# Patient Record
Sex: FEMALE | Race: OTHER | HISPANIC OR LATINO | Employment: UNEMPLOYED | URBAN - METROPOLITAN AREA
[De-identification: names, ages, dates, MRNs, and addresses within clinical notes are randomized per-mention and may not be internally consistent; named-entity substitution may affect disease eponyms.]

---
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
## 2021-02-09 NOTE — PATIENT INSTRUCTIONS
Vulvovaginal Candidiasis   WHAT YOU NEED TO KNOW:   Vulvovaginal candidiasis, or yeast infection, is a common vaginal infection  Vulvovaginal candidiasis is caused by a fungus, or yeast-like germ  Fungi are normally found in your vagina  When there are too many fungi, it can cause an infection  DISCHARGE INSTRUCTIONS:   Return to the emergency department if:   · You have fever and chills  · You are bleeding from your vagina and it is not your monthly period  · You develop abdominal or pelvic pain  Contact your healthcare provider if:   · Your signs and symptoms get worse, even after treatment  · You have questions or concerns about your condition or care  Medicines:   · Medicines  help treat the fungal infection and decrease inflammation  The medicine may be a pill, topical cream, or vaginal suppository  · Take your medicine as directed  Contact your healthcare provider if you think your medicine is not helping or if you have side effects  Tell him of her if you are allergic to any medicine  Keep a list of the medicines, vitamins, and herbs you take  Include the amounts, and when and why you take them  Bring the list or the pill bottles to follow-up visits  Carry your medicine list with you in case of an emergency  Manage your symptoms:   · Wear cotton underwear  · Keep the vaginal area clean and dry  · Do not have sex until your symptoms are gone  · Do not douche  · Do not use feminine hygiene sprays, powders, or bubble bath  Prevent another infection:   · Take showers instead of baths  · Eat yogurt  · Limit the amount of alcohol you drink'    · Control your blood sugar if you are diabetic  · Limit your time in hot tubs  Follow up with your healthcare provider as directed:  Write down your questions so you remember to ask them during your visits     © 2017 Elvira Lopez Information is for End User's use only and may not be sold, redistributed or otherwise used well developed, well nourished , in no acute distress , ambulating without difficulty , normal communication ability for commercial purposes  All illustrations and images included in CareNotes® are the copyrighted property of A D A M , Inc  or Huan Simon  The above information is an  only  It is not intended as medical advice for individual conditions or treatments  Talk to your doctor, nurse or pharmacist before following any medical regimen to see if it is safe and effective for you

## 2021-07-25 NOTE — PROGRESS NOTES
L&D Triage Note - OB/GYN  Mindy Garza 24 y o  female MRN: [id number]  Unit/Bed#: L&D 329-01 Encounter: [id number]    Patient is seen by EMILY  _________________________________________________________  ASSESSMENT:  _________________________________________________________  Mindy Garza is a 24 y o   at 39w3d latent labor  _________________________________________________________  PLAN:  _________________________________________________________  1) Latent labor   - 3/70/-1, pt comfortable, declines 2 hour recheck as she lives 5 min away and would like to go home and come back when labor pains get worse  2) Continue routine prenatal care  ) Discharge from [facility] triage with term labor precautions    - Reviewed rupture of membranes, false vs true labor, decreased fetal movement, and vaginal bleeding   - Pt to call provider with any concerns and follow up at her next scheduled prenatal appointment, plan is for induction tomorrow night, this may be bumped up to the AM if she does not return in labor today, discussed with patient, I will call her today for an update on her status   - Case discussed with Dr Rodrick Blank  _________________________________________________________  SUBJECTIVE:  _________________________________________________________  Mindy Garza 24 y o   at 39w3d with an Estimated Date of Delivery: 20 who presents for rule out labor  Her contractions started overnight and have worsened but she does not appear to be in distress  She has no other obstetric complaints she is on for elective induction tomorrow night      Contractions: occasional  Leakage of fluid: none  Vaginal Bleeding: none  Fetal movement: present  _________________________________________________________  OBJECTIVE:  _________________________________________________________  Vitals:    20 0550   BP: 129/58   Pulse: 84   Resp: 20   Temp: 98 5 °F (36 9 °C)   SpO2:        ROS:  Constitutional: Negative  Respiratory: Negative  Cardiovascular: Negative    Gastrointestinal: Negative    General Physical Exam:  General: in no apparent distress, well developed and well nourished and non-toxic  Cardiovascular: deferred  Lungs: non-labored breathing  Abdomen: abdomen is soft without significant tenderness, masses, organomegaly or guarding  Lower extremeties: nontender    Cervical Exam  SVE: 3/70/-1    Fetal monitoring:  FHT:  130 bpm/ Moderate 6 - 25 bpm / 15 x 15 accelerations present, no decelerations  [place]: contractions q70-27tka, pt appears comfortable, not in distress     Juwan Alejandra, DO  PGY-4 OB/GYN Resident   8/20/2020 6:38 AM minimum assist (75% patients effort)

## 2021-12-14 ENCOUNTER — HOSPITAL ENCOUNTER (EMERGENCY)
Facility: HOSPITAL | Age: 21
Discharge: HOME | End: 2021-12-14
Attending: EMERGENCY MEDICINE
Payer: COMMERCIAL

## 2021-12-14 VITALS
RESPIRATION RATE: 18 BRPM | SYSTOLIC BLOOD PRESSURE: 108 MMHG | WEIGHT: 196.21 LBS | TEMPERATURE: 97.8 F | OXYGEN SATURATION: 100 % | DIASTOLIC BLOOD PRESSURE: 56 MMHG | BODY MASS INDEX: 34.76 KG/M2 | HEART RATE: 74 BPM

## 2021-12-14 DIAGNOSIS — R10.9 NONSPECIFIC ABDOMINAL PAIN: Primary | ICD-10-CM

## 2021-12-14 LAB
BACTERIA UR QL AUTO: ABNORMAL /HPF
BILIRUB UR QL STRIP: NEGATIVE
CLARITY UR: CLEAR
COLOR UR: YELLOW
EXT PREG TEST URINE: NEGATIVE
EXT. CONTROL ED NAV: NORMAL
GLUCOSE UR STRIP-MCNC: NEGATIVE MG/DL
HGB UR QL STRIP.AUTO: ABNORMAL
KETONES UR STRIP-MCNC: ABNORMAL MG/DL
LEUKOCYTE ESTERASE UR QL STRIP: NEGATIVE
NITRITE UR QL STRIP: NEGATIVE
NON-SQ EPI CELLS URNS QL MICRO: ABNORMAL /HPF
PH UR STRIP.AUTO: 5.5 [PH] (ref 4.5–8)
PROT UR STRIP-MCNC: ABNORMAL MG/DL
RBC #/AREA URNS AUTO: ABNORMAL /HPF
SP GR UR STRIP.AUTO: >=1.03 (ref 1–1.03)
UROBILINOGEN UR QL STRIP.AUTO: 0.2 E.U./DL
WBC #/AREA URNS AUTO: ABNORMAL /HPF

## 2021-12-14 PROCEDURE — 81025 URINE PREGNANCY TEST: CPT

## 2021-12-14 PROCEDURE — 81001 URINALYSIS AUTO W/SCOPE: CPT

## 2021-12-14 PROCEDURE — 99284 EMERGENCY DEPT VISIT MOD MDM: CPT

## 2021-12-14 PROCEDURE — 99284 EMERGENCY DEPT VISIT MOD MDM: CPT | Performed by: EMERGENCY MEDICINE

## 2022-01-05 DIAGNOSIS — Z11.59 SCREENING FOR VIRAL DISEASE: Primary | ICD-10-CM

## 2022-01-05 PROCEDURE — U0005 INFEC AGEN DETEC AMPLI PROBE: HCPCS | Performed by: INTERNAL MEDICINE

## 2022-01-05 PROCEDURE — U0003 INFECTIOUS AGENT DETECTION BY NUCLEIC ACID (DNA OR RNA); SEVERE ACUTE RESPIRATORY SYNDROME CORONAVIRUS 2 (SARS-COV-2) (CORONAVIRUS DISEASE [COVID-19]), AMPLIFIED PROBE TECHNIQUE, MAKING USE OF HIGH THROUGHPUT TECHNOLOGIES AS DESCRIBED BY CMS-2020-01-R: HCPCS | Performed by: INTERNAL MEDICINE

## 2022-01-06 ENCOUNTER — HOSPITAL ENCOUNTER (EMERGENCY)
Facility: HOSPITAL | Age: 22
Discharge: HOME | End: 2022-01-06
Attending: EMERGENCY MEDICINE
Payer: MEDICARE

## 2022-01-06 VITALS
HEART RATE: 88 BPM | DIASTOLIC BLOOD PRESSURE: 107 MMHG | SYSTOLIC BLOOD PRESSURE: 135 MMHG | WEIGHT: 197.1 LBS | RESPIRATION RATE: 18 BRPM | TEMPERATURE: 96.5 F | BODY MASS INDEX: 34.91 KG/M2 | OXYGEN SATURATION: 100 %

## 2022-01-06 DIAGNOSIS — K08.89 DENTALGIA: Primary | ICD-10-CM

## 2022-01-06 LAB
EXT PREG TEST URINE: NEGATIVE
EXT. CONTROL ED NAV: NORMAL

## 2022-01-06 PROCEDURE — 99284 EMERGENCY DEPT VISIT MOD MDM: CPT | Performed by: PHYSICIAN ASSISTANT

## 2022-01-06 PROCEDURE — 99283 EMERGENCY DEPT VISIT LOW MDM: CPT

## 2022-01-06 PROCEDURE — 81025 URINE PREGNANCY TEST: CPT | Performed by: PHYSICIAN ASSISTANT

## 2022-01-06 PROCEDURE — 96372 THER/PROPH/DIAG INJ SC/IM: CPT

## 2022-01-06 RX ORDER — PENICILLIN V POTASSIUM 500 MG/1
500 TABLET ORAL 4 TIMES DAILY
Qty: 28 TABLET | Refills: 0 | Status: SHIPPED | OUTPATIENT
Start: 2022-01-06 | End: 2022-01-13

## 2022-01-06 RX ORDER — KETOROLAC TROMETHAMINE 10 MG/1
10 TABLET, FILM COATED ORAL EVERY 6 HOURS PRN
Qty: 20 TABLET | Refills: 0 | Status: SHIPPED | OUTPATIENT
Start: 2022-01-06

## 2022-01-06 RX ORDER — PENICILLIN V POTASSIUM 250 MG/1
500 TABLET ORAL ONCE
Status: COMPLETED | OUTPATIENT
Start: 2022-01-06 | End: 2022-01-06

## 2022-01-06 RX ORDER — KETOROLAC TROMETHAMINE 30 MG/ML
15 INJECTION, SOLUTION INTRAMUSCULAR; INTRAVENOUS ONCE
Status: COMPLETED | OUTPATIENT
Start: 2022-01-06 | End: 2022-01-06

## 2022-01-06 RX ADMIN — PENICILLIN V POTASSIUM 500 MG: 250 TABLET, FILM COATED ORAL at 04:22

## 2022-01-06 RX ADMIN — KETOROLAC TROMETHAMINE 15 MG: 30 INJECTION, SOLUTION INTRAMUSCULAR; INTRAVENOUS at 04:23

## 2022-01-06 NOTE — ED PROVIDER NOTES
History  Chief Complaint   Patient presents with    Dental Pain     Pt reports right sided dental pain and headache  Tearful in triage   Headache     Patient presents for an evaluation of R upper dental pain ongoing for the past week  Patient has a dentist appointment later today at 0930  States she has tried using Tylenol and ibuprofen at home with little relief  States pain radiates up R side of her face  Denies any fevers, drooling, swelling, abdominal pain, vomiting, throat pain  No other complaints  Prior to Admission Medications   Prescriptions Last Dose Informant Patient Reported? Taking?   etonogestrel (NEXPLANON) subdermal implant   Yes No   Si mg by Subdermal route once      Facility-Administered Medications: None       Past Medical History:   Diagnosis Date    Depression        Past Surgical History:   Procedure Laterality Date    MT  DELIVERY ONLY N/A 2020    Procedure:  SECTION (); Surgeon: Delphine Miller MD;  [facility];  Service: Obstetrics       Family History   Problem Relation Age of Onset    No Known Problems Mother     No Known Problems Father     No Known Problems Sister     No Known Problems Brother     Diabetes Neg Hx     Cancer Neg Hx      I have reviewed and agree with the history as documented  E-Cigarette/Vaping    E-Cigarette Use Never User      E-Cigarette/Vaping Substances    Nicotine No     THC No     CBD No     Flavoring No     Other No     Unknown No      Social History     Tobacco Use    Smoking status: Current Every Day Smoker     Types: Pipe    Smokeless tobacco: Never Used   Vaping Use    Vaping Use: Never used   Substance Use Topics    Alcohol use: Not Currently     Comment: "sometimes"    Drug use: Never       Review of Systems   Constitutional: Negative for chills and fever  HENT: Positive for dental problem  Negative for congestion, drooling, ear pain, facial swelling and sore throat      Eyes: Negative for pain  Respiratory: Negative for cough and shortness of breath  Cardiovascular: Negative for chest pain  Gastrointestinal: Negative for abdominal pain, nausea and vomiting  Genitourinary: Negative for dysuria  Musculoskeletal: Negative for back pain  Skin: Negative for rash  Neurological: Negative for dizziness and numbness  Psychiatric/Behavioral: Negative for suicidal ideas  All other systems reviewed and are negative  Physical Exam  Physical Exam  Vitals reviewed  Constitutional:       Appearance: She is well-developed  HENT:      Head: Normocephalic and atraumatic  Right Ear: External ear normal       Left Ear: External ear normal       Nose: Nose normal       Mouth/Throat:      Mouth: Mucous membranes are moist       Dentition: Dental tenderness and gingival swelling present  No dental abscesses  Pharynx: Oropharynx is clear  No oropharyngeal exudate or posterior oropharyngeal erythema  Comments: No obvious dental caries  No abscess appreciated  Mild gingival swelling noted  Cardiovascular:      Rate and Rhythm: Normal rate and regular rhythm  Heart sounds: Normal heart sounds  Pulmonary:      Effort: Pulmonary effort is normal       Breath sounds: Normal breath sounds  Abdominal:      General: Bowel sounds are normal       Palpations: Abdomen is soft  Tenderness: There is no abdominal tenderness  Musculoskeletal:         General: Normal range of motion  Cervical back: Normal range of motion and neck supple  Skin:     General: Skin is warm and dry  Capillary Refill: Capillary refill takes less than 2 seconds  Neurological:      Mental Status: She is alert and oriented to person, place, and time     Psychiatric:         Behavior: Behavior normal          Vital Signs  ED Triage Vitals   Temperature Pulse Respirations Blood Pressure SpO2   01/06/22 0408 01/06/22 0408 01/06/22 0408 01/06/22 0408 01/06/22 0408   (!) 96 5 °F (35 8 °C) 88 18 (!) 135/107 100 %      Temp Source Heart Rate Source Patient Position - Orthostatic VS BP Location FiO2 (%)   01/06/22 0408 01/06/22 0408 01/06/22 0408 01/06/22 0408 --   Tympanic Monitor Lying Left arm       Pain Score       01/06/22 0423       10 - Worst Possible Pain           Vitals:    01/06/22 0408   BP: (!) 135/107   Pulse: 88   Patient Position - Orthostatic VS: Lying         Visual Acuity      ED Medications  Medications   ketorolac (TORADOL) injection 15 mg (15 mg Intramuscular Given 1/6/22 0423)   penicillin V potassium (VEETID) tablet 500 mg (500 mg Oral Given 1/6/22 0422)       Diagnostic Studies  Results Reviewed     Procedure Component Value Units Date/Time    POCT pregnancy, urine [[id number]]  (Normal) Resulted: 01/06/22 0423    Lab Status: Final result Updated: 01/06/22 0423     EXT PREG TEST UR (Ref: Negative) negative     Control valid                 No orders to display              Procedures  Procedures         ED Course                                             MDM  Number of Diagnoses or Management Options  Dentalgia  Diagnosis management comments: Patient has dental appointment later this morning  Emphasized importance of making appointment  Will DC with Toradol and penicillin  Patient agreeable      Disposition  Final diagnoses:   Ammon Rajan     Time reflects when diagnosis was documented in both MDM as applicable and the Disposition within this note     Time User Action Codes Description Comment    1/6/2022  4:23 AM Franco Ramirez Add [K08 89] Ammon Rajan       ED Disposition     ED Disposition Condition Date/Time Comment    Discharge Stable u Jan 6, 2022  4:23 AM Narcisa Medicmalik discharge to home/self care              Follow-up Information     Follow up With Specialties Details Why [address]    [address]  [place] [id number]  [phone]          Patient's Medications   Discharge Prescriptions    KETOROLAC (TORADOL) 10 MG TABLET    Take 1 tablet (10 mg total) by mouth every 6 (six) hours as needed for moderate pain       Start Date: 1/6/2022  End Date: --       Order Dose: 10 mg       Quantity: 20 tablet    Refills: 0    PENICILLIN V POTASSIUM (VEETID) 500 MG TABLET    Take 1 tablet (500 mg total) by mouth 4 (four) times a day for 7 days       Start Date: 1/6/2022  End Date: 1/13/2022       Order Dose: 500 mg       Quantity: 28 tablet    Refills: 0       No discharge procedures on file      PDMP Review     None          ED Provider  Electronically Signed by           Prince Virgilio PA-C  01/06/22 9838

## 2022-01-06 NOTE — DISCHARGE INSTRUCTIONS
Please follow up with your dentist appointment later this morning  Use medication as prescribed  Please return to the ER with any worsening symptoms

## 2023-05-16 ENCOUNTER — APPOINTMENT (EMERGENCY)
Dept: ULTRASOUND IMAGING | Facility: HOSPITAL | Age: 23
End: 2023-05-16

## 2023-05-16 ENCOUNTER — HOSPITAL ENCOUNTER (EMERGENCY)
Facility: HOSPITAL | Age: 23
Discharge: HOME | End: 2023-05-17
Attending: EMERGENCY MEDICINE

## 2023-05-16 VITALS
BODY MASS INDEX: 33.66 KG/M2 | WEIGHT: 190 LBS | TEMPERATURE: 98.4 F | SYSTOLIC BLOOD PRESSURE: 109 MMHG | HEART RATE: 87 BPM | RESPIRATION RATE: 16 BRPM | DIASTOLIC BLOOD PRESSURE: 62 MMHG | OXYGEN SATURATION: 100 %

## 2023-05-16 DIAGNOSIS — O23.41 URINARY TRACT INFECTION IN MOTHER DURING FIRST TRIMESTER OF PREGNANCY: ICD-10-CM

## 2023-05-16 DIAGNOSIS — M54.9 BACK PAIN: ICD-10-CM

## 2023-05-16 DIAGNOSIS — Z32.01 POSITIVE PREGNANCY TEST: Primary | ICD-10-CM

## 2023-05-16 DIAGNOSIS — R10.30 LOWER ABDOMINAL PAIN: ICD-10-CM

## 2023-05-16 LAB
B-HCG SERPL-ACNC: 2755 MIU/ML (ref 0–5)
BACTERIA UR QL AUTO: ABNORMAL /HPF
BILIRUB UR QL STRIP: NEGATIVE
BUDDING YEAST: PRESENT
CAOX CRY URNS QL MICRO: ABNORMAL /HPF
CLARITY UR: ABNORMAL
COLOR UR: YELLOW
EXT PREGNANCY TEST URINE: POSITIVE
EXT. CONTROL: ABNORMAL
GLUCOSE UR STRIP-MCNC: NEGATIVE MG/DL
HGB UR QL STRIP.AUTO: ABNORMAL
KETONES UR STRIP-MCNC: NEGATIVE MG/DL
LEUKOCYTE ESTERASE UR QL STRIP: ABNORMAL
MUCOUS THREADS UR QL AUTO: ABNORMAL
NITRITE UR QL STRIP: NEGATIVE
NON-SQ EPI CELLS URNS QL MICRO: ABNORMAL /HPF
PH UR STRIP.AUTO: 7 [PH] (ref 4.5–8)
PROT UR STRIP-MCNC: ABNORMAL MG/DL
RBC #/AREA URNS AUTO: ABNORMAL /HPF
SP GR UR STRIP.AUTO: 1.02 (ref 1–1.03)
UROBILINOGEN UR QL STRIP.AUTO: 0.2 E.U./DL
WBC #/AREA URNS AUTO: ABNORMAL /HPF

## 2023-05-16 RX ORDER — CEPHALEXIN 250 MG/1
500 CAPSULE ORAL ONCE
Status: COMPLETED | OUTPATIENT
Start: 2023-05-16 | End: 2023-05-16

## 2023-05-16 RX ORDER — KETOROLAC TROMETHAMINE 30 MG/ML
15 INJECTION, SOLUTION INTRAMUSCULAR; INTRAVENOUS ONCE
Status: DISCONTINUED | OUTPATIENT
Start: 2023-05-16 | End: 2023-05-17 | Stop reason: HOSPADM

## 2023-05-16 RX ORDER — CEPHALEXIN 500 MG/1
500 CAPSULE ORAL EVERY 12 HOURS SCHEDULED
Qty: 14 CAPSULE | Refills: 0 | Status: SHIPPED | OUTPATIENT
Start: 2023-05-16 | End: 2023-05-23

## 2023-05-16 RX ORDER — ACETAMINOPHEN 325 MG/1
650 TABLET ORAL ONCE
Status: COMPLETED | OUTPATIENT
Start: 2023-05-16 | End: 2023-05-16

## 2023-05-16 RX ADMIN — CEPHALEXIN 500 MG: 250 CAPSULE ORAL at 23:51

## 2023-05-16 RX ADMIN — ACETAMINOPHEN 325MG 325 MG: 325 TABLET ORAL at 21:40

## 2023-05-16 NOTE — Clinical Note
Daja Landis was seen and treated in our emergency department on 5/16/2023  No restrictions            Diagnosis: Abdominal Pain    Grismerlin  may return to work on return date  She may return on this date: 05/18/2023         If you have any questions or concerns, please don't hesitate to call        Josseline Garcia PA-C    ______________________________           _______________          _______________  Hospital Representative                              Date                                Time

## 2023-05-17 NOTE — ED PROVIDER NOTES
History  Chief Complaint   Patient presents with   • Abdominal Pain     Pt c/o bilateral lower abd pain and HA x2 days  Denies N/V/D     This is a 81-year-old female presenting for evaluation of bilateral lower abdominal pain, low back pain and headache x2 days  Patient denies any fevers or chills, no nausea or vomiting  She denies any diarrhea or constipation  She denies any trauma or injury to the areas  She has not taken anything for pain  She endorses dysuria and frequency, no urgency or hematuria  She denies any vaginal discharge or bleeding, no concerns for STDs  LMP was 2023  Patient states headache feels similar to headaches patient has had in the past   No URI symptoms  No visual changes, no N/T/W  History provided by:  Patient   used: No        Prior to Admission Medications   Prescriptions Last Dose Informant Patient Reported? Taking?   etonogestrel (NEXPLANON) subdermal implant   Yes No   Si mg by Subdermal route once   ketorolac (TORADOL) 10 mg tablet   No No   Sig: Take 1 tablet (10 mg total) by mouth every 6 (six) hours as needed for moderate pain      Facility-Administered Medications: None       Past Medical History:   Diagnosis Date   • Depression        Past Surgical History:   Procedure Laterality Date   • [place]  DELIVERY ONLY N/A 2020    Procedure:  SECTION (); Surgeon: Cammie Kirkpatrick MD;  Location: [facility];  Service: Obstetrics       Family History   Problem Relation Age of Onset   • No Known Problems Mother    • No Known Problems Father    • No Known Problems Sister    • No Known Problems Brother    • Diabetes Neg Hx    • Cancer Neg Hx      I have reviewed and agree with the history as documented      E-Cigarette/Vaping   • E-Cigarette Use Never User      E-Cigarette/Vaping Substances   • Nicotine No    • THC No    • CBD No    • Flavoring No    • Other No    • Unknown No      Social History     Tobacco Use   • Smoking status: "Some Days     Types: Pipe   • Smokeless tobacco: Never   Vaping Use   • Vaping Use: Never used   Substance Use Topics   • Alcohol use: Not Currently     Comment: \"sometimes\"   • Drug use: Never       Review of Systems   Constitutional: Negative for chills and fever  Gastrointestinal: Positive for abdominal pain  Negative for diarrhea, nausea and vomiting  Genitourinary: Positive for dysuria and frequency  Negative for vaginal bleeding and vaginal discharge  All other systems reviewed and are negative  Physical Exam  Physical Exam  Vitals reviewed  Constitutional:       General: She is not in acute distress  Appearance: Normal appearance  She is well-developed and well-groomed  She is not ill-appearing, toxic-appearing or diaphoretic  HENT:      Head: Normocephalic and atraumatic  Right Ear: External ear normal       Left Ear: External ear normal       Nose: Nose normal  No congestion or rhinorrhea  Mouth/Throat:      Mouth: Mucous membranes are moist       Pharynx: Oropharynx is clear  No oropharyngeal exudate or posterior oropharyngeal erythema  Eyes:      General: No scleral icterus  Right eye: No discharge  Left eye: No discharge  Extraocular Movements: Extraocular movements intact  Conjunctiva/sclera: Conjunctivae normal    Cardiovascular:      Rate and Rhythm: Normal rate and regular rhythm  Pulses: Normal pulses  Heart sounds: No murmur heard  No friction rub  No gallop  Pulmonary:      Effort: Pulmonary effort is normal  No respiratory distress  Breath sounds: Normal breath sounds  No wheezing, rhonchi or rales  Abdominal:      General: Abdomen is flat  There is no distension  Palpations: Abdomen is soft  Tenderness: There is abdominal tenderness in the suprapubic area  There is no right CVA tenderness, left CVA tenderness, guarding or rebound  Musculoskeletal:         General: No deformity  Normal range of motion        " Cervical back: Normal, normal range of motion and neck supple  Thoracic back: Normal       Lumbar back: Normal  No swelling, spasms, tenderness or bony tenderness  Normal range of motion  Negative right straight leg raise test and negative left straight leg raise test    Skin:     General: Skin is warm and dry  Coloration: Skin is not jaundiced or pale  Findings: No rash  Neurological:      General: No focal deficit present  Mental Status: She is alert  Psychiatric:         Mood and Affect: Mood normal          Behavior: Behavior normal  Behavior is cooperative  Vital Signs  ED Triage Vitals [05/16/23 2003]   Temperature Pulse Respirations Blood Pressure SpO2   98 4 °F (36 9 °C) 84 18 128/73 100 %      Temp Source Heart Rate Source Patient Position - Orthostatic VS BP Location FiO2 (%)   Oral Monitor Sitting Right arm --      Pain Score       --           Vitals:    05/16/23 2003 05/16/23 2207   BP: 128/73 109/62   Pulse: 84 87   Patient Position - Orthostatic VS: Sitting Lying         Visual Acuity      ED Medications  Medications   acetaminophen (TYLENOL) tablet 650 mg (325 mg Oral Given 5/16/23 2140)   cephalexin (KEFLEX) capsule 500 mg (500 mg Oral Given 5/16/23 2351)       Diagnostic Studies  Results Reviewed     Procedure Component Value Units Date/Time    Urine Microscopic [[id number]]  (Abnormal) Collected: 05/16/23 2047    Lab Status: Final result Specimen: Urine, Clean Catch Updated: 05/16/23 2331     RBC, UA 20-30 /hpf      WBC, UA Innumerable /hpf      Epithelial Cells Occasional /hpf      Bacteria, UA Occasional /hpf      MUCUS THREADS Occasional     Ca Oxalate Iesha, UA Occasional /hpf      Budding Yeast Present    Urine culture [[id number]] Collected: 05/16/23 2047    Lab Status:  In process Specimen: Urine, Clean Catch Updated: 05/16/23 2330    hCG, quantitative [[id number]]  (Abnormal) Collected: 05/16/23 2151    Lab Status: Final result Specimen: Blood from Arm, Right Updated: 23     HCG, Quant 2,755 mIU/mL     Narrative:       Expected Ranges:    HCG results between 5 and 25 mIU/mL may be indicative of early pregnancy but should be interpreted in light of the total clinical presentation  HCG can rise to detectable levels in bull and post menopausal women (0-11 6 mIU/mL  Approximate               Approximate HCG  Gestation age          Concentration ( mIU/mL)  _____________          ______________________   Carolina Blow                      HCG values  0 2-1                       5-50  1-2                           2-3                         100-5000  3-4                         500-34686  4-5                         1000-59601  5-6                         [id number]  6-8                         05509-670906  8-12                        [id number]      POCT pregnancy, urine [[id number]]  (Abnormal) Resulted: 23    Lab Status: Final result Updated: 23     EXT Preg Test, Ur Positive     Control Valid    Urine Macroscopic, POC [[id number]]  (Abnormal) Collected: 23    Lab Status: Final result Specimen: Urine Updated: 23     Color, UA Yellow     Clarity, UA Slightly Cloudy     pH, UA 7 0     Leukocytes, UA Small     Nitrite, UA Negative     Protein, UA Trace mg/dl      Glucose, UA Negative mg/dl      Ketones, UA Negative mg/dl      Urobilinogen, UA 0 2 E U /dl      Bilirubin, UA Negative     Occult Blood, UA Moderate     Specific [place], UA 1 020    Narrative:      CLINITEK RESULT                 US OB < 14 weeks with transvaginal   Final Result by Ashely Wallis MD ([id number])      No intrauterine gestation or adnexal mass identified  Differential remains early IUP, spontaneous  and ectopic pregnancy  Correlate with serial quantitative BHCG              Workstation performed: [id number]                    Procedures  Procedures         ED Course  ED Course as of 237   Tue May 16, 2023   2053 Leukocytes, UA(!): Small    Blood, UA(!): Moderate    Clarity, UA: Slightly Cloudy    LMP 2141 PREGNANCY TEST URINE(!): Positive   230 HCG QUANTITATIVE(!): 2,755   2309 US OB < 14 weeks with transvaginal  No intrauterine gestation or adnexal mass identified  Differential remains early IUP, spontaneous  and ectopic pregnancy  Correlate with serial quantitative BHCG  2340 WBC, UA(!): Innumerable   2340 Bacteria, UA: Occasional             SBIRT 20yo+    Flowsheet Row Most Recent Value   Initial Alcohol Screen: US AUDIT-C     1  How often do you have a drink containing alcohol? 0 Filed at: 2023   2  How many drinks containing alcohol do you have on a typical day you are drinking? 0 Filed at: 2023   3b  FEMALE Any Age, or MALE 65+: How often do you have 4 or more drinks on one occassion? 0 Filed at: 2023   Audit-C Score 0 Filed at: 2023   DARIUS: How many times in the past year have you    Used an illegal drug or used a prescription medication for non-medical reasons? Never Filed at: 2023                    Medical Decision Making      DDx including but not limited to: appendicitis, gastroenteritis, gastritis, PUD, GERD, colitis, enteritis, bowel obstruction,perforated viscus, tumor, diverticulitis, internal hernia, constipation, pelvic pathology, renal colic, pyelonephritis, UTI  Patient presenting for evaluation of lower abdominal and lower back pain x2 days  Patient has not taken anything for pain  She endorses dysuria and frequency as well, no nausea, vomiting or diarrhea  Patient has some mild tenderness to palpation of the suprapubic region, no pain on palpation of the back  Will order UA for evaluation of urinary tract infection, U preg for evaluation of pregnancy status  Will treat symptomatically with Tylenol and Toradol if upreg neg  UA with small leuks and moderate blood, urine micro pending at this time    U preg positive, given abdominal pain in the setting of positive pregnancy test, will order TVUS to rule out ectopic pregnancy  LMP 2023  Will order hCG quant as well  We will hold Toradol at this time  hCG quant 2754  US: No intrauterine gestation or adnexal mass identified  Differential remains early IUP, spontaneous  and ectopic pregnancy  Correlate with serial quantitative HCG  UA with innumerable WBC but occasional bacteria  Given symptomatic and positive preg with treat with Keflex  Patient given referral to OB/GYN as she states she has no followed up and repeat hCG quant in 2 days  Reviewed strict return precautions, patient verbalized understanding of signs and symptoms that warrant return to the ED  Prior to discharge, discharge instructions were discussed with patient at bedside  Patient was provided both verbal and written instructions  Patient is understanding of the discharge instructions and is agreeable to plan of care  Return precautions were discussed with patient bedside, patient verbalized understanding of signs and symptoms that would necessitate return to the ED  All questions were answered  Patient was comfortable with the plan of care and discharged to home  Dispo: discharge home with follow up to OBGYN  Patient stable, in no acute distress and non-toxic at discharge  Back pain: acute illness or injury  Lower abdominal pain: acute illness or injury  Positive pregnancy test: acute illness or injury  Urinary tract infection in mother during first trimester of pregnancy: acute illness or injury  Amount and/or Complexity of Data Reviewed  Labs: ordered  Decision-making details documented in ED Course  Radiology: ordered  Decision-making details documented in ED Course  Risk  OTC drugs  Prescription drug management            Disposition  Final diagnoses:   Positive pregnancy test   Lower abdominal pain   Back pain   Urinary tract infection in mother during first trimester of pregnancy     Time reflects when diagnosis was documented in both MDM as applicable and the Disposition within this note     Time User Action Codes Description Comment    5/16/2023 11:41 PM Ana Aida Add [Z32 01] Positive pregnancy test     5/16/2023 11:41 PM Ana Aida Add [R10 30] Lower abdominal pain     5/16/2023 11:41 PM Ana Aida Add [M54 9] Back pain     5/16/2023 11:42 PM Ana Aida Add [O23 41] Urinary tract infection in mother during first trimester of pregnancy       ED Disposition     ED Disposition   Discharge    Condition   Stable    Date/Time   Tue May 16, 2023 11:09 PM    Comment   Litone Funmilayo discharge to home/self care  Follow-up Information     Follow up With Specialties Details Why Contact Info Additional [address] [facility] Schedule an appointment as soon as possible for a visit   [address] [facility] [address] [facility] [address]  [address] [place] [address], [address] [place] [address], [address] [place] [address], [address] [phone]          Discharge Medication List as of 5/16/2023 11:46 PM      START taking these medications    Details   cephalexin (KEFLEX) 500 mg capsule Take 1 capsule (500 mg total) by mouth every 12 (twelve) hours for 7 days, Starting Tue 5/16/2023, Until Tue 5/23/2023, Normal         CONTINUE these medications which have NOT CHANGED    Details   etonogestrel (NEXPLANON) subdermal implant 68 mg by Subdermal route once, Starting Tue 8/25/2020, Historical Med      ketorolac (TORADOL) 10 mg tablet Take 1 tablet (10 mg total) by mouth every 6 (six) hours as needed for moderate pain, Starting Thu 1/6/2022, Normal             Outpatient Discharge Orders   hCG, quantitative   Standing Status: Future Standing Exp   Date: 05/16/24       PDMP Review     None          ED Provider  Electronically Signed by [facility] [address] [place], [place]  05/17/23 8201

## 2023-05-19 LAB — BACTERIA UR CULT: ABNORMAL

## 2023-06-01 ENCOUNTER — HOSPITAL ENCOUNTER (EMERGENCY)
Facility: HOSPITAL | Age: 23
Discharge: HOME | End: 2023-06-01
Attending: EMERGENCY MEDICINE

## 2023-06-01 VITALS
WEIGHT: 201.5 LBS | BODY MASS INDEX: 35.69 KG/M2 | OXYGEN SATURATION: 100 % | TEMPERATURE: 97.8 F | RESPIRATION RATE: 20 BRPM | HEART RATE: 97 BPM | SYSTOLIC BLOOD PRESSURE: 152 MMHG | DIASTOLIC BLOOD PRESSURE: 89 MMHG

## 2023-06-01 DIAGNOSIS — K04.7 DENTAL INFECTION: Primary | ICD-10-CM

## 2023-06-01 RX ORDER — ACETAMINOPHEN 325 MG/1
650 TABLET ORAL ONCE
Status: COMPLETED | OUTPATIENT
Start: 2023-06-01 | End: 2023-06-01

## 2023-06-01 RX ORDER — AMOXICILLIN 500 MG/1
500 CAPSULE ORAL EVERY 8 HOURS SCHEDULED
Qty: 21 CAPSULE | Refills: 0 | Status: SHIPPED | OUTPATIENT
Start: 2023-06-01 | End: 2023-06-08

## 2023-06-01 RX ORDER — LIDOCAINE HYDROCHLORIDE 20 MG/ML
15 SOLUTION OROPHARYNGEAL 4 TIMES DAILY PRN
Status: DISCONTINUED | OUTPATIENT
Start: 2023-06-01 | End: 2023-06-01 | Stop reason: HOSPADM

## 2023-06-01 RX ADMIN — ACETAMINOPHEN 650 MG: 325 TABLET ORAL at 12:45

## 2023-06-01 RX ADMIN — Medication 15 ML: at 12:56

## 2023-06-01 NOTE — DISCHARGE INSTRUCTIONS
Today I provided you with a prescription for amoxicillin  This is a antibiotic that can help with the dental infection that is causing your tooth pain  You can take Tylenol 1000 mg every 8 hours as needed for pain management  Do not take more than 3000 mg of Tylenol in a 24-hour time period as this can contribute to a liver injury  Avoid NSAIDs as you are currently pregnant  Taking NSAIDs while pregnant can lead to unwanted pregnancy complications  I provided you with a referral to the [facility] at [facility]  Please follow-up with them for further management of your dental infection  Please follow up with OBGYN for management of current pregnancy

## 2023-06-01 NOTE — ED PROVIDER NOTES
History  Chief Complaint   Patient presents with   • Dental Pain     Pt reports R sided dental pain, woke up with it  Took no OTC meds, pregnant  28-year-old female with no pertinent medical history presents with dental pain  She states that the dental pain started this morning  The pain is located on the upper right molar region  She denies history of prior dental infections  She denies recent dental traumas  She rates the pain a 10 out of 10  She is not taking Tylenol or ibuprofen for the pain  She denies fever, chills, nausea, vomiting, diarrhea, chest pain, shortness of breath, change in bowel or bladder habits, loss of motor function, loss of touch sensation, visual changes, face sensation  She denies seeing dental professional regularly  The pain is localized to 1 specific area of her mouth  Of note she was seen in the ED in the middle of May 2023 and had a positive pregnancy test   Patient is currently pregnant  Will follow up with OBGYN in early  for further pregnancy management  Prior to Admission Medications   Prescriptions Last Dose Informant Patient Reported? Taking?   etonogestrel (Rand Riggs) subdermal implant Not Taking  Yes No   Si mg by Subdermal route once   Patient not taking: Reported on 2023   ketorolac (TORADOL) 10 mg tablet Not Taking  No No   Sig: Take 1 tablet (10 mg total) by mouth every 6 (six) hours as needed for moderate pain   Patient not taking: Reported on 2023      Facility-Administered Medications: None       Past Medical History:   Diagnosis Date   • Depression        Past Surgical History:   Procedure Laterality Date   • [place]  DELIVERY ONLY N/A 2020    Procedure:  SECTION (); Surgeon:  Antonio Woodard MD;  Location: [facility];  Service: Obstetrics       Family History   Problem Relation Age of Onset   • No Known Problems Mother    • No Known Problems Father    • No Known Problems Sister    • No Known Problems "Brother    • Diabetes Neg Hx    • Cancer Neg Hx      I have reviewed and agree with the history as documented  E-Cigarette/Vaping   • E-Cigarette Use Never User      E-Cigarette/Vaping Substances   • Nicotine No    • THC No    • CBD No    • Flavoring No    • Other No    • Unknown No      Social History     Tobacco Use   • Smoking status: Some Days     Types: Pipe   • Smokeless tobacco: Never   Vaping Use   • Vaping Use: Never used   Substance Use Topics   • Alcohol use: Not Currently     Comment: \"sometimes\"   • Drug use: Never       Review of Systems   Constitutional: Negative for chills, diaphoresis and fever  HENT: Positive for dental problem  Negative for congestion, drooling, ear discharge, ear pain, facial swelling, hearing loss, mouth sores, sinus pain, sneezing and sore throat  Eyes: Negative for photophobia, pain, discharge, redness, itching and visual disturbance  Respiratory: Negative for cough, shortness of breath, wheezing and stridor  Cardiovascular: Negative for chest pain and palpitations  Gastrointestinal: Negative for abdominal pain and vomiting  Genitourinary: Negative for dysuria, frequency and hematuria  Musculoskeletal: Negative for arthralgias and back pain  Skin: Negative for color change and rash  Neurological: Negative for seizures, syncope and headaches  All other systems reviewed and are negative  Physical Exam  Physical Exam  Vitals and nursing note reviewed  Constitutional:       General: She is in acute distress  Appearance: Normal appearance  She is well-developed  She is not ill-appearing, toxic-appearing or diaphoretic  HENT:      Head: Normocephalic and atraumatic  Right Ear: Tympanic membrane, ear canal and external ear normal  There is no impacted cerumen  Left Ear: Tympanic membrane, ear canal and external ear normal  There is no impacted cerumen  Mouth/Throat:      Lips: Pink  No lesions        Mouth: Mucous membranes are " moist       Dentition: Dental tenderness present  No gingival swelling, dental caries, dental abscesses or gum lesions  Tongue: No lesions  Tongue does not deviate from midline  Pharynx: No pharyngeal swelling, oropharyngeal exudate, posterior oropharyngeal erythema or uvula swelling  Tonsils: No tonsillar exudate or tonsillar abscesses  Comments: Patient able to identify area of pain to selected tooth on diagram  No signs of inflammation near tooth  No pus or discharge  No lacerations of gums  No oral lesions seen in oral cavity  Tonsils non enlarged  No palate petechiae  Eyes:      General:         Right eye: No discharge  Left eye: No discharge  Extraocular Movements: Extraocular movements intact  Conjunctiva/sclera: Conjunctivae normal       Pupils: Pupils are equal, round, and reactive to light  Cardiovascular:      Rate and Rhythm: Normal rate and regular rhythm  Heart sounds: No murmur heard  Pulmonary:      Effort: Pulmonary effort is normal  No respiratory distress  Breath sounds: Normal breath sounds  Abdominal:      Palpations: Abdomen is soft  Tenderness: There is no abdominal tenderness  Musculoskeletal:         General: No swelling  Cervical back: Neck supple  Skin:     General: Skin is warm and dry  Capillary Refill: Capillary refill takes less than 2 seconds  Neurological:      Mental Status: She is alert     Psychiatric:         Mood and Affect: Mood normal          Vital Signs  ED Triage Vitals   Temperature Pulse Respirations Blood Pressure SpO2   06/01/23 1159 06/01/23 1159 06/01/23 1159 06/01/23 1159 06/01/23 1159   97 8 °F (36 6 °C) 97 20 152/89 100 %      Temp Source Heart Rate Source Patient Position - Orthostatic VS BP Location FiO2 (%)   06/01/23 1159 06/01/23 1159 06/01/23 1159 06/01/23 1159 --   Oral Monitor Sitting Right arm       Pain Score       06/01/23 1245       10 - Worst Possible Pain           Vitals: 06/01/23 1159   BP: 152/89   Pulse: 97   Patient Position - Orthostatic VS: Sitting         Visual Acuity      ED Medications  Medications   acetaminophen (TYLENOL) tablet 650 mg (650 mg Oral Given 6/1/23 1245)       Diagnostic Studies  Results Reviewed     None                 No orders to display              Procedures  Procedures         ED Course                                             Medical Decision Making  Patient presents with dental pain that started this morning  No prior history of dental infections  Patient is in extreme distress during physical examination  Patient given dose of tylenol for pain  Did not give NSAIDs due to patient currently being pregnant  Also tried viscous lidocaine rinse with mild relief of pain  Visual inspection of tender area demonstrates no obvious inflammation or open lesions  No expressible puss or discharge  Cause of pain likely due to dental infection  No signs of systemic infection  No other presenting symptoms besides dental pain  Will provide patient with prescription of amoxicillin and contact information for [facility] SPECIALTY [facility] dental team  Encouraged patient to follow up with OBGYN for management of current pregnancy  Prior to discharge, discharge instructions were discussed with patient at bedside  Patient was provided both verbal and written instructions  Patient is understanding of the discharge instructions and is agreeable to plan of care  Return precautions were discussed with patient bedside, patient verbalized understanding of signs and symptoms that would necessitate return to the ED  All questions were answered  Patient was comfortable with the plan of care and discharged to home  Dental infection: acute illness or injury  Risk  OTC drugs  Prescription drug management            Disposition  Final diagnoses:   Dental infection     Time reflects when diagnosis was documented in both MDM as applicable and the Disposition within this note     Time User Action Codes Description Comment    6/1/2023 12:54 PM Gearline Bumpers Add [K04 7] Dental infection       ED Disposition     ED Disposition   Discharge    Condition   Stable    Date/Time   Thu Jun 1, 2023  1:00 PM    Comment   Eliana Sonja discharge to home/self care  Follow-up Information     Follow up With Specialties Details Why [address]    [address]  [address] [facility]          Discharge Medication List as of 6/1/2023  1:01 PM      START taking these medications    Details   amoxicillin (AMOXIL) 500 mg capsule Take 1 capsule (500 mg total) by mouth every 8 (eight) hours for 7 days, Starting Thu 6/1/2023, Until Thu 6/8/2023, Normal         CONTINUE these medications which have NOT CHANGED    Details   etonogestrel (NEXPLANON) subdermal implant 68 mg by Subdermal route once, Starting Tue 8/25/2020, Historical Med      ketorolac (TORADOL) 10 mg tablet Take 1 tablet (10 mg total) by mouth every 6 (six) hours as needed for moderate pain, Starting Thu 1/6/2022, Normal             No discharge procedures on file      PDMP Review     None          ED Provider  Electronically Signed by           Amber Perez PA-C  06/01/23 7435

## 2023-06-08 ENCOUNTER — ULTRASOUND (OUTPATIENT)
Dept: OBGYN CLINIC | Facility: CLINIC | Age: 23
End: 2023-06-08

## 2023-06-08 VITALS
WEIGHT: 201 LBS | DIASTOLIC BLOOD PRESSURE: 78 MMHG | SYSTOLIC BLOOD PRESSURE: 122 MMHG | HEART RATE: 88 BPM | BODY MASS INDEX: 36.99 KG/M2 | HEIGHT: 62 IN

## 2023-06-08 DIAGNOSIS — Z32.01 POSITIVE PREGNANCY TEST: Primary | ICD-10-CM

## 2023-06-08 PROBLEM — D50.0 ANEMIA, BLOOD LOSS: Status: RESOLVED | Noted: 2020-08-26 | Resolved: 2023-06-08

## 2023-06-08 PROBLEM — Z98.891 S/P C-SECTION: Status: RESOLVED | Noted: 2020-08-26 | Resolved: 2023-06-08

## 2023-06-08 PROBLEM — Z00.8 MEDICAL CLEARANCE FOR PSYCHIATRIC ADMISSION: Status: RESOLVED | Noted: 2020-07-09 | Resolved: 2023-06-08

## 2023-06-08 PROBLEM — E66.9 OBESITY: Status: RESOLVED | Noted: 2020-11-24 | Resolved: 2023-06-08

## 2023-06-08 PROCEDURE — 76817 TRANSVAGINAL US OBSTETRIC: CPT | Performed by: NURSE PRACTITIONER

## 2023-06-08 PROCEDURE — 99213 OFFICE O/P EST LOW 20 MIN: CPT | Performed by: NURSE PRACTITIONER

## 2023-06-08 RX ORDER — VITAMIN A ACETATE, .BETA.-CAROTENE, ASCORBIC ACID, CHOLECALCIFEROL, .ALPHA.-TOCOPHEROL ACETATE, DL-, THIAMINE MONONITRATE, RIBOFLAVIN, NIACINAMIDE, PYRIDOXINE HYDROCHLORIDE, FOLIC ACID, CYANOCOBALAMIN, CALCIUM CARBONATE, FERROUS FUMARATE, ZINC OXIDE, CUPRIC OXIDE 9.9; 120; 920; 200; 400; 2; 12; 27; 1; 20; 10; 3; 1.84; 3080; 25 MG/1; MG/1; [IU]/1; MG/1; [IU]/1; MG/1; UG/1; MG/1; MG/1; MG/1; MG/1; MG/1; MG/1; [IU]/1; MG/1
1 TABLET, FILM COATED ORAL DAILY
Qty: 90 TABLET | Refills: 4 | Status: SHIPPED | OUTPATIENT
Start: 2023-06-08

## 2023-06-08 NOTE — PROGRESS NOTES
"LMP=23  ER 23 abd pain Auwu=7849  Pelvic u/s no IUP or adnexal mass      Juanjo Barnett is a  who presents today for viability/dating ultrasound  Patient's last menstrual period was 2023 (approximate)  She reports positive pregnancy test at ER on 2023  Serum Bhcg at that time was 2755  Pelvic ultrasound was also performed and noted no IUP or adnexal mass  She denies vaginal bleeding or abdominal/pelvic pain  /78   Pulse 88   Ht 5' 2\" (1 575 m)   Wt 91 2 kg (201 lb)   LMP 2023 (Approximate)   BMI 36 76 kg/m²   Abdomen soft and non-tender  Transvaginal Pelvic Ultrasound:  # of fetuses: 1  Intrauterine: yes  Average CRL: 1 57cm (8w0d)  EDC based on CRL: 2024  Fetal cardiac activity: present  FHR: 167  Additional Findings: + yolk sac    Please choose how you are assigning the NIGHAT: The gestational age by LMP is </= 8w 6d and demonstrates more than 5 days days difference from the gestational age by [facility], therefore the final NIGHAT will be based on the ultrasound at this encounter  Final NIGHAT: 2024 by ultrasound at this encounter  Return in 2 weeks for OB intake  Rx prenatal vitamins sent to pharmacy      Current Outpatient Medications   Medication Instructions   • amoxicillin (AMOXIL) 500 mg, Oral, Every 8 hours scheduled   • Prenatal Vit-Fe Fumarate-FA (Prenatal Plus Vitamin/Mineral) 27-1 MG TABS 1 tablet, Oral, Daily       Maximiliano Tillman, [address] [address]  Dept: [phone]  Dept Fax: Christina Ames 284: [phone]  [address] [place] [address] South: [phone]  [address] [place] [address] Fax: [phone]  "

## 2023-06-08 NOTE — PATIENT INSTRUCTIONS
Danielle por fernandez confianza en nuestro equipo  Erica Jenkins y agradecemos eliceo comentarios  Si recibe tiffany encuesta nuestra, tómese unos momentos para informarnos cómo estamos     Sinceramente,  [address] [facility] Nestor Levo

## 2023-06-19 ENCOUNTER — INITIAL PRENATAL (OUTPATIENT)
Dept: OBGYN CLINIC | Facility: CLINIC | Age: 23
End: 2023-06-19

## 2023-06-19 ENCOUNTER — PATIENT OUTREACH (OUTPATIENT)
Dept: OBGYN CLINIC | Facility: CLINIC | Age: 23
End: 2023-06-19

## 2023-06-19 VITALS
WEIGHT: 204.4 LBS | HEART RATE: 76 BPM | HEIGHT: 62 IN | SYSTOLIC BLOOD PRESSURE: 110 MMHG | DIASTOLIC BLOOD PRESSURE: 74 MMHG | BODY MASS INDEX: 37.61 KG/M2

## 2023-06-19 DIAGNOSIS — Z3A.09 9 WEEKS GESTATION OF PREGNANCY: ICD-10-CM

## 2023-06-19 DIAGNOSIS — Z34.91 PRENATAL CARE IN FIRST TRIMESTER: Primary | ICD-10-CM

## 2023-06-19 PROCEDURE — T1001 NURSING ASSESSMENT/EVALUATN: HCPCS

## 2023-06-19 NOTE — LETTER
6/19/2023      This letter is to confirm that Reddy Beard is pregnant and is under our care  Her Estimated Date of Delivery: 1/18/24  If you have any questions or concerns, please contact our office    Thank you,    PER Wright

## 2023-06-19 NOTE — PROGRESS NOTES
"OBSTETRIC INTAKE VISIT    Suly Robertson presents today for initial OB visit and intake at 9w4d  LMP - 23  History obtained from patient and she reports it as follows:    Past Medical History:   Diagnosis Date   • Depression      Past Surgical History:   Procedure Laterality Date   • ABDOMINOPLASTY     • [place]  DELIVERY ONLY N/A 2020    Procedure:  SECTION (); Surgeon: Monika Moulton MD;  [facility]: [facility];  Service: Obstetrics     OB History    Para Term  AB Living   2 1 1 0 0 1   SAB IAB Ectopic Multiple Live Births   0 0 0 0 1      # Outcome Date GA Lbr Silas/2nd Weight Sex Delivery Anes PTL Lv   2 Current            1 Term 20 39w5d  2470 g (5 lb 7 1 oz) M CS-LTranv EPI N ENRICO      Complications: Fetal Intolerance     Social History     Tobacco Use   • Smoking status: Former     Types: Pipe   • Smokeless tobacco: Never   Vaping Use   • Vaping Use: Never used   Substance Use Topics   • Alcohol use: Not Currently     Comment: \"sometimes\"   • Drug use: Never       Current Outpatient Medications   Medication Instructions   • Prenatal Vit-Fe Fumarate-FA (Prenatal Plus Vitamin/Mineral) 27-1 MG TABS 1 tablet, Oral, Daily       No Known Allergies    Vitals: /74 (BP Location: Left arm, Patient Position: Sitting, Cuff Size: Standard)   Pulse 76   Ht 5' 2\" (1 575 m)   Wt 92 7 kg (204 lb 6 4 oz)   LMP 2023 (Approximate)   BMI 37 39 kg/m²     Review of Systems:  Denies vaginal bleeding or leaking fluid  Denies abdominal/pelvic pain or contractions  Plan:  1  OB labwork ordered today to include Prenatal Panel, HCV antibody, Hgb fractionation cascade, Prenatal Carrier Screen Panel  Other labs include Glucose 1H PG  Advised patient to have drawn within the next few days  2  Will help pt schedule ultrasound with MFM  3  Return in 1 weeks for H&P prenatal visit  4  Referrals placed for [facility], , and [facility]    5  Given " vaccines: None due  6  Patient's depression screening was assessed with a PHQ-2 score of 2  Their PHQ-9 score was 6  Clinically patient does not have depression  No treatment is required   in to see patient    7  Reviewed the following educational topics with patient:   -routine prenatal visit/ultrasound/labwork schedule   -hospital for delivery and office phone/answering service contact information   -nutritional demands of pregnancy, healthy dietary habits   -listeria, toxoplasmosis, seafood precautions   -weight gain expectations (based on pre-pregnant BMI)   -exercise, rest, and sexual activity during pregnancy   -abstinence from alcohol, tobacco, and illegal drugs   -common discomforts of pregnancy and appropriate management   -OTC medications safe to use in pregnancy   -genetic screening options   -vaccines in pregnancy   -symptoms to report to OB provider    -signs of PTL and pre-eclampsia    -vaginal bleeding/leaking of fluid    -severe n/v-unable to tolerate ANY food/fluids for more than 24 hours

## 2023-06-19 NOTE — LETTER
2023    To Myrtice Severs  : 2000      To whom it may concern,    Myrtice Severs is under our care for her pregnancy  We recommend that all pregnant women:   1  Have a well-ventilated workspace  2  Wear low-heeled shoes  3  Work no more than 40 hours per week  4  Have a 15-minute break every 2 hours and at least 30 minutes for a meal    5  Lift no more than 20 pounds without assistance  6  Have ready access to bathrooms and water  7  Should not be climbing ladders  She may continue to work until her due date (Estimated Date of Delivery: 24) unless medical complications arise sooner  We anticipate she may return to work in 6-8 weeks after delivery  Please contact our office with any questions or concerns      Sincerely,  Bala Maria RN

## 2023-06-19 NOTE — LETTER
"    [facility] REFERRAL      Date: 6/19/2023    Patient name: Gabriella Hoskins    YOB: 2000    Estimated Date of Delivery: 1/18/24      /74 (BP Location: Left arm, Patient Position: Sitting, Cuff Size: Standard)   Pulse 76   Ht 5' 2\" (1 575 m)   Wt 92 7 kg (204 lb 6 4 oz)   LMP 04/19/2023 (Approximate)   BMI 37 39 kg/m²         Thank you,  Angelo Barrios, [address] [place] [address]  locations:   1  [facility] and [address]       Phone: [phone]       Fax: [phone]     2   [address]       [address]       Phone: [phone]       Fax: [phone]       "

## 2023-06-19 NOTE — PATIENT INSTRUCTIONS
SENALES RADHA EL EMBARAZO  Llame a nuestra oficina al [phone] para cualquiera de los siguientes:    1  sangrado vaginal  2  Dolor abdominal dari que no desaparece  3  Fiebre (más de 100 4 y no se bernardo con Tylenol)  4  Vómitos persistentes que tenorio más de 24 horas  5  dolor de pecho  6  Dolor o ardor al orinar  7  Dolor de brady severo que no se resuelve con Tylenol  8  Visión borrosa o isaac puntos en fernandez visión  9  Hinchazón repentina de fernandez chata o johanny  10  Enrojecimiento, hinchazón o dolor en tiffany pierna  11  Un aumento de peso repentino en pocos días  12  Contar los movimientos fetales del bebé  (después de 28 semanas o el sexto mes de embarazo)  15  Tiffany pérdida de líquido acuoso de la vagina: puede ser un chorro, un goteo o tiffany humedad continua  14   Después de 20 semanas de embarazo, calambres rítmicos (más de 4 por hora) o menstruales nancy dolor Glenys Popper / Gracy Clan
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
## 2023-06-21 ENCOUNTER — APPOINTMENT (OUTPATIENT)
Dept: LAB | Facility: HOSPITAL | Age: 23
End: 2023-06-21
Payer: COMMERCIAL

## 2023-06-21 DIAGNOSIS — Z34.91 PRENATAL CARE IN FIRST TRIMESTER: ICD-10-CM

## 2023-06-21 DIAGNOSIS — Z3A.09 9 WEEKS GESTATION OF PREGNANCY: ICD-10-CM

## 2023-06-21 DIAGNOSIS — Z32.01 POSITIVE PREGNANCY TEST: ICD-10-CM

## 2023-06-21 LAB
ABO GROUP BLD: NORMAL
B-HCG SERPL-ACNC: ABNORMAL MIU/ML (ref 0–5)
BACTERIA UR QL AUTO: ABNORMAL /HPF
BASOPHILS # BLD AUTO: 0.03 THOUSANDS/ÂΜL (ref 0–0.1)
BASOPHILS NFR BLD AUTO: 0 % (ref 0–1)
BILIRUB UR QL STRIP: NEGATIVE
BLD GP AB SCN SERPL QL: NEGATIVE
CLARITY UR: ABNORMAL
COLOR UR: ABNORMAL
EOSINOPHIL # BLD AUTO: 0.05 THOUSAND/ÂΜL (ref 0–0.61)
EOSINOPHIL NFR BLD AUTO: 0 % (ref 0–6)
ERYTHROCYTE [DISTWIDTH] IN BLOOD BY AUTOMATED COUNT: 12.3 % (ref 11.6–15.1)
GLUCOSE 1H P 50 G GLC PO SERPL-MCNC: 93 MG/DL (ref 40–134)
GLUCOSE UR STRIP-MCNC: NEGATIVE MG/DL
HBV SURFACE AB SER-ACNC: 35.2 MIU/ML
HBV SURFACE AG SER QL: NORMAL
HCT VFR BLD AUTO: 38.3 % (ref 34.8–46.1)
HCV AB SER QL: NORMAL
HGB BLD-MCNC: 12.4 G/DL (ref 11.5–15.4)
HGB UR QL STRIP.AUTO: 50
HIV 1+2 AB+HIV1 P24 AG SERPL QL IA: NORMAL
HIV 2 AB SERPL QL IA: NORMAL
HIV1 AB SERPL QL IA: NORMAL
HIV1 P24 AG SERPL QL IA: NORMAL
IMM GRANULOCYTES # BLD AUTO: 0.05 THOUSAND/UL (ref 0–0.2)
IMM GRANULOCYTES NFR BLD AUTO: 0 % (ref 0–2)
KETONES UR STRIP-MCNC: NEGATIVE MG/DL
LEUKOCYTE ESTERASE UR QL STRIP: 500
LYMPHOCYTES # BLD AUTO: 2.05 THOUSANDS/ÂΜL (ref 0.6–4.47)
LYMPHOCYTES NFR BLD AUTO: 18 % (ref 14–44)
MCH RBC QN AUTO: 28.5 PG (ref 26.8–34.3)
MCHC RBC AUTO-ENTMCNC: 32.4 G/DL (ref 31.4–37.4)
MCV RBC AUTO: 88 FL (ref 82–98)
MONOCYTES # BLD AUTO: 0.72 THOUSAND/ÂΜL (ref 0.17–1.22)
MONOCYTES NFR BLD AUTO: 6 % (ref 4–12)
NEUTROPHILS # BLD AUTO: 8.27 THOUSANDS/ÂΜL (ref 1.85–7.62)
NEUTS SEG NFR BLD AUTO: 76 % (ref 43–75)
NITRITE UR QL STRIP: NEGATIVE
NON-SQ EPI CELLS URNS QL MICRO: ABNORMAL /HPF
NRBC BLD AUTO-RTO: 0 /100 WBCS
PH UR STRIP.AUTO: 6 [PH]
PLATELET # BLD AUTO: 284 THOUSANDS/UL (ref 149–390)
PMV BLD AUTO: 10.1 FL (ref 8.9–12.7)
PROT UR STRIP-MCNC: NEGATIVE MG/DL
RBC # BLD AUTO: 4.35 MILLION/UL (ref 3.81–5.12)
RBC #/AREA URNS AUTO: ABNORMAL /HPF
RH BLD: POSITIVE
RUBV IGG SERPL IA-ACNC: 39.1 IU/ML
SP GR UR STRIP.AUTO: 1.02 (ref 1–1.04)
SPECIMEN EXPIRATION DATE: NORMAL
TREPONEMA PALLIDUM IGG+IGM AB [PRESENCE] IN SERUM OR PLASMA BY IMMUNOASSAY: NORMAL
UROBILINOGEN UA: NEGATIVE MG/DL
WBC # BLD AUTO: 11.17 THOUSAND/UL (ref 4.31–10.16)
WBC #/AREA URNS AUTO: ABNORMAL /HPF

## 2023-06-21 PROCEDURE — 87086 URINE CULTURE/COLONY COUNT: CPT

## 2023-06-21 PROCEDURE — 86901 BLOOD TYPING SEROLOGIC RH(D): CPT

## 2023-06-21 PROCEDURE — 86762 RUBELLA ANTIBODY: CPT

## 2023-06-21 PROCEDURE — 83020 HEMOGLOBIN ELECTROPHORESIS: CPT

## 2023-06-21 PROCEDURE — 86706 HEP B SURFACE ANTIBODY: CPT

## 2023-06-21 PROCEDURE — 85025 COMPLETE CBC W/AUTO DIFF WBC: CPT

## 2023-06-21 PROCEDURE — 82950 GLUCOSE TEST: CPT

## 2023-06-21 PROCEDURE — 81001 URINALYSIS AUTO W/SCOPE: CPT

## 2023-06-21 PROCEDURE — 86780 TREPONEMA PALLIDUM: CPT

## 2023-06-21 PROCEDURE — 87340 HEPATITIS B SURFACE AG IA: CPT

## 2023-06-21 PROCEDURE — 86803 HEPATITIS C AB TEST: CPT

## 2023-06-21 PROCEDURE — 84702 CHORIONIC GONADOTROPIN TEST: CPT

## 2023-06-21 PROCEDURE — 86900 BLOOD TYPING SEROLOGIC ABO: CPT

## 2023-06-21 PROCEDURE — 86850 RBC ANTIBODY SCREEN: CPT

## 2023-06-21 PROCEDURE — 36415 COLL VENOUS BLD VENIPUNCTURE: CPT

## 2023-06-21 PROCEDURE — 87389 HIV-1 AG W/HIV-1&-2 AB AG IA: CPT

## 2023-06-22 ENCOUNTER — TELEPHONE (OUTPATIENT)
Dept: PERINATAL CARE | Facility: OTHER | Age: 23
End: 2023-06-22

## 2023-06-22 ENCOUNTER — TELEPHONE (OUTPATIENT)
Dept: OBGYN CLINIC | Facility: CLINIC | Age: 23
End: 2023-06-22

## 2023-06-22 PROBLEM — O99.211 OBESITY AFFECTING PREGNANCY IN FIRST TRIMESTER: Status: ACTIVE | Noted: 2023-06-22

## 2023-06-22 PROBLEM — Z98.891 HX OF CESAREAN SECTION: Status: ACTIVE | Noted: 2023-06-22

## 2023-06-22 LAB — BACTERIA UR CULT: NORMAL

## 2023-06-22 NOTE — PROGRESS NOTES
"Prenatal H&P  [facility]    Subjective:  Patient is a  here for initial prenatal H&P  This is an intended pregnancy  Patient reports that her LMP was 23  Patient is currently doing wel  Her previous pregnancy resulted in a   She currently works at a [employer]  She has a good support system at home  She does not have a known family history of inheritable conditions such as physical or intellectual disabilities, birth defects, blood disorders, heart or neural tube defects  She denies recent travel  She denies use of nicotine, EtOH or recreational drug use  OB History    Para Term  AB Living   2 1 1 0 0 1   SAB IAB Ectopic Multiple Live Births   0 0 0 0 1      # Outcome Date GA Lbr Silas/2nd Weight Sex Delivery Anes PTL Lv   2 Current            1 Term 20 39w5d  2470 g (5 lb 7 1 oz) M CS-LTranv EPI N ENRICO      Complications: Fetal Intolerance         Objective:   /80   Pulse 80   Ht 5' 2\" (1 575 m)   Wt 91 kg (200 lb 9 6 oz)   LMP 2023 (Approximate)   BMI 36 69 kg/m²     General:   alert and oriented, in no acute distress, alert, appears stated age and cooperative   Heart: regular rate and rhythm, S1, S2 normal, no murmur, click, rub or gallop   Lungs: clear to auscultation bilaterally   Abdomen: soft, non-tender, without masses or organomegaly, prior abdominoplasty scars   Vulva: normal   Vagina: normal mucosa   Cervix: no lesions   Uterus: non-tender   Adnexa: normal adnexa         Assessment and Plan:  1  LMP 23 with an NIGHAT 23  2  TVUS showed EGA 8 with an NIGHAT 23  3  Pap smear collected  4  GC/CT collected  5  Prenatal labs normal   6  Plans on TOLAC  7  Delivery consent form to be signed at 28 weeks  8  Sequential screenin23  9  Labor expectations discussed with patient, including appointment schedule, nutrition, weight gain, sexual intercourse, and nausea and vomiting  10  RTC in 4 weeks   " Continue PNV QD

## 2023-06-24 LAB
HGB A MFR BLD: 2.5 % (ref 1.8–3.2)
HGB A MFR BLD: 97.5 % (ref 96.4–98.8)
HGB F MFR BLD: 0 % (ref 0–2)
HGB FRACT BLD-IMP: NORMAL
HGB S MFR BLD: 0 %

## 2023-06-28 ENCOUNTER — INITIAL PRENATAL (OUTPATIENT)
Dept: OBGYN CLINIC | Facility: CLINIC | Age: 23
End: 2023-06-28

## 2023-06-28 VITALS
DIASTOLIC BLOOD PRESSURE: 80 MMHG | HEART RATE: 80 BPM | SYSTOLIC BLOOD PRESSURE: 115 MMHG | BODY MASS INDEX: 36.91 KG/M2 | HEIGHT: 62 IN | WEIGHT: 200.6 LBS

## 2023-06-28 DIAGNOSIS — Z3A.10 10 WEEKS GESTATION OF PREGNANCY: ICD-10-CM

## 2023-06-28 DIAGNOSIS — Z34.91 PRENATAL CARE IN FIRST TRIMESTER: Primary | ICD-10-CM

## 2023-06-28 DIAGNOSIS — Z12.4 CERVICAL CANCER SCREENING: ICD-10-CM

## 2023-06-28 DIAGNOSIS — Z20.2 POSSIBLE EXPOSURE TO STD: ICD-10-CM

## 2023-06-28 PROCEDURE — 87591 N.GONORRHOEAE DNA AMP PROB: CPT | Performed by: OBSTETRICS & GYNECOLOGY

## 2023-06-28 PROCEDURE — G0143 SCR C/V CYTO,THINLAYER,RESCR: HCPCS | Performed by: OBSTETRICS & GYNECOLOGY

## 2023-06-28 PROCEDURE — 87491 CHLMYD TRACH DNA AMP PROBE: CPT | Performed by: OBSTETRICS & GYNECOLOGY

## 2023-06-28 PROCEDURE — 99213 OFFICE O/P EST LOW 20 MIN: CPT | Performed by: OBSTETRICS & GYNECOLOGY

## 2023-06-30 LAB
C TRACH DNA SPEC QL NAA+PROBE: NEGATIVE
N GONORRHOEA DNA SPEC QL NAA+PROBE: NEGATIVE

## 2023-07-04 ENCOUNTER — HOSPITAL ENCOUNTER (EMERGENCY)
Facility: HOSPITAL | Age: 23
Discharge: HOME | End: 2023-07-04
Attending: EMERGENCY MEDICINE
Payer: COMMERCIAL

## 2023-07-04 VITALS
TEMPERATURE: 98.2 F | WEIGHT: 203.48 LBS | RESPIRATION RATE: 18 BRPM | BODY MASS INDEX: 37.22 KG/M2 | SYSTOLIC BLOOD PRESSURE: 152 MMHG | DIASTOLIC BLOOD PRESSURE: 65 MMHG | HEART RATE: 93 BPM | OXYGEN SATURATION: 99 %

## 2023-07-04 DIAGNOSIS — R10.9 ABDOMINAL PAIN DURING PREGNANCY IN FIRST TRIMESTER: Primary | ICD-10-CM

## 2023-07-04 DIAGNOSIS — O26.891 ABDOMINAL PAIN DURING PREGNANCY IN FIRST TRIMESTER: Primary | ICD-10-CM

## 2023-07-04 PROCEDURE — 76815 OB US LIMITED FETUS(S): CPT | Performed by: PHYSICIAN ASSISTANT

## 2023-07-04 PROCEDURE — 99284 EMERGENCY DEPT VISIT MOD MDM: CPT | Performed by: PHYSICIAN ASSISTANT

## 2023-07-04 PROCEDURE — 99284 EMERGENCY DEPT VISIT MOD MDM: CPT

## 2023-07-04 NOTE — ED PROVIDER NOTES
History  Chief Complaint   Patient presents with   • Abdominal Pain     Patient arrives via ems c/o of abdominal pain for the past hour, patient denies vomiting or diarrhea, patient is 11 weeks pregnant     Is a 56-year-old female presenting via EMS for evaluation of lower abdominal pain. Patient is approximately 11 weeks pregnant. Patient denies any VB, LOF, vaginal discharge. She endorses dysuria and frequency. Symptoms started approximately 1 hour ago, she did not take anything for pain. She denies any nausea or vomiting, no diarrhea or constipation. She denies any trauma or injury to the area. History provided by:  Patient   used: No        Prior to Admission Medications   Prescriptions Last Dose Informant Patient Reported? Taking? Prenatal Vit-Fe Fumarate-FA (Prenatal Plus Vitamin/Mineral) 27-1 MG TABS   No No   Sig: Take 1 tablet by mouth in the morning      Facility-Administered Medications: None       Past Medical History:   Diagnosis Date   • Depression        Past Surgical History:   Procedure Laterality Date   • ABDOMINOPLASTY     • [place]  DELIVERY ONLY N/A 2020    Procedure:  SECTION (); Surgeon: Corey Delvalle MD;  Location: [facility];  Service: Obstetrics       Family History   Problem Relation Age of Onset   • No Known Problems Mother    • No Known Problems Father    • No Known Problems Sister    • No Known Problems Brother    • Cancer Neg Hx    • Diabetes Neg Hx      I have reviewed and agree with the history as documented.     E-Cigarette/Vaping   • E-Cigarette Use Never User      E-Cigarette/Vaping Substances   • Nicotine No    • THC No    • CBD No    • Flavoring No    • Other No    • Unknown No      Social History     Tobacco Use   • Smoking status: Former     Types: Pipe   • Smokeless tobacco: Never   Vaping Use   • Vaping Use: Never used   Substance Use Topics   • Alcohol use: Not Currently     Comment: "sometimes"   • Drug use: Never Review of Systems   Gastrointestinal: Positive for abdominal pain. Genitourinary: Positive for dysuria and frequency. All other systems reviewed and are negative. Physical Exam  Physical Exam  Vitals reviewed. Constitutional:       General: She is not in acute distress. Appearance: Normal appearance. She is well-developed and well-groomed. She is not ill-appearing, toxic-appearing or diaphoretic. HENT:      Head: Normocephalic and atraumatic. Right Ear: External ear normal.      Left Ear: External ear normal.      Nose: Nose normal. No congestion or rhinorrhea. Mouth/Throat:      Mouth: Mucous membranes are moist.      Pharynx: Oropharynx is clear. No oropharyngeal exudate or posterior oropharyngeal erythema. Eyes:      General: No scleral icterus. Right eye: No discharge. Left eye: No discharge. Extraocular Movements: Extraocular movements intact. Conjunctiva/sclera: Conjunctivae normal.   Cardiovascular:      Rate and Rhythm: Normal rate and regular rhythm. Pulses: Normal pulses. Heart sounds: No murmur heard. No friction rub. No gallop. Pulmonary:      Effort: Pulmonary effort is normal. No respiratory distress. Breath sounds: Normal breath sounds. No wheezing, rhonchi or rales. Abdominal:      General: Abdomen is flat. A surgical scar is present. There is no distension. Palpations: Abdomen is soft. Tenderness: There is abdominal tenderness in the suprapubic area. There is no right CVA tenderness, left CVA tenderness, guarding or rebound. Musculoskeletal:         General: No deformity. Normal range of motion. Cervical back: Normal range of motion and neck supple. Skin:     General: Skin is warm and dry. Coloration: Skin is not jaundiced or pale. Findings: No rash. Neurological:      General: No focal deficit present. Mental Status: She is alert.    Psychiatric:         Mood and Affect: Mood normal.         Behavior: Behavior normal. Behavior is cooperative. Vital Signs  ED Triage Vitals [07/04/23 0052]   Temperature Pulse Respirations Blood Pressure SpO2   98.2 °F (36.8 °C) 93 18 152/65 99 %      Temp Source Heart Rate Source Patient Position - Orthostatic VS BP Location FiO2 (%)   Oral Monitor Lying Right arm --      Pain Score       --           Vitals:    07/04/23 0052   BP: 152/65   Pulse: 93   Patient Position - Orthostatic VS: Lying         Visual Acuity      ED Medications  Medications - No data to display    Diagnostic Studies  Results Reviewed     None                 No orders to display              Procedures  POC Pelvic US    Date/Time: 7/4/2023 1:07 AM    Performed by: Jaqui Walker PA-C  Authorized by: Jaqui Walker PA-C    Patient location:  ED  Procedure details:     Indications: evaluate for IUP and pregnant with abdominal pain    Uterine findings:     Intrauterine pregnancy: identified      Fetal pole: identified      Fetal heart rate: identified      Fetal heart rate (bpm):  146  Interpretation:     Pregnancy findings: intrauterine pregnancy (IUP)                    ED Course                               SBIRT 22yo+    Flowsheet Row Most Recent Value   Initial Alcohol Screen: US AUDIT-C     1. How often do you have a drink containing alcohol? 0 Filed at: 07/04/2023 0118   2. How many drinks containing alcohol do you have on a typical day you are drinking? 0 Filed at: 07/04/2023 0118   3a. Male UNDER 65: How often do you have five or more drinks on one occasion? 0 Filed at: 07/04/2023 0118   3b. FEMALE Any Age, or MALE 65+: How often do you have 4 or more drinks on one occassion? 0 Filed at: 07/04/2023 0118   Audit-C Score 0 Filed at: 07/04/2023 0118   DARIUS: How many times in the past year have you. .. Used an illegal drug or used a prescription medication for non-medical reasons?  Never Filed at: 07/04/2023 1664                    Medical Decision Making      DDx including but not limited to: appendicitis, colitis, enteritis, diverticulitis, constipation, pelvic pathology, miscarriage, renal colic, pyelonephritis, UTI. Patient presenting to the ED for evaluation of suprapubic abdominal pain, dysuria and frequency. Point-of-care ultrasound done by myself, fetal heart tones noted. Was informed by RN that patient left after evaluation prior to labs being ordered. Unable to review return precautions with patient. Abdominal pain during pregnancy in first trimester: acute illness or injury      Disposition  Final diagnoses:   Abdominal pain during pregnancy in first trimester     Time reflects when diagnosis was documented in both MDM as applicable and the Disposition within this note     Time User Action Codes Description Comment    7/4/2023  1:44 AM Reyes Cheshire Add [O26.891,  R10.9] Abdominal pain during pregnancy in first trimester       ED Disposition     ED Disposition   Left from Room after Provider Exam    Condition   --    Date/Time   Tue Jul 4, 2023  1:44 AM    Comment   --         Follow-up Information    None         Discharge Medication List as of 7/4/2023  1:44 AM      CONTINUE these medications which have NOT CHANGED    Details   Prenatal Vit-Fe Fumarate-FA (Prenatal Plus Vitamin/Mineral) 27-1 MG TABS Take 1 tablet by mouth in the morning, Starting Thu 6/8/2023, Normal             No discharge procedures on file.     PDMP Review     None          ED Provider  Electronically Signed by [facility]  07/04/23 9069

## 2023-07-05 LAB
LAB AP GYN PRIMARY INTERPRETATION: NORMAL
Lab: NORMAL
PATH INTERP SPEC-IMP: NORMAL

## 2023-07-11 NOTE — PROGRESS NOTES
Please refer to the [facility] ultrasound report in Ob Procedures for additional information regarding today's visit

## 2023-07-12 ENCOUNTER — ROUTINE PRENATAL (OUTPATIENT)
Dept: PERINATAL CARE | Facility: OTHER | Age: 23
End: 2023-07-12
Payer: MEDICARE

## 2023-07-12 VITALS
HEART RATE: 82 BPM | DIASTOLIC BLOOD PRESSURE: 62 MMHG | SYSTOLIC BLOOD PRESSURE: 108 MMHG | HEIGHT: 62 IN | BODY MASS INDEX: 37.54 KG/M2 | WEIGHT: 204 LBS

## 2023-07-12 DIAGNOSIS — O09.291 HISTORY OF INTRAUTERINE GROWTH RESTRICTION IN PRIOR PREGNANCY, CURRENTLY PREGNANT, FIRST TRIMESTER: ICD-10-CM

## 2023-07-12 DIAGNOSIS — Z3A.12 12 WEEKS GESTATION OF PREGNANCY: ICD-10-CM

## 2023-07-12 DIAGNOSIS — Z36.82 ENCOUNTER FOR ANTENATAL SCREENING FOR NUCHAL TRANSLUCENCY: Primary | ICD-10-CM

## 2023-07-12 DIAGNOSIS — O99.211 OBESITY AFFECTING PREGNANCY IN FIRST TRIMESTER: ICD-10-CM

## 2023-07-12 DIAGNOSIS — Z98.891 HISTORY OF CESAREAN SECTION: ICD-10-CM

## 2023-07-12 PROCEDURE — 76813 OB US NUCHAL MEAS 1 GEST: CPT | Performed by: OBSTETRICS & GYNECOLOGY

## 2023-07-12 PROCEDURE — 99243 OFF/OP CNSLTJ NEW/EST LOW 30: CPT | Performed by: OBSTETRICS & GYNECOLOGY

## 2023-07-12 PROCEDURE — 76814 OB US NUCHAL MEAS ADD-ON: CPT | Performed by: OBSTETRICS & GYNECOLOGY

## 2023-07-12 PROCEDURE — 36415 COLL VENOUS BLD VENIPUNCTURE: CPT | Performed by: OBSTETRICS & GYNECOLOGY

## 2023-07-12 RX ORDER — ASPIRIN 81 MG/1
162 TABLET, CHEWABLE ORAL
Qty: 180 TABLET | Refills: 1 | Status: SHIPPED | OUTPATIENT
Start: 2023-07-12 | End: 2023-10-10

## 2023-07-12 NOTE — LETTER
July 12, 2023     Dante Canavan, MD  [place] [address]    Patient: Nellie Bragg   YOB: 2000   Date of Visit: 7/12/2023       Dear Dr. Mari Schroeder: Thank you for referring Nellie Bragg to me for evaluation. Below are my notes for this consultation. If you have questions, please do not hesitate to call me. I look forward to following your patient along with you.          Sincerely,        Petty Bloom MD        CC: No Recipients    Petty Bloom MD  7/11/2023  5:01 PM  Sign when Signing Visit  Please refer to the [facility] ultrasound report in Ob Procedures for additional information regarding today's visit

## 2023-07-12 NOTE — PROGRESS NOTES
Patient chose to have [facility] Non-invasive Prenatal Screen with fetal sex. Patient given brochure and is aware [facility] will contact their insurance and coordinate coverage. Patient made aware she will need to respond to text message or e-mail from [address] within 2 business days or testing will be run through insurance. Patient informed text message will come from area code  "415". Provided [employer] # [phone] and web site : [email or web address].   "Anita your test online" card with barcode and test tube ID provided to patient. Reviewed [employer]'s web site states 5-7 business days for results via their portal.   [employer] message will be sent to patient when [facility] receives results /provider reviews. 2 vials of blood drawn from right arm by SHIRLENE Jay RN. Patient tolerated blood draw without difficulty. Specimens labeled with patient identifiers (name, date of birth, specimen collection date), order and specimen were verified with patient, packed and sent via [address]. [employer]  tracking #  [id number]  Copy of lab order scanned to Epic media. Maternal Fetal Medicine will have results in approximately 7-10 business days and will call patient or notify via [address] [place] [address]. Patient aware viewing lab result online will reveal fetal sex if ordered. Patient verbalized understanding of all instructions and no questions at this time.

## 2023-07-31 ENCOUNTER — TELEPHONE (OUTPATIENT)
Dept: OBGYN CLINIC | Facility: CLINIC | Age: 23
End: 2023-07-31

## 2023-08-03 ENCOUNTER — TELEPHONE (OUTPATIENT)
Dept: OBGYN CLINIC | Facility: CLINIC | Age: 23
End: 2023-08-03

## 2023-08-16 ENCOUNTER — TELEPHONE (OUTPATIENT)
Dept: OBGYN CLINIC | Facility: CLINIC | Age: 23
End: 2023-08-16

## 2023-09-01 ENCOUNTER — ROUTINE PRENATAL (OUTPATIENT)
Dept: PERINATAL CARE | Facility: OTHER | Age: 23
End: 2023-09-01
Payer: MEDICARE

## 2023-09-01 VITALS
SYSTOLIC BLOOD PRESSURE: 128 MMHG | WEIGHT: 213.4 LBS | HEART RATE: 96 BPM | HEIGHT: 62 IN | DIASTOLIC BLOOD PRESSURE: 70 MMHG | BODY MASS INDEX: 39.27 KG/M2

## 2023-09-01 DIAGNOSIS — Z36.86 ENCOUNTER FOR ANTENATAL SCREENING FOR CERVICAL LENGTH: ICD-10-CM

## 2023-09-01 DIAGNOSIS — Z3A.20 20 WEEKS GESTATION OF PREGNANCY: ICD-10-CM

## 2023-09-01 DIAGNOSIS — O09.291 HISTORY OF INTRAUTERINE GROWTH RESTRICTION IN PRIOR PREGNANCY, CURRENTLY PREGNANT, FIRST TRIMESTER: Primary | ICD-10-CM

## 2023-09-01 DIAGNOSIS — O99.211 OBESITY AFFECTING PREGNANCY IN FIRST TRIMESTER: ICD-10-CM

## 2023-09-01 DIAGNOSIS — Z36.3 ENCOUNTER FOR ANTENATAL SCREENING FOR MALFORMATION: ICD-10-CM

## 2023-09-01 DIAGNOSIS — Z98.891 HX OF CESAREAN SECTION: ICD-10-CM

## 2023-09-01 PROCEDURE — 76817 TRANSVAGINAL US OBSTETRIC: CPT | Performed by: OBSTETRICS & GYNECOLOGY

## 2023-09-01 PROCEDURE — 76811 OB US DETAILED SNGL FETUS: CPT | Performed by: OBSTETRICS & GYNECOLOGY

## 2023-09-01 NOTE — PROGRESS NOTES
Ultrasound Probe Disinfection    A transvaginal ultrasound was performed. Prior to use, disinfection was performed with High Level Disinfection Process (EVRYTHNG). Probe serial number F1: [id number]  was used.       Crista Vargas  09/01/23  2:33 PM

## 2023-09-01 NOTE — PROGRESS NOTES
[facility]: Rosa Purvis was seen today for anatomic survey and cervical length screening ultrasound. See ultrasound report under "OB Procedures" tab. The time spent on this established patient on the encounter date included 4 minutes previsit service time reviewing records and precharting, 6 minutes face-to-face service time counseling regarding results and coordinating care, and  5 minutes charting, totalling 15 minutes.   Please don't hesitate to contact our office with any concerns or questions.  -Fifi Thomas MD

## 2023-09-01 NOTE — PATIENT INSTRUCTIONS
Thank you for choosing us for your  care today. If you have any questions about your ultrasound or care, please do not hesitate to contact us or your primary obstetrician. Some general instructions for your pregnancy are:    Protect against coronavirus: get vaccinated - pregnant women are increased risk of severe COVID. Notify your primary care doctor if you have any symptoms. Exercise: Aim for 22 minutes per day (150 minutes per week) of regular exercise. Walking is great! Nutrition: aim for calcium-rich and iron-rich foods as well as healthy sources of protein. Learn about Preeclampsia: preeclampsia is a common, serious high blood pressure complication in pregnancy. A blood pressure of 578KFVQ (systolic or top number) or 05VKOH (diastolic or bottom number) is not normal and needs evaluation by your doctor. Aspirin is sometimes prescribed in early pregnancy to prevent preeclampsia in women with risk factors - ask your obstetrician if you should be on this medication. If you smoke, try to reduce how many cigarettes you smoke or try to quit completely. Do not vape. Other warning signs to watch out for in pregnancy or postpartum: chest pain, obstructed breathing or shortness of breath, seizures, thoughts of hurting yourself or your baby, bleeding, a painful or swollen leg, fever, or headache (see [facility] Warning Sign[facility] campaign). If these happen call 911. Itching is also not normal in pregnancy and if you experience this, especially over your hands and feet, potentially worse at night, notify your doctors.

## 2023-10-16 ENCOUNTER — TELEPHONE (OUTPATIENT)
Dept: OBGYN CLINIC | Facility: CLINIC | Age: 23
End: 2023-10-16

## 2023-10-17 ENCOUNTER — ROUTINE PRENATAL (OUTPATIENT)
Dept: OBGYN CLINIC | Facility: CLINIC | Age: 23
End: 2023-10-17

## 2023-10-17 VITALS
BODY MASS INDEX: 40.59 KG/M2 | HEIGHT: 62 IN | DIASTOLIC BLOOD PRESSURE: 71 MMHG | HEART RATE: 99 BPM | WEIGHT: 220.6 LBS | SYSTOLIC BLOOD PRESSURE: 106 MMHG

## 2023-10-17 DIAGNOSIS — O09.291 HISTORY OF INTRAUTERINE GROWTH RESTRICTION IN PRIOR PREGNANCY, CURRENTLY PREGNANT, FIRST TRIMESTER: ICD-10-CM

## 2023-10-17 DIAGNOSIS — Z98.890 H/O ABDOMINOPLASTY: ICD-10-CM

## 2023-10-17 DIAGNOSIS — Z3A.26 26 WEEKS GESTATION OF PREGNANCY: ICD-10-CM

## 2023-10-17 DIAGNOSIS — O99.212 OBESITY AFFECTING PREGNANCY IN SECOND TRIMESTER, UNSPECIFIED OBESITY TYPE: ICD-10-CM

## 2023-10-17 DIAGNOSIS — Z34.92 PRENATAL CARE IN SECOND TRIMESTER: Primary | ICD-10-CM

## 2023-10-17 DIAGNOSIS — Z98.891 HX OF CESAREAN SECTION: ICD-10-CM

## 2023-10-17 PROCEDURE — 99214 OFFICE O/P EST MOD 30 MIN: CPT | Performed by: OBSTETRICS & GYNECOLOGY

## 2023-10-17 NOTE — PROGRESS NOTES
Mariella Mccoy presents today for routine OB visit at 26w5d. Blood Pressure: 106/71  Ii=063 kg (220 lb 9.6 oz); Body mass index is 40.35 kg/m².; TWG=8.891 kg (19 lb 9.6 oz)  Fetal Heart Rate: 141; Fundal Height (cm): 26 cm  Abdomen: gravid, soft, non-tender. She reports discomfort and pain at abdominoplasty site. Denies uterine contractions or persistent cramping. Denies vaginal bleeding or leaking of fluid. Reports fetal movement. Scheduled for ultrasound 23. Reviewed common discomforts of pregnancy in second trimester and warning signs. Advised to continue medications and return in 2 weeks.       Current Outpatient Medications   Medication Instructions    aspirin 162 mg, Oral, Daily at bedtime    Prenatal Vit-Fe Fumarate-FA (Prenatal Plus Vitamin/Mineral) 27-1 MG TABS 1 tablet, Oral, Daily         G2 Problems (from 23 to present)       Problem Noted Resolved    H/O abdominoplasty 10/17/2023 by Anastacio Parks DO No    Overview Signed 10/17/2023  3:53 PM by Anastacio Parks DO     Patient having pain and discomfort at incision site         26 weeks gestation of pregnancy 2023 by Allen Avendano MD No    Overview Addendum 2023  1:29 PM by Allen Avendano MD     Overview:  Labs  Pap smear: 23 normal; GC/CT: negative  Prenatal panel: normal  28w labs**  Vaccines:  Flu vaccine: **  Covid vaccine: **  Tdap vaccine: **  Genetic screening**  TWG ** lb  Pre-gravid BMI 36  Recommended weight gain 11-20 lb  Contraception: **  Feeding plan: breast/bottle**  Birth plan:  with epidural**  Delivery consent: to be signed at 28w**  Ultrasounds: **    Assessment and Plan:  No obstetric complaints**  Discussed pre**term labor precautions and fetal kick counts  Return to clinic in ** weeks           Hx of  section 2023 by Prashanth Amador MD No    Overview Addendum 2023 12:56 PM by Aristeo Bernal MD     Prior LTCS x 1 - category II FHT in latent labor Plans on TOLAC         Obesity affecting pregnancy in second trimester 6/22/2023 by Jax Pruett MD No    Overview Signed 7/12/2023 12:57 PM by Eleuterio Martinez MD     prepreg BMI 36  - growth US  - weekly surveillance 36 weeks

## 2023-10-17 NOTE — PATIENT INSTRUCTIONS
WARNING SIGNS DURING PREGNANCY  Call our office at [phone] for any of the followin. Vaginal bleeding  2. Sharp abdominal pain that does not go away  3. Fever (more than 100.4 and is not relieved by Tylenol)  4. Persistent vomiting lasting greater than 24 hours  5. Chest pain   6. Pain or burning when you urinate  7. Severe headache that doesn't resolve with Tylenol  8. Blurred vision or seeing spots in your vision  9. Sudden swelling of your face or hands  10. Redness, swelling or pain in a leg  11. A sudden weight gain in just a few days  12. Decrease in your baby's movement (after 28 weeks or the 6th month of pregnancy)  13. A loss of watery fluid from your vagina - can be a gush, a trickle or continuous wetness  14.  After 20 weeks of pregnancy, rhythmic cramping (greater than 4 per hour) or menstrual like low/pelvic pain

## 2023-10-20 ENCOUNTER — TELEPHONE (OUTPATIENT)
Dept: OBGYN CLINIC | Facility: CLINIC | Age: 23
End: 2023-10-20

## 2023-10-31 ENCOUNTER — TELEPHONE (OUTPATIENT)
Dept: OBGYN CLINIC | Facility: CLINIC | Age: 23
End: 2023-10-31

## 2023-11-24 ENCOUNTER — ULTRASOUND (OUTPATIENT)
Dept: PERINATAL CARE | Facility: OTHER | Age: 23
End: 2023-11-24
Payer: MEDICARE

## 2023-11-24 VITALS
SYSTOLIC BLOOD PRESSURE: 100 MMHG | HEART RATE: 112 BPM | BODY MASS INDEX: 42.07 KG/M2 | DIASTOLIC BLOOD PRESSURE: 66 MMHG | WEIGHT: 228.6 LBS | HEIGHT: 62 IN

## 2023-11-24 DIAGNOSIS — O99.212 OBESITY AFFECTING PREGNANCY IN SECOND TRIMESTER, UNSPECIFIED OBESITY TYPE: Primary | ICD-10-CM

## 2023-11-24 DIAGNOSIS — Z98.891 HX OF CESAREAN SECTION: ICD-10-CM

## 2023-11-24 DIAGNOSIS — Z36.89 ENCOUNTER FOR ULTRASOUND TO CHECK FETAL GROWTH: ICD-10-CM

## 2023-11-24 DIAGNOSIS — O09.291 HISTORY OF INTRAUTERINE GROWTH RESTRICTION IN PRIOR PREGNANCY, CURRENTLY PREGNANT, FIRST TRIMESTER: ICD-10-CM

## 2023-11-24 DIAGNOSIS — Z3A.32 32 WEEKS GESTATION OF PREGNANCY: ICD-10-CM

## 2023-11-24 PROCEDURE — 99213 OFFICE O/P EST LOW 20 MIN: CPT | Performed by: STUDENT IN AN ORGANIZED HEALTH CARE EDUCATION/TRAINING PROGRAM

## 2023-11-24 PROCEDURE — 76816 OB US FOLLOW-UP PER FETUS: CPT | Performed by: STUDENT IN AN ORGANIZED HEALTH CARE EDUCATION/TRAINING PROGRAM

## 2023-11-24 NOTE — PROGRESS NOTES
[facility]: Ms. Sacha Farnk was seen today for fetal growth assessment ultrasound. See ultrasound report under "OB Procedures" tab. The time spent on this established patient on the encounter date included 5 minutes previsit service time reviewing records and precharting, 4 minutes face-to-face service time counseling regarding results and coordinating care, and  5 minutes charting, totalling 14 minutes. Please don't hesitate to contact our office with any concerns or questions.   -Melani Chapin MD

## 2023-12-12 ENCOUNTER — TELEPHONE (OUTPATIENT)
Dept: OBGYN CLINIC | Facility: CLINIC | Age: 23
End: 2023-12-12

## 2023-12-18 ENCOUNTER — APPOINTMENT (OUTPATIENT)
Dept: LAB | Facility: CLINIC | Age: 23
End: 2023-12-18
Payer: MEDICARE

## 2023-12-18 DIAGNOSIS — Z3A.26 26 WEEKS GESTATION OF PREGNANCY: ICD-10-CM

## 2023-12-18 LAB
BASOPHILS # BLD AUTO: 0.04 THOUSANDS/ÂΜL (ref 0–0.1)
BASOPHILS NFR BLD AUTO: 0 % (ref 0–1)
EOSINOPHIL # BLD AUTO: 0.09 THOUSAND/ÂΜL (ref 0–0.61)
EOSINOPHIL NFR BLD AUTO: 1 % (ref 0–6)
ERYTHROCYTE [DISTWIDTH] IN BLOOD BY AUTOMATED COUNT: 13.6 % (ref 11.6–15.1)
GLUCOSE 1H P 50 G GLC PO SERPL-MCNC: 127 MG/DL (ref 40–134)
HCT VFR BLD AUTO: 33.1 % (ref 34.8–46.1)
HGB BLD-MCNC: 10.5 G/DL (ref 11.5–15.4)
IMM GRANULOCYTES # BLD AUTO: 0.1 THOUSAND/UL (ref 0–0.2)
IMM GRANULOCYTES NFR BLD AUTO: 1 % (ref 0–2)
LYMPHOCYTES # BLD AUTO: 3 THOUSANDS/ÂΜL (ref 0.6–4.47)
LYMPHOCYTES NFR BLD AUTO: 22 % (ref 14–44)
MCH RBC QN AUTO: 27 PG (ref 26.8–34.3)
MCHC RBC AUTO-ENTMCNC: 31.7 G/DL (ref 31.4–37.4)
MCV RBC AUTO: 85 FL (ref 82–98)
MONOCYTES # BLD AUTO: 1.01 THOUSAND/ÂΜL (ref 0.17–1.22)
MONOCYTES NFR BLD AUTO: 7 % (ref 4–12)
NEUTROPHILS # BLD AUTO: 9.65 THOUSANDS/ÂΜL (ref 1.85–7.62)
NEUTS SEG NFR BLD AUTO: 69 % (ref 43–75)
NRBC BLD AUTO-RTO: 0 /100 WBCS
PLATELET # BLD AUTO: 301 THOUSANDS/UL (ref 149–390)
PMV BLD AUTO: 10.5 FL (ref 8.9–12.7)
RBC # BLD AUTO: 3.89 MILLION/UL (ref 3.81–5.12)
WBC # BLD AUTO: 13.89 THOUSAND/UL (ref 4.31–10.16)

## 2023-12-18 PROCEDURE — 36415 COLL VENOUS BLD VENIPUNCTURE: CPT

## 2023-12-18 PROCEDURE — 82950 GLUCOSE TEST: CPT

## 2023-12-18 PROCEDURE — 86780 TREPONEMA PALLIDUM: CPT

## 2023-12-18 PROCEDURE — 85025 COMPLETE CBC W/AUTO DIFF WBC: CPT

## 2023-12-19 ENCOUNTER — ROUTINE PRENATAL (OUTPATIENT)
Dept: OBGYN CLINIC | Facility: CLINIC | Age: 23
End: 2023-12-19

## 2023-12-19 ENCOUNTER — PATIENT OUTREACH (OUTPATIENT)
Dept: OBGYN CLINIC | Facility: CLINIC | Age: 23
End: 2023-12-19

## 2023-12-19 VITALS
HEIGHT: 62 IN | DIASTOLIC BLOOD PRESSURE: 70 MMHG | BODY MASS INDEX: 42.25 KG/M2 | WEIGHT: 229.6 LBS | HEART RATE: 87 BPM | SYSTOLIC BLOOD PRESSURE: 101 MMHG

## 2023-12-19 DIAGNOSIS — Z3A.35 35 WEEKS GESTATION OF PREGNANCY: ICD-10-CM

## 2023-12-19 DIAGNOSIS — Z91.199 PATIENT NONCOMPLIANCE: ICD-10-CM

## 2023-12-19 DIAGNOSIS — O99.212 OBESITY AFFECTING PREGNANCY IN SECOND TRIMESTER, UNSPECIFIED OBESITY TYPE: ICD-10-CM

## 2023-12-19 DIAGNOSIS — Z98.891 HX OF CESAREAN SECTION: ICD-10-CM

## 2023-12-19 DIAGNOSIS — Z3A.12 12 WEEKS GESTATION OF PREGNANCY: ICD-10-CM

## 2023-12-19 DIAGNOSIS — Z98.890 H/O ABDOMINOPLASTY: ICD-10-CM

## 2023-12-19 DIAGNOSIS — B37.31 VULVOVAGINAL CANDIDIASIS: ICD-10-CM

## 2023-12-19 DIAGNOSIS — Z23 ENCOUNTER FOR IMMUNIZATION: ICD-10-CM

## 2023-12-19 DIAGNOSIS — Z34.93 PRENATAL CARE IN THIRD TRIMESTER: Primary | ICD-10-CM

## 2023-12-19 LAB
DME PARACHUTE DELIVERY DATE REQUESTED: NORMAL
DME PARACHUTE ITEM DESCRIPTION: NORMAL
DME PARACHUTE ORDER STATUS: NORMAL
DME PARACHUTE SUPPLIER NAME: NORMAL
DME PARACHUTE SUPPLIER PHONE: NORMAL
TREPONEMA PALLIDUM IGG+IGM AB [PRESENCE] IN SERUM OR PLASMA BY IMMUNOASSAY: NORMAL

## 2023-12-19 PROCEDURE — 87150 DNA/RNA AMPLIFIED PROBE: CPT | Performed by: OBSTETRICS & GYNECOLOGY

## 2023-12-19 PROCEDURE — 87210 SMEAR WET MOUNT SALINE/INK: CPT | Performed by: OBSTETRICS & GYNECOLOGY

## 2023-12-19 PROCEDURE — 90471 IMMUNIZATION ADMIN: CPT | Performed by: OBSTETRICS & GYNECOLOGY

## 2023-12-19 PROCEDURE — 90715 TDAP VACCINE 7 YRS/> IM: CPT | Performed by: OBSTETRICS & GYNECOLOGY

## 2023-12-19 PROCEDURE — 90472 IMMUNIZATION ADMIN EACH ADD: CPT | Performed by: OBSTETRICS & GYNECOLOGY

## 2023-12-19 PROCEDURE — 99214 OFFICE O/P EST MOD 30 MIN: CPT | Performed by: OBSTETRICS & GYNECOLOGY

## 2023-12-19 PROCEDURE — 90678 RSV VACC PREF BIVALENT IM: CPT | Performed by: OBSTETRICS & GYNECOLOGY

## 2023-12-19 RX ORDER — FERROUS SULFATE 324(65)MG
324 TABLET, DELAYED RELEASE (ENTERIC COATED) ORAL
Qty: 60 TABLET | Refills: 1 | Status: SHIPPED | OUTPATIENT
Start: 2023-12-19

## 2023-12-19 RX ORDER — ASPIRIN 81 MG/1
162 TABLET, CHEWABLE ORAL
Qty: 180 TABLET | Refills: 1 | Status: SHIPPED | OUTPATIENT
Start: 2023-12-19 | End: 2023-12-28

## 2023-12-19 NOTE — PROGRESS NOTES
PT GBS done by Flynn ,  28 week teaching and Tdap, RSV vaccine .     Tdap:Right Deltoid  NDC#:25331-414-99  Lot#:[id number]  Exp date:11/23/2025    RSV:Left deltoid  NDC#:9373-9829-52  Lot#:[id number]  Exp Date:03/30/2025

## 2023-12-19 NOTE — PROGRESS NOTES
KATALINA WATERS saw pt today because she had not been in for care in several weeks and is now 35wGA. KATALINA WATERS and the provider stressed the importance of coming in for care, pt would like a TOLAC and Dr. Pruett explained how important it is to come in for appointments to plan for that ahead of time.     Pt denies any transport issues, KATALINA WATERS encouraged her if she has any issues the next few weeks to let us know.     Pt has everything she needs for the baby. She lives with FOB who will help her at home. Her mom lives close by and is also a support.

## 2023-12-19 NOTE — PROGRESS NOTES
Grismerlin Fernandez presents today for routine OB visit at 35w5d.  Blood Pressure: 101/70  Sc=259 kg (229 lb 9.6 oz); Body mass index is 41.99 kg/m².; TWG=13 kg (28 lb 9.6 oz)  Fetal Heart Rate: 145;    Abdomen: gravid, soft, non-tender.  She reports recurrent yeast infections.  Denies uterine contractions.  Denies vaginal bleeding or leaking of fluid.  Reports adequate fetal movement of at least 10 movements in 2 hours once daily.  Scheduled for ultrasound .  Reviewed premature labor precautions and fetal kick counts.  Advised to continue medications and return in 1 weeks.  Desires TOLAC    Current Outpatient Medications   Medication Instructions    aspirin 162 mg, Oral, Daily at bedtime    ferrous sulfate 324 mg, Oral, 2 times daily before meals    Prenatal Vit-Fe Fumarate-FA (Prenatal Plus Vitamin/Mineral) 27-1 MG TABS 1 tablet, Oral, Daily    terconazole (TERAZOL 7) 0.4 % vaginal cream 1 applicator, Vaginal, Daily at bedtime         G2 Problems (from 23 to present)       Problem Noted Resolved    H/O abdominoplasty 10/17/2023 by Yardlie Toussaint-Foster, DO No    Overview Signed 10/17/2023  3:53 PM by Yardlie Toussaint-Foster, DO     Patient having pain and discomfort at incision site         35 weeks gestation of pregnancy 2023 by Mirna Rain MD No    Overview Addendum 2023  1:29 PM by Mirna Rain MD     Overview:  Labs  Pap smear: 23 normal; GC/CT: negative  Prenatal panel: normal  28w labs**  Vaccines:  Flu vaccine: **  Covid vaccine: **  Tdap vaccine: **  Genetic screening**  TWG ** lb  Pre-gravid BMI 36  Recommended weight gain 11-20 lb  Contraception: **  Feeding plan: breast/bottle**  Birth plan:  with epidural**  Delivery consent: to be signed at 28w**  Ultrasounds: **    Assessment and Plan:  No obstetric complaints**  Discussed pre**term labor precautions and fetal kick counts  Return to clinic in ** weeks           Hx of  section 2023 by Trudi Huber MD  No    Overview Addendum 7/12/2023 12:56 PM by Yelitza Neves MD     Prior LTCS x 1 - category II FHT in latent labor   Plans on TOLAC         Obesity affecting pregnancy in second trimester 6/22/2023 by Trudi Huber MD No    Overview Signed 7/12/2023 12:57 PM by Yelitza Neves MD     prepreg BMI 36  - growth US  - weekly surveillance 36 weeks

## 2023-12-19 NOTE — PATIENT INSTRUCTIONS
SENALES RADHA EL EMBARAZO  Llame a nuestra oficina al [phone] para cualquiera de los siguientes:    1. sangrado vaginal  2. Dolor abdominal dari que no desaparece.  3. Fiebre (más de 100.4 y no se bernardo con Tylenol)  4. Vómitos persistentes que tenorio más de 24 horas.  5. dolor de pecho  6. Dolor o ardor al orinar.  7. Dolor de brady severo que no se resuelve con Tylenol  8. Visión borrosa o isaac puntos en fernandez visión.  9. Hinchazón repentina de fernandez chata o johanny.  10. Enrojecimiento, hinchazón o dolor en tiffany pierna.  11. Un aumento de peso repentino en pocos días.  12. Contar los movimientos fetales del bebé. (después de 28 semanas o el sexto mes de embarazo)  13. Tiffany pérdida de líquido acuoso de la vagina: puede ser un chorro, un goteo o tiffany humedad continua  14. Después de 20 semanas de embarazo, calambres rítmicos (más de 4 por hora) o menstruales nancy dolor bajo / pélvico

## 2023-12-21 LAB — GP B STREP DNA SPEC QL NAA+PROBE: POSITIVE

## 2023-12-24 ENCOUNTER — HOSPITAL ENCOUNTER (OUTPATIENT)
Facility: HOSPITAL | Age: 23
Discharge: HOME | End: 2023-12-24
Attending: OBSTETRICS & GYNECOLOGY | Admitting: OBSTETRICS & GYNECOLOGY
Payer: MEDICARE

## 2023-12-24 ENCOUNTER — NURSE TRIAGE (OUTPATIENT)
Dept: OTHER | Facility: OTHER | Age: 23
End: 2023-12-24

## 2023-12-24 VITALS
TEMPERATURE: 98.1 F | SYSTOLIC BLOOD PRESSURE: 109 MMHG | DIASTOLIC BLOOD PRESSURE: 66 MMHG | RESPIRATION RATE: 18 BRPM | HEART RATE: 118 BPM

## 2023-12-24 LAB
BACTERIA UR QL AUTO: ABNORMAL /HPF
BILIRUB UR QL STRIP: NEGATIVE
CLARITY UR: ABNORMAL
COLOR UR: YELLOW
GLUCOSE UR STRIP-MCNC: NEGATIVE MG/DL
HGB UR QL STRIP.AUTO: ABNORMAL
KETONES UR STRIP-MCNC: ABNORMAL MG/DL
LEUKOCYTE ESTERASE UR QL STRIP: ABNORMAL
MUCOUS THREADS UR QL AUTO: ABNORMAL
NITRITE UR QL STRIP: NEGATIVE
NON-SQ EPI CELLS URNS QL MICRO: ABNORMAL /HPF
PH UR STRIP.AUTO: 6.5 [PH]
PROT UR STRIP-MCNC: ABNORMAL MG/DL
RBC #/AREA URNS AUTO: ABNORMAL /HPF
SP GR UR STRIP.AUTO: 1.02 (ref 1–1.03)
UROBILINOGEN UR STRIP-ACNC: <2 MG/DL
WBC #/AREA URNS AUTO: ABNORMAL /HPF

## 2023-12-24 PROCEDURE — 81001 URINALYSIS AUTO W/SCOPE: CPT

## 2023-12-24 PROCEDURE — NC001 PR NO CHARGE: Performed by: OBSTETRICS & GYNECOLOGY

## 2023-12-24 PROCEDURE — 99203 OFFICE O/P NEW LOW 30 MIN: CPT

## 2023-12-24 PROCEDURE — 87086 URINE CULTURE/COLONY COUNT: CPT

## 2023-12-24 RX ORDER — FLUCONAZOLE 200 MG/1
200 TABLET ORAL ONCE
Qty: 1 TABLET | Refills: 0 | Status: COMPLETED | OUTPATIENT
Start: 2023-12-24 | End: 2023-12-24

## 2023-12-24 RX ADMIN — FLUCONAZOLE 200 MG: 200 TABLET ORAL at 18:09

## 2023-12-24 NOTE — TELEPHONE ENCOUNTER
"Regardinw pregnant/ extreme pelvic pain  ----- Message from Tonya Jc sent at 2023  3:09 PM EST -----  \" I am having a lot of pelvic pain, I can barely move.It gets worse every time I move.\"    "

## 2023-12-24 NOTE — PROGRESS NOTES
L&D Triage Note - OB/GYN  Grismerlin Fernandez 23 y.o. female MRN: [id number]  Unit/Bed#: L&D 323-01 Encounter: [id number]      ASSESSMENT:    Grismerlin Fernandez is a 23 y.o.  at 36w3d presenting to triage with abdominal pain and untreated yeast infection. R/o labor negative, and given diflucan. Appropriate for discharge    PLAN:    1) Abdominal pain  -SVE /3 without contractions on toco  -NST reactive  -Patient reports sex 2 days ago as possible etiology  -F/u urine culture  2) Yeast infection  -Confirmed today on speculum  -Diflucan given in triage  3) Continue routine prenatal care  4) Discharge from OB triage with  labor precautions    - Reviewed rupture of membranes, false vs true labor, decreased fetal movement, and vaginal bleeding   - Pt to call provider with any concerns and follow up at her next scheduled prenatal appointment    - Case discussed with Dr. Mcallister    SUBJECTIVE:    Grismerlin Fernandez 23 y.o.  at 36w3d with an Estimated Date of Delivery: 24 presenting to triage with complaint of abdominal pain that is intermittently sharp and intermittently dull since this morning.  The pain does not feel like contractions.  The patient does report having sex 2 days ago which was painful at that time.  The patient also reports being diagnosed with a yeast infection recently at her clinic appointment but was unable to  the Monistat and the infection has not yet been treated.  Patient denies vaginal bleeding, vaginal leakage feels normal fetal movement. She does desire TOLAC.    OBJECTIVE:    Vitals:    23 1725   BP: 109/66   Pulse: (!) 118   Resp: 18   Temp: 98.1 °F (36.7 °C)       ROS:  Constitutional: Negative  Respiratory: Negative  Cardiovascular: Negative    Gastrointestinal: Negative    General Physical Exam:  General: in no apparent distress  Cardiovascular: No murmurs  Lungs: non-labored breathing  Abdomen: abdomen is soft without significant tenderness,  masses, organomegaly or guarding  Lower extremeties: nontender    Cervical Exam  Speculum: Cervical os is closed, presence of thick white discharge    SVE:   Cervical Dilation: 1  Cervical Effacement: 30  Fetal Station: -3  Method: Manual  OB Examiner: Alonzo    FHT:   130 bpm, moderate variability, 15x15 accelerations present, decelerations absent    TOCO:    No contractions    Lab Results   Component Value Date    WBC 13.89 (H) 12/18/2023    HGB 10.5 (L) 12/18/2023    HCT 33.1 (L) 12/18/2023     12/18/2023     Lab Results   Component Value Date    K 4.8 11/22/2020     11/22/2020    CO2 21 (L) 11/22/2020    BUN 13 11/22/2020    CREATININE 0.62 11/22/2020    AST 24 11/22/2020    ALT 8 (L) 11/22/2020         La Rosado MD,  OBGYN PGY-2  12/24/2023 6:02 PM

## 2023-12-24 NOTE — TELEPHONE ENCOUNTER
"Reason for Disposition  • MODERATE-SEVERE abdominal pain (e.g., interferes with normal activities, awakens from sleep)    Answer Assessment - Initial Assessment Questions  1. LOCATION: \"Where does it hurt?\"       Lower abdomen    2. RADIATION: \"Does the pain shoot anywhere else?\" (e.g., chest, back)        3. ONSET: \"When did the pain begin?\" (Minutes, hours or days ago)       Today since 6 am     4. ONSET: \"Gradual or sudden onset?\"      Sudden    5. PATTERN: \"Does the pain come and go, or has it been constant since it started?\"         6. SEVERITY: \"How bad is the pain?\" \"What does it keep you from doing?\"  (e.g., Scale 1-10; mild, moderate, or severe)    - MILD (1-3): doesn't interfere with normal activities, abdomen soft and not tender to touch     - MODERATE (4-7): interferes with normal activities or awakens from sleep, tender to touch     - SEVERE (8-10): excruciating pain, doubled over, unable to do any normal activities      10/10 with movement and when walking.     7. RECURRENT SYMPTOM: \"Have you ever had this type of stomach pain before?\" If Yes, ask: \"When was the last time?\" and \"What happened that time?\"       Felt this way at 33 wks but says it was from working and being on her feet.     8. CAUSE: \"What do you think is causing the stomach pain?      Pregnancy.     9. RELIEVING/AGGRAVATING FACTORS: \"What makes it better or worse?\" (e.g., antacids, bowel movement, movement)        10. FETAL MOVEMENT: \"Has the baby's movement decreased or changed significantly from normal?\"        Baby is moving normally.     11. OTHER SYMPTOMS: \"Has there been any vaginal bleeding, fever, vomiting, diarrhea, or urine problems?\"        No vaginal bleeding. No leakage of vaginal fluid.     12. NIGHAT: \"What date are you expecting to deliver?\"      1/18/2024    Protocols used: Pregnancy - Abdominal Pain Greater Than 20 Weeks EGA-ADULT-AH    "

## 2023-12-26 LAB — BACTERIA UR CULT: NORMAL

## 2023-12-28 ENCOUNTER — ROUTINE PRENATAL (OUTPATIENT)
Dept: OBGYN CLINIC | Facility: CLINIC | Age: 23
End: 2023-12-28

## 2023-12-28 VITALS
BODY MASS INDEX: 42.36 KG/M2 | WEIGHT: 231.6 LBS | HEART RATE: 91 BPM | SYSTOLIC BLOOD PRESSURE: 116 MMHG | DIASTOLIC BLOOD PRESSURE: 80 MMHG

## 2023-12-28 DIAGNOSIS — Z98.890 H/O ABDOMINOPLASTY: ICD-10-CM

## 2023-12-28 DIAGNOSIS — Z34.93 PRENATAL CARE IN THIRD TRIMESTER: ICD-10-CM

## 2023-12-28 DIAGNOSIS — Z3A.37 37 WEEKS GESTATION OF PREGNANCY: ICD-10-CM

## 2023-12-28 DIAGNOSIS — O99.212 OBESITY AFFECTING PREGNANCY IN SECOND TRIMESTER, UNSPECIFIED OBESITY TYPE: ICD-10-CM

## 2023-12-28 DIAGNOSIS — Z98.891 HX OF CESAREAN SECTION: Primary | ICD-10-CM

## 2023-12-28 DIAGNOSIS — Z3A.35 35 WEEKS GESTATION OF PREGNANCY: ICD-10-CM

## 2023-12-28 PROBLEM — O99.820 GBS (GROUP B STREPTOCOCCUS CARRIER), +RV CULTURE, CURRENTLY PREGNANT: Status: ACTIVE | Noted: 2023-12-28

## 2023-12-28 PROCEDURE — 99213 OFFICE O/P EST LOW 20 MIN: CPT | Performed by: NURSE PRACTITIONER

## 2023-12-28 NOTE — PROGRESS NOTES
Grismerlin Fernandez presents today for routine OB visit at 37w0d.  Blood Pressure: 116/80  Nd=699 kg (231 lb 9.6 oz); Body mass index is 42.36 kg/m².; TWG=13.9 kg (30 lb 9.6 oz)  Fetal Heart Rate: 132; Fundal Height (cm): 35 cm  Abdomen: gravid, soft, non-tender.  She reports pelvic pressure.  Reports irregular uterine contractions.  Denies vaginal bleeding or leaking of fluid.  Reports adequate fetal movement of at least 10 movements in 2 hours once daily.  Reviewed labor precautions and fetal kick counts as well as pre-eclampsia warning signs.  Reviewed perineal massage for decreasing risk of perineal lacerations during delivery.  Advised to continue medications and return in 1 week.      Current Outpatient Medications   Medication Instructions    ferrous sulfate 324 mg, Oral, 2 times daily before meals    Prenatal Vit-Fe Fumarate-FA (Prenatal Plus Vitamin/Mineral) 27-1 MG TABS 1 tablet, Oral, Daily

## 2023-12-28 NOTE — PATIENT INSTRUCTIONS
Danielle por fernandez confianza en nuestro equipo.   Le agradecemos y agradecemos eliceo comentarios.   Si recibe tiffany encuesta nuestra, tómese unos momentos para informarnos cómo estamos.   Sinceramente,  PER Jacobs       NIKOLAI DE PARTO   Llame a nuestra oficina al [phone] para cualquiera de los siguientes:    * Necesito llamar inmediatamente yo si tengo incluso tiffany pequeña cantidad de LIQUIDO se escapa de mi vagina, con o sin contracciones.   * Arlene llamar si tengo SANGRADO tiffany cantidad igual o más que un período. Tiffany pequeña cantidad de flujo vaginal sangriento es normal al final del embarazo.   * Arlene llamar si tengo CONTRACCIONES cada emmanuel minutos nani al menos tiffany hora. Necesito un reloj para tiempo. Yo arlene tiempo desde el comienzo de tiffany contracción hasta el comienzo de la siguiente.   * De ser necesario ANTES DE  ir al hospital.   * Necesito tener un plan para TRANSPORTE a ir al hospital cuando estoy en trabajo de parto. Trabajo generalmente no es tiffany emergencia que requiere tiffany ambulancia.          CUENTAS DEL RETROCESO FETAL    En el tercer trimestre (después de 28 semanas de gestación) deben realizar patada fetal cuentas cada día. Fernandez bebé debe moverse al menos 10 veces en 2 horas nani un tiempo activo, tiffany vez al día.    Elegir el atime del día cuando el bebé es más activo. Trate de hacerlo a la misma hora cada día. Entrar en tiffany posición cómoda y luego escribir el tiempo que tu bebé se mueve destinee. Cuenta cada movimiento hasta que el bebé se mueve 10 veces. Estos movimientos incluyen patadas, puñetazos, codazos, revolotea o rollos. [place] puede tardar entre 5 minutos a 2 horas. Anote el tiempo que sientes movimiento 10 del bebé.    Si ha pasado 2 horas y tu bebé no se ha movido al menos 10 veces, usted debe llamar a la oficina de inmediato. [phone]          MASAJE EN LA HORACE PERINEAL O VAGINAL    Que puedo hacer ahora para reducir las probabilidades de desgarro nani el  parto?  Masaje alrededor del orificio de la vagina realizado por usted misma (o por fernandez jonathon).  El masaje en esta loren, ya sea antes del parto o nani la segunda etapa del parto, puede reducir la probabilidad de desgarro perineal nani el parto.  Asimismo, el uso de compresas tibias en el perineo nani la etapa expulsiva del parto puede reducir la pravedad del desparro.  [place] sucedera nani la etapa expulsiva del parto.  En casa tambien puede reducir las probabilidades de sufrir lesiones durnate el parto de con masajes en la loren perineal.    Quien?  Todas las mujeres embarazadas a partir de, aproximadamente, las 34 semanas de embarazo.    Cuando?  Usted o fernandez jonathon deben hacer masajes en la loren vaginal nani 5 a 10 minutos de 1 a 4 veces por semana.    [place]?  Use tiffany mezcla de agua y aceite de almendras, bill u august con 1 o 2 dedos (sharifa la comodidad).  Inserte los dedos en la vagina entre 3 y 5cm.  Aplique presion general hacia abajo y hacia los costados nani 5 a 10 minutos entre las 3 y las 9 horas, de 1 a 4 veces por semana.          GROUP B STREP    Group B Strep (GBS) es tiffany bacteria vaginal común. Aproximadamente el 25% de las mujeres normalmente tienen la bacteria GBS en la vagina. NO es tiffany infección de transmisión sexual. ¡De hecho, no es tiffany infección! Es solo tiffany bacteria vaginal normal para muchas mujeres.    Sin embargo, la bacteria GBS puede ser peligrosas para un bebé recién nacido si el bebé está expuesto a fernando bacteria particular nani el parto y el nacimiento Y desarrolla tiffany infección de la bacteria. La probabilidad de tiffany infección de GBS en recién nacidos para tiffany robinson que tiene las bacteria GBS en la vagina es cerca de 1% - 2%. Janessa si la infeccion produce, un bebé podría ser gravemente enfermo.    Por esta razón, hacemos un cultivo vaginal (Q-Tip de la vagina y el recto) para todas las mujeres embarazadas en el aproximadamente 36 semanas de embarazo. Si el cultivo  demuestra que hay bacteria GBS presente, no es tiffany razón para el pánico! Porque en esta situación dará fernanda antibióticos de la robinson a través de fernandez IV mientras está en parto. Cuando tiffany madre se trata con antibióticos nani el parto, fernandez bebé DAYA NUNCA se infecta con la bacteria GBS.

## 2024-01-01 ENCOUNTER — HOSPITAL ENCOUNTER (OUTPATIENT)
Facility: HOSPITAL | Age: 24
Discharge: HOME | End: 2024-01-01
Attending: OBSTETRICS & GYNECOLOGY | Admitting: OBSTETRICS & GYNECOLOGY
Payer: MEDICARE

## 2024-01-01 VITALS
DIASTOLIC BLOOD PRESSURE: 74 MMHG | SYSTOLIC BLOOD PRESSURE: 123 MMHG | HEART RATE: 104 BPM | RESPIRATION RATE: 18 BRPM | TEMPERATURE: 98.8 F | OXYGEN SATURATION: 95 %

## 2024-01-01 PROBLEM — U07.1 COVID-19 AFFECTING PREGNANCY IN THIRD TRIMESTER: Status: ACTIVE | Noted: 2024-01-01

## 2024-01-01 PROBLEM — Z3A.37 37 WEEKS GESTATION OF PREGNANCY: Status: ACTIVE | Noted: 2023-07-12

## 2024-01-01 PROBLEM — O98.513 COVID-19 AFFECTING PREGNANCY IN THIRD TRIMESTER: Status: ACTIVE | Noted: 2024-01-01

## 2024-01-01 LAB
FLUAV RNA RESP QL NAA+PROBE: NEGATIVE
FLUBV RNA RESP QL NAA+PROBE: NEGATIVE
RSV RNA RESP QL NAA+PROBE: NEGATIVE
SARS-COV-2 RNA RESP QL NAA+PROBE: POSITIVE

## 2024-01-01 PROCEDURE — 99211 OFF/OP EST MAY X REQ PHY/QHP: CPT

## 2024-01-01 PROCEDURE — NC001 PR NO CHARGE: Performed by: OBSTETRICS & GYNECOLOGY

## 2024-01-01 PROCEDURE — 0241U HB NFCT DS VIR RESP RNA 4 TRGT: CPT

## 2024-01-01 RX ORDER — CARBOPROST TROMETHAMINE 250 UG/ML
INJECTION, SOLUTION INTRAMUSCULAR
Status: DISCONTINUED
Start: 2024-01-01 | End: 2024-01-01 | Stop reason: HOSPADM

## 2024-01-01 RX ORDER — METHYLERGONOVINE MALEATE 0.2 MG/ML
INJECTION INTRAVENOUS
Status: DISCONTINUED
Start: 2024-01-01 | End: 2024-01-01 | Stop reason: HOSPADM

## 2024-01-01 NOTE — PROGRESS NOTES
L&D Triage Note - OB/GYN  Grismerlin Fernandez 23 y.o. female MRN: [id number]  Unit/Bed#: L&D 322-01 Encounter: [id number]      ASSESSMENT:    Grismerlin Fernandez is a 23 y.o.  at 37w4d presents with feelings of general malaise. Found to be COVID positive. FHT is reactive. Low concern for fetal distress at this time. Patient is suitable for discharge home with precautions.    PLAN:    1) Malaise   -COVID swab: positive   -FHT: reactive   -[place]: mild, irregular   -SVE: /-3, unchanged from previous exam.    2) Continue routine prenatal care  3) Discharge from OB triage with term labor precautions    - Reviewed rupture of membranes, false vs true labor, decreased fetal movement, and vaginal bleeding   - Pt to call provider with any concerns and follow up at her next scheduled prenatal appointment    - Case discussed with Dr. Toussaint-Swansea    SUBJECTIVE:    Grismerlin Fernandez 23 y.o.  at 37w4d with an Estimated Date of Delivery: 24 She comes in with complaints of feeling tired and generally unwell. She has had some abdominal cramping as well. Endorses good fetal movement. Denies any leaking fluid or vaginal bleeding.    Her current obstetrical history is significant for H/o abdominoplasty, COVID +, GBS +    Her past obstetrical history is significant for prior , FGR      OBJECTIVE:    Vitals:    24 0348   BP: 123/74   Pulse: 104   Resp: 18   Temp: 98.8 °F (37.1 °C)   SpO2:        ROS:  Constitutional: as per HPI  Respiratory: Negative  Cardiovascular: Negative    Gastrointestinal: Negative    General Physical Exam:  General: non-toxic, alert, and cooperative  Cardiovascular: Cor RRR  Lungs: non-labored breathing  Abdomen: abdomen is soft without significant tenderness, masses, organomegaly or guarding  Lower extremeties: nontender    Cervical Exam  SVE: % / -3    Fetal monitoring:  FHT:  135 bpm/ Moderate 6 - 25 bpm / 15 x 15 accelerations present, no  decelerations  [place]: mild, irregular       Alex Carvajal MD  OBGYN PGY-2  1/1/2024 6:30 AM

## 2024-01-08 ENCOUNTER — ROUTINE PRENATAL (OUTPATIENT)
Dept: OBGYN CLINIC | Facility: CLINIC | Age: 24
End: 2024-01-08

## 2024-01-08 VITALS
WEIGHT: 232.6 LBS | SYSTOLIC BLOOD PRESSURE: 111 MMHG | DIASTOLIC BLOOD PRESSURE: 75 MMHG | HEART RATE: 96 BPM | BODY MASS INDEX: 42.8 KG/M2 | HEIGHT: 62 IN

## 2024-01-08 DIAGNOSIS — Z3A.38 38 WEEKS GESTATION OF PREGNANCY: ICD-10-CM

## 2024-01-08 DIAGNOSIS — Z98.891 HX OF CESAREAN SECTION: ICD-10-CM

## 2024-01-08 DIAGNOSIS — Z34.93 NORMAL PREGNANCY IN THIRD TRIMESTER: Primary | ICD-10-CM

## 2024-01-08 PROCEDURE — 99215 OFFICE O/P EST HI 40 MIN: CPT | Performed by: NURSE PRACTITIONER

## 2024-01-08 NOTE — PROGRESS NOTES
"Assessment & Plan  23 y.o.  at 38w4d presenting for routine prenatal visit.   Pt states she had a C/S x 1 for fetal intolerence to labor, plans TOLAC  Pt states she would like an elective induction at 39 weeks  Discussed need for cervical Ripen and possible methods  Explained labor may be longer, pt states she would like to be induced  Induction was scheduled 8 pm 2024  Hx of GBS positive      Problem List Items Addressed This Visit          Other    38 weeks gestation of pregnancy    Hx of  section     Other Visit Diagnoses       Normal pregnancy in third trimester    -  Primary          ____________________________________________________________  Subjective  She is without complaint.   She denies contractions, loss of fluid, or vaginal bleeding.   She feels regular fetal movements.     WNL respiratory effort, negative cough or SOB    Objective  /75   Pulse 96   Ht 5' 2\" (1.575 m)   Wt 106 kg (232 lb 9.6 oz)   LMP 2023 (Approximate)   BMI 42.54 kg/m²     Fetal Heart Rate: 150    Patient's Active Problem List  Patient Active Problem List   Diagnosis    Hx of  section    Obesity affecting pregnancy in second trimester    38 weeks gestation of pregnancy    History of intrauterine growth restriction in prior pregnancy, currently pregnant, first trimester    H/O abdominoplasty    Patient noncompliance    GBS (group B Streptococcus carrier), +RV culture, currently pregnant    COVID-19 affecting pregnancy in third trimester     Plan  Present for induction of labor through the ED 2024 8 pn  To call with vaginal bleeding, loss of fluid, regular contractions, decreased fetal movement, other needs or concerns.  Return for postpartum care  Pt verbalized understanding of all discussed.    "

## 2024-01-08 NOTE — PATIENT INSTRUCTIONS
WARNING SIGNS DURING PREGNANCY  Call our office at [phone] for any of the followin. Vaginal bleeding  2. Sharp abdominal pain that does not go away  3. Fever (more than 100.4 and is not relieved by Tylenol)  4. Persistent vomiting lasting greater than 24 hours  5. Chest pain   6. Pain or burning when you urinate  7. Severe headache that doesn't resolve with Tylenol  8. Blurred vision or seeing spots in your vision  9. Sudden swelling of your face or hands  10. Redness, swelling or pain in a leg  11. A sudden weight gain in just a few days  12. Decrease in your baby's movement (after 28 weeks or the 6th month of pregnancy)  13. A loss of watery fluid from your vagina - can be a gush, a trickle or continuous wetness  14. After 20 weeks of pregnancy, rhythmic cramping (greater than 4 per hour) or menstrual like low/pelvic pain       Present for induction of labor throug the emergency room 2024 8 pm, enter through the emergency room  Return for postpartum care

## 2024-01-09 ENCOUNTER — TELEPHONE (OUTPATIENT)
Dept: LABOR AND DELIVERY | Facility: HOSPITAL | Age: 24
End: 2024-01-09

## 2024-01-09 NOTE — TELEPHONE ENCOUNTER
Due to higher risk nature of Grismerlin's delivery plan, induction on Sunday night into Monday is preferred instead of a weekend appointment. Therefore I called her and advised she be induced Sunday. She accepted my recommendation and will come Sunday at 8pm. I told her she should eat dinner prior to coming in.

## 2024-01-14 ENCOUNTER — HOSPITAL ENCOUNTER (INPATIENT)
Facility: HOSPITAL | Age: 24
LOS: 3 days | Discharge: HOME | DRG: 540 | End: 2024-01-17
Attending: OBSTETRICS & GYNECOLOGY | Admitting: OBSTETRICS & GYNECOLOGY
Payer: MEDICARE

## 2024-01-14 ENCOUNTER — HOSPITAL ENCOUNTER (OUTPATIENT)
Dept: LABOR AND DELIVERY | Facility: HOSPITAL | Age: 24
Discharge: HOME | DRG: 540 | End: 2024-01-14
Payer: MEDICARE

## 2024-01-14 DIAGNOSIS — Z98.891 HX OF CESAREAN SECTION: ICD-10-CM

## 2024-01-14 DIAGNOSIS — Z30.017 INSERTION OF NEXPLANON: ICD-10-CM

## 2024-01-14 DIAGNOSIS — O98.513 COVID-19 AFFECTING PREGNANCY IN THIRD TRIMESTER: ICD-10-CM

## 2024-01-14 DIAGNOSIS — Z98.890 H/O ABDOMINOPLASTY: ICD-10-CM

## 2024-01-14 DIAGNOSIS — Z98.891 STATUS POST REPEAT LOW TRANSVERSE CESAREAN SECTION: ICD-10-CM

## 2024-01-14 DIAGNOSIS — O09.291 HISTORY OF INTRAUTERINE GROWTH RESTRICTION IN PRIOR PREGNANCY, CURRENTLY PREGNANT, FIRST TRIMESTER: ICD-10-CM

## 2024-01-14 DIAGNOSIS — O99.212 OBESITY AFFECTING PREGNANCY IN SECOND TRIMESTER, UNSPECIFIED OBESITY TYPE: ICD-10-CM

## 2024-01-14 DIAGNOSIS — O99.820 GBS (GROUP B STREPTOCOCCUS CARRIER), +RV CULTURE, CURRENTLY PREGNANT: ICD-10-CM

## 2024-01-14 DIAGNOSIS — Z91.199 PATIENT NONCOMPLIANCE: ICD-10-CM

## 2024-01-14 DIAGNOSIS — U07.1 COVID-19 AFFECTING PREGNANCY IN THIRD TRIMESTER: ICD-10-CM

## 2024-01-14 DIAGNOSIS — Z3A.39 39 WEEKS GESTATION OF PREGNANCY: Primary | ICD-10-CM

## 2024-01-14 LAB
ABO GROUP BLD: NORMAL
BLD GP AB SCN SERPL QL: NEGATIVE
ERYTHROCYTE [DISTWIDTH] IN BLOOD BY AUTOMATED COUNT: 14.1 % (ref 11.6–15.1)
HCT VFR BLD AUTO: 32.7 % (ref 34.8–46.1)
HGB BLD-MCNC: 10.6 G/DL (ref 11.5–15.4)
HOLD SPECIMEN: YES
MCH RBC QN AUTO: 26.4 PG (ref 26.8–34.3)
MCHC RBC AUTO-ENTMCNC: 32.4 G/DL (ref 31.4–37.4)
MCV RBC AUTO: 82 FL (ref 82–98)
PLATELET # BLD AUTO: 277 THOUSANDS/UL (ref 149–390)
PMV BLD AUTO: 10.2 FL (ref 8.9–12.7)
RBC # BLD AUTO: 4.01 MILLION/UL (ref 3.81–5.12)
RH BLD: POSITIVE
SPECIMEN EXPIRATION DATE: NORMAL
WBC # BLD AUTO: 13.68 THOUSAND/UL (ref 4.31–10.16)

## 2024-01-14 PROCEDURE — 0JHF3HZ INSERTION OF CONTRACEPTIVE DEVICE INTO LEFT UPPER ARM SUBCUTANEOUS TISSUE AND FASCIA, PERCUTANEOUS APPROACH: ICD-10-PCS | Performed by: OBSTETRICS & GYNECOLOGY

## 2024-01-14 PROCEDURE — 86780 TREPONEMA PALLIDUM: CPT

## 2024-01-14 PROCEDURE — NC001 PR NO CHARGE: Performed by: OBSTETRICS & GYNECOLOGY

## 2024-01-14 PROCEDURE — 85027 COMPLETE CBC AUTOMATED: CPT

## 2024-01-14 PROCEDURE — 86901 BLOOD TYPING SEROLOGIC RH(D): CPT

## 2024-01-14 PROCEDURE — 86900 BLOOD TYPING SEROLOGIC ABO: CPT

## 2024-01-14 PROCEDURE — 86850 RBC ANTIBODY SCREEN: CPT

## 2024-01-14 RX ORDER — ONDANSETRON 2 MG/ML
4 INJECTION INTRAMUSCULAR; INTRAVENOUS EVERY 6 HOURS PRN
Status: DISCONTINUED | OUTPATIENT
Start: 2024-01-14 | End: 2024-01-15

## 2024-01-14 RX ORDER — BUPIVACAINE HYDROCHLORIDE 2.5 MG/ML
30 INJECTION, SOLUTION EPIDURAL; INFILTRATION; INTRACAUDAL ONCE AS NEEDED
Status: DISCONTINUED | OUTPATIENT
Start: 2024-01-14 | End: 2024-01-15

## 2024-01-14 RX ORDER — SODIUM CHLORIDE, SODIUM LACTATE, POTASSIUM CHLORIDE, CALCIUM CHLORIDE 600; 310; 30; 20 MG/100ML; MG/100ML; MG/100ML; MG/100ML
125 INJECTION, SOLUTION INTRAVENOUS CONTINUOUS
Status: DISCONTINUED | OUTPATIENT
Start: 2024-01-14 | End: 2024-01-15

## 2024-01-14 RX ADMIN — SODIUM CHLORIDE, SODIUM LACTATE, POTASSIUM CHLORIDE, AND CALCIUM CHLORIDE 125 ML/HR: .6; .31; .03; .02 INJECTION, SOLUTION INTRAVENOUS at 21:40

## 2024-01-14 RX ADMIN — SODIUM CHLORIDE 5 MILLION UNITS: 0.9 INJECTION, SOLUTION INTRAVENOUS at 21:49

## 2024-01-15 ENCOUNTER — ANESTHESIA EVENT (INPATIENT)
Dept: LABOR AND DELIVERY | Facility: HOSPITAL | Age: 24
DRG: 540 | End: 2024-01-15
Payer: MEDICARE

## 2024-01-15 ENCOUNTER — ANESTHESIA (INPATIENT)
Dept: LABOR AND DELIVERY | Facility: HOSPITAL | Age: 24
DRG: 540 | End: 2024-01-15
Payer: MEDICARE

## 2024-01-15 PROBLEM — Z98.891 S/P CESAREAN SECTION: Status: ACTIVE | Noted: 2023-07-12

## 2024-01-15 LAB
BASE EXCESS BLDCOA CALC-SCNC: -6.9 MMOL/L (ref 3–11)
BASE EXCESS BLDCOV CALC-SCNC: -7.7 MMOL/L (ref 1–9)
HCO3 BLDCOA-SCNC: 24.2 MMOL/L (ref 17.3–27.3)
HCO3 BLDCOV-SCNC: 21.5 MMOL/L (ref 12.2–28.6)
O2 CT VFR BLDCOA CALC: 2.8 ML/DL
OXYHGB MFR BLDCOA: 13.4 %
OXYHGB MFR BLDCOV: 17 %
PCO2 BLDCOA: 75.7 MM[HG] (ref 30–60)
PCO2 BLDCOV: 59.6 MM HG (ref 27–43)
PH BLDCOA: 7.12 [PH] (ref 7.23–7.43)
PH BLDCOV: 7.17 [PH] (ref 7.19–7.49)
PO2 BLDCOA: 11.7 MM HG (ref 5–25)
PO2 BLDCOV: 13.1 MM HG (ref 15–45)
SAO2 % BLDCOV: 3.5 ML/DL
TREPONEMA PALLIDUM IGG+IGM AB [PRESENCE] IN SERUM OR PLASMA BY IMMUNOASSAY: NORMAL

## 2024-01-15 PROCEDURE — C1765 ADHESION BARRIER: HCPCS | Performed by: OBSTETRICS & GYNECOLOGY

## 2024-01-15 PROCEDURE — 94762 N-INVAS EAR/PLS OXIMTRY CONT: CPT

## 2024-01-15 PROCEDURE — 82805 BLOOD GASES W/O2 SATURATION: CPT | Performed by: OBSTETRICS & GYNECOLOGY

## 2024-01-15 PROCEDURE — NC001 PR NO CHARGE: Performed by: OBSTETRICS & GYNECOLOGY

## 2024-01-15 PROCEDURE — C9290 INJ, BUPIVACAINE LIPOSOME: HCPCS

## 2024-01-15 RX ORDER — ACETAMINOPHEN 325 MG/1
975 TABLET ORAL EVERY 6 HOURS PRN
Status: ACTIVE | OUTPATIENT
Start: 2024-01-15 | End: 2024-01-16

## 2024-01-15 RX ORDER — METOCLOPRAMIDE HYDROCHLORIDE 5 MG/ML
5 INJECTION INTRAMUSCULAR; INTRAVENOUS EVERY 6 HOURS PRN
Status: ACTIVE | OUTPATIENT
Start: 2024-01-15 | End: 2024-01-16

## 2024-01-15 RX ORDER — OXYTOCIN/RINGER'S LACTATE 30/500 ML
1-30 PLASTIC BAG, INJECTION (ML) INTRAVENOUS
Status: DISCONTINUED | OUTPATIENT
Start: 2024-01-15 | End: 2024-01-15

## 2024-01-15 RX ORDER — KETOROLAC TROMETHAMINE 30 MG/ML
INJECTION, SOLUTION INTRAMUSCULAR; INTRAVENOUS AS NEEDED
Status: DISCONTINUED | OUTPATIENT
Start: 2024-01-15 | End: 2024-01-15

## 2024-01-15 RX ORDER — LIDOCAINE HCL/EPINEPHRINE/PF 2%-1:200K
VIAL (ML) INJECTION AS NEEDED
Status: DISCONTINUED | OUTPATIENT
Start: 2024-01-15 | End: 2024-01-15

## 2024-01-15 RX ORDER — MORPHINE SULFATE 0.5 MG/ML
INJECTION, SOLUTION EPIDURAL; INTRATHECAL; INTRAVENOUS AS NEEDED
Status: DISCONTINUED | OUTPATIENT
Start: 2024-01-15 | End: 2024-01-15

## 2024-01-15 RX ORDER — PHENYLEPHRINE HCL IN 0.9% NACL 1 MG/10 ML
SYRINGE (ML) INTRAVENOUS
Status: COMPLETED
Start: 2024-01-15 | End: 2024-01-15

## 2024-01-15 RX ORDER — ROPIVACAINE HYDROCHLORIDE 2 MG/ML
INJECTION, SOLUTION EPIDURAL; INFILTRATION; PERINEURAL CONTINUOUS PRN
Status: DISCONTINUED | OUTPATIENT
Start: 2024-01-15 | End: 2024-01-15

## 2024-01-15 RX ORDER — NALOXONE HYDROCHLORIDE 0.4 MG/ML
0.1 INJECTION, SOLUTION INTRAMUSCULAR; INTRAVENOUS; SUBCUTANEOUS
Status: ACTIVE | OUTPATIENT
Start: 2024-01-15 | End: 2024-01-16

## 2024-01-15 RX ORDER — SENNOSIDES 8.6 MG
1 TABLET ORAL DAILY
Status: DISCONTINUED | OUTPATIENT
Start: 2024-01-16 | End: 2024-01-17 | Stop reason: HOSPADM

## 2024-01-15 RX ORDER — ONDANSETRON 2 MG/ML
INJECTION INTRAMUSCULAR; INTRAVENOUS AS NEEDED
Status: DISCONTINUED | OUTPATIENT
Start: 2024-01-15 | End: 2024-01-15

## 2024-01-15 RX ORDER — LIDOCAINE HCL/EPINEPHRINE/PF 2%-1:200K
VIAL (ML) INJECTION
Status: COMPLETED
Start: 2024-01-15 | End: 2024-01-15

## 2024-01-15 RX ORDER — MORPHINE SULFATE 4 MG/ML
4 INJECTION, SOLUTION INTRAMUSCULAR; INTRAVENOUS ONCE
Status: COMPLETED | OUTPATIENT
Start: 2024-01-15 | End: 2024-01-15

## 2024-01-15 RX ORDER — PHENYLEPHRINE HCL IN 0.9% NACL 1 MG/10 ML
SYRINGE (ML) INTRAVENOUS AS NEEDED
Status: DISCONTINUED | OUTPATIENT
Start: 2024-01-15 | End: 2024-01-15

## 2024-01-15 RX ORDER — MORPHINE SULFATE 0.5 MG/ML
INJECTION, SOLUTION EPIDURAL; INTRATHECAL; INTRAVENOUS
Status: COMPLETED
Start: 2024-01-15 | End: 2024-01-15

## 2024-01-15 RX ORDER — CEFAZOLIN SODIUM 2 G/50ML
2000 SOLUTION INTRAVENOUS ONCE
Status: COMPLETED | OUTPATIENT
Start: 2024-01-15 | End: 2024-01-15

## 2024-01-15 RX ORDER — OXYCODONE HYDROCHLORIDE 5 MG/1
5 TABLET ORAL EVERY 4 HOURS PRN
Status: DISCONTINUED | OUTPATIENT
Start: 2024-01-16 | End: 2024-01-17 | Stop reason: HOSPADM

## 2024-01-15 RX ORDER — ACETAMINOPHEN 325 MG/1
650 TABLET ORAL EVERY 6 HOURS
Status: DISCONTINUED | OUTPATIENT
Start: 2024-01-15 | End: 2024-01-17 | Stop reason: HOSPADM

## 2024-01-15 RX ORDER — LIDOCAINE HYDROCHLORIDE AND EPINEPHRINE 15; 5 MG/ML; UG/ML
INJECTION, SOLUTION EPIDURAL
Status: COMPLETED | OUTPATIENT
Start: 2024-01-15 | End: 2024-01-15

## 2024-01-15 RX ORDER — ONDANSETRON 2 MG/ML
4 INJECTION INTRAMUSCULAR; INTRAVENOUS EVERY 8 HOURS PRN
Status: DISCONTINUED | OUTPATIENT
Start: 2024-01-16 | End: 2024-01-17 | Stop reason: HOSPADM

## 2024-01-15 RX ORDER — DIPHENHYDRAMINE HYDROCHLORIDE 50 MG/ML
25 INJECTION INTRAMUSCULAR; INTRAVENOUS EVERY 6 HOURS PRN
Status: DISCONTINUED | OUTPATIENT
Start: 2024-01-16 | End: 2024-01-17 | Stop reason: HOSPADM

## 2024-01-15 RX ORDER — CALCIUM CARBONATE 500 MG/1
1000 TABLET, CHEWABLE ORAL DAILY PRN
Status: DISCONTINUED | OUTPATIENT
Start: 2024-01-15 | End: 2024-01-17 | Stop reason: HOSPADM

## 2024-01-15 RX ORDER — NALBUPHINE HYDROCHLORIDE 10 MG/ML
3 INJECTION, SOLUTION INTRAMUSCULAR; INTRAVENOUS; SUBCUTANEOUS
Status: ACTIVE | OUTPATIENT
Start: 2024-01-15 | End: 2024-01-16

## 2024-01-15 RX ORDER — OXYTOCIN/RINGER'S LACTATE 30/500 ML
62.5 PLASTIC BAG, INJECTION (ML) INTRAVENOUS ONCE
Status: DISCONTINUED | OUTPATIENT
Start: 2024-01-15 | End: 2024-01-17 | Stop reason: HOSPADM

## 2024-01-15 RX ORDER — OXYCODONE HYDROCHLORIDE 5 MG/1
10 TABLET ORAL EVERY 4 HOURS PRN
Status: DISCONTINUED | OUTPATIENT
Start: 2024-01-16 | End: 2024-01-17 | Stop reason: HOSPADM

## 2024-01-15 RX ORDER — ONDANSETRON 2 MG/ML
4 INJECTION INTRAMUSCULAR; INTRAVENOUS EVERY 6 HOURS PRN
Status: DISPENSED | OUTPATIENT
Start: 2024-01-15 | End: 2024-01-16

## 2024-01-15 RX ORDER — ONDANSETRON 2 MG/ML
4 INJECTION INTRAMUSCULAR; INTRAVENOUS ONCE AS NEEDED
Status: DISCONTINUED | OUTPATIENT
Start: 2024-01-15 | End: 2024-01-15 | Stop reason: SDUPTHER

## 2024-01-15 RX ORDER — OXYTOCIN/RINGER'S LACTATE 30/500 ML
PLASTIC BAG, INJECTION (ML) INTRAVENOUS
Status: COMPLETED
Start: 2024-01-15 | End: 2024-01-15

## 2024-01-15 RX ORDER — HYDROMORPHONE HCL/PF 1 MG/ML
0.5 SYRINGE (ML) INJECTION EVERY 2 HOUR PRN
Status: ACTIVE | OUTPATIENT
Start: 2024-01-15 | End: 2024-01-16

## 2024-01-15 RX ORDER — OXYCODONE HYDROCHLORIDE 5 MG/1
5 TABLET ORAL EVERY 4 HOURS PRN
Status: ACTIVE | OUTPATIENT
Start: 2024-01-15 | End: 2024-01-16

## 2024-01-15 RX ORDER — SODIUM CHLORIDE, SODIUM LACTATE, POTASSIUM CHLORIDE, CALCIUM CHLORIDE 600; 310; 30; 20 MG/100ML; MG/100ML; MG/100ML; MG/100ML
125 INJECTION, SOLUTION INTRAVENOUS CONTINUOUS
Status: DISCONTINUED | OUTPATIENT
Start: 2024-01-15 | End: 2024-01-17 | Stop reason: HOSPADM

## 2024-01-15 RX ORDER — DIPHENHYDRAMINE HYDROCHLORIDE 50 MG/ML
25 INJECTION INTRAMUSCULAR; INTRAVENOUS EVERY 6 HOURS PRN
Status: DISPENSED | OUTPATIENT
Start: 2024-01-15 | End: 2024-01-16

## 2024-01-15 RX ORDER — SIMETHICONE 80 MG
80 TABLET,CHEWABLE ORAL 4 TIMES DAILY PRN
Status: DISCONTINUED | OUTPATIENT
Start: 2024-01-15 | End: 2024-01-17 | Stop reason: HOSPADM

## 2024-01-15 RX ORDER — FENTANYL CITRATE/PF 50 MCG/ML
50 SYRINGE (ML) INJECTION
Status: ACTIVE | OUTPATIENT
Start: 2024-01-15 | End: 2024-01-15

## 2024-01-15 RX ORDER — ONDANSETRON 2 MG/ML
INJECTION INTRAMUSCULAR; INTRAVENOUS
Status: COMPLETED
Start: 2024-01-15 | End: 2024-01-15

## 2024-01-15 RX ADMIN — SODIUM CHLORIDE, SODIUM LACTATE, POTASSIUM CHLORIDE, AND CALCIUM CHLORIDE 999 ML/HR: .6; .31; .03; .02 INJECTION, SOLUTION INTRAVENOUS at 07:35

## 2024-01-15 RX ADMIN — ROPIVACAINE HYDROCHLORIDE 8 ML/HR: 2 INJECTION, SOLUTION EPIDURAL; INFILTRATION at 07:33

## 2024-01-15 RX ADMIN — LIDOCAINE HYDROCHLORIDE AND EPINEPHRINE 5 ML: 20; 5 INJECTION, SOLUTION EPIDURAL; INFILTRATION; INTRACAUDAL; PERINEURAL at 15:38

## 2024-01-15 RX ADMIN — KETOROLAC TROMETHAMINE 30 MG: 30 INJECTION, SOLUTION INTRAMUSCULAR; INTRAVENOUS at 16:53

## 2024-01-15 RX ADMIN — LIDOCAINE HYDROCHLORIDE AND EPINEPHRINE 5 ML: 20; 5 INJECTION, SOLUTION EPIDURAL; INFILTRATION; INTRACAUDAL; PERINEURAL at 15:33

## 2024-01-15 RX ADMIN — AZITHROMYCIN 500 MG: 500 INJECTION, POWDER, LYOPHILIZED, FOR SOLUTION INTRAVENOUS at 15:50

## 2024-01-15 RX ADMIN — SODIUM CHLORIDE, SODIUM LACTATE, POTASSIUM CHLORIDE, AND CALCIUM CHLORIDE 999 ML/HR: .6; .31; .03; .02 INJECTION, SOLUTION INTRAVENOUS at 15:39

## 2024-01-15 RX ADMIN — PHENYLEPHRINE HYDROCHLORIDE 25 MCG/MIN: 10 INJECTION INTRAVENOUS at 15:46

## 2024-01-15 RX ADMIN — SODIUM CHLORIDE 2.5 MILLION UNITS: 9 INJECTION, SOLUTION INTRAVENOUS at 06:39

## 2024-01-15 RX ADMIN — ONDANSETRON 4 MG: 2 INJECTION INTRAMUSCULAR; INTRAVENOUS at 15:48

## 2024-01-15 RX ADMIN — CEFAZOLIN SODIUM 2000 MG: 2 SOLUTION INTRAVENOUS at 15:35

## 2024-01-15 RX ADMIN — BUPIVACAINE 20 ML: 13.3 INJECTION, SUSPENSION, LIPOSOMAL INFILTRATION at 16:43

## 2024-01-15 RX ADMIN — ONDANSETRON 4 MG: 2 INJECTION INTRAMUSCULAR; INTRAVENOUS at 14:13

## 2024-01-15 RX ADMIN — ROPIVACAINE HYDROCHLORIDE 4 ML: 2 INJECTION, SOLUTION EPIDURAL; INFILTRATION at 07:30

## 2024-01-15 RX ADMIN — LIDOCAINE HYDROCHLORIDE AND EPINEPHRINE 3 ML: 15; 5 INJECTION, SOLUTION EPIDURAL at 07:22

## 2024-01-15 RX ADMIN — Medication 2 MILLI-UNITS/MIN: at 00:53

## 2024-01-15 RX ADMIN — SODIUM CHLORIDE 2.5 MILLION UNITS: 9 INJECTION, SOLUTION INTRAVENOUS at 02:38

## 2024-01-15 RX ADMIN — SODIUM CHLORIDE, SODIUM LACTATE, POTASSIUM CHLORIDE, AND CALCIUM CHLORIDE 125 ML/HR: .6; .31; .03; .02 INJECTION, SOLUTION INTRAVENOUS at 08:44

## 2024-01-15 RX ADMIN — MORPHINE SULFATE 3 MG: 0.5 INJECTION EPIDURAL; INTRATHECAL; INTRAVENOUS at 16:28

## 2024-01-15 RX ADMIN — ROPIVACAINE HYDROCHLORIDE 4 ML: 2 INJECTION, SOLUTION EPIDURAL; INFILTRATION at 07:27

## 2024-01-15 RX ADMIN — ROPIVACAINE HYDROCHLORIDE: 2 INJECTION, SOLUTION EPIDURAL; INFILTRATION at 07:30

## 2024-01-15 RX ADMIN — Medication 200 MCG: at 09:57

## 2024-01-15 RX ADMIN — SODIUM CHLORIDE 2.5 MILLION UNITS: 9 INJECTION, SOLUTION INTRAVENOUS at 10:34

## 2024-01-15 RX ADMIN — LIDOCAINE HYDROCHLORIDE AND EPINEPHRINE 5 ML: 20; 5 INJECTION, SOLUTION EPIDURAL; INFILTRATION; INTRACAUDAL; PERINEURAL at 15:36

## 2024-01-15 RX ADMIN — LIDOCAINE HYDROCHLORIDE AND EPINEPHRINE 5 ML: 20; 5 INJECTION, SOLUTION EPIDURAL; INFILTRATION; INTRACAUDAL; PERINEURAL at 15:40

## 2024-01-15 RX ADMIN — MORPHINE SULFATE 4 MG: 4 INJECTION INTRAVENOUS at 04:38

## 2024-01-15 RX ADMIN — SODIUM CHLORIDE, SODIUM LACTATE, POTASSIUM CHLORIDE, AND CALCIUM CHLORIDE 999 ML/HR: .6; .31; .03; .02 INJECTION, SOLUTION INTRAVENOUS at 06:51

## 2024-01-15 RX ADMIN — ONDANSETRON 4 MG: 2 INJECTION INTRAMUSCULAR; INTRAVENOUS at 22:52

## 2024-01-15 NOTE — OB LABOR/OXYTOCIN SAFETY PROGRESS
Labor Progress Note - Grismerlin Fernandez 23 y.o. female MRN: [id number]    Unit/Bed#: L&D 326-01 Encounter: [id number]                Cervical Dilation: 2        Cervical Effacement: 50  Fetal Station: -3        FHR Category: 1               Vital Signs:   Vitals:    01/14/24 2122   BP: 114/75   Pulse: 90   Resp: 19   Temp: 98.6 °F (37 °C)       Notes/comments:   PROCEDURE:  ZHAO BALLOON PLACEMENT     A 24F zhao with a 30cc balloon was selected, SVE was performed and cervix was located, zhao was introduced over sterile gloved hands. Balloon advanced through cervix beyond the internal cervical os. A small amount amount of sterile saline solution was instilled in the balloon to confirm placement. Placement was confirmed to be beyond the internal cervical os. A total of 60cc of sterile saline solution was placed into the balloon. Pt tolerated well. Instructions left with RN to place zhao to gravity with a 1L bag of IV fluid. Notify MD when zhao dislodged.          Alex Carvajal MD 1/14/2024 10:22 PM

## 2024-01-15 NOTE — PLAN OF CARE
Problem: BIRTH - VAGINAL/ SECTION  Goal: Fetal and maternal status remain reassuring during the birth process  Description: INTERVENTIONS:  - Monitor vital signs  - Monitor fetal heart rate  - Monitor uterine activity  - Monitor labor progression (vaginal delivery)  - DVT prophylaxis  - Antibiotic prophylaxis  2024 by Severo Cabrera RN  Outcome: Progressing  2024 by Severo Cabrera RN  Outcome: Not Progressing  Goal: Emotionally satisfying birthing experience for mother/fetus  Description: Interventions:  - Assess, plan, implement and evaluate the nursing care given to the patient in labor  - Advocate the philosophy that each childbirth experience is a unique experience and support the family's chosen level of involvement and control during the labor process   - Actively participate in both the patient's and family's teaching of the birth process  - Consider cultural, Gnosticism and age-specific factors and plan care for the patient in labor  2024 by Severo Cabrera RN  Outcome: Progressing  2024 by Severo Cabrera RN  Outcome: Not Progressing     Problem: PAIN - ADULT  Goal: Verbalizes/displays adequate comfort level or baseline comfort level  Description: Interventions:  - Encourage patient to monitor pain and request assistance  - Assess pain using appropriate pain scale  - Administer analgesics based on type and severity of pain and evaluate response  - Implement non-pharmacological measures as appropriate and evaluate response  - Consider cultural and social influences on pain and pain management  - Notify physician/advanced practitioner if interventions unsuccessful or patient reports new pain  2024 by Severo Cabrera RN  Outcome: Progressing  2024 by Severo Cabrera RN  Outcome: Not Progressing     Problem: INFECTION - ADULT  Goal: Absence or prevention of progression during hospitalization  Description: INTERVENTIONS:  - Assess and monitor for  signs and symptoms of infection  - Monitor lab/diagnostic results  - Monitor all insertion sites, i.e. indwelling lines, tubes, and drains  - Monitor endotracheal if appropriate and nasal secretions for changes in amount and color  - [place] appropriate cooling/warming therapies per order  - Administer medications as ordered  - Instruct and encourage patient and family to use good hand hygiene technique  - Identify and instruct in appropriate isolation precautions for identified infection/condition  1/14/2024 2125 by Severo Cabrera RN  Outcome: Progressing  1/14/2024 2125 by Severo Cabrera RN  Outcome: Not Progressing  Goal: Absence of fever/infection during neutropenic period  Description: INTERVENTIONS:  - Monitor WBC    1/14/2024 2125 by Severo Cabrera RN  Outcome: Progressing  1/14/2024 2125 by Severo Cabrera RN  Outcome: Not Progressing     Problem: SAFETY ADULT  Goal: Patient will remain free of falls  Description: INTERVENTIONS:  - Educate patient/family on patient safety including physical limitations  - Instruct patient to call for assistance with activity   - Consult OT/PT to assist with strengthening/mobility   - Keep Call bell within reach  - Keep bed low and locked with side rails adjusted as appropriate  - Keep care items and personal belongings within reach  - Initiate and maintain comfort rounds  - Make Fall Risk Sign visible to staff  - Offer Toileting every  Hours, in advance of need  - Initiate/Maintain alarm  - Obtain necessary fall risk management equipment:   - Apply yellow socks and bracelet for high fall risk patients  - Consider moving patient to room near nurses station  1/14/2024 2125 by Severo Cabrera RN  Outcome: Progressing  1/14/2024 2125 by Severo Cabrera RN  Outcome: Not Progressing  Goal: Maintain or return to baseline ADL function  Description: INTERVENTIONS:  -  Assess patient's ability to carry out ADLs; assess patient's baseline for ADL function and identify physical deficits  which impact ability to perform ADLs (bathing, care of mouth/teeth, toileting, grooming, dressing, etc.)  - Assess/evaluate cause of self-care deficits   - Assess range of motion  - Assess patient's mobility; develop plan if impaired  - Assess patient's need for assistive devices and provide as appropriate  - Encourage maximum independence but intervene and supervise when necessary  - Involve family in performance of ADLs  - Assess for home care needs following discharge   - Consider OT consult to assist with ADL evaluation and planning for discharge  - Provide patient education as appropriate  1/14/2024 2125 by Severo Cabrera RN  Outcome: Progressing  1/14/2024 2125 by Severo Cabrera RN  Outcome: Not Progressing  Goal: Maintains/Returns to pre admission functional level  Description: INTERVENTIONS:  - Perform AM-PAC 6 Click Basic Mobility/ Daily Activity assessment daily.  - Set and communicate daily mobility goal to care team and patient/family/caregiver.   - Collaborate with rehabilitation services on mobility goals if consulted  - Perform Range of Motion  times a day.  - Reposition patient every  hours.  - Dangle patient  times a day  - Stand patient  times a day  - Ambulate patient  times a day  - Out of bed to chair  times a day   - Out of bed for meals  times a day  - Out of bed for toileting  - Record patient progress and toleration of activity level   1/14/2024 2125 by Severo Cabrera RN  Outcome: Progressing  1/14/2024 2125 by Severo Cabrera RN  Outcome: Not Progressing     Problem: DISCHARGE PLANNING  Goal: Discharge to home or other facility with appropriate resources  Description: INTERVENTIONS:  - Identify barriers to discharge w/patient and caregiver  - Arrange for needed discharge resources and transportation as appropriate  - Identify discharge learning needs (meds, wound care, etc.)  - Arrange for interpretive services to assist at discharge as needed  - Refer to Case Management Department for  coordinating discharge planning if the patient needs post-hospital services based on physician/advanced practitioner order or complex needs related to functional status, cognitive ability, or social support system  1/14/2024 2125 by Severo Cabrera RN  Outcome: Progressing  1/14/2024 2125 by Severo Cabrera RN  Outcome: Not Progressing     Problem: Knowledge Deficit  Goal: Patient/family/caregiver demonstrates understanding of disease process, treatment plan, medications, and discharge instructions  Description: Complete learning assessment and assess knowledge base.  Interventions:  - Provide teaching at level of understanding  - Provide teaching via preferred learning methods  1/14/2024 2125 by Severo Cabrera RN  Outcome: Progressing  1/14/2024 2125 by Severo Cabrera RN  Outcome: Not Progressing  Goal: Verbalizes understanding of labor plan  Description: Assess patient/family/caregiver's baseline knowledge level and ability to understand information.  Provide education via patient/family/caregiver's preferred learning method at appropriate level of understanding.     1. Provide teaching at level of understanding.  2. Provide teaching via preferred learning method(s).  1/14/2024 2125 by Severo Cabrera RN  Outcome: Progressing  1/14/2024 2125 by Severo Cabrera RN  Outcome: Not Progressing     Problem: Labor & Delivery  Goal: Manages discomfort  Description: Assess and monitor for signs and symptoms of discomfort.  Assess patient's pain level regularly and per hospital policy.  Administer medications as ordered. Support use of nonpharmacological methods to help control pain such as distraction, imagery, relaxation, and application of heat and cold.  Collaborate with interdisciplinary team and patient to determine appropriate pain management plan.    1. Include patient in decisions related to comfort.  2. Offer non-pharmacological pain management interventions.  3. Report ineffective pain management to  physician.  1/14/2024 2125 by Sveero Cabrera RN  Outcome: Progressing  1/14/2024 2125 by Severo Cabrera RN  Outcome: Not Progressing  Goal: Patient vital signs are stable  Description: 1. Assess vital signs - vaginal delivery.  1/14/2024 2125 by Severo Cabrera RN  Outcome: Progressing  1/14/2024 2125 by Severo Cabrera RN  Outcome: Not Progressing

## 2024-01-15 NOTE — ANESTHESIA POSTPROCEDURE EVALUATION
"Post-Op Assessment Note    CV Status:  Stable    Pain management: adequate      Post-op block assessment: catheter intact and no complications   Mental Status:  Alert and awake   Hydration Status:  Euvolemic   PONV Controlled:  Controlled   Airway Patency:  Patent     Post Op Vitals Reviewed: Yes      Staff: Anesthesiologist               BP      Temp      Pulse     Resp      SpO2      /58 (BP Location: Right arm)   Pulse 95   Temp 97.6 °F (36.4 °C) (Oral)   Resp 20   Ht 5' 2\" (1.575 m)   Wt 105 kg (232 lb)   LMP 04/19/2023 (Approximate)   SpO2 98%   Breastfeeding Unknown   BMI 42.43 kg/m²     "

## 2024-01-15 NOTE — OB LABOR/OXYTOCIN SAFETY PROGRESS
Oxytocin Safety Progress Check Note - Grismerlin Fernandez 23 y.o. female MRN: [id number]    Unit/Bed#: L&D 326-01 Encounter: [id number]    Dose (eric-units/min) Oxytocin: 0 eric-units/min  Contraction Frequency (minutes): 2-4  Contraction Intensity: Moderate  Uterine Activity Characteristics: Regular  Cervical Dilation: 4-5        Cervical Effacement: 80  Fetal Station: -1  Baseline Rate (FHR): 130 bpm  Fetal Heart Rate (FHT): 130 BPM  FHR Category: 1               Vital Signs:   Vitals:    01/15/24 1500   BP: 116/57   Pulse: 96   Resp:    Temp:        Notes/comments:     Notified of patient requesting  section.     At bedside with Dr. Amaya discussed with patient.  At this time Grismerlin states that she wishes to proceed with a repeat  section in the setting of minimal cervical change and back pain.  We discussed with her that her back pain will likely persist postoperatively and she will have the added pain recovering from abdominal surgery.  We discussed the risk of  section including bleeding, infection, injury to surrounding organs including bowel, bladder and ureter.  We discussed the risk of disrupting her previously performed abdominoplasty if permanent sutures are still in place.  We discussed that given her history of prior abdominal surgery ( section x 1 and abdominoplasty) she is at increased risk of adhesions resulting in injury to surrounding structures.  We discussed the risk of VTE associated with abdominal surgery.  We discussed the longer recovery time with  section. Grismerlin expressed understanding and wishes to proceed.     Pitocin titration held. Anesthesia and OR staff notified.     2 grams of ancef and 500 mg of azithromycin ordered for surgical prophyalxis. Cross for 1u PRBC in the setting of anemia and higher risk for hemorrhage.     Will plan to proceed to the OR after 30 minutes of washout from time of pitocin titration discontinuation as  FHT remains category I at this time.           Desiree Johnson MD 1/15/2024 3:06 PM

## 2024-01-15 NOTE — H&P
H & P- Obstetrics   Grismerlin Fernandez 23 y.o. female MRN: [id number]  Unit/Bed#: L&D 326-01 Encounter: [id number]    Assessment: 23 y.o.  at 39w3d admitted for TOLAC.  SVE: 250/-3  FHT: category 1  Clinical EFW: 61%   GBS status: pos   Postpartum contraception plan: undecided    Plan:   * 39 weeks gestation of pregnancy  Assessment & Plan  TOLAC    Admit to OBGYN   Clear liquid diet   F/u T&S, CBC, RPR   IVF LR 125cc/hr   Continuous fetal monitoring and tocometry   Analgesia at maternal request   Vertex by TAUS  Induction plan: zhao balloon followed by pitocin      COVID-19 affecting pregnancy in third trimester  Assessment & Plan  Recent diagnosis: 24    GBS (group B Streptococcus carrier), +RV culture, currently pregnant  Assessment & Plan  GBS prophylaxis          Discussed case and plan w/ Dr. Mcallister      Chief Complaint: none    HPI: Grismerlin Fernandez is a 23 y.o.  with an NIGHAT of 2024, by Ultrasound at 39w3d who is being admitted for induction of labor. She denies having uterine contractions, has no LOF, and reports no VB. She states she has felt good FM.    Patient Active Problem List   Diagnosis    Hx of  section    Obesity affecting pregnancy in second trimester    39 weeks gestation of pregnancy    History of intrauterine growth restriction in prior pregnancy, currently pregnant, first trimester    H/O abdominoplasty    Patient noncompliance    GBS (group B Streptococcus carrier), +RV culture, currently pregnant    COVID-19 affecting pregnancy in third trimester       Baby complications/comments: none    Review of Systems   Constitutional:  Negative for chills and fever.   Eyes:  Negative for visual disturbance.   Respiratory:  Negative for shortness of breath.    Cardiovascular:  Negative for chest pain.   Gastrointestinal:  Negative for abdominal pain, diarrhea, nausea and vomiting.   Genitourinary:  Negative for dysuria, flank pain, hematuria, vaginal bleeding and  vaginal discharge.   Skin:  Negative for rash.   Neurological:  Negative for dizziness, numbness and headaches.   All other systems reviewed and are negative.      OB Hx:  OB History    Para Term  AB Living   2 1 1 0 0 1   SAB IAB Ectopic Multiple Live Births   0 0 0 0 1      # Outcome Date GA Lbr Silas/2nd Weight Sex Delivery Anes PTL Lv   2 Current            1 Term 20 39w5d  2470 g (5 lb 7.1 oz) M CS-LTranv EPI N ENRICO      Complications: Fetal Intolerance       Past Medical Hx:  Past Medical History:   Diagnosis Date    Depression        Past Surgical hx:  Past Surgical History:   Procedure Laterality Date    ABDOMINOPLASTY      [place]  DELIVERY ONLY N/A 2020    Procedure:  SECTION ();  Surgeon: Adair Clifton MD;  Location: [facility];  Service: Obstetrics       Social Hx:  Alcohol use: denies  Tobacco use: denies  Other substance use: denies    No Known Allergies    Medications Prior to Admission   Medication    ferrous sulfate 324 (65 Fe) mg    Prenatal Vit-Fe Fumarate-FA (Prenatal Plus Vitamin/Mineral) 27-1 MG TABS       Objective:  Temp:  [98.6 °F (37 °C)] 98.6 °F (37 °C)  HR:  [90] 90  Resp:  [19] 19  BP: (114)/(75) 114/75  Body mass index is 42.43 kg/m².     Physical Exam:  Physical Exam  Constitutional:       General: She is not in acute distress.     Appearance: Normal appearance.   Genitourinary:      Vulva normal.      No vaginal discharge.   HENT:      Head: Normocephalic and atraumatic.   Eyes:      Extraocular Movements: Extraocular movements intact.   Cardiovascular:      Rate and Rhythm: Normal rate.      Pulses: Normal pulses.   Pulmonary:      Effort: Pulmonary effort is normal. No respiratory distress.   Abdominal:      Tenderness: There is no abdominal tenderness. There is no guarding.   Musculoskeletal:         General: Normal range of motion.      Cervical back: Normal range of motion.   Neurological:      General: No focal deficit present.       Mental Status: She is alert. Mental status is at baseline.   Skin:     General: Skin is warm and dry.   Psychiatric:         Mood and Affect: Mood normal.         Behavior: Behavior normal.   Vitals reviewed. Exam conducted with a chaperone present.          Lab Results   Component Value Date    WBC 13.68 (H) 01/14/2024    HGB 10.6 (L) 01/14/2024    HCT 32.7 (L) 01/14/2024     01/14/2024     Lab Results   Component Value Date    K 4.8 11/22/2020     11/22/2020    CO2 21 (L) 11/22/2020    BUN 13 11/22/2020    CREATININE 0.62 11/22/2020    AST 24 11/22/2020    ALT 8 (L) 11/22/2020     Prenatal Labs: Reviewed     Blood type: A pos  Antibody: neg  GBS: pos  HIV: NR  Rubella: immune  Syphilis IgM/IgG: NR  HBsAg: NR  HCAb: NR  Chlamydia: neg  Gonorrhea: neg  Diabetes 1 hour screen: wnl  3 hour glucose: not indicated  Hgb: 10.6  Platelets: 277  >2 Midnights  INPATIENT     Signature/Title: Alex Carvajal MD  Date: 1/14/2024  Time: 10:22 PM

## 2024-01-15 NOTE — OB LABOR/OXYTOCIN SAFETY PROGRESS
Oxytocin Safety Progress Check Note - Grismerlin Fernandez 23 y.o. female MRN: [id number]    Unit/Bed#: L&D 326-01 Encounter: [id number]    Dose (eric-units/min) Oxytocin: 4 eric-units/min  Contraction Frequency (minutes): difficult to trace  Contraction Intensity: Moderate  Uterine Activity Characteristics: Irregular  Cervical Dilation: 4-5        Cervical Effacement: 60  Fetal Station: -3  Baseline Rate (FHR): 135 bpm  Fetal Heart Rate (FHT): 135 BPM  FHR Category: 1               Vital Signs:   Vitals:    01/15/24 0730   BP: 120/53   Pulse: 96   Resp:    Temp:        SROM noted at 0635; patient subsequently received epidural. She is currently comfortable with epidural, does not feel her contractions at all. SVE as above, unchanged from prior exam. FHT cat 1. Contractions difficult to trace, will readjust tocometer. Pitocin currently at 4 mU; previously at 16 but was turned off for epidural placement. Will continue to titrate as tolerated. Will d/w Dr. Amaya.    Elaine Long MD 1/15/2024 8:42 AM

## 2024-01-15 NOTE — OB LABOR/OXYTOCIN SAFETY PROGRESS
Oxytocin Safety Progress Check Note - Grismerlin Fernandez 23 y.o. female MRN: [id number]    Unit/Bed#: L&D 326-01 Encounter: [id number]    Dose (eric-units/min) Oxytocin: 18 eric-units/min  Contraction Frequency (minutes): 2-4  Contraction Intensity: Moderate  Uterine Activity Characteristics: Regular  Cervical Dilation: 4-5        Cervical Effacement: 80  Fetal Station: -1  Baseline Rate (FHR): 130 bpm  Fetal Heart Rate (FHT): 130 BPM  FHR Category: 1               Vital Signs:   Vitals:    01/15/24 1145   BP: 115/60   Pulse: 86   Resp:    Temp:        Patient sleeping through contractions.  SVE as above. FHT cat 1. Carolin q2-4, difficult to trace with IUPC, catheter repositioned. Pitocin at 18, will continue to titrate as tolerated. D/w Dr. Amaya.       Elaine Long MD 1/15/2024 1:45 PM

## 2024-01-15 NOTE — OB LABOR/OXYTOCIN SAFETY PROGRESS
Oxytocin Safety Progress Check Note - Grismerlin Fernandez 23 y.o. female MRN: [id number]    Unit/Bed#: L&D 326-01 Encounter: [id number]    Dose (eric-units/min) Oxytocin: 6 eric-units/min  Contraction Frequency (minutes): 2  Contraction Intensity: Moderate  Uterine Activity Characteristics: Regular  Cervical Dilation: 4-5        Cervical Effacement: 60  Fetal Station: -3  Baseline Rate (FHR): 135 bpm  Fetal Heart Rate (FHT): 135 BPM  FHR Category: 2               Vital Signs:   Vitals:    01/15/24 0902   BP: 106/53   Pulse: 81   Resp:    Temp:        Patient evaluated at bedside due to maternal hypotension and occasional fetal decelerations. Patient comfortable and woken from sleep; asymptomatic. Difficult to characterize fetal decelerations due to difficulty tracing contraction pattern; IUPC placed. Anesthesia called to bedside due to persistent blood pressure in 80s/40s. Pit @6. Will continue to titrate pitocin as tolerated. Dr. Amaya at bedside.    Elaine Long MD 1/15/2024 10:02 AM

## 2024-01-15 NOTE — ASSESSMENT & PLAN NOTE
, Hgb 10.6 --> post op Hgb 9.8 , venofer ordered  Lines: zhao removed, voided once 400ml  Pain: Tylenol and toradol scheduled, addi 5/10 PRN    FEN: Tolerating regular diet  DVT ppx: SCDs and  Lovenox 40mg BID  Passing flatus   Incision C/D/I

## 2024-01-15 NOTE — ANESTHESIA PREPROCEDURE EVALUATION
Procedure:  LABOR ANALGESIA    Relevant Problems   GYN   (+) 39 weeks gestation of pregnancy      Other   (+) COVID-19 affecting pregnancy in third trimester   (+) Obesity affecting pregnancy, antepartum (Resolved)        Physical Exam    Airway    Mallampati score: III  TM Distance: >3 FB  Neck ROM: full     Dental   No notable dental hx     Cardiovascular  Rhythm: regular, Rate: normal, Cardiovascular exam normal    Pulmonary  Pulmonary exam normal Breath sounds clear to auscultation    Other Findings  post-pubertal.      Anesthesia Plan  ASA Score- 3     Anesthesia Type- epidural with ASA Monitors.         Additional Monitors:     Airway Plan:            Plan Factors-    Chart reviewed.   Existing labs reviewed. Patient summary reviewed.                  Induction-     Postoperative Plan-     Informed Consent- Anesthetic plan and risks discussed with patient.

## 2024-01-15 NOTE — PLAN OF CARE
Problem: BIRTH - VAGINAL/ SECTION  Goal: Fetal and maternal status remain reassuring during the birth process  Description: INTERVENTIONS:  - Monitor vital signs  - Monitor fetal heart rate  - Monitor uterine activity  - Monitor labor progression (vaginal delivery)  - DVT prophylaxis  - Antibiotic prophylaxis  Outcome: Progressing  Goal: Emotionally satisfying birthing experience for mother/fetus  Description: Interventions:  - Assess, plan, implement and evaluate the nursing care given to the patient in labor  - Advocate the philosophy that each childbirth experience is a unique experience and support the family's chosen level of involvement and control during the labor process   - Actively participate in both the patient's and family's teaching of the birth process  - Consider cultural, Orthodoxy and age-specific factors and plan care for the patient in labor  Outcome: Progressing     Problem: PAIN - ADULT  Goal: Verbalizes/displays adequate comfort level or baseline comfort level  Description: Interventions:  - Encourage patient to monitor pain and request assistance  - Assess pain using appropriate pain scale  - Administer analgesics based on type and severity of pain and evaluate response  - Implement non-pharmacological measures as appropriate and evaluate response  - Consider cultural and social influences on pain and pain management  - Notify physician/advanced practitioner if interventions unsuccessful or patient reports new pain  Outcome: Progressing     Problem: INFECTION - ADULT  Goal: Absence or prevention of progression during hospitalization  Description: INTERVENTIONS:  - Assess and monitor for signs and symptoms of infection  - Monitor lab/diagnostic results  - Monitor all insertion sites, i.e. indwelling lines, tubes, and drains  - Monitor endotracheal if appropriate and nasal secretions for changes in amount and color  - [place] appropriate cooling/warming therapies per order  -  Administer medications as ordered  - Instruct and encourage patient and family to use good hand hygiene technique  - Identify and instruct in appropriate isolation precautions for identified infection/condition  Outcome: Progressing  Goal: Absence of fever/infection during neutropenic period  Description: INTERVENTIONS:  - Monitor WBC    Outcome: Progressing     Problem: SAFETY ADULT  Goal: Patient will remain free of falls  Description: INTERVENTIONS:  - Educate patient/family on patient safety including physical limitations  - Instruct patient to call for assistance with activity   - Consult OT/PT to assist with strengthening/mobility   - Keep Call bell within reach  - Keep bed low and locked with side rails adjusted as appropriate  - Keep care items and personal belongings within reach  - Initiate and maintain comfort rounds  - Make Fall Risk Sign visible to staff  - Offer Toileting every  Hours, in advance of need  - Initiate/Maintain alarm  - Obtain necessary fall risk management equipment:   - Apply yellow socks and bracelet for high fall risk patients  - Consider moving patient to room near nurses station  Outcome: Progressing  Goal: Maintain or return to baseline ADL function  Description: INTERVENTIONS:  -  Assess patient's ability to carry out ADLs; assess patient's baseline for ADL function and identify physical deficits which impact ability to perform ADLs (bathing, care of mouth/teeth, toileting, grooming, dressing, etc.)  - Assess/evaluate cause of self-care deficits   - Assess range of motion  - Assess patient's mobility; develop plan if impaired  - Assess patient's need for assistive devices and provide as appropriate  - Encourage maximum independence but intervene and supervise when necessary  - Involve family in performance of ADLs  - Assess for home care needs following discharge   - Consider OT consult to assist with ADL evaluation and planning for discharge  - Provide patient education as  appropriate  Outcome: Progressing  Goal: Maintains/Returns to pre admission functional level  Description: INTERVENTIONS:  - Perform AM-PAC 6 Click Basic Mobility/ Daily Activity assessment daily.  - Set and communicate daily mobility goal to care team and patient/family/caregiver.   - Collaborate with rehabilitation services on mobility goals if consulted  - Perform Range of Motion  times a day.  - Reposition patient every  hours.  - Dangle patient  times a day  - Stand patient  times a day  - Ambulate patient  times a day  - Out of bed to chair  times a day   - Out of bed for meals  times a day  - Out of bed for toileting  - Record patient progress and toleration of activity level   Outcome: Progressing     Problem: DISCHARGE PLANNING  Goal: Discharge to home or other facility with appropriate resources  Description: INTERVENTIONS:  - Identify barriers to discharge w/patient and caregiver  - Arrange for needed discharge resources and transportation as appropriate  - Identify discharge learning needs (meds, wound care, etc.)  - Arrange for interpretive services to assist at discharge as needed  - Refer to Case Management Department for coordinating discharge planning if the patient needs post-hospital services based on physician/advanced practitioner order or complex needs related to functional status, cognitive ability, or social support system  Outcome: Progressing     Problem: Knowledge Deficit  Goal: Patient/family/caregiver demonstrates understanding of disease process, treatment plan, medications, and discharge instructions  Description: Complete learning assessment and assess knowledge base.  Interventions:  - Provide teaching at level of understanding  - Provide teaching via preferred learning methods  Outcome: Progressing  Goal: Verbalizes understanding of labor plan  Description: Assess patient/family/caregiver's baseline knowledge level and ability to understand information.  Provide education via  patient/family/caregiver's preferred learning method at appropriate level of understanding.     1. Provide teaching at level of understanding.  2. Provide teaching via preferred learning method(s).  Outcome: Progressing     Problem: Labor & Delivery  Goal: Manages discomfort  Description: Assess and monitor for signs and symptoms of discomfort.  Assess patient's pain level regularly and per hospital policy.  Administer medications as ordered. Support use of nonpharmacological methods to help control pain such as distraction, imagery, relaxation, and application of heat and cold.  Collaborate with interdisciplinary team and patient to determine appropriate pain management plan.    1. Include patient in decisions related to comfort.  2. Offer non-pharmacological pain management interventions.  3. Report ineffective pain management to physician.  Outcome: Progressing  Goal: Patient vital signs are stable  Description: 1. Assess vital signs - vaginal delivery.  Outcome: Progressing

## 2024-01-15 NOTE — OB LABOR/OXYTOCIN SAFETY PROGRESS
Oxytocin Safety Progress Check Note - Grismerlin Fernandez 23 y.o. female MRN: [id number]    Unit/Bed#: L&D 326-01 Encounter: [id number]    Dose (eric-units/min) Oxytocin: 12 eric-units/min  Contraction Frequency (minutes): 2  Contraction Intensity: Moderate  Uterine Activity Characteristics: Regular  Cervical Dilation: 4-5  Cervical Effacement: 60  Fetal Station: -3  Baseline Rate (FHR): 135 bpm  Fetal Heart Rate (FHT): 135 BPM  FHR Category: currently category I               Vital Signs:   Vitals:    01/15/24 1100   BP: 112/54   Pulse: 82   Resp:    Temp:        Patient seen with Blanquita Amaya and Ethan to discuss her request for repeat  section. After initiating discussion regarding the procedural risks, patient had a change of heart and wanted to continue trial of labor. We discussed that there is no guarantee that she will have a  but there is also nothing on our end at this time that makes us want to rush toward a  section. Maternal and fetal status have both been reassuring. We discussed that it is her choice and if she wants to revisit or changes her mind about laboring, we are available to have another discussion.    Ludmila Mcallister MD  PGY-IV, OB/GYN  1/15/2024, 11:58 AM

## 2024-01-15 NOTE — OB LABOR/OXYTOCIN SAFETY PROGRESS
Labor Progress Note - Grismerlin Bertin 23 y.o. female MRN: [id number]    Unit/Bed#: L&D 326-01 Encounter: [id number]          Contraction Intensity: Mild  Uterine Activity Characteristics: Irritability  Cervical Dilation: 4        Cervical Effacement: 60  Fetal Station: -3  Baseline Rate (FHR): 135 bpm     FHR Category: 1               Vital Signs:   Vitals:    01/14/24 2200   BP: 114/75   Pulse:    Resp:    Temp:        Notes/comments:   [place] balloon is out. SVE as above. Will start pitocin.     Alex Carvajal MD 1/15/2024 12:36 AM

## 2024-01-15 NOTE — OB LABOR/OXYTOCIN SAFETY PROGRESS
Oxytocin Safety Progress Check Note - Grismerlin Fernandez 23 y.o. female MRN: [id number]    Unit/Bed#: L&D 326-01 Encounter: [id number]    Dose (eric-units/min) Oxytocin: 8 eric-units/min  Contraction Frequency (minutes): 2-3  Contraction Intensity: Mild  Uterine Activity Characteristics: Irregular  Cervical Dilation: 4-5        Cervical Effacement: 60  Fetal Station: -3  Baseline Rate (FHR): 135 bpm     FHR Category: 1               Vital Signs:   Vitals:    01/15/24 0241   BP: 138/79   Pulse: 76   Resp:    Temp:        Notes/comments:   Patient becoming more uncomfortable with contractions. Not ready for epidural yet. SVE as above. Will give IV pain meds. Continue pitocin titration.     Alex Carvajal MD 1/15/2024 4:29 AM

## 2024-01-15 NOTE — OB LABOR/OXYTOCIN SAFETY PROGRESS
Oxytocin Safety Progress Check Note - Grismerlin Fernandez 23 y.o. female MRN: [id number]    Unit/Bed#: L&D 326-01 Encounter: [id number]    Dose (eric-units/min) Oxytocin: 8 eric-units/min  Contraction Frequency (minutes): 2-3  Contraction Intensity: Mild  Uterine Activity Characteristics: Irregular  Cervical Dilation: 4        Cervical Effacement: 60  Fetal Station: -3  Baseline Rate (FHR): 135 bpm     FHR Category: 1               Vital Signs:   Vitals:    01/15/24 0241   BP: 138/79   Pulse: 76   Resp:    Temp:        Notes/comments:   Patient comfortable. Pitocin started just before 0100, currently at 8mU, continue titration. SVE deferred.     Alex Carvajal MD 1/15/2024 3:43 AM

## 2024-01-15 NOTE — OP NOTE
OPERATIVE REPORT  PATIENT NAME: Grismerlin Fernandez    :  2000  MRN: [id number]  Pt Location: [facility] L&D OR ROOM 01       Section Procedure Note    Indications:   Pregnancy at 39 weeks of gestation   Maternal request for repeat  section    Pre-operative Diagnosis:   Pregnancy at 39 weeks of gestation   GBS positive status   History of prior  section   History of abdominoplasty   Elevated BMI in pregnancy   Maternal request for repeat  section     Post-operative Diagnosis:   Repeat  section     Attending: Savage Amaya MD  Resident: Desiree Johnson MD and Ludmila Mcallister MD     Maternal Findings:  Diffusely thickened abdominal fascia consistent with prior abdominoplasty and  section  Rectus muscles with thickened midline unable to be bluntly entered  Thickened serosa on the anterior surface of the uterus with adhesions to the anterior abdominal wall  Adhesions bilaterally from the sidewalls of the uterus to the abdominal cavity  Left fallopian tube was adhered to the side of the uterus, adhesion ligated and fallopian tube freed  Normal-appearing left ovary  Right ovary and fallopian tube were unable to be visualized secondary to adhesions  Hemostatic anterior serosal surface with Interceed placed to prevent further adhesions  Hemostatic hysterotomy after 2 layer closure  Uterus not exteriorized secondary to profuse adhesions     Findings:  Delivery of viable male on 1/15/24 at 1619 weighing 7lbs 4.4oz  Apgar scores of 7 at one minute and 9 at five minutes  Clear amniotic fluid  Normal placenta with 3-vessel cord    Arterial and Venous Gases:  Umbilical Cord Venous Blood Gas:  Results from last 7 days   Lab Units 01/15/24  1600   PH COV  7.175*   PCO2 COV mm HG 59.6*   HCO3 COV mmol/L 21.5   BASE EXC COV mmol/L -7.7*   O2 CT CD VB mL/dL 3.5   O2 HGB, VENOUS CORD % 17.0     Umbilical Cord Arterial Blood Gas:  Results from last 7 days   Lab Units  01/15/24  1600   PH COA  7.122*   PCO2 COA  75.7*   PO2 COA mm HG 11.7   HCO3 COA mmol/L 24.2   BASE EXC COA mmol/L -6.9*   O2 CONTENT CORD ART ml/dl 2.8   O2 HGB, ARTERIAL CORD % 13.4       Specimens: Arterial and venous cord gases, cord blood, segment of umbilical cord, placenta to storage    Lee Criteria Decision Aid:   Multiple pregnancy - NO - Transverse or oblique lie - No - Breech lie - NO - Gestational age <37 weeks - NO - Multiparous - YES - Previous uterine scar - YES -  is LEE GROUP 5    Quantitative Blood Loss: 412 mL    Drains: Zhao catheter           Complications:   None Apparent            Disposition: Recovering well in postpartum room            Condition: stable    Brief History   Grismerlin Fernandez is now a 23 y.o.  who was initially admitted at 39w3d for elective TOLAC. She had a zhao balloon for cervical ripening followed by induction with Pitocin. Amniotic membranes spontaneously ruptured. She received an epidural for analgesia. She requested a repeat  section before meeting criteria for failed induction.     Procedure Details   The patient was taken to the OB Operating Room at 1540 where she a rebolus of her epidural. For infection prophylaxis, she received 2g ancef and  500mg of Azithromycin preoperatively. Fetal heart tones in the OR were assessed and noted to be within normal limits and SCDs were placed. The abdomen was prepped with Chloraprep, the vagina was prepped with Chlorhexidine, and following appropriate drying time, the patient was draped in the usual sterile manner.   A Time Out was held and the above information confirmed.  The patient was identified as Grismerlin Fernandez and the procedure verified as a  Delivery for pregnancy at 39 weeks of gestation, history of prior  section and maternal request for repeat  section.    Start time was 1555. A Pfannenstiel incision was made and carried down through the underlying  subcutaneous tissue to the fascia using a scalpel. The fascia was noted to be diffusely thickened consistent with prior  section and abdominoplasty. The rectus fascia was then nicked in the midline and dissected laterally using Schroeder scissors.  The superior edge of the fascial incision was grasped with Kocher clamps bilaterally, tented upward. An attempt was made to dissect the muscles off using the curved mayos however the fascia was noted to be so thickened that bovie electrocautery was required. Using a niko clamp, the fascia was elevated and bovie electrocautery was used.  A single prolene stitch from previous abdominoplasty was visualized and ligated.  This was repeated on the inferior edge of the fascia and dissected down to the pubic rami.  An attempt was made to separate the rectus muscles however they were noted to be fused at the midline. Niko clamps were used to elevate the suspected peritoneum and the scalpel was used to vertically cut down the midline. The  rectus muscles were  and the peritoneum was then identified, entered, and extended longitudinally with blunt dissection. There was noted to be inadequate space for fetal extraction and bovie electrocautery was used to cut the rectus muscles bilaterally.     The uterus was examined and there was noted to be thick adhesive layer overlying the uterus.  An attempt was made to excise part of the suspected adhesions however bleeding was encountered.       The  bladder blade was inserted.  A bladder flap was created by bluntly pushing down the bladder fibers.The bladder blade was repositioned.  A transverse incision made in the lower uterine segment.  The incision was extended cephalo caudad using blunt traction. The infant was delivered in the cephalic presentation by elevating the head out of the pelvis. Gentle fundal pressure brought the head to the incision, and the infant was delivered without difficulty. The mouth and nose were  bulb-suctioned. The cord was clamped and cut. The infant had a spontaneous cry. The infant was handed off to the awaiting NICU team.  Venous and arterial blood gases along with cord blood was collected the placenta delivered with gentle traction.     The uterus was not exteriorized [place] to significant adhesions bilaterally.  The uterus was cleared of all clot and debris with a lap sponge. The hysterotomy was repaired in running locked fashion using 0-Vicryl. A second horizontal imbricating layer was made using 0-Vicryl      The uterus was reexamined and there was noted to be bleeding from the anterior surface of the uterus.  Using a 2-0 Vicryl the serosa was oversewn and hemostasis was achieved.  The left fallopian tube was noted to be adhered to the uterine sidewall.  Using Bovie electrocautery the left fallopian tube was freed.    Irrigation was performed and hemostasis was noted.  At this time Interceed was placed over the anterior serosal surface of the uterus given significant adhesions.    Using an 18-gauge spinal needle, Exparel was injected just above the anterior abdominal wall fascia for analgesia.    The fascia was closed with a running suture of 1- PDS Subcutaneous adipose tissue was closed with a running suture of 0-vicryl. A second layer of subdermal sutures were placed using 0-vicryl in an interrupted fashion The skin was closed with a subcuticular running suture of 4-0 Monocryl. Exofin was applied    The patient appeared to tolerate the procedure very well.  Lap sponge, needle, and instrument counts were correct x2.  The patient's fundus was palpated and the uterus was expressed. She was then cleaned and transferred to her postpartum recovery room in stable condition and her infant went to the  nursery.    Attending Attestation: Dr. Savage Amaya MD was present for the entire procedure.        SIGNATURE: Desiree Johnson MD  DATE: January 15, 2024  TIME: 5:20 PM

## 2024-01-15 NOTE — PLAN OF CARE
Problem: PAIN - ADULT  Goal: Verbalizes/displays adequate comfort level or baseline comfort level  Description: Interventions:  - Encourage patient to monitor pain and request assistance  - Assess pain using appropriate pain scale  - Administer analgesics based on type and severity of pain and evaluate response  - Implement non-pharmacological measures as appropriate and evaluate response  - Consider cultural and social influences on pain and pain management  - Notify physician/advanced practitioner if interventions unsuccessful or patient reports new pain  Outcome: Progressing     Problem: INFECTION - ADULT  Goal: Absence or prevention of progression during hospitalization  Description: INTERVENTIONS:  - Assess and monitor for signs and symptoms of infection  - Monitor lab/diagnostic results  - Monitor all insertion sites, i.e. indwelling lines, tubes, and drains  - Monitor endotracheal if appropriate and nasal secretions for changes in amount and color  - [place] appropriate cooling/warming therapies per order  - Administer medications as ordered  - Instruct and encourage patient and family to use good hand hygiene technique  - Identify and instruct in appropriate isolation precautions for identified infection/condition  Outcome: Progressing  Goal: Absence of fever/infection during neutropenic period  Description: INTERVENTIONS:  - Monitor WBC    Outcome: Progressing     Problem: SAFETY ADULT  Goal: Patient will remain free of falls  Description: INTERVENTIONS:  - Educate patient/family on patient safety including physical limitations  - Instruct patient to call for assistance with activity   - Consult OT/PT to assist with strengthening/mobility   - Keep Call bell within reach  - Keep bed low and locked with side rails adjusted as appropriate  - Keep care items and personal belongings within reach  - Initiate and maintain comfort rounds  - Make Fall Risk Sign visible to staff  - Apply yellow socks and bracelet  for high fall risk patients  - Consider moving patient to room near nurses station  Outcome: Progressing  Goal: Maintain or return to baseline ADL function  Description: INTERVENTIONS:  -  Assess patient's ability to carry out ADLs; assess patient's baseline for ADL function and identify physical deficits which impact ability to perform ADLs (bathing, care of mouth/teeth, toileting, grooming, dressing, etc.)  - Assess/evaluate cause of self-care deficits   - Assess range of motion  - Assess patient's mobility; develop plan if impaired  - Assess patient's need for assistive devices and provide as appropriate  - Encourage maximum independence but intervene and supervise when necessary  - Involve family in performance of ADLs  - Assess for home care needs following discharge   - Consider OT consult to assist with ADL evaluation and planning for discharge  - Provide patient education as appropriate  Outcome: Progressing  Goal: Maintains/Returns to pre admission functional level  Description: INTERVENTIONS:  - Perform AM-PAC 6 Click Basic Mobility/ Daily Activity assessment daily.  - Set and communicate daily mobility goal to care team and patient/family/caregiver.   - Collaborate with rehabilitation services on mobility goals if consulted  - Out of bed for toileting  - Record patient progress and toleration of activity level   Outcome: Progressing     Problem: Knowledge Deficit  Goal: Patient/family/caregiver demonstrates understanding of disease process, treatment plan, medications, and discharge instructions  Description: Complete learning assessment and assess knowledge base.  Interventions:  - Provide teaching at level of understanding  - Provide teaching via preferred learning methods  Outcome: Progressing     Problem: DISCHARGE PLANNING  Goal: Discharge to home or other facility with appropriate resources  Description: INTERVENTIONS:  - Identify barriers to discharge w/patient and caregiver  - Arrange for needed  discharge resources and transportation as appropriate  - Identify discharge learning needs (meds, wound care, etc.)  - Arrange for interpretive services to assist at discharge as needed  - Refer to Case Management Department for coordinating discharge planning if the patient needs post-hospital services based on physician/advanced practitioner order or complex needs related to functional status, cognitive ability, or social support system  Outcome: Progressing

## 2024-01-15 NOTE — OB LABOR/OXYTOCIN SAFETY PROGRESS
"Oxytocin Safety Progress Check Note - Grismerlin Fernandez 23 y.o. female MRN: [id number]    Unit/Bed#: L&D 326-01 Encounter: [id number]    Dose (eric-units/min) Oxytocin: 12 eric-units/min  Contraction Frequency (minutes): 2  Contraction Intensity: Moderate  Uterine Activity Characteristics: Regular  Cervical Dilation: 4-5        Cervical Effacement: 60  Fetal Station: -3  Baseline Rate (FHR): 135 bpm  Fetal Heart Rate (FHT): 135 BPM  FHR Category: 1               Vital Signs:   Vitals:    01/15/24 1100   BP: 112/54   Pulse: 82   Resp:    Temp:        IUPC readjusted with improvement in contraction pattern reading on tocometry. SVE remains unchanged. Patient now stating she would like to proceed with  section because she is tired and wants to be \"done.\" Patient amenable to discussion, will come back with Viji Amaya and Esvin.     Elaine Long MD 1/15/2024 11:50 AM      "

## 2024-01-15 NOTE — OB LABOR/OXYTOCIN SAFETY PROGRESS
Oxytocin Safety Progress Check Note - Grismerlin Fernandez 23 y.o. female MRN: [id number]    Unit/Bed#: L&D 326-01 Encounter: [id number]    Dose (eric-units/min) Oxytocin: 18 eric-units/min  Contraction Frequency (minutes): 2-4  Contraction Intensity: Moderate  Uterine Activity Characteristics: Regular  Cervical Dilation: 4-5        Cervical Effacement: 80  Fetal Station: -1  Baseline Rate (FHR): 130 bpm  Fetal Heart Rate (FHT): 130 BPM  FHR Category: 2               Vital Signs:   Vitals:    01/15/24 1145   BP: 115/60   Pulse: 86   Resp:    Temp:        Notes/comments:   At bedside for late decelerations. BP noted to be in 70s/50s and patient reports feeling nauseated. SVE unchanged. Repositioned from left to right side and BP noted to improve to 100s/50s  and late decelerations noted to resolve. Will continue pitocin titration.     Desiree Johnson MD 1/15/2024 2:40 PM

## 2024-01-15 NOTE — ANESTHESIA PROCEDURE NOTES
Epidural Block    Start time: 1/15/2024 7:22 AM  Reason for block: at surgeon's request and primary anesthetic  Staffing  Performed by: Bahman Carcamo DO  Authorized by: Bahman Carcamo DO    Preanesthetic Checklist  Completed: patient identified, IV checked, risks and benefits discussed, surgical consent, monitors and equipment checked, pre-op evaluation and timeout performed  Epidural  Patient position: sitting  Prep: Betadine  Sedation Level: no sedation  Patient monitoring: frequent blood pressure checks, continuous pulse oximetry and heart rate  Approach: midline  Location: lumbar, L3-4  Injection technique: JOSELYN saline  Needle  Needle type: Tuohy   Needle gauge: 18 G  Needle insertion depth: 7 cm  Catheter type: multi-orifice  Catheter size: 20 G  Catheter at skin depth: 12 cm  Catheter securement method: clear occlusive dressing  Test dose: negativelidocaine-epinephrine (XYLOCAINE-MPF/EPINEPHRINE) 1.5 %-1:200,000 injection 3 mL - Epidural   3 mL - 1/15/2024 7:22:00 AM  Assessment  Sensory level: T10  Number of attempts: 1negative aspiration for CSF, negative aspiration for heme and no paresthesia on injection  patient tolerated the procedure well with no immediate complications

## 2024-01-16 LAB
DME PARACHUTE DELIVERY DATE ACTUAL: NORMAL
DME PARACHUTE DELIVERY DATE REQUESTED: NORMAL
DME PARACHUTE ITEM DESCRIPTION: NORMAL
DME PARACHUTE ORDER STATUS: NORMAL
DME PARACHUTE SUPPLIER NAME: NORMAL
DME PARACHUTE SUPPLIER PHONE: NORMAL
ERYTHROCYTE [DISTWIDTH] IN BLOOD BY AUTOMATED COUNT: 14.3 % (ref 11.6–15.1)
HCT VFR BLD AUTO: 30.3 % (ref 34.8–46.1)
HGB BLD-MCNC: 9.8 G/DL (ref 11.5–15.4)
MCH RBC QN AUTO: 26.8 PG (ref 26.8–34.3)
MCHC RBC AUTO-ENTMCNC: 32.3 G/DL (ref 31.4–37.4)
MCV RBC AUTO: 83 FL (ref 82–98)
PLATELET # BLD AUTO: 213 THOUSANDS/UL (ref 149–390)
PMV BLD AUTO: 10.3 FL (ref 8.9–12.7)
RBC # BLD AUTO: 3.66 MILLION/UL (ref 3.81–5.12)
WBC # BLD AUTO: 16.43 THOUSAND/UL (ref 4.31–10.16)

## 2024-01-16 PROCEDURE — 85027 COMPLETE CBC AUTOMATED: CPT

## 2024-01-16 PROCEDURE — 99024 POSTOP FOLLOW-UP VISIT: CPT | Performed by: OBSTETRICS & GYNECOLOGY

## 2024-01-16 RX ORDER — IBUPROFEN 600 MG/1
600 TABLET ORAL EVERY 6 HOURS SCHEDULED
Status: DISCONTINUED | OUTPATIENT
Start: 2024-01-16 | End: 2024-01-17 | Stop reason: HOSPADM

## 2024-01-16 RX ORDER — ENOXAPARIN SODIUM 100 MG/ML
40 INJECTION SUBCUTANEOUS EVERY 12 HOURS SCHEDULED
Status: DISCONTINUED | OUTPATIENT
Start: 2024-01-16 | End: 2024-01-17 | Stop reason: HOSPADM

## 2024-01-16 RX ADMIN — DIPHENHYDRAMINE HYDROCHLORIDE 25 MG: 50 INJECTION, SOLUTION INTRAMUSCULAR; INTRAVENOUS at 07:55

## 2024-01-16 RX ADMIN — ENOXAPARIN SODIUM 40 MG: 40 INJECTION SUBCUTANEOUS at 21:51

## 2024-01-16 RX ADMIN — ACETAMINOPHEN 325MG 650 MG: 325 TABLET ORAL at 17:50

## 2024-01-16 RX ADMIN — ACETAMINOPHEN 325MG 650 MG: 325 TABLET ORAL at 12:07

## 2024-01-16 RX ADMIN — SENNOSIDES 8.6 MG: 8.6 TABLET, FILM COATED ORAL at 08:00

## 2024-01-16 RX ADMIN — ACETAMINOPHEN 325MG 650 MG: 325 TABLET ORAL at 05:53

## 2024-01-16 RX ADMIN — IBUPROFEN 600 MG: 600 TABLET, FILM COATED ORAL at 07:34

## 2024-01-16 RX ADMIN — ACETAMINOPHEN 325MG 650 MG: 325 TABLET ORAL at 00:17

## 2024-01-16 RX ADMIN — ENOXAPARIN SODIUM 40 MG: 40 INJECTION SUBCUTANEOUS at 08:55

## 2024-01-16 RX ADMIN — IBUPROFEN 600 MG: 600 TABLET, FILM COATED ORAL at 17:50

## 2024-01-16 RX ADMIN — IRON SUCROSE 200 MG: 20 INJECTION, SOLUTION INTRAVENOUS at 07:34

## 2024-01-16 NOTE — LACTATION NOTE
This note was copied from a baby's chart.  CONSULT - LACTATION  Baby Boy (Grismerlin) Fernandez 1 days male MRN: [id number]    [facility] NURSERY Room / Bed: L&D 304(N)/L&D 304(n) Encounter: [id number]    Maternal Information     MOTHER:  Fernandez,Grismerlin  Maternal Age: 23 y.o.   OB History: # 1 - Date: 20, Sex: Male, Weight: 2470 g (5 lb 7.1 oz), GA: 39w5d, Delivery: , Low Transverse, Apgar1: 8, Apgar5: 9, Living: Living, Birth Comments: None    # 2 - Date: 01/15/24, Sex: Male, Weight: 3300 g (7 lb 4.4 oz), GA: 39w4d, Delivery: None, Apgar1: 7, Apgar5: 9, Living: Living, Birth Comments: None   Previouse breast reduction surgery? No    Lactation history:   Has patient previously breast fed: No   How long had patient previously breast fed:     Previous breast feeding complications:       Past Surgical History:   Procedure Laterality Date    ABDOMINOPLASTY      [place] ONLY N/A 2020    Procedure:  SECTION ();  Surgeon: Adair Clifton MD;  Location: [facility];  Service: Obstetrics    [place] N/A 1/15/2024    Procedure:  SECTION ();  Surgeon: Yardlie Toussaint-Foster, DO;  Location: [facility];  Service: Obstetrics        Birth information:  YOB: 2023   Time of birth: 4:19 PM   Sex: male   Delivery type:     Birth Weight: 3300 g (7 lb 4.4 oz)   Percent of Weight Change: 0%     Gestational Age: 39w4d   [unfilled]    Assessment     Breast and nipple assessment:  Denies need for assistance      Assessment: sleepy    Feeding assessment:  Mom choosing to supplement    LATCH:  Latch:     Audible Swallowing:     Type of Nipple:     Comfort (Breast/Nipple):     Hold (Positioning):     LATCH Score:            Feeding recommendations:  breast feed on demand    Met with mother. Provided with Ready, Set, Baby booklet which contained information on:  Hand expression with access to QR codes  to review hand expression.  Positioning and latch reviewed as well as showing images of other feeding positions.  Discussed the properties of a good latch in any position.   Feeding on cue and what that means for recognizing infant's hunger, s/s that baby is getting enough milk and some s/s that breastfeeding dyad may need further help  Skin to Skin contact and benefits to mom and baby  Avoidance of pacifiers for the first month discussed.     Discussed risks for early supplementation: over feeding, longer digestion times, engorgement for mom, lower milk supply for mom, and nipple confusion.     Benefits of breast feeding for infant's intestinal tract, less engorgement for mom, protection from multiple disease processes as infant develops, avoidance of over feeding for infant, less nipple confusion, and increased health benefits for mom.    Gave information on common concerns, what to expect the first few weeks after delivery, preparing for other caregivers, and how partners can help. Resources for support also provided.    Instructions given on pumping.  Discussed when to start, frequency, different pumps available versus manual expression.    Discussed hygiene of hands and supplies as well as assembly, placement of flanges, size of flanged, preparing the breast and cycles and suction settings on pump.    Demonstrated use of hand pump.    Discussed labeling of milk, storage, and preparation of stored milk.      Also reviewed discharge breastfeeding booklet including the feeding log. Emphasized 8 or more (12) feedings in a 24 hour period, what to expect for the number of diapers per day of life and the progression of properties of the  stooling pattern.    List of reasons to call a lactation consultant.  Feeding logs  Feeding cues  Hand expression  Baby's Second day (cluster feeding)  Breastfeeding and Your Lifestyle (Medications, Alcohol, Caffeine, Smoking, Street Drugs, Methadone)  First Two Weeks Survival  Guide for Breastfeeding  Breast Changes  Physical Therapy  Storage and Handling of Breast milk  How to Keep Your Breast Pump Kit Clean  The Employed Breastfeeding Mother  Mixed feeding  Bottle feeding like breastfeeding (paced bottle feeding)  astfeeding and your lifestyle, storage and preparation of breast milk, how to keep you breast pump clean, the employed breastfeeding mother and paced bottle feeding handouts.     Booklet included Breastfeeding Resources for after discharge including access to the number for the [facility].    [employer] [facility]  to call for assistance, questions and concerns.  Extension number for inpatient lactation support provided.          Barb Dubois RN 1/16/2024 3:21 PM

## 2024-01-16 NOTE — PLAN OF CARE
Problem: PAIN - ADULT  Goal: Verbalizes/displays adequate comfort level or baseline comfort level  Description: Interventions:  - Encourage patient to monitor pain and request assistance  - Assess pain using appropriate pain scale  - Administer analgesics based on type and severity of pain and evaluate response  - Implement non-pharmacological measures as appropriate and evaluate response  - Consider cultural and social influences on pain and pain management  - Notify physician/advanced practitioner if interventions unsuccessful or patient reports new pain  Outcome: Progressing     Problem: INFECTION - ADULT  Goal: Absence or prevention of progression during hospitalization  Description: INTERVENTIONS:  - Assess and monitor for signs and symptoms of infection  - Monitor lab/diagnostic results  - Monitor all insertion sites, i.e. indwelling lines, tubes, and drains  - Monitor endotracheal if appropriate and nasal secretions for changes in amount and color  - [place] appropriate cooling/warming therapies per order  - Administer medications as ordered  - Instruct and encourage patient and family to use good hand hygiene technique  - Identify and instruct in appropriate isolation precautions for identified infection/condition  Outcome: Progressing  Goal: Absence of fever/infection during neutropenic period  Description: INTERVENTIONS:  - Monitor WBC    Outcome: Progressing     Problem: SAFETY ADULT  Goal: Patient will remain free of falls  Description: INTERVENTIONS:  - Educate patient/family on patient safety including physical limitations  - Instruct patient to call for assistance with activity   - Consult OT/PT to assist with strengthening/mobility   - Keep Call bell within reach  - Keep bed low and locked with side rails adjusted as appropriate  - Keep care items and personal belongings within reach  - Initiate and maintain comfort rounds  - Make Fall Risk Sign visible to staff  - Apply yellow socks and bracelet  for high fall risk patients  - Consider moving patient to room near nurses station  Outcome: Progressing  Goal: Maintain or return to baseline ADL function  Description: INTERVENTIONS:  -  Assess patient's ability to carry out ADLs; assess patient's baseline for ADL function and identify physical deficits which impact ability to perform ADLs (bathing, care of mouth/teeth, toileting, grooming, dressing, etc.)  - Assess/evaluate cause of self-care deficits   - Assess range of motion  - Assess patient's mobility; develop plan if impaired  - Assess patient's need for assistive devices and provide as appropriate  - Encourage maximum independence but intervene and supervise when necessary  - Involve family in performance of ADLs  - Assess for home care needs following discharge   - Consider OT consult to assist with ADL evaluation and planning for discharge  - Provide patient education as appropriate  Outcome: Progressing  Goal: Maintains/Returns to pre admission functional level  Description: INTERVENTIONS:  - Perform AM-PAC 6 Click Basic Mobility/ Daily Activity assessment daily.  - Set and communicate daily mobility goal to care team and patient/family/caregiver.   - Collaborate with rehabilitation services on mobility goals if consulted  - Out of bed for toileting  - Record patient progress and toleration of activity level   Outcome: Progressing     Problem: Knowledge Deficit  Goal: Patient/family/caregiver demonstrates understanding of disease process, treatment plan, medications, and discharge instructions  Description: Complete learning assessment and assess knowledge base.  Interventions:  - Provide teaching at level of understanding  - Provide teaching via preferred learning methods  Outcome: Progressing     Problem: DISCHARGE PLANNING  Goal: Discharge to home or other facility with appropriate resources  Description: INTERVENTIONS:  - Identify barriers to discharge w/patient and caregiver  - Arrange for needed  discharge resources and transportation as appropriate  - Identify discharge learning needs (meds, wound care, etc.)  - Arrange for interpretive services to assist at discharge as needed  - Refer to Case Management Department for coordinating discharge planning if the patient needs post-hospital services based on physician/advanced practitioner order or complex needs related to functional status, cognitive ability, or social support system  Outcome: Progressing

## 2024-01-16 NOTE — PLAN OF CARE
Problem: PAIN - ADULT  Goal: Verbalizes/displays adequate comfort level or baseline comfort level  Description: Interventions:  - Encourage patient to monitor pain and request assistance  - Assess pain using appropriate pain scale  - Administer analgesics based on type and severity of pain and evaluate response  - Implement non-pharmacological measures as appropriate and evaluate response  - Consider cultural and social influences on pain and pain management  - Notify physician/advanced practitioner if interventions unsuccessful or patient reports new pain  Outcome: Progressing     Problem: INFECTION - ADULT  Goal: Absence or prevention of progression during hospitalization  Description: INTERVENTIONS:  - Assess and monitor for signs and symptoms of infection  - Monitor lab/diagnostic results  - Monitor all insertion sites, i.e. indwelling lines, tubes, and drains  - Monitor endotracheal if appropriate and nasal secretions for changes in amount and color  - [place] appropriate cooling/warming therapies per order  - Administer medications as ordered  - Instruct and encourage patient and family to use good hand hygiene technique  - Identify and instruct in appropriate isolation precautions for identified infection/condition  Outcome: Progressing  Goal: Absence of fever/infection during neutropenic period  Description: INTERVENTIONS:  - Monitor WBC    Outcome: Progressing     Problem: SAFETY ADULT  Goal: Patient will remain free of falls  Description: INTERVENTIONS:  - Educate patient/family on patient safety including physical limitations  - Instruct patient to call for assistance with activity   - Consult OT/PT to assist with strengthening/mobility   - Keep Call bell within reach  - Keep bed low and locked with side rails adjusted as appropriate  - Keep care items and personal belongings within reach  - Initiate and maintain comfort rounds  - Make Fall Risk Sign visible to staff  -   - Apply yellow socks and  bracelet for high fall risk patients  - Consider moving patient to room near nurses station  Outcome: Progressing  Goal: Maintain or return to baseline ADL function  Description: INTERVENTIONS:  -  Assess patient's ability to carry out ADLs; assess patient's baseline for ADL function and identify physical deficits which impact ability to perform ADLs (bathing, care of mouth/teeth, toileting, grooming, dressing, etc.)  - Assess/evaluate cause of self-care deficits   - Assess range of motion  - Assess patient's mobility; develop plan if impaired  - Assess patient's need for assistive devices and provide as appropriate  - Encourage maximum independence but intervene and supervise when necessary  - Involve family in performance of ADLs  - Assess for home care needs following discharge   - Consider OT consult to assist with ADL evaluation and planning for discharge  - Provide patient education as appropriate  Outcome: Progressing  Goal: Maintains/Returns to pre admission functional level  Description: INTERVENTIONS:  - Perform AM-PAC 6 Click Basic Mobility/ Daily Activity assessment daily.  - Set and communicate daily mobility goal to care team and patient/family/caregiver.   - Collaborate with rehabilitation services on mobility goals if consulted  - - Out of bed for toileting  - Record patient progress and toleration of activity level   Outcome: Progressing     Problem: Knowledge Deficit  Goal: Patient/family/caregiver demonstrates understanding of disease process, treatment plan, medications, and discharge instructions  Description: Complete learning assessment and assess knowledge base.  Interventions:  - Provide teaching at level of understanding  - Provide teaching via preferred learning methods  Outcome: Progressing     Problem: DISCHARGE PLANNING  Goal: Discharge to home or other facility with appropriate resources  Description: INTERVENTIONS:  - Identify barriers to discharge w/patient and caregiver  - Arrange  for needed discharge resources and transportation as appropriate  - Identify discharge learning needs (meds, wound care, etc.)  - Arrange for interpretive services to assist at discharge as needed  - Refer to Case Management Department for coordinating discharge planning if the patient needs post-hospital services based on physician/advanced practitioner order or complex needs related to functional status, cognitive ability, or social support system  Outcome: Progressing

## 2024-01-16 NOTE — PROGRESS NOTES
"Progress Note - OB/GYN  Grismerlin Fernandez 23 y.o. female MRN: [id number]  Unit/Bed#: L&D 304-01 Encounter: [id number]    Assessment and Plan     Grismerlin Fernandez is a patient of: [facility]. She is POD# 1 s/p RLTCS.  Recovering well and is stable       * Status post repeat low transverse  section  Assessment & Plan  , Hgb 10.6 --> post op Hgb 9.8 , venofer ordered  Lines: zhao removed, voided once 400ml  Pain: Tylenol and toradol scheduled, addi 5/10 PRN    FEN: Tolerating regular diet  DVT ppx: SCDs and  Lovenox 40mg BID  Passing flatus   Incision C/D/I      COVID-19 affecting pregnancy in third trimester  Assessment & Plan  Recent diagnosis: 24        Disposition    - Anticipate discharge home on POD# 2-3      Subjective/Objective     Chief Complaint: Postpartum State     Subjective:    Grismerlin Fernandez is POD#1 s/p RLTCS. She has no current complaints.  Pain is well controlled. Zhao was removed overnight and she is pending void trial.  She is ambulating.  Patient is currently passing flatus and has had no bowel movement. She is tolerating PO, and denies nausea or vomitting. Patient denies fever, chills, chest pain, shortness of breath, or calf tenderness. Lochia is normal. She is  Breastfeeding. She is recovering well and is stable.       Vitals:   /74 (BP Location: Left arm)   Pulse 95   Temp 97.5 °F (36.4 °C) (Temporal)   Resp 18   Ht 5' 2\" (1.575 m)   Wt 105 kg (232 lb)   LMP 2023 (Approximate)   SpO2 97%   Breastfeeding Unknown   BMI 42.43 kg/m²       Intake/Output Summary (Last 24 hours) at 2024 0652  Last data filed at 2024 0620  Gross per 24 hour   Intake 1950 ml   Output 3212 ml   Net -1262 ml       Invasive Devices       Peripheral Intravenous Line  Duration             Peripheral IV 24 Dorsal (posterior);Left Hand 1 day                    Physical Exam:   GEN: Grismerlin Fernandez appears well, " alert and oriented x 3, pleasant and cooperative   CARDIO: RRR, no murmurs or rubs  RESP:  CTAB, no wheezes or rales  ABDOMEN: soft, no tenderness, no distention, fundus @ 2 cm below u, Incision C/D/I   EXTREMITIES: SCDs on, non tender, no erythema      Labs:     Hemoglobin   Date Value Ref Range Status   01/16/2024 9.8 (L) 11.5 - 15.4 g/dL Final   01/14/2024 10.6 (L) 11.5 - 15.4 g/dL Final     WBC   Date Value Ref Range Status   01/16/2024 16.43 (H) 4.31 - 10.16 Thousand/uL Final   01/14/2024 13.68 (H) 4.31 - 10.16 Thousand/uL Final     Platelets   Date Value Ref Range Status   01/16/2024 213 149 - 390 Thousands/uL Final   01/14/2024 277 149 - 390 Thousands/uL Final     Creatinine   Date Value Ref Range Status   11/22/2020 0.62 0.60 - 1.20 mg/dL Final     Comment:     Standardized to IDMS reference method   08/23/2020 0.57 (L) 0.60 - 1.30 mg/dL Final     Comment:     Standardized to IDMS reference method     AST   Date Value Ref Range Status   11/22/2020 24 14 - 36 U/L Final     Comment:     Specimen hemolyzed, results may be affected.  Specimen collection should occur prior to Sulfasalazine administration due to the potential for falsely depressed results.    08/23/2020 15 5 - 45 U/L Final     Comment:     Specimen collection should occur prior to Sulfasalazine administration due to the potential for falsely depressed results.      ALT   Date Value Ref Range Status   11/22/2020 8 (L) 9 - 52 U/L Final     Comment:     Specimen hemolyzed, results may be affected.  Specimen collection should occur prior to Sulfasalazine administration due to the potential for falsely depressed results.    08/23/2020 9 (L) 12 - 78 U/L Final     Comment:     Specimen collection should occur prior to Sulfasalazine administration due to the potential for falsely depressed results.           Milagro Hall MD  1/16/2024  6:52 AM

## 2024-01-17 VITALS
HEART RATE: 92 BPM | RESPIRATION RATE: 18 BRPM | TEMPERATURE: 98 F | OXYGEN SATURATION: 97 % | BODY MASS INDEX: 42.69 KG/M2 | DIASTOLIC BLOOD PRESSURE: 68 MMHG | WEIGHT: 232 LBS | HEIGHT: 62 IN | SYSTOLIC BLOOD PRESSURE: 108 MMHG

## 2024-01-17 PROBLEM — O99.820 GBS (GROUP B STREPTOCOCCUS CARRIER), +RV CULTURE, CURRENTLY PREGNANT: Status: RESOLVED | Noted: 2023-12-28 | Resolved: 2024-01-17

## 2024-01-17 PROCEDURE — 11981 INSERTION DRUG DLVR IMPLANT: CPT | Performed by: NURSE PRACTITIONER

## 2024-01-17 PROCEDURE — NC001 PR NO CHARGE: Performed by: OBSTETRICS & GYNECOLOGY

## 2024-01-17 PROCEDURE — 99024 POSTOP FOLLOW-UP VISIT: CPT | Performed by: OBSTETRICS & GYNECOLOGY

## 2024-01-17 RX ORDER — LIDOCAINE HYDROCHLORIDE 10 MG/ML
2 INJECTION, SOLUTION EPIDURAL; INFILTRATION; INTRACAUDAL; PERINEURAL ONCE
Qty: 2 ML | Refills: 0 | Status: CANCELLED | OUTPATIENT
Start: 2024-01-17 | End: 2024-01-17

## 2024-01-17 RX ORDER — ACETAMINOPHEN 325 MG/1
650 TABLET ORAL EVERY 6 HOURS
Qty: 12 TABLET | Refills: 0 | Status: SHIPPED | OUTPATIENT
Start: 2024-01-17 | End: 2024-01-19

## 2024-01-17 RX ORDER — OXYCODONE HYDROCHLORIDE 5 MG/1
5 TABLET ORAL EVERY 4 HOURS PRN
Qty: 5 TABLET | Refills: 0 | Status: SHIPPED | OUTPATIENT
Start: 2024-01-17 | End: 2024-01-27

## 2024-01-17 RX ORDER — LIDOCAINE HYDROCHLORIDE 10 MG/ML
2 INJECTION, SOLUTION EPIDURAL; INFILTRATION; INTRACAUDAL; PERINEURAL ONCE
Status: COMPLETED | OUTPATIENT
Start: 2024-01-17 | End: 2024-01-17

## 2024-01-17 RX ORDER — IBUPROFEN 600 MG/1
600 TABLET ORAL EVERY 6 HOURS SCHEDULED
Qty: 30 TABLET | Refills: 0 | Status: SHIPPED | OUTPATIENT
Start: 2024-01-17

## 2024-01-17 RX ADMIN — IBUPROFEN 600 MG: 600 TABLET, FILM COATED ORAL at 00:25

## 2024-01-17 RX ADMIN — IBUPROFEN 600 MG: 600 TABLET, FILM COATED ORAL at 06:16

## 2024-01-17 RX ADMIN — ACETAMINOPHEN 325MG 650 MG: 325 TABLET ORAL at 00:25

## 2024-01-17 RX ADMIN — ETONOGESTREL 68 MG: 68 IMPLANT SUBCUTANEOUS at 09:46

## 2024-01-17 RX ADMIN — ACETAMINOPHEN 325MG 650 MG: 325 TABLET ORAL at 06:16

## 2024-01-17 RX ADMIN — ENOXAPARIN SODIUM 40 MG: 40 INJECTION SUBCUTANEOUS at 08:38

## 2024-01-17 RX ADMIN — SENNOSIDES 8.6 MG: 8.6 TABLET, FILM COATED ORAL at 08:38

## 2024-01-17 RX ADMIN — LIDOCAINE HYDROCHLORIDE 2 ML: 10 INJECTION, SOLUTION EPIDURAL; INFILTRATION; INTRACAUDAL; PERINEURAL at 09:46

## 2024-01-17 NOTE — PROCEDURES
"Grismerlin Fernandez, buffy  at 39w4d with an NIGHAT of 2024, by Ultrasound, was seen at [facility] LABOR AND DELIVERY for the following procedure(s):  ]  Universal Protocol:  Consent: Verbal consent not obtained. Written consent not obtained.  Risks and benefits: risks, benefits and alternatives were discussed  Consent given by: patient  Time out: Immediately prior to procedure a \"time out\" was called to verify the correct patient, procedure, equipment, support staff and site/side marked as required.  Timeout called at: 2024 9:45 AM.  Patient understanding: patient states understanding of the procedure being performed  Patient consent: the patient's understanding of the procedure matches consent given  Procedure consent: procedure consent matches procedure scheduled  Relevant documents: relevant documents present and verified  Test results: test results not available  Site marked: the operative site was marked  Radiology Images displayed and confirmed. If images not available, report reviewed: imaging studies not available  Required items: required blood products, implants, devices, and special equipment available  Patient identity confirmed: verbally with patient  Remove and insert drug implant    Date/Time: 2024 10:03 AM    Performed by: PER Le  Authorized by: PER Le    Indication:     Indication: Insertion of non-biodegradable drug delivery implant    Pre-procedure:     Pre-procedure timeout performed: yes      Prepped with: alcohol 70% and povidone-iodine      Local anesthetic:  Lidocaine 1%    The site was cleaned and prepped in a sterile fashion: yes    Procedure:     Procedure:  Insertion (LOT [id number])    Small stab incision was made in arm: no      Left/right:  Left    Preloaded contraceptive capsule trocar was placed subdermally: yes      Visualization of implant was obtained: yes      Contraceptive capsule was inserted and trocar removed: yes "      Visualization of notch in stylet and palpation of device: yes      Palpation confirms placement by provider and patient: yes      Site was closed with steri-strips and pressure bandage applied: yes    Comments:       requesting Nexplanon prior to discharge, POD #2. Has had nexplanon in the past. Verbalizes understanding Nexplanon is effective for 3 years. Common side effects reviewd including irregular / unpredictable vaginal bleeding. Consent reviewed and signed.   Tolerated well. Nexplanon inserted without difficulty.

## 2024-01-17 NOTE — PROGRESS NOTES
Post- Progress note    Patient is post-op day 2 from a Repeat Low Transverse  delivery per maternal request. Pregnancy complicated by abdominoplasty.  with significant findings of adhesions.  Pt planning Nexplanon prior to DC.     Pain: no  Tolerating Oral Intake: yes  Voiding: yes  Flatus: yes  Bowel Movement: no  Ambulating: yes  Breastfeeding: Breastfeeding and Bottle feeding  Chest Pain: no  Shortness of Breath: no  Leg Pain/Discomfort: no  Lochia: minimal      Objective:   Vitals:    24 0700   BP: 112/58   Pulse: 85   Resp: 18   Temp: 98 °F (36.7 °C)   SpO2:        Intake/Output Summary (Last 24 hours) at 2024 0824  Last data filed at 2024 1930  Gross per 24 hour   Intake --   Output 400 ml   Net -400 ml       Physical Exam:  General: in no apparent distress and well developed and well nourished  Cardiovascular: Cor RRR  Lungs: clear to auscultation bilaterally  Abdomen: abdomen is soft without significant tenderness, masses, organomegaly or guarding  Fundus: Firm and non-tender, 1 above the umbilicus  Incision: clean, dry, and intact  Lower extremeties: nontender, trace edema   Other: planning Nexplanon for PP contraception prior to DC     Labs/Tests:   24 HGB- 9.8 s/p     Assessment and Plan:  23 y.o. year-old , postoperative day 2 status-post Repeat Low Transverse  delivery    Continue routine postpartum care  Encourage ambulation  Advance diet as tolerated  Nexplanon prior to discharge  DC Home with precautions    PER Le     I discussed the case with the NP and reviewed her note, prescribed medication ,and orders placed.  I supervised Kinjal and I agree with the management plan as it was presented to me.  I was present in the hospital, but did not examine the patient.    Rosanne Neville MD 24

## 2024-01-17 NOTE — CASE MANAGEMENT
Case Management Progress Note    Patient name Grismerlin Fernandez  Location L&D 304/L&D 304-01 MRN [id number]  : 2000 Date 2024       LOS (days): 3  Geometric Mean LOS (GMLOS) (days):   Days to GMLOS:        OBJECTIVE:        Current admission status: Inpatient  Preferred Pharmacy:   [id number] - [place], [place] - [address]  [address] [facility] [address]  Phone: [phone] Fax: [phone]    Primary Care Provider: No primary care provider on file.    Primary Insurance: [employer]  Secondary Insurance:     PROGRESS NOTE:    CM received consult for breast pump. MOB requesting Zomee pump. CM placed order via [employer]. Order approved. CM delivered pump to MOB's room. Delivery ticket signed and placed in bin for DME liaison.

## 2024-01-17 NOTE — UTILIZATION REVIEW
"Mother and baby discharged on 2024       NOTIFICATION OF INPATIENT ADMISSION   MATERNITY/DELIVERY AUTHORIZATION REQUEST   SERVICING FACILITY:   [facility] - L&D, , NICU  [address] [place] [address]  Tax ID: [id number]  NPI: [id number] ATTENDING PROVIDER:  Attending Name and NPI#: Savage Amaya Md [[id number]]  Address: [address] [place] [address]  Phone: [phone]     ADMISSION INFORMATION:  Place of Service: Inpatient [facility]  Place of Service Code: 21  Inpatient Admission Date/Time: 24  8:57 PM  Discharge Date/Time: 2024  1:31 PM  Admitting Diagnosis Code/Description:  Encounter for induction of labor [Z34.90]   Mother: Grismerlin Fernandez 2000 Estimated Date of Delivery: 24  Delivering clinician:     OB History          2    Para   2    Term   2       0    AB   0    Living   2         SAB   0    IAB   0    Ectopic   0    Multiple   0    Live Births   2               [place] Name & MRN:   Information for the patient's :  Magdalena Denton Boy (Grismerlin) [[id number]]    Delivery Information:  Sex: male  Delivered 1/15/2024 4:19 PM by ; Gestational Age: 39w4d     Measurements:  Weight: 7 lb 4.4 oz (3300 g);  Height: 20.25\"    APGAR 1 minute 5 minutes 10 minutes   Totals: 7 9       Birth Information: 23 y.o. female MRN: [id number] Unit/Bed#: L&D 304-01   Birthweight: No birth weight on file. Gestational Age: <None> Delivery Type:    APGARS Totals:        UTILIZATION REVIEW CONTACT:  Juanis Nielsen, Utilization   Network Utilization Review Department  Phone: [phone]  Fax [phone]  Email: [email or web address]  Contact for approvals/pending authorizations, clinical reviews, and discharge.   PHYSICIAN ADVISORY SERVICES:  Medical Necessity Denial & Nsrh-am-Jnck Review  Phone: [phone]  Fax: [phone]  Email: " [email or web address]   DISCHARGE SUPPORT TEAM:  For Patients Discharge Needs & Updates  Phone: [phone] opt. 2 Fax: [phone]  Email: [email or web address]       Asc Procedure Text (A): After obtaining clear surgical margins the patient was sent to an ASC for surgical repair.  The patient understands they will receive post-surgical care and follow-up from the ASC physician.

## 2024-01-17 NOTE — PLAN OF CARE
Problem: PAIN - ADULT  Goal: Verbalizes/displays adequate comfort level or baseline comfort level  Description: Interventions:  - Encourage patient to monitor pain and request assistance  - Assess pain using appropriate pain scale  - Administer analgesics based on type and severity of pain and evaluate response  - Implement non-pharmacological measures as appropriate and evaluate response  - Consider cultural and social influences on pain and pain management  - Notify physician/advanced practitioner if interventions unsuccessful or patient reports new pain  Outcome: Progressing     Problem: INFECTION - ADULT  Goal: Absence or prevention of progression during hospitalization  Description: INTERVENTIONS:  - Assess and monitor for signs and symptoms of infection  - Monitor lab/diagnostic results  - Monitor all insertion sites, i.e. indwelling lines, tubes, and drains  - Monitor endotracheal if appropriate and nasal secretions for changes in amount and color  - [place] appropriate cooling/warming therapies per order  - Administer medications as ordered  - Instruct and encourage patient and family to use good hand hygiene technique  - Identify and instruct in appropriate isolation precautions for identified infection/condition  Outcome: Progressing  Goal: Absence of fever/infection during neutropenic period  Description: INTERVENTIONS:  - Monitor WBC    Outcome: Progressing     Problem: SAFETY ADULT  Goal: Patient will remain free of falls  Description: INTERVENTIONS:  - Educate patient/family on patient safety including physical limitations  - Instruct patient to call for assistance with activity   - Consult OT/PT to assist with strengthening/mobility   - Keep Call bell within reach  - Keep bed low and locked with side rails adjusted as appropriate  - Keep care items and personal belongings within reach  - Initiate and maintain comfort rounds  - Make Fall Risk Sign visible to staff  - Offer Toileting every  Hours,  in advance of need  - Initiate/Maintain alarm  - Obtain necessary fall risk management equipment:   - Apply yellow socks and bracelet for high fall risk patients  - Consider moving patient to room near nurses station  Outcome: Progressing  Goal: Maintain or return to baseline ADL function  Description: INTERVENTIONS:  -  Assess patient's ability to carry out ADLs; assess patient's baseline for ADL function and identify physical deficits which impact ability to perform ADLs (bathing, care of mouth/teeth, toileting, grooming, dressing, etc.)  - Assess/evaluate cause of self-care deficits   - Assess range of motion  - Assess patient's mobility; develop plan if impaired  - Assess patient's need for assistive devices and provide as appropriate  - Encourage maximum independence but intervene and supervise when necessary  - Involve family in performance of ADLs  - Assess for home care needs following discharge   - Consider OT consult to assist with ADL evaluation and planning for discharge  - Provide patient education as appropriate  Outcome: Progressing  Goal: Maintains/Returns to pre admission functional level  Description: INTERVENTIONS:  - Perform AM-PAC 6 Click Basic Mobility/ Daily Activity assessment daily.  - Set and communicate daily mobility goal to care team and patient/family/caregiver.   - Collaborate with rehabilitation services on mobility goals if consulted  - Perform Range of Motion  times a day.  - Reposition patient every  hours.  - Dangle patient  times a day  - Stand patient  times a day  - Ambulate patient  times a day  - Out of bed to chair  times a day   - Out of bed for meals  times a day  - Out of bed for toileting  - Record patient progress and toleration of activity level   Outcome: Progressing     Problem: Knowledge Deficit  Goal: Patient/family/caregiver demonstrates understanding of disease process, treatment plan, medications, and discharge instructions  Description: Complete learning  assessment and assess knowledge base.  Interventions:  - Provide teaching at level of understanding  - Provide teaching via preferred learning methods  Outcome: Progressing     Problem: DISCHARGE PLANNING  Goal: Discharge to home or other facility with appropriate resources  Description: INTERVENTIONS:  - Identify barriers to discharge w/patient and caregiver  - Arrange for needed discharge resources and transportation as appropriate  - Identify discharge learning needs (meds, wound care, etc.)  - Arrange for interpretive services to assist at discharge as needed  - Refer to Case Management Department for coordinating discharge planning if the patient needs post-hospital services based on physician/advanced practitioner order or complex needs related to functional status, cognitive ability, or social support system  Outcome: Progressing

## 2024-01-17 NOTE — LACTATION NOTE
This note was copied from a baby's chart.  CONSULT - LACTATION  Baby Boy (Grismerlin) Fernandez 2 days male MRN: [id number]    [facility] NURSERY Room / Bed: L&D 304(N)/L&D 304(n) Encounter: [id number]    Maternal Information     MOTHER:  Fernandez,Grismerlin  Maternal Age: 23 y.o.   OB History: # 1 - Date: 20, Sex: Male, Weight: 2470 g (5 lb 7.1 oz), GA: 39w5d, Delivery: , Low Transverse, Apgar1: 8, Apgar5: 9, Living: Living, Birth Comments: None    # 2 - Date: 01/15/24, Sex: Male, Weight: 3300 g (7 lb 4.4 oz), GA: 39w4d, Delivery: None, Apgar1: 7, Apgar5: 9, Living: Living, Birth Comments: None   Previouse breast reduction surgery? No    Lactation history:   Has patient previously breast fed: No   How long had patient previously breast fed:     Previous breast feeding complications:       Past Surgical History:   Procedure Laterality Date    ABDOMINOPLASTY      CT  DELIVERY ONLY N/A 2020    Procedure:  SECTION ();  Surgeon: Adair Clifton MD;  Location: [facility];  Service: Obstetrics    CT  DELIVERY ONLY N/A 1/15/2024    Procedure:  SECTION ();  Surgeon: Yardlie Toussaint-Foster, DO;  Location: [facility];  Service: Obstetrics        Birth information:  YOB: 2023   Time of birth: 4:19 PM   Sex: male   Delivery type:     Birth Weight: 3300 g (7 lb 4.4 oz)   Percent of Weight Change: -2%     Gestational Age: 39w4d   [unfilled]    Assessment     Breast and nipple assessment:  no clinical exam at this time        24 0800   Lactation Consultation   Reason for Consult 10 minutes   Lactation Consultant Total Time 10   Breasts/Nipples   Date Pumping Initiated 24   Intervention Breast pump   Breastfeeding Progress Not yet established   Reasons for not Breastfeeding Maternal preference   Other OB Lactation Tools   Feeding Devices Bottle;Pump   Patient Follow-Up   Lactation Consult Status 2    Follow-Up Type Inpatient;Call as needed   Other OB Lactation Documentation    Additional Problem Noted Mom plans to do breastfeeding and formula. Mom pumping but has not pumped since yesterday. Not putting baby to breast. Giving formula. Mom denies any questions or concerns. Encouraged to call for further assistance if needed.   (D/C booklet reviewed the other day per mom and note in chart.)        Feeding recommendations:  mixed feeding plan.   Mom plans to breastfeed and formula feeding. Mom pumped yesterday but did not pump today. Not latching to breast, would prefer to pump. Mom not getting anything when pumping. Education on establishing milk supply. Set up mixed feeding plan with mom. Encouraged to offer expressed breast milk first and then finish with formula if necessary. Encouraged to call for further assistance if needed.     Encouraged parents to call for assistance, questions, and concerns about breastfeeding.  Extension provided.      Luz Elena Garcia 1/17/2024 8:23 AM

## 2024-01-17 NOTE — PLAN OF CARE
Problem: PAIN - ADULT  Goal: Verbalizes/displays adequate comfort level or baseline comfort level  Description: Interventions:  - Encourage patient to monitor pain and request assistance  - Assess pain using appropriate pain scale  - Administer analgesics based on type and severity of pain and evaluate response  - Implement non-pharmacological measures as appropriate and evaluate response  - Consider cultural and social influences on pain and pain management  - Notify physician/advanced practitioner if interventions unsuccessful or patient reports new pain  Outcome: Progressing     Problem: INFECTION - ADULT  Goal: Absence or prevention of progression during hospitalization  Description: INTERVENTIONS:  - Assess and monitor for signs and symptoms of infection  - Monitor lab/diagnostic results  - Monitor all insertion sites, i.e. indwelling lines, tubes, and drains  - Monitor endotracheal if appropriate and nasal secretions for changes in amount and color  - [place] appropriate cooling/warming therapies per order  - Administer medications as ordered  - Instruct and encourage patient and family to use good hand hygiene technique  - Identify and instruct in appropriate isolation precautions for identified infection/condition  Outcome: Progressing  Goal: Absence of fever/infection during neutropenic period  Description: INTERVENTIONS:  - Monitor WBC    Outcome: Progressing     Problem: SAFETY ADULT  Goal: Patient will remain free of falls  Description: INTERVENTIONS:  - Educate patient/family on patient safety including physical limitations  - Instruct patient to call for assistance with activity   - Consult OT/PT to assist with strengthening/mobility   - Keep Call bell within reach  - Keep bed low and locked with side rails adjusted as appropriate  - Keep care items and personal belongings within reach  - Initiate and maintain comfort rounds  - Make Fall Risk Sign visible to staff  - Apply yellow socks and bracelet  for high fall risk patients  - Consider moving patient to room near nurses station  Outcome: Progressing  Goal: Maintain or return to baseline ADL function  Description: INTERVENTIONS:  -  Assess patient's ability to carry out ADLs; assess patient's baseline for ADL function and identify physical deficits which impact ability to perform ADLs (bathing, care of mouth/teeth, toileting, grooming, dressing, etc.)  - Assess/evaluate cause of self-care deficits   - Assess range of motion  - Assess patient's mobility; develop plan if impaired  - Assess patient's need for assistive devices and provide as appropriate  - Encourage maximum independence but intervene and supervise when necessary  - Involve family in performance of ADLs  - Assess for home care needs following discharge   - Consider OT consult to assist with ADL evaluation and planning for discharge  - Provide patient education as appropriate  Outcome: Progressing  Goal: Maintains/Returns to pre admission functional level  Description: INTERVENTIONS:  - Perform AM-PAC 6 Click Basic Mobility/ Daily Activity assessment daily.  - Set and communicate daily mobility goal to care team and patient/family/caregiver.   - Collaborate with rehabilitation services on mobility goals if consulted  - Out of bed for toileting  - Record patient progress and toleration of activity level   Outcome: Progressing     Problem: Knowledge Deficit  Goal: Patient/family/caregiver demonstrates understanding of disease process, treatment plan, medications, and discharge instructions  Description: Complete learning assessment and assess knowledge base.  Interventions:  - Provide teaching at level of understanding  - Provide teaching via preferred learning methods  Outcome: Progressing     Problem: DISCHARGE PLANNING  Goal: Discharge to home or other facility with appropriate resources  Description: INTERVENTIONS:  - Identify barriers to discharge w/patient and caregiver  - Arrange for needed  discharge resources and transportation as appropriate  - Identify discharge learning needs (meds, wound care, etc.)  - Arrange for interpretive services to assist at discharge as needed  - Refer to Case Management Department for coordinating discharge planning if the patient needs post-hospital services based on physician/advanced practitioner order or complex needs related to functional status, cognitive ability, or social support system  Outcome: Progressing

## 2024-01-22 LAB
DME PARACHUTE DELIVERY DATE ACTUAL: NORMAL
DME PARACHUTE DELIVERY DATE REQUESTED: NORMAL
DME PARACHUTE ITEM DESCRIPTION: NORMAL
DME PARACHUTE ORDER STATUS: NORMAL
DME PARACHUTE SUPPLIER NAME: NORMAL
DME PARACHUTE SUPPLIER PHONE: NORMAL

## 2024-01-23 LAB — PLACENTA IN STORAGE: NORMAL

## 2024-01-24 ENCOUNTER — TELEPHONE (OUTPATIENT)
Dept: PEDIATRICS CLINIC | Facility: CLINIC | Age: 24
End: 2024-01-24

## 2024-01-26 NOTE — TELEPHONE ENCOUNTER
01/25/24  11:12 PM    Thank you for your request.  This PCP item is one the locked fields and cannot be edited; it is informational. PCP is not showing in the PCP-General field. PCP removal request is not needed.    Thank you  Mel Moore

## 2024-01-28 ENCOUNTER — HOSPITAL ENCOUNTER (INPATIENT)
Facility: HOSPITAL | Age: 24
LOS: 2 days | Discharge: LEFT BEFORE DISCHARGE | DRG: 561 | End: 2024-01-31
Attending: EMERGENCY MEDICINE | Admitting: STUDENT IN AN ORGANIZED HEALTH CARE EDUCATION/TRAINING PROGRAM
Payer: MEDICARE

## 2024-01-28 DIAGNOSIS — R65.10 SIRS (SYSTEMIC INFLAMMATORY RESPONSE SYNDROME) (HCC): ICD-10-CM

## 2024-01-28 DIAGNOSIS — N73.9 PELVIC ABSCESS IN FEMALE: ICD-10-CM

## 2024-01-28 DIAGNOSIS — R79.89 ELEVATED LFTS: ICD-10-CM

## 2024-01-28 DIAGNOSIS — T39.1X1A TOXIC EFFECT OF ACETAMINOPHEN, ACCIDENTAL OR UNINTENTIONAL, INITIAL ENCOUNTER: Primary | ICD-10-CM

## 2024-01-28 DIAGNOSIS — R74.01 TRANSAMINITIS: ICD-10-CM

## 2024-01-28 DIAGNOSIS — N12 PYELONEPHRITIS: ICD-10-CM

## 2024-01-28 DIAGNOSIS — Z98.891 STATUS POST REPEAT LOW TRANSVERSE CESAREAN SECTION: ICD-10-CM

## 2024-01-28 DIAGNOSIS — R11.2 NAUSEA AND VOMITING: ICD-10-CM

## 2024-01-28 DIAGNOSIS — T81.49XA POST-OPERATIVE WOUND ABSCESS: ICD-10-CM

## 2024-01-28 PROCEDURE — 99285 EMERGENCY DEPT VISIT HI MDM: CPT

## 2024-01-29 ENCOUNTER — APPOINTMENT (INPATIENT)
Dept: ULTRASOUND IMAGING | Facility: HOSPITAL | Age: 24
DRG: 561 | End: 2024-01-29
Payer: MEDICARE

## 2024-01-29 ENCOUNTER — APPOINTMENT (EMERGENCY)
Dept: CT IMAGING | Facility: HOSPITAL | Age: 24
DRG: 561 | End: 2024-01-29
Payer: MEDICARE

## 2024-01-29 PROBLEM — N73.9 PELVIC ABSCESS IN FEMALE: Status: ACTIVE | Noted: 2024-01-29

## 2024-01-29 PROBLEM — A41.9 SEPSIS (HCC): Status: ACTIVE | Noted: 2024-01-29

## 2024-01-29 PROBLEM — N12 PYELONEPHRITIS: Status: ACTIVE | Noted: 2024-01-29

## 2024-01-29 PROBLEM — T39.1X1A TYLENOL INGESTION: Status: ACTIVE | Noted: 2024-01-29

## 2024-01-29 PROBLEM — R65.10 SIRS (SYSTEMIC INFLAMMATORY RESPONSE SYNDROME) (HCC): Status: ACTIVE | Noted: 2024-01-29

## 2024-01-29 PROBLEM — R74.01 TRANSAMINITIS: Status: ACTIVE | Noted: 2024-01-29

## 2024-01-29 LAB
ALBUMIN SERPL BCP-MCNC: 3 G/DL (ref 3.5–5)
ALBUMIN SERPL BCP-MCNC: 3.1 G/DL (ref 3.5–5)
ALBUMIN SERPL BCP-MCNC: 3.8 G/DL (ref 3.5–5)
ALP SERPL-CCNC: 105 U/L (ref 34–104)
ALP SERPL-CCNC: 85 U/L (ref 34–104)
ALP SERPL-CCNC: 88 U/L (ref 34–104)
ALT SERPL W P-5'-P-CCNC: 2232 U/L (ref 7–52)
ALT SERPL W P-5'-P-CCNC: 384 U/L (ref 7–52)
ALT SERPL W P-5'-P-CCNC: 816 U/L (ref 7–52)
ANION GAP SERPL CALCULATED.3IONS-SCNC: 6 MMOL/L
ANION GAP SERPL CALCULATED.3IONS-SCNC: 8 MMOL/L
APAP SERPL-MCNC: 2 UG/ML (ref 10–20)
APAP SERPL-MCNC: 8 UG/ML (ref 10–20)
APTT PPP: 32 SECONDS (ref 23–37)
AST SERPL W P-5'-P-CCNC: 2367 U/L (ref 13–39)
AST SERPL W P-5'-P-CCNC: 330 U/L (ref 13–39)
AST SERPL W P-5'-P-CCNC: 785 U/L (ref 13–39)
ATRIAL RATE: 73 BPM
BACTERIA UR QL AUTO: ABNORMAL /HPF
BASOPHILS # BLD AUTO: 0.02 THOUSANDS/ÂΜL (ref 0–0.1)
BASOPHILS # BLD AUTO: 0.08 THOUSANDS/ÂΜL (ref 0–0.1)
BASOPHILS NFR BLD AUTO: 0 % (ref 0–1)
BASOPHILS NFR BLD AUTO: 1 % (ref 0–1)
BILIRUB DIRECT SERPL-MCNC: 0.15 MG/DL (ref 0–0.2)
BILIRUB SERPL-MCNC: 0.35 MG/DL (ref 0.2–1)
BILIRUB SERPL-MCNC: 0.52 MG/DL (ref 0.2–1)
BILIRUB SERPL-MCNC: 0.56 MG/DL (ref 0.2–1)
BILIRUB UR QL STRIP: NEGATIVE
BUN SERPL-MCNC: 12 MG/DL (ref 5–25)
BUN SERPL-MCNC: 8 MG/DL (ref 5–25)
CALCIUM ALBUM COR SERPL-MCNC: 8.9 MG/DL (ref 8.3–10.1)
CALCIUM SERPL-MCNC: 8.2 MG/DL (ref 8.4–10.2)
CALCIUM SERPL-MCNC: 8.7 MG/DL (ref 8.4–10.2)
CHLORIDE SERPL-SCNC: 107 MMOL/L (ref 96–108)
CHLORIDE SERPL-SCNC: 108 MMOL/L (ref 96–108)
CK SERPL-CCNC: 46 U/L (ref 26–192)
CLARITY UR: CLEAR
CO2 SERPL-SCNC: 22 MMOL/L (ref 21–32)
CO2 SERPL-SCNC: 22 MMOL/L (ref 21–32)
COLOR UR: YELLOW
CREAT SERPL-MCNC: 0.52 MG/DL (ref 0.6–1.3)
CREAT SERPL-MCNC: 0.66 MG/DL (ref 0.6–1.3)
CREAT UR-MCNC: 154.3 MG/DL
EOSINOPHIL # BLD AUTO: 0 THOUSAND/ÂΜL (ref 0–0.61)
EOSINOPHIL # BLD AUTO: 0.17 THOUSAND/ÂΜL (ref 0–0.61)
EOSINOPHIL NFR BLD AUTO: 0 % (ref 0–6)
EOSINOPHIL NFR BLD AUTO: 1 % (ref 0–6)
ERYTHROCYTE [DISTWIDTH] IN BLOOD BY AUTOMATED COUNT: 14.6 % (ref 11.6–15.1)
ERYTHROCYTE [DISTWIDTH] IN BLOOD BY AUTOMATED COUNT: 14.8 % (ref 11.6–15.1)
GFR SERPL CREATININE-BSD FRML MDRD: 125 ML/MIN/1.73SQ M
GFR SERPL CREATININE-BSD FRML MDRD: 136 ML/MIN/1.73SQ M
GLUCOSE SERPL-MCNC: 110 MG/DL (ref 65–140)
GLUCOSE SERPL-MCNC: 145 MG/DL (ref 65–140)
GLUCOSE UR STRIP-MCNC: NEGATIVE MG/DL
HAV IGM SER QL: NORMAL
HBV CORE AB SER QL: NORMAL
HBV CORE IGM SER QL: NORMAL
HBV SURFACE AG SER QL: NORMAL
HCT VFR BLD AUTO: 32.8 % (ref 34.8–46.1)
HCT VFR BLD AUTO: 38 % (ref 34.8–46.1)
HCV AB SER QL: NORMAL
HGB BLD-MCNC: 10.6 G/DL (ref 11.5–15.4)
HGB BLD-MCNC: 12.1 G/DL (ref 11.5–15.4)
HGB UR QL STRIP.AUTO: ABNORMAL
IMM GRANULOCYTES # BLD AUTO: 0.12 THOUSAND/UL (ref 0–0.2)
IMM GRANULOCYTES # BLD AUTO: 0.13 THOUSAND/UL (ref 0–0.2)
IMM GRANULOCYTES NFR BLD AUTO: 1 % (ref 0–2)
IMM GRANULOCYTES NFR BLD AUTO: 1 % (ref 0–2)
INR PPP: 1.58 (ref 0.84–1.19)
INR PPP: 1.66 (ref 0.84–1.19)
KETONES UR STRIP-MCNC: NEGATIVE MG/DL
LACTATE SERPL-SCNC: 1.1 MMOL/L (ref 0.5–2)
LEUKOCYTE ESTERASE UR QL STRIP: ABNORMAL
LIPASE SERPL-CCNC: 23 U/L (ref 11–82)
LYMPHOCYTES # BLD AUTO: 0.9 THOUSANDS/ÂΜL (ref 0.6–4.47)
LYMPHOCYTES # BLD AUTO: 1.46 THOUSANDS/ÂΜL (ref 0.6–4.47)
LYMPHOCYTES NFR BLD AUTO: 10 % (ref 14–44)
LYMPHOCYTES NFR BLD AUTO: 6 % (ref 14–44)
MCH RBC QN AUTO: 25.9 PG (ref 26.8–34.3)
MCH RBC QN AUTO: 26 PG (ref 26.8–34.3)
MCHC RBC AUTO-ENTMCNC: 31.8 G/DL (ref 31.4–37.4)
MCHC RBC AUTO-ENTMCNC: 32.3 G/DL (ref 31.4–37.4)
MCV RBC AUTO: 80 FL (ref 82–98)
MCV RBC AUTO: 82 FL (ref 82–98)
MONOCYTES # BLD AUTO: 0.67 THOUSAND/ÂΜL (ref 0.17–1.22)
MONOCYTES # BLD AUTO: 0.92 THOUSAND/ÂΜL (ref 0.17–1.22)
MONOCYTES NFR BLD AUTO: 4 % (ref 4–12)
MONOCYTES NFR BLD AUTO: 6 % (ref 4–12)
MUCOUS THREADS UR QL AUTO: ABNORMAL
NEUTROPHILS # BLD AUTO: 12.3 THOUSANDS/ÂΜL (ref 1.85–7.62)
NEUTROPHILS # BLD AUTO: 13.36 THOUSANDS/ÂΜL (ref 1.85–7.62)
NEUTS SEG NFR BLD AUTO: 81 % (ref 43–75)
NEUTS SEG NFR BLD AUTO: 89 % (ref 43–75)
NITRITE UR QL STRIP: NEGATIVE
NON-SQ EPI CELLS URNS QL MICRO: ABNORMAL /HPF
NRBC BLD AUTO-RTO: 0 /100 WBCS
NRBC BLD AUTO-RTO: 0 /100 WBCS
P AXIS: 57 DEGREES
PH UR STRIP.AUTO: 6 [PH] (ref 4.5–8)
PLATELET # BLD AUTO: 352 THOUSANDS/UL (ref 149–390)
PLATELET # BLD AUTO: 446 THOUSANDS/UL (ref 149–390)
PMV BLD AUTO: 9 FL (ref 8.9–12.7)
PMV BLD AUTO: 9.3 FL (ref 8.9–12.7)
POTASSIUM SERPL-SCNC: 3.5 MMOL/L (ref 3.5–5.3)
POTASSIUM SERPL-SCNC: 3.6 MMOL/L (ref 3.5–5.3)
PR INTERVAL: 118 MS
PROCALCITONIN SERPL-MCNC: 54.28 NG/ML
PROT SERPL-MCNC: 6.2 G/DL (ref 6.4–8.4)
PROT SERPL-MCNC: 6.4 G/DL (ref 6.4–8.4)
PROT SERPL-MCNC: 7.5 G/DL (ref 6.4–8.4)
PROT UR STRIP-MCNC: ABNORMAL MG/DL
PROT UR-MCNC: 27 MG/DL
PROT/CREAT UR: 0.17 MG/G{CREAT} (ref 0–0.1)
PROTHROMBIN TIME: 19.1 SECONDS (ref 11.6–14.5)
PROTHROMBIN TIME: 19.8 SECONDS (ref 11.6–14.5)
QRS AXIS: 85 DEGREES
QRSD INTERVAL: 82 MS
QT INTERVAL: 386 MS
QTC INTERVAL: 425 MS
RBC # BLD AUTO: 4.1 MILLION/UL (ref 3.81–5.12)
RBC # BLD AUTO: 4.65 MILLION/UL (ref 3.81–5.12)
RBC #/AREA URNS AUTO: ABNORMAL /HPF
SODIUM SERPL-SCNC: 136 MMOL/L (ref 135–147)
SODIUM SERPL-SCNC: 137 MMOL/L (ref 135–147)
SP GR UR STRIP.AUTO: 1.02 (ref 1–1.03)
T WAVE AXIS: 18 DEGREES
UROBILINOGEN UR QL STRIP.AUTO: >=8 E.U./DL
VENTRICULAR RATE: 73 BPM
WBC # BLD AUTO: 15.06 THOUSAND/UL (ref 4.31–10.16)
WBC # BLD AUTO: 15.07 THOUSAND/UL (ref 4.31–10.16)
WBC #/AREA URNS AUTO: ABNORMAL /HPF

## 2024-01-29 PROCEDURE — 80076 HEPATIC FUNCTION PANEL: CPT | Performed by: INTERNAL MEDICINE

## 2024-01-29 PROCEDURE — 76705 ECHO EXAM OF ABDOMEN: CPT

## 2024-01-29 PROCEDURE — 82550 ASSAY OF CK (CPK): CPT | Performed by: INTERNAL MEDICINE

## 2024-01-29 PROCEDURE — 99222 1ST HOSP IP/OBS MODERATE 55: CPT | Performed by: INTERNAL MEDICINE

## 2024-01-29 PROCEDURE — 87077 CULTURE AEROBIC IDENTIFY: CPT

## 2024-01-29 PROCEDURE — G1004 CDSM NDSC: HCPCS

## 2024-01-29 PROCEDURE — 83690 ASSAY OF LIPASE: CPT | Performed by: EMERGENCY MEDICINE

## 2024-01-29 PROCEDURE — 86644 CMV ANTIBODY: CPT

## 2024-01-29 PROCEDURE — 36415 COLL VENOUS BLD VENIPUNCTURE: CPT | Performed by: EMERGENCY MEDICINE

## 2024-01-29 PROCEDURE — 84156 ASSAY OF PROTEIN URINE: CPT | Performed by: EMERGENCY MEDICINE

## 2024-01-29 PROCEDURE — 97166 OT EVAL MOD COMPLEX 45 MIN: CPT

## 2024-01-29 PROCEDURE — 85025 COMPLETE CBC W/AUTO DIFF WBC: CPT | Performed by: INTERNAL MEDICINE

## 2024-01-29 PROCEDURE — 74177 CT ABD & PELVIS W/CONTRAST: CPT

## 2024-01-29 PROCEDURE — 96376 TX/PRO/DX INJ SAME DRUG ADON: CPT

## 2024-01-29 PROCEDURE — 96366 THER/PROPH/DIAG IV INF ADDON: CPT

## 2024-01-29 PROCEDURE — 96367 TX/PROPH/DG ADDL SEQ IV INF: CPT

## 2024-01-29 PROCEDURE — 96365 THER/PROPH/DIAG IV INF INIT: CPT

## 2024-01-29 PROCEDURE — 87086 URINE CULTURE/COLONY COUNT: CPT

## 2024-01-29 PROCEDURE — 80143 DRUG ASSAY ACETAMINOPHEN: CPT | Performed by: EMERGENCY MEDICINE

## 2024-01-29 PROCEDURE — 84145 PROCALCITONIN (PCT): CPT

## 2024-01-29 PROCEDURE — 80053 COMPREHEN METABOLIC PANEL: CPT | Performed by: INTERNAL MEDICINE

## 2024-01-29 PROCEDURE — 86704 HEP B CORE ANTIBODY TOTAL: CPT

## 2024-01-29 PROCEDURE — 85610 PROTHROMBIN TIME: CPT | Performed by: INTERNAL MEDICINE

## 2024-01-29 PROCEDURE — 82570 ASSAY OF URINE CREATININE: CPT | Performed by: EMERGENCY MEDICINE

## 2024-01-29 PROCEDURE — 97161 PT EVAL LOW COMPLEX 20 MIN: CPT

## 2024-01-29 PROCEDURE — 99254 IP/OBS CNSLTJ NEW/EST MOD 60: CPT | Performed by: OBSTETRICS & GYNECOLOGY

## 2024-01-29 PROCEDURE — 80053 COMPREHEN METABOLIC PANEL: CPT | Performed by: EMERGENCY MEDICINE

## 2024-01-29 PROCEDURE — 80074 ACUTE HEPATITIS PANEL: CPT

## 2024-01-29 PROCEDURE — 87040 BLOOD CULTURE FOR BACTERIA: CPT

## 2024-01-29 PROCEDURE — 81001 URINALYSIS AUTO W/SCOPE: CPT

## 2024-01-29 PROCEDURE — 86645 CMV ANTIBODY IGM: CPT

## 2024-01-29 PROCEDURE — 80143 DRUG ASSAY ACETAMINOPHEN: CPT | Performed by: INTERNAL MEDICINE

## 2024-01-29 PROCEDURE — 99255 IP/OBS CONSLTJ NEW/EST HI 80: CPT | Performed by: EMERGENCY MEDICINE

## 2024-01-29 PROCEDURE — 96368 THER/DIAG CONCURRENT INF: CPT

## 2024-01-29 PROCEDURE — 86665 EPSTEIN-BARR CAPSID VCA: CPT

## 2024-01-29 PROCEDURE — 85610 PROTHROMBIN TIME: CPT | Performed by: EMERGENCY MEDICINE

## 2024-01-29 PROCEDURE — 96375 TX/PRO/DX INJ NEW DRUG ADDON: CPT

## 2024-01-29 PROCEDURE — 85730 THROMBOPLASTIN TIME PARTIAL: CPT | Performed by: EMERGENCY MEDICINE

## 2024-01-29 PROCEDURE — 83605 ASSAY OF LACTIC ACID: CPT

## 2024-01-29 PROCEDURE — 85025 COMPLETE CBC W/AUTO DIFF WBC: CPT | Performed by: EMERGENCY MEDICINE

## 2024-01-29 PROCEDURE — 93005 ELECTROCARDIOGRAM TRACING: CPT

## 2024-01-29 PROCEDURE — 99291 CRITICAL CARE FIRST HOUR: CPT | Performed by: EMERGENCY MEDICINE

## 2024-01-29 RX ORDER — CEFTRIAXONE 1 G/50ML
1000 INJECTION, SOLUTION INTRAVENOUS ONCE
Status: COMPLETED | OUTPATIENT
Start: 2024-01-29 | End: 2024-01-29

## 2024-01-29 RX ORDER — ONDANSETRON 2 MG/ML
4 INJECTION INTRAMUSCULAR; INTRAVENOUS ONCE
Status: COMPLETED | OUTPATIENT
Start: 2024-01-29 | End: 2024-01-29

## 2024-01-29 RX ORDER — SODIUM CHLORIDE, SODIUM GLUCONATE, SODIUM ACETATE, POTASSIUM CHLORIDE, MAGNESIUM CHLORIDE, SODIUM PHOSPHATE, DIBASIC, AND POTASSIUM PHOSPHATE .53; .5; .37; .037; .03; .012; .00082 G/100ML; G/100ML; G/100ML; G/100ML; G/100ML; G/100ML; G/100ML
100 INJECTION, SOLUTION INTRAVENOUS CONTINUOUS
Status: DISCONTINUED | OUTPATIENT
Start: 2024-01-29 | End: 2024-01-31 | Stop reason: HOSPADM

## 2024-01-29 RX ORDER — DROPERIDOL 2.5 MG/ML
1.25 INJECTION, SOLUTION INTRAMUSCULAR; INTRAVENOUS ONCE
Status: COMPLETED | OUTPATIENT
Start: 2024-01-29 | End: 2024-01-29

## 2024-01-29 RX ORDER — CEFTRIAXONE 1 G/50ML
1000 INJECTION, SOLUTION INTRAVENOUS EVERY 24 HOURS
Status: DISCONTINUED | OUTPATIENT
Start: 2024-01-30 | End: 2024-01-29

## 2024-01-29 RX ORDER — CEPHALEXIN 250 MG/1
500 CAPSULE ORAL EVERY 12 HOURS SCHEDULED
Status: DISCONTINUED | OUTPATIENT
Start: 2024-01-29 | End: 2024-01-29

## 2024-01-29 RX ORDER — POTASSIUM CHLORIDE 20 MEQ/1
20 TABLET, EXTENDED RELEASE ORAL ONCE
Status: COMPLETED | OUTPATIENT
Start: 2024-01-29 | End: 2024-01-29

## 2024-01-29 RX ORDER — ENOXAPARIN SODIUM 100 MG/ML
40 INJECTION SUBCUTANEOUS DAILY
Status: DISCONTINUED | OUTPATIENT
Start: 2024-01-29 | End: 2024-01-31 | Stop reason: HOSPADM

## 2024-01-29 RX ORDER — MAGNESIUM SULFATE HEPTAHYDRATE 40 MG/ML
2 INJECTION, SOLUTION INTRAVENOUS ONCE
Status: COMPLETED | OUTPATIENT
Start: 2024-01-29 | End: 2024-01-29

## 2024-01-29 RX ORDER — CEFTRIAXONE 1 G/50ML
1000 INJECTION, SOLUTION INTRAVENOUS EVERY 24 HOURS
Status: DISCONTINUED | OUTPATIENT
Start: 2024-01-29 | End: 2024-01-29

## 2024-01-29 RX ORDER — ACETYLCYSTEINE 200 MG/ML
100 INJECTION INTRAVENOUS ONCE
Status: DISCONTINUED | OUTPATIENT
Start: 2024-01-29 | End: 2024-01-29

## 2024-01-29 RX ADMIN — CEFTRIAXONE 1000 MG: 1 INJECTION, SOLUTION INTRAVENOUS at 04:35

## 2024-01-29 RX ADMIN — ONDANSETRON 4 MG: 2 INJECTION INTRAMUSCULAR; INTRAVENOUS at 02:59

## 2024-01-29 RX ADMIN — POTASSIUM CHLORIDE 20 MEQ: 1500 TABLET, EXTENDED RELEASE ORAL at 06:47

## 2024-01-29 RX ADMIN — IOHEXOL 100 ML: 350 INJECTION, SOLUTION INTRAVENOUS at 02:25

## 2024-01-29 RX ADMIN — SODIUM CHLORIDE, SODIUM LACTATE, POTASSIUM CHLORIDE, AND CALCIUM CHLORIDE 1000 ML: .6; .31; .03; .02 INJECTION, SOLUTION INTRAVENOUS at 00:26

## 2024-01-29 RX ADMIN — SODIUM CHLORIDE, SODIUM GLUCONATE, SODIUM ACETATE, POTASSIUM CHLORIDE, MAGNESIUM CHLORIDE, SODIUM PHOSPHATE, DIBASIC, AND POTASSIUM PHOSPHATE 100 ML/HR: .53; .5; .37; .037; .03; .012; .00082 INJECTION, SOLUTION INTRAVENOUS at 06:39

## 2024-01-29 RX ADMIN — ACETYLCYSTEINE 10000 MG: 200 INJECTION, SOLUTION INTRAVENOUS at 09:53

## 2024-01-29 RX ADMIN — ONDANSETRON 4 MG: 2 INJECTION INTRAMUSCULAR; INTRAVENOUS at 00:26

## 2024-01-29 RX ADMIN — ENOXAPARIN SODIUM 40 MG: 40 INJECTION SUBCUTANEOUS at 09:07

## 2024-01-29 RX ADMIN — MAGNESIUM SULFATE HEPTAHYDRATE 2 G: 40 INJECTION, SOLUTION INTRAVENOUS at 03:31

## 2024-01-29 RX ADMIN — ACETYLCYSTEINE 5000 MG: 200 INJECTION, SOLUTION INTRAVENOUS at 04:38

## 2024-01-29 RX ADMIN — SODIUM CHLORIDE, SODIUM LACTATE, POTASSIUM CHLORIDE, AND CALCIUM CHLORIDE 1000 ML: .6; .31; .03; .02 INJECTION, SOLUTION INTRAVENOUS at 03:42

## 2024-01-29 RX ADMIN — ACETYLCYSTEINE 15000 MG: 200 INJECTION, SOLUTION INTRAVENOUS at 02:05

## 2024-01-29 RX ADMIN — ACETYLCYSTEINE 10000 MG: 200 INJECTION, SOLUTION INTRAVENOUS at 20:24

## 2024-01-29 RX ADMIN — DROPERIDOL 1.25 MG: 2.5 INJECTION, SOLUTION INTRAMUSCULAR; INTRAVENOUS at 03:29

## 2024-01-29 NOTE — ASSESSMENT & PLAN NOTE
Patient with PMHx of  on 1/15 presented to the ED with complaints of right flank pain, suprapubic pain, and abdominal pain x 3 days  Pt fulfilling SIRS with tachycardia and leukocytosis of 15.06  Suspect related to pyelonephritis and pelvic abscess v. post-surgical inflammation  Pt afebrile   Lactic acid, procalc, and blood cultures pending  Continue IV fluids  Ceftriaxone initiated in the ED, continue

## 2024-01-29 NOTE — ED PROVIDER NOTES
History  Chief Complaint   Patient presents with    Abdominal Pain     Pt reports right sided flank pain since yesterday. Has been taking tylenol at home with no relief. Also reports bad taste in mouth and nausea. Had  1/15/24.      23-year-old female status post  on 1/15/2024 who presents with abdominal pain and nausea.  Patient has been experiencing mild abdominal pain over her  scar since the surgery.  Patient mainly concerned today because she believes she took 7 pills of 500 mg Tylenol yesterday for her pain.  (She is unsure of the actual number of pills)  Denies taking tylenol prior to this.  Now experiencing nausea.        Prior to Admission Medications   Prescriptions Last Dose Informant Patient Reported? Taking?   ibuprofen (MOTRIN) 600 mg tablet Not Taking  No No   Sig: Take 1 tablet (600 mg total) by mouth every 6 (six) hours   Patient not taking: Reported on 2024   oxyCODONE (Roxicodone) 5 immediate release tablet   No No   Sig: Take 1 tablet (5 mg total) by mouth every 4 (four) hours as needed for moderate pain for up to 10 days Max Daily Amount: 30 mg      Facility-Administered Medications: None       Past Medical History:   Diagnosis Date    Depression        Past Surgical History:   Procedure Laterality Date    ABDOMINOPLASTY      SC  DELIVERY ONLY N/A 2020    Procedure:  SECTION ();  Surgeon: Adair Clifton MD;  Location: [facility];  Service: Obstetrics    SC  DELIVERY ONLY N/A 1/15/2024    Procedure:  SECTION ();  Surgeon: Yardlie Toussaint-Foster, DO;  Location: [facility];  Service: Obstetrics       Family History   Problem Relation Age of Onset    No Known Problems Mother     No Known Problems Father     No Known Problems Sister     No Known Problems Brother     Cancer Neg Hx     Diabetes Neg Hx      I have reviewed and agree with the history as documented.    E-Cigarette/Vaping    E-Cigarette Use Never User   "    E-Cigarette/Vaping Substances    Nicotine No     THC No     CBD No     Flavoring No     Other No     Unknown No      Social History     Tobacco Use    Smoking status: Former     Types: Pipe    Smokeless tobacco: Never   Vaping Use    Vaping status: Never Used   Substance Use Topics    Alcohol use: Not Currently     Comment: \"sometimes\"    Drug use: Never       Review of Systems   Constitutional:  Negative for chills and fever.   HENT:  Negative for rhinorrhea, sore throat and trouble swallowing.    Eyes:  Negative for photophobia and visual disturbance.   Respiratory:  Negative for cough, chest tightness and shortness of breath.    Cardiovascular:  Negative for chest pain, palpitations and leg swelling.   Gastrointestinal:  Positive for abdominal pain and nausea. Negative for blood in stool, diarrhea and vomiting.   Endocrine: Negative for polyuria.   Genitourinary:  Negative for dysuria, flank pain, hematuria, vaginal bleeding and vaginal discharge.   Musculoskeletal:  Negative for back pain and neck pain.   Skin:  Negative for color change and rash.   Allergic/Immunologic: Negative for immunocompromised state.   Neurological:  Negative for dizziness, weakness, light-headedness, numbness and headaches.   All other systems reviewed and are negative.      Physical Exam  Physical Exam  Vitals and nursing note reviewed.   Constitutional:       General: She is not in acute distress.     Appearance: She is well-developed.   HENT:      Head: Normocephalic and atraumatic.      Mouth/Throat:      Lips: Pink.      Mouth: Mucous membranes are moist.   Eyes:      General: Lids are normal.      Extraocular Movements: Extraocular movements intact.      Conjunctiva/sclera: Conjunctivae normal.      Pupils: Pupils are equal, round, and reactive to light.   Cardiovascular:      Rate and Rhythm: Normal rate and regular rhythm.      Heart sounds: Normal heart sounds. No murmur heard.  Pulmonary:      Effort: Pulmonary effort is " normal.      Breath sounds: Normal breath sounds.   Abdominal:      General: There is no distension.      Palpations: Abdomen is soft.      Tenderness: There is abdominal tenderness in the periumbilical area. There is no guarding or rebound.      Comments:  is healing well.   Musculoskeletal:         General: No swelling.      Cervical back: Full passive range of motion without pain, normal range of motion and neck supple.   Skin:     General: Skin is warm.      Capillary Refill: Capillary refill takes less than 2 seconds.   Neurological:      General: No focal deficit present.      Mental Status: She is alert.   Psychiatric:         Mood and Affect: Mood normal.         Speech: Speech normal.         Behavior: Behavior normal.         Vital Signs  ED Triage Vitals [24]   Temperature Pulse Respirations Blood Pressure SpO2   98.5 °F (36.9 °C) 102 18 146/92 98 %      Temp Source Heart Rate Source Patient Position - Orthostatic VS BP Location FiO2 (%)   Oral Monitor Sitting Right arm --      Pain Score       6           Vitals:    24 0350   BP: 146/92 142/68 139/65   Pulse: 102 86 78   Patient Position - Orthostatic VS: Sitting Sitting Lying         Visual Acuity      ED Medications  Medications   acetylcysteine (ACETADOTE) 5,000 mg in dextrose 5 % 500 mL IVPB (5,000 mg Intravenous New Bag 24 0438)   acetylcysteine (ACETADOTE) 10,000 mg in dextrose 5 % 1,000 mL IVPB (has no administration in time range)   ondansetron (ZOFRAN) injection 4 mg (4 mg Intravenous Given 24 0026)   lactated ringers bolus 1,000 mL (0 mL Intravenous Stopped 24 0157)   acetylcysteine (ACETADOTE) 15,000 mg in dextrose 5 % 200 mL IVPB (0 mg Intravenous Stopped 24 0344)   iohexol (OMNIPAQUE) 350 MG/ML injection (MULTI-DOSE) 100 mL (100 mL Intravenous Given 24 0225)   ondansetron (ZOFRAN) injection 4 mg (4 mg Intravenous Given 24 0259)   magnesium sulfate 2 g/50 mL  IVPB (premix) 2 g (0 g Intravenous Stopped 1/29/24 0351)   droperidol (INAPSINE) injection 1.25 mg (1.25 mg Intravenous Given 1/29/24 0329)   lactated ringers bolus 1,000 mL (0 mL Intravenous Stopped 1/29/24 0538)   cefTRIAXone (ROCEPHIN) IVPB (premix in dextrose) 1,000 mg 50 mL (0 mg Intravenous Stopped 1/29/24 0515)       Diagnostic Studies  Results Reviewed       Procedure Component Value Units Date/Time    Lactic acid, plasma (w/reflex if result > 2.0) [[id number]]     Lab Status: No result Specimen: Blood     Procalcitonin [[id number]]     Lab Status: No result Specimen: Blood     Blood culture [[id number]]     Lab Status: No result Specimen: Blood     Blood culture [[id number]]     Lab Status: No result Specimen: Blood     Protein / creatinine ratio, urine [[id number]]  (Abnormal) Collected: 01/29/24 0323    Lab Status: Final result Specimen: Urine, Clean Catch Updated: 01/29/24 0406     Creatinine, Ur 154.3 mg/dL      Protein Urine Random 27 mg/dL      Prot/Creat Ratio, Ur 0.17    Urine Microscopic [[id number]]  (Abnormal) Collected: 01/29/24 0209    Lab Status: Final result Specimen: Urine, Clean Catch Updated: 01/29/24 0238     RBC, UA Innumerable /hpf      WBC, UA 30-50 /hpf      Epithelial Cells Occasional /hpf      Bacteria, UA None Seen /hpf      MUCUS THREADS Moderate    Urine culture [[id number]] Collected: 01/29/24 0209    Lab Status: In process Specimen: Urine, Clean Catch Updated: 01/29/24 0238    Urine Macroscopic, POC [[id number]]  (Abnormal) Collected: 01/29/24 0209    Lab Status: Final result Specimen: Urine Updated: 01/29/24 0210     Color, UA Yellow     Clarity, UA Clear     pH, UA 6.0     Leukocytes, UA Small     Nitrite, UA Negative     Protein, UA 30 (1+) mg/dl      Glucose, UA Negative mg/dl      Ketones, UA Negative mg/dl      Urobilinogen, UA >=8.0 E.U./dl      Bilirubin, UA Negative     Occult Blood, UA Large     Specific Gravity, UA 1.020    Narrative:      CLINITEK RESULT    Acetaminophen  "level-\"If concentration is detectable, please discuss with medical  on call.\" [[id number]]  (Abnormal) Collected: 01/29/24 0025    Lab Status: Final result Specimen: Blood from Arm, Right Updated: 01/29/24 0104     Acetaminophen Level 8 ug/mL     Protime-INR [[id number]]  (Abnormal) Collected: 01/29/24 0025    Lab Status: Final result Specimen: Blood from Arm, Right Updated: 01/29/24 0057     Protime 19.1 seconds      INR 1.58    APTT [[id number]]  (Normal) Collected: 01/29/24 0025    Lab Status: Final result Specimen: Blood from Arm, Right Updated: 01/29/24 0057     PTT 32 seconds     Comprehensive metabolic panel [[id number]]  (Abnormal) Collected: 01/29/24 0025    Lab Status: Final result Specimen: Blood from Arm, Right Updated: 01/29/24 0049     Sodium 136 mmol/L      Potassium 3.6 mmol/L      Chloride 108 mmol/L      CO2 22 mmol/L      ANION GAP 6 mmol/L      BUN 12 mg/dL      Creatinine 0.66 mg/dL      Glucose 110 mg/dL      Calcium 8.7 mg/dL       U/L       U/L      Alkaline Phosphatase 105 U/L      Total Protein 7.5 g/dL      Albumin 3.8 g/dL      Total Bilirubin 0.52 mg/dL      eGFR 125 ml/min/1.73sq m     Narrative:      [employer] guidelines for Chronic Kidney Disease (CKD):     Stage 1 with normal or high GFR (GFR > 90 mL/min/1.73 square meters)    Stage 2 Mild CKD (GFR = 60-89 mL/min/1.73 square meters)    Stage 3A Moderate CKD (GFR = 45-59 mL/min/1.73 square meters)    Stage 3B Moderate CKD (GFR = 30-44 mL/min/1.73 square meters)    Stage 4 Severe CKD (GFR = 15-29 mL/min/1.73 square meters)    Stage 5 End Stage CKD (GFR <15 mL/min/1.73 square meters)  Note: GFR calculation is accurate only with a steady state creatinine    Lipase [[id number]]  (Normal) Collected: 01/29/24 0025    Lab Status: Final result Specimen: Blood from Arm, Right Updated: 01/29/24 0049     Lipase 23 u/L     CBC and differential [[id number]]  (Abnormal) Collected: 01/29/24 0025    Lab " Status: Final result Specimen: Blood from Arm, Right Updated: 24 0033     WBC 15.06 Thousand/uL      RBC 4.65 Million/uL      Hemoglobin 12.1 g/dL      Hematocrit 38.0 %      MCV 82 fL      MCH 26.0 pg      MCHC 31.8 g/dL      RDW 14.8 %      MPV 9.0 fL      Platelets 446 Thousands/uL      nRBC 0 /100 WBCs      Neutrophils Relative 81 %      Immat GRANS % 1 %      Lymphocytes Relative 10 %      Monocytes Relative 6 %      Eosinophils Relative 1 %      Basophils Relative 1 %      Neutrophils Absolute 12.30 Thousands/µL      Immature Grans Absolute 0.13 Thousand/uL      Lymphocytes Absolute 1.46 Thousands/µL      Monocytes Absolute 0.92 Thousand/µL      Eosinophils Absolute 0.17 Thousand/µL      Basophils Absolute 0.08 Thousands/µL                    CT abdomen pelvis with contrast   Final Result by Tonio Gallardo MD ( 2052)      Postsurgical changes of recent  noted with hyperemia along the lower abdominopelvic wall.   4.2 cm enhancing fluid collection at the right lower abdominal pelvic wall deep to the surgical incision site concerning for developing abscess. GYN/surgical consult advised.      Findings above suspicious for pyelonephritis, right worse than left and concomitant cystitis although evaluation of the latter somewhat limited secondary to underdistention and adjacent postsurgical change.   No evidence of intrarenal abscess. Clinical correlation recommended.      Otherwise mild edematous and inflammatory stranding in the pelvis overall within normal limits for expected postoperative change. No discrete abdominal pelvic collection is seen.      Above findings discussed with Dr. Batista at 4:25 a.m. on 2024.      Workstation performed: [id number]                    Procedures  CriticalCare Time    Date/Time: 2024 1:24 AM    Performed by: Ga Batista MD  Authorized by: Ga Batista MD    Critical care provider statement:     Critical care time (minutes):  45    Critical  care time was exclusive of:  Separately billable procedures and treating other patients    Critical care was necessary to treat or prevent imminent or life-threatening deterioration of the following conditions:  Hepatic failure    Critical care was time spent personally by me on the following activities:  Obtaining history from patient or surrogate, development of treatment plan with patient or surrogate, evaluation of patient's response to treatment, examination of patient, review of old charts, re-evaluation of patient's condition, ordering and review of radiographic studies and ordering and review of laboratory studies    I assumed direction of critical care for this patient from another provider in my specialty: no             ED Course  ED Course as of 01/29/24 0605   Mon Jan 29, 2024   0034 WBC(!): 15.06  Stable, s/p csection   0051 Comprehensive metabolic panel(!)  Elevated LFTs (biliary cause, accidental tylenol OD?).  Awaiting tylenol level.  Add CT abd/pelvis to evaluate biliary cause.   0111 Comprehensive metabolic panel(!)  Given concern for elevated lfts and INR without definitive biliary cause yet, will start NAC is case lft elevation is 2/2 tylenol ingestion.   0239 CT abdomen pelvis with contrast  No evidence of biliary disease.  Seroma at the surgical site?  Otherwise, no acute abdominal pathology as interpreted by myself.   0439 TT sent to [facility] for admission, and ob for the possible post op abscess (clinically, surgical site is healing well)   0452 OB will see in consult.  Consult ordered.                                             Medical Decision Making  - given the presentation, will check CBC for marked leukocytosis  - CMP for liver enzyme elevation that could signal cholecystitis, biliary obstructive disease. Check RFTs for GREGOR / markers of dehydration.  - Lipase given abdominal pain to evaluate specifically for pancreatitis.  - Urine: will check for UTI or signs of pyelonephritis.   -Patient  has an unremarkable abdominal exam and a well-healing  scar.  She is passing gas and having bowel movements.  Doubt intra-abdominal pathology.  Will hold off on imaging at this time unless lab work would warrant imaging.  -Amount of Tylenol taken should be within the therapeutic range.  However, will check Tylenol level.  - Disposition per workup.      Problems Addressed:  Elevated LFTs: acute illness or injury  Nausea and vomiting: acute illness or injury  Post-operative wound abscess: acute illness or injury  Pyelonephritis: complicated acute illness or injury with systemic symptoms that poses a threat to life or bodily functions  Toxic effect of acetaminophen, accidental or unintentional, initial encounter: complicated acute illness or injury with systemic symptoms that poses a threat to life or bodily functions    Amount and/or Complexity of Data Reviewed  Labs: ordered. Decision-making details documented in ED Course.  Radiology: ordered. Decision-making details documented in ED Course.    Risk  Prescription drug management.  Decision regarding hospitalization.             Disposition  Final diagnoses:   Toxic effect of acetaminophen, accidental or unintentional, initial encounter   Nausea and vomiting   Pyelonephritis   Elevated LFTs   Post-operative wound abscess     Time reflects when diagnosis was documented in both MDM as applicable and the Disposition within this note       Time User Action Codes Description Comment    2024  4:40 AM Caverly, Ga P Add [T39.1X1A] Toxic effect of acetaminophen, accidental or unintentional, initial encounter     2024  4:40 AM Caverly, Ag P Add [R11.2] Nausea and vomiting     2024  4:40 AM Caverly, Ga P Add [N12] Pyelonephritis     2024  4:40 AM Caverly, Ga P Add [R79.89] Elevated LFTs     2024  4:51 AM Caverly, Ga P Add [T81.49XA] Post-operative wound abscess           ED Disposition       ED Disposition   Admit    Condition    Stable    Date/Time     4:40 AM    Comment                  Follow-up Information    None         Current Discharge Medication List        CONTINUE these medications which have NOT CHANGED    Details   ibuprofen (MOTRIN) 600 mg tablet Take 1 tablet (600 mg total) by mouth every 6 (six) hours  Qty: 30 tablet, Refills: 0    Associated Diagnoses: Hx of  section           STOP taking these medications       oxyCODONE (Roxicodone) 5 immediate release tablet Comments:   Reason for Stopping:               No discharge procedures on file.    PDMP Review       None            ED Provider  Electronically Signed by             Ga Batista MD  24 0508       Ga Batista MD  24 0698

## 2024-01-29 NOTE — CONSULTS
[facility]  Consultation Note  Encounter: [id number]     PATIENT INFO     Name: Grismerlin Fernandez  YOB: 2000   Age: 23 y.o.   Sex: female   MRN: [id number]  Unit/Bed#: E2 -01     REASON FOR CONSULTATION     Transaminitis      ASSESSMENT & PLAN     Ms. Denton is a 23 F with hx of obesity and recent  delivery on 1/15/24 who presented with SIRS and new transaminitis.     # Transaminitis   Patient presented with new elevations in , , , T. bili 0.52, in hepatocellular pattern R 11.5.Impaired synthetic function with INR 1.58 from a baseline of 1 . Lactate 1. No history of preeclampsia or HELLP during pregnancy.   CT imaging was negative for cholelithiasis or inflammation, and liver was found to be unremarkable.    Patient did have total 3.5 g of Tylenol past 1 to 2 days which is within acceptable amount <4g/day.  Tylenol level was 8. No apparent jaundice, changes in mentation or RUQ pain.   Differentials include drug-induced liver injury secondary to antibiotic use (azithromycin, penicillin, cefazolin on 1/15 during ), other etiology may include DILI from Tylenol, viral hepatitis, budd chiari, autoimmune hepatitis.    Prior HCV neg 2023, HB S Ag neg 2023, HB S ab 35     Plan:  Trend daily LFTs and INR  Obtain acute hepatitis panel, HBV core ab total, EBV IgM, CMV IgG/IgM abs, HSV  RUQ U/S with doppler today  Monitor for symptoms  Avoid tylenol use at this time  N-acetylcysteine initiated today with medical toxicology    # Pyelonephritis  Pyelonephritis and cystitis on CT abdomen pelvis on 2024; + UA  On IV zosyn per primary    # R pelvic abscess  Recent  delivery on 1/15/24 with new R pelvic abscess on CT  On IV Zosyn  Gynecology consulted    # SIRS  WBC 15K  Likely in setting of pelvic abscess and pyelnonephritis  On IV antibiotics     HISTORY OF PRESENT ILLNESS       Grismerlin Fernandez is a 23 y.o. female  with complaint of right flank pain and suprapubic pain, found to have transaminitis on lab work.  She has a history of obesity and had a uncomplicated pregnancy and delivery on 1/15/2024.  On presentation, AST newly elevated to 330, , , T. bili 0.52.  CT scan of the abdomen pelvis with contrast showed no stones or inflammation, and liver was found to be unremarkable.  She did have right lower abdominal pelvic wall with collection measuring 1.9 x 4.2 x 2.3 cm suspicious for abscess and pyelonephritis.     She states that her flank pain and suprapubic pain have resolved.  Denies any right upper quadrant pain.  At this time she does not have any nausea, vomiting, diarrhea.  No recent cold-like illness, fever, chills.    Of note patient states that she took 7 pills of acetaminophen 500 mg over the past 24-48 hours, total 3500 mg due to pain.  Denies any use of NSAIDs or recent use of antibiotics. Only on birth control implant. Does not consume alcohol as she is breast-feeding.  Denies any complications during her pregnancy including preeclampsia/eclampsia/high blood pressure.  No recent travels.  Originally from [place].     REVIEW OF SYSTEMS     CONSTITUTIONAL: Denies any fever, chills, rigors, and weight loss  HEENT: No earache or tinnitus, denies hearing loss or visual disturbances  CARDIOVASCULAR: No chest pain or palpitations  RESPIRATORY: Denies any cough, hemoptysis, shortness of breath or dyspnea on exertion  GASTROINTESTINAL: As noted in the History of Present Illness  GENITOURINARY: No problems with urination, denies any hematuria or dysuria  NEUROLOGIC: No dizziness or vertigo, denies headaches   MUSCULOSKELETAL: Denies any muscle or joint pain   SKIN: Denies skin rashes or itching  ENDOCRINE: Denies excessive thirst, denies intolerance to heat or cold  PSYCHOSOCIAL: Denies depression or anxiety, denies any recent memory loss     Historical Information   Past Medical History:  "  Diagnosis Date    Depression      Past Surgical History:   Procedure Laterality Date    ABDOMINOPLASTY      [place]  DELIVERY ONLY N/A 2020    Procedure:  SECTION ();  Surgeon: Adair Clifton MD;  Location: [facility];  Service: Obstetrics    [place]  DELIVERY ONLY N/A 1/15/2024    Procedure:  SECTION ();  Surgeon: Yardlie Toussaint-Foster, DO;  Location: [facility];  Service: Obstetrics     Social History   Social History     Substance and Sexual Activity   Alcohol Use Not Currently    Comment: \"sometimes\"     Social History     Substance and Sexual Activity   Drug Use Never     Social History     Tobacco Use   Smoking Status Former    Types: Pipe   Smokeless Tobacco Never     Family History   Problem Relation Age of Onset    No Known Problems Mother     No Known Problems Father     No Known Problems Sister     No Known Problems Brother     Cancer Neg Hx     Diabetes Neg Hx         MEDICATIONS & ALLERGIES     Meds/Allergies   Medications Prior to Admission   Medication    ibuprofen (MOTRIN) 600 mg tablet     Current Facility-Administered Medications   Medication Dose Route Frequency    acetylcysteine (ACETADOTE) 10,000 mg in dextrose 5 % 1,000 mL IVPB  100 mg/kg Intravenous Once    enoxaparin (LOVENOX) subcutaneous injection 40 mg  40 mg Subcutaneous Daily    multi-electrolyte (PLASMALYTE-A/ISOLYTE-S PH 7.4) IV solution  100 mL/hr Intravenous Continuous    piperacillin-tazobactam (ZOSYN) 3.375 g in sodium chloride 0.9 % 100 mL IVPB  3.375 g Intravenous Q6H     No Known Allergies     PHYSICAL EXAM     Objective   Blood pressure 147/74, pulse 75, temperature (!) 96.8 °F (36 °C), temperature source Temporal, resp. rate 14, height (P) 5' 2\" (1.575 m), weight 99.1 kg (218 lb 7.6 oz), last menstrual period 2023, SpO2 99%, currently breastfeeding. Body mass index is 39.96 kg/m² (pended).    Intake/Output Summary (Last 24 hours) at 2024 0913  Last data filed at 2024 " 0538  Gross per 24 hour   Intake 2300 ml   Output --   Net 2300 ml     Medication Administration - last 24 hours from 01/28/2024 0913 to 01/29/2024 0913         Date/Time Order Dose Route Action Action by     01/29/2024 0026 EST ondansetron (ZOFRAN) injection 4 mg 4 mg Intravenous Given Yessenia Pierre RN     01/29/2024 0157 EST lactated ringers bolus 1,000 mL 0 mL Intravenous Stopped Yessenia Pierre RN     01/29/2024 0026 EST lactated ringers bolus 1,000 mL 1,000 mL Intravenous New Bag Yessenia Pierre RN     01/29/2024 0344 EST acetylcysteine (ACETADOTE) 15,000 mg in dextrose 5 % 200 mL IVPB 0 mg Intravenous Stopped Jeannine Olivas RN     01/29/2024 0205 EST acetylcysteine (ACETADOTE) 15,000 mg in dextrose 5 % 200 mL IVPB 15,000 mg Intravenous New Bag Yessenia Pierre RN     01/29/2024 0438 EST acetylcysteine (ACETADOTE) 5,000 mg in dextrose 5 % 500 mL IVPB 5,000 mg Intravenous New Bag Jeannine Olivas RN     01/29/2024 0225 EST iohexol (OMNIPAQUE) 350 MG/ML injection (MULTI-DOSE) 100 mL 100 mL Intravenous Given Raymundo GOOD Sal     01/29/2024 0259 EST ondansetron (ZOFRAN) injection 4 mg 4 mg Intravenous Given Jeannine Olivas RN     01/29/2024 0351 EST magnesium sulfate 2 g/50 mL IVPB (premix) 2 g 0 g Intravenous Stopped Jeannine Olivas RN     01/29/2024 0331 EST magnesium sulfate 2 g/50 mL IVPB (premix) 2 g 2 g Intravenous New Bag Jeannine Olivas RN     01/29/2024 0329 EST droperidol (INAPSINE) injection 1.25 mg 1.25 mg Intravenous Given Jeannine Olivas RN     01/29/2024 0538 EST lactated ringers bolus 1,000 mL 0 mL Intravenous Stopped Jeannine Olivas RN     01/29/2024 0342 EST lactated ringers bolus 1,000 mL 1,000 mL Intravenous New Bag Jeannine Olivas RN     01/29/2024 0515 EST cefTRIAXone (ROCEPHIN) IVPB (premix in dextrose) 1,000 mg 50 mL 0 mg Intravenous Stopped Jeannine Olivas RN     01/29/2024 0435 EST cefTRIAXone (ROCEPHIN) IVPB (premix in dextrose) 1,000 mg 50 mL 1,000 mg Intravenous New Bag Jeannine Olivas RN      2024 0639 EST multi-electrolyte (PLASMALYTE-A/ISOLYTE-S PH 7.4) IV solution 100 mL/hr Intravenous New Bag Amanda Walker RN     2024 0907 EST enoxaparin (LOVENOX) subcutaneous injection 40 mg 40 mg Subcutaneous Given Segundo Mccoy RN     2024 0647 EST potassium chloride (Klor-Con M20) CR tablet 20 mEq 20 mEq Oral Given Amanda Walker RN            General Appearance:   Alert, cooperative, no distress   HEENT:   Normocephalic, atraumatic, anicteric     Lungs:   Equal chest rise, respirations unlabored    Heart:   Regular rate and rhythm   Abdomen:    incision, no tenderness. Soft, non-tender, non-distended; normal bowel sounds; no masses, no organomegaly    Rectal:   Deferred    Extremities:   No cyanosis, clubbing or edema    Neuro:   Moves all 4 extremities    Skin:   No jaundice, rashes, or lesions      Invasive Devices       Peripheral Intravenous Line  Duration             Peripheral IV 24 Left Antecubital <1 day    Peripheral IV 24 Right;Ventral (anterior) Forearm <1 day                     LABORATORY RESULTS     Admission on 2024   Component Date Value    WBC 2024 15.06 (H)     RBC 2024 4.65     Hemoglobin 2024 12.1     Hematocrit 2024 38.0     MCV 2024 82     MCH 2024 26.0 (L)     MCHC 2024 31.8     RDW 2024 14.8     MPV 2024 9.0     Platelets 2024 446 (H)     nRBC 2024 0     Neutrophils Relative 2024 81 (H)     Immat GRANS % 2024 1     Lymphocytes Relative 2024 10 (L)     Monocytes Relative 2024 6     Eosinophils Relative 2024 1     Basophils Relative 2024 1     Neutrophils Absolute 2024 12.30 (H)     Immature Grans Absolute 2024 0.13     Lymphocytes Absolute 2024 1.46     Monocytes Absolute 2024 0.92     Eosinophils Absolute 2024 0.17     Basophils Absolute 2024 0.08     Sodium 2024 136     Potassium 2024 3.6      Chloride 01/29/2024 108     CO2 01/29/2024 22     ANION GAP 01/29/2024 6     BUN 01/29/2024 12     Creatinine 01/29/2024 0.66     Glucose 01/29/2024 110     Calcium 01/29/2024 8.7     AST 01/29/2024 330 (H)     ALT 01/29/2024 384 (H)     Alkaline Phosphatase 01/29/2024 105 (H)     Total Protein 01/29/2024 7.5     Albumin 01/29/2024 3.8     Total Bilirubin 01/29/2024 0.52     eGFR 01/29/2024 125     Protime 01/29/2024 19.1 (H)     INR 01/29/2024 1.58 (H)     PTT 01/29/2024 32     Lipase 01/29/2024 23     Acetaminophen Level 01/29/2024 8 (L)     Color, UA 01/29/2024 Yellow     Clarity, UA 01/29/2024 Clear     pH, UA 01/29/2024 6.0     Leukocytes, UA 01/29/2024 Small (A)     Nitrite, UA 01/29/2024 Negative     Protein, UA 01/29/2024 30 (1+) (A)     Glucose, UA 01/29/2024 Negative     Ketones, UA 01/29/2024 Negative     Urobilinogen, UA 01/29/2024 >=8.0 (A)     Bilirubin, UA 01/29/2024 Negative     Occult Blood, UA 01/29/2024 Large (A)     Specific [place], UA 01/29/2024 1.020     RBC, UA 01/29/2024 Innumerable (A)     WBC, UA 01/29/2024 30-50 (A)     Epithelial Cells 01/29/2024 Occasional     Bacteria, UA 01/29/2024 None Seen     MUCUS THREADS 01/29/2024 Moderate (A)     Creatinine, Ur 01/29/2024 154.3     Protein Urine Random 01/29/2024 27     Prot/Creat Ratio, Ur 01/29/2024 0.17 (H)     Ventricular Rate 01/29/2024 73     Atrial Rate 01/29/2024 73     MN Interval 01/29/2024 118     QRSD Interval 01/29/2024 82     QT Interval 01/29/2024 386     QTC Interval 01/29/2024 425     P [place] 01/29/2024 57     QRS Axis 01/29/2024 85     T Wave [place] 01/29/2024 18     LACTIC ACID 01/29/2024 1.1     Procalcitonin 01/29/2024 54.28 (H)     WBC 01/29/2024 15.07 (H)     RBC 01/29/2024 4.10     Hemoglobin 01/29/2024 10.6 (L)     Hematocrit 01/29/2024 32.8 (L)     MCV 01/29/2024 80 (L)     MCH 01/29/2024 25.9 (L)     MCHC 01/29/2024 32.3     RDW 01/29/2024 14.6     MPV 01/29/2024 9.3     Platelets 01/29/2024 352     nRBC 01/29/2024 0      Neutrophils Relative 2024 89 (H)     Immat GRANS % 2024 1     Lymphocytes Relative 2024 6 (L)     Monocytes Relative 2024 4     Eosinophils Relative 2024 0     Basophils Relative 2024 0     Neutrophils Absolute 2024 13.36 (H)     Immature Grans Absolute 2024 0.12     Lymphocytes Absolute 2024 0.90     Monocytes Absolute 2024 0.67     Eosinophils Absolute 2024 0.00     Basophils Absolute 2024 0.02         IMAGING RESULTS     CT abdomen pelvis with contrast    Result Date: 2024  Narrative: CT ABDOMEN AND PELVIS WITH IV CONTRAST INDICATION: Abdominal pain, acute, nonlocalized Abdominal pain s/p , elevated LFTs. COMPARISON: 2020. TECHNIQUE: CT examination of the abdomen and pelvis was performed. Multiplanar 2D reformatted images were created from the source data. This examination, like all CT scans performed in the [facility], was performed utilizing techniques to minimize radiation dose exposure, including the use of iterative reconstruction and automated exposure control. Radiation dose length product (DLP) for this visit: 854.94 mGy-cm IV Contrast: 100 mL of iohexol (OMNIPAQUE) Enteric Contrast: Not administered. FINDINGS: ABDOMEN LOWER CHEST: No clinically significant abnormality in the visualized lower chest. LIVER/BILIARY TREE: Unremarkable. GALLBLADDER: No calcified gallstones. No pericholecystic inflammatory change. SPLEEN: Unremarkable. PANCREAS: Unremarkable. ADRENAL GLANDS: Unremarkable. KIDNEYS/URETERS: Tiny wedge-shaped hypoenhancing areas are seen in the bilateral kidneys, right greater than left. No hydronephrosis. STOMACH AND BOWEL: Unremarkable. APPENDIX: No findings to suggest appendicitis. ABDOMINOPELVIC CAVITY: No ascites. No pneumoperitoneum. No lymphadenopathy. VESSELS: Unremarkable for patient's age. PELVIS REPRODUCTIVE ORGANS: Heterogeneous attenuation of the uterus with some fluid seen  within the endometrial cavity. There is some stranding seen along the anterior margin of the uterus. Overall findings are within normal limits given patient's history of recent . URINARY BLADDER: Limited by underdistention. There is wall thickening and perivesicular stranding noted although somewhat difficult to assess in the setting of adjacent postsurgical change. ABDOMINAL WALL/INGUINAL REGIONS: Subcutaneous edematous and inflammatory changes seen along the lower abdominal pelvic wall consistent with patient history of recent . There is a focal enhancing fluid collection seen at the right lower abdominal  pelvic wall deep to the incision site measuring approximately 1.9 x 4.2 x 2.3 cm (AP by transverse by CC) suspicious for developing abscess (601:25, 2:125). BONES: No acute fracture or suspicious osseous lesion.     Impression: Postsurgical changes of recent  noted with hyperemia along the lower abdominopelvic wall. 4.2 cm enhancing fluid collection at the right lower abdominal pelvic wall deep to the surgical incision site concerning for developing abscess. GYN/surgical consult advised. Findings above suspicious for pyelonephritis, right worse than left and concomitant cystitis although evaluation of the latter somewhat limited secondary to underdistention and adjacent postsurgical change. No evidence of intrarenal abscess. Clinical correlation recommended. Otherwise mild edematous and inflammatory stranding in the pelvis overall within normal limits for expected postoperative change. No discrete abdominal pelvic collection is seen. Above findings discussed with Dr. Batista at 4:25 a.m. on 2024. Workstation performed: [id number]     I have personally reviewed any available and pertinent imaging study reports.      Emilie Soyeon Kim, MD  Internal Medicine Residency, PGY-3  [facility]  Available on [employer]    ** Please Note: This note is constructed  using a voice recognition dictation system. **

## 2024-01-29 NOTE — PROGRESS NOTES
"[facility]  Progress Note  Name: Grismerlin Fernandez I  MRN: [id number]  Unit/Bed#: E2 -01 I Date of Admission: 2024   Date of Service: 2024 I Hospital Day: 0    Assessment/Plan   * SIRS (systemic inflammatory response syndrome) (HCC)  Assessment & Plan  Patient is a 23-year-old female with past medical history significant for  on 1/15 who presented to the ED with complaints of right flank pain, suprapubic pain, and abdominal pain x 3 days    Pt fulfilling SIRS with tachycardia with  and leukocytosis of 15: presentation concerning for sepsis, POA  CT scan with \"4.2 cm enhancing fluid collection at the right lower abdominal pelvic wall deep to the surgical incision site concerning for developing abscess\"  OB/GYN team evaluation appreciated: 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  CT scan also with \"suspicious for pyelonephritis, right worse than left and concomitant cystitis\", however associated urinalysis with negative nitrates, small leukocyte esterase and 30-50 white blood cells:  pt denies any urinary symptoms  Patient was admitted to the hospital to undergo workup for possible sepsis  Blood cultures: Pending x 2  Urine culture: Pending  Procal 54, normal lactate  Continue antibiotics: Will change to Zosyn for treatment of possible pelvic abscess post C section    Pelvic abscess  Assessment & Plan  Patient reporting suprapubic pain and nausea x 3 days in combination with her right flank pain  CT a/p demonstrating \"Postsurgical changes of recent  noted with hyperemia along the lower abdominopelvic wall. 4.2 cm enhancing fluid collection at the right lower abdominal pelvic wall deep to the surgical incision site concerning for developing abscess. GYN/surgical consult advised.\"  OB/GYN team following  Cont abx with zosyn    Transaminitis  Assessment & Plan  CMP with new transaminitis on admission: , , alkaline " "phosphatase 105.  Normal bilirubin  She reported concern that her nausea is from taking \"too many Tylenol\". However, pt reports taking only about 8 tylenol over the 3 days PTA to help control her right flank and pelvic pain, only taking 2 at time.   Tylenol level 8  Suspect this is less likely the cause of her transaminits   Pt initiated on NAC in the ED, continue for now  RUQ US pending   INR 1.58  Continue to trend CMP, INR, and lactic acid   Seen in consult by OB/GYN who reports low suspicion for preeclampsia at this time  Med tox and GI consults pending   Repeat LFTs/tylenol level pending this am    Tylenol ingestion  Assessment & Plan  Patient relates she took Tylenol Saturday, : She took between 7-8 tablets of 500 mg of Tylenol in a 24-hour period.  Denies any other Tylenol use  Patient states she was taking this pain for abdominal cramping  Acetaminophen level on admission 8:    Pt was started on NAC in ER  Pt notes her nausea resolved  Reviewed with Toxicology team: will cont NAC infusion until LFTs trend down x 2   Check CK  Repeat LFT and tylenol level pending this am    Pyelonephritis  Assessment & Plan  CT a/p demonstrating \"Findings above suspicious for pyelonephritis, right worse than left and concomitant cystitis although evaluation of the latter somewhat limited secondary to underdistention and adjacent postsurgical change. No evidence of intrarenal abscess. Clinical correlation recommended.\"  UA with negative nitrates, small leukocyte esterase, 30-50 white blood cells:  Urine culture pending  Prescribed empiric antibiotics for possible pelvic abscess    Status post repeat low transverse  section  Assessment & Plan  Cont post op care  Pt breastfeeding:  breast pump ordered:  d/w pt to pump and discard milk                 Family:  called  Umesh Brewer: wrong number:  confirmed new number w pt at bedside.  She request I do not call now because he is trying to sleep after caring for " " and 3 yo at home.    VTE Pharmacologic Prophylaxis: Enoxaparin (Lovenox)  VTE Mechanical Prophylaxis: sequential compression device    Dw pts nurse  Dw Toxicology team: Dr. Bingham:  cont NAC until LFT improve x 2.  Check CK.  Repeat LFT and tylenol level pending:  eval for other etilogy of LFTs  Dictated consult to infectious disease team    Certification Statement: The patient will continue to require additional inpatient hospital stay due to need for further acute intervention for sepsis    Status: inpatient     ===================================================================    Subjective:  Patient relates that she was having pain, which she described as a \"cramping\" throughout her entire anterior abdomen.  Most prominently in the left lower quadrant.  She notes she took Tylenol for this pain    Patient estimates that on Saturday, , she took either 7 or 8 Tylenol tablets, 500 mg acetaminophen, in a 24-hour period.  Did not take any other Tylenol doses this week.    Patient came to the ER for nausea.  She states however her nausea has resolved she also notes her abdominal cramping and discomfort has resolved.  She denies any current nausea or vomiting.  Is tolerating p.o. now.  She denies any diarrhea or constipation.  Note she has been moving her bowels.    Patient denies any difficulty passing her urine.  Denies any pain, burning.  She notes during her pregnancy she noted a foul smell to her urine however that has since resolved.    She denies any shortness of breath or cough.  Denies any other complaints.    Patient notes she is breast-feeding.  She confirms she would like to be discharged soon as possible she has an infant and a 3-year-old at home, and her  is currently caring for them.  She confirms that they have formula at home to feed the baby.  She confirms that she will be amenable to breast pumping here in the hospital, and then discarding that breastmilk.      Physical Exam:   Temp:  " [96.8 °F (36 °C)-98.5 °F (36.9 °C)] 96.8 °F (36 °C)  HR:  [] 75  Resp:  [14-20] 14  BP: (139-147)/(65-92) 147/74    Gen:  Pleasant, non-tachypnic, non-dyspnic.  Conversant.  Heart: regular rate and rhythm, S1S2 present, no murmur, rub or gallop  Lungs: clear to ausculatation bilaterally.  No wheezing, crackles, or rhonchi. No accessory muscle use or respiratory distress.  Good air movement  Abd: soft, non-tender with palpation, non-distended. NABS, no guarding, rebound or peritoneal signs.  Extremities: no clubbing, cyanosis or edema.  2+pedal pulses bilaterally. Full range of motion  Neuro: awake, alert and oriented.  Fluent speech.  Interactive.  Moving all 4 extremities symmetrically  Skin: warm and dry: no petechiae, purpura and rash.    LABS:   Results from last 7 days   Lab Units 24  0904 24  0025   WBC Thousand/uL 15.07* 15.06*   HEMOGLOBIN g/dL 10.6* 12.1   HEMATOCRIT % 32.8* 38.0   PLATELETS Thousands/uL 352 446*     Results from last 7 days   Lab Units 24  0904 24  0025   POTASSIUM mmol/L 3.5 3.6   CHLORIDE mmol/L 107 108   CO2 mmol/L 22 22   BUN mg/dL 8 12   CREATININE mg/dL 0.52* 0.66   CALCIUM mg/dL 8.2* 8.7       Hospital Data:   CT abdomen/pelvis:  Postsurgical changes of recent  noted with hyperemia along the lower abdominopelvic wall.  4.2 cm enhancing fluid collection at the right lower abdominal pelvic wall deep to the surgical incision site concerning for developing abscess. GYN/surgical consult advised.  Findings above suspicious for pyelonephritis, right worse than left and concomitant cystitis although evaluation of the latter somewhat limited secondary to underdistention and adjacent postsurgical change.  No evidence of intrarenal abscess. Clinical correlation recommended.  otherwise mild edematous and inflammatory stranding in the pelvis overall within normal limits for expected postoperative change. No discrete abdominal pelvic collection is  seen.    Microbiology  1/29: Blood culture: Pending x 2  1/29: Urine culture: Pending          ---------------------------------------------------------------------------------------------------------------  This note has been constructed using a voice recognition system.

## 2024-01-29 NOTE — PHYSICAL THERAPY NOTE
PHYSICAL THERAPY EVALUATION    NAME:  Grismerlin Fernandez  DATE: 24    AGE:   23 y.o.  Mrn:   [id number]  ADMIT DX:  Abdominal pain [R10.9]  Pyelonephritis [N12]  Nausea and vomiting [R11.2]  Elevated LFTs [R79.89]  Post-operative wound abscess [T81.49XA]  Toxic effect of acetaminophen, accidental or unintentional, initial encounter [T39.1X1A]    Patient Active Problem List   Diagnosis    Hx of  section    Obesity affecting pregnancy in second trimester    Status post repeat low transverse  section    History of intrauterine growth restriction in prior pregnancy, currently pregnant, first trimester    H/O abdominoplasty    Patient noncompliance    COVID-19 affecting pregnancy in third trimester    SIRS (systemic inflammatory response syndrome) (HCC)    Transaminitis    Pelvic abscess    Pyelonephritis    Tylenol ingestion       Past Medical History:   Diagnosis Date    Depression        Past Surgical History:   Procedure Laterality Date    ABDOMINOPLASTY      SD  DELIVERY ONLY N/A 2020    Procedure:  SECTION ();  Surgeon: Adair Clifton MD;  Location: [facility] ;  Service: Obstetrics    SD  DELIVERY ONLY N/A 1/15/2024    Procedure:  SECTION ();  Surgeon: Yardlie Toussaint-Foster, DO;  Location: [facility] ;  Service: Obstetrics       Imaging Studies:  CT abdomen pelvis with contrast   Final Result by Tonio Gallardo MD ( [id number])      Postsurgical changes of recent  noted with hyperemia along the lower abdominopelvic wall.   4.2 cm enhancing fluid collection at the right lower abdominal pelvic wall deep to the surgical incision site concerning for developing abscess. GYN/surgical consult advised.      Findings above suspicious for pyelonephritis, right worse than left and concomitant cystitis although evaluation of the latter somewhat limited secondary to underdistention and adjacent postsurgical change.   No evidence of intrarenal  abscess. Clinical correlation recommended.      Otherwise mild edematous and inflammatory stranding in the pelvis overall within normal limits for expected postoperative change. No discrete abdominal pelvic collection is seen.      Above findings discussed with Dr. Batista at 4:25 a.m. on 2024.      Workstation performed: [id number]         US right upper quadrant with liver dopplers    (Results Pending)       Past Medical History:   Diagnosis Date    Depression      Length Of Stay: 0  Performed at least 2 patient identifiers during session: Name and Birthday  PHYSICAL THERAPY EVALUATION :        24 1202   PT Last Visit   PT Visit Date 24   Note Type   Note type Evaluation   Pain Assessment   Pain Assessment Tool FLACC   Pain Score No Pain   Pain Rating: FLACC (Rest) - Face 0   Pain Rating: FLACC (Rest) - Legs 0   Pain Rating: FLACC (Rest) - Activity 0   Pain Rating: FLACC (Rest) - Cry 0   Pain Rating: FLACC (Rest) - Consolability 0   Score: FLACC (Rest) 0   Pain Rating: FLACC (Activity) - Face 0   Pain Rating: FLACC (Activity) - Legs 0   Pain Rating: FLACC (Activity) - Activity 0   Pain Rating: FLACC (Activity) - Cry 0   Pain Rating: FLACC (Activity) - Consolability 0   Score: FLACC (Activity) 0   Restrictions/Precautions   Weight Bearing Precautions Per Order No   Other Precautions Multiple lines   Home Living   Type of Home Apartment   Home Layout Stairs to enter without rails;One level  (2 YENI)   Bathroom Shower/Tub Tub/shower unit   Bathroom Toilet Standard   Bathroom Equipment Other (Comment)  (denies)   Home Equipment   (denies)   Additional Comments Denies DME   Prior Function   Level of [place] Independent with ADLs;Independent with functional mobility;Independent with IADLS   Lives With Spouse;Son  (2 sons (one ))   Falls in the last 6 months 0   Vocational Other (Comment)  (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  (-) falls, (+) drive   General   Additional Pertinent History status post  on 1/15/2024   Family/Caregiver Present No   Cognition   Overall Cognitive Status WFL   Attention Within functional limits   Orientation Level Oriented X4   Memory Within functional limits   Following Commands Follows all commands and directions without difficulty   Comments Pleasant and cooperative   Subjective   Subjective Amenable to PT/OT evaluation.   RUE Assessment   RUE Assessment   (Refer to OT)   LUE Assessment   LUE Assessment   (Refer to OT)   RLE Assessment   RLE Assessment WFL  (4+/5 for major muscle groups, hip flx at least 3/5 formal testing deferred 2/2 recent .)   LLE Assessment   LLE Assessment WFL  (4+/5 for major muscle groups, hip flx at least 3/5 formal testing deferred 2/2 recent .)   Vision-Basic Assessment   Current Vision Other (Comment)  (glasses, not preesnt upon evaluation)   Coordination   Sensation WFL   Light Touch   RLE Light Touch Grossly intact   LLE Light Touch Grossly intact   Bed Mobility   Additional Comments Greeted in bedside chair upon arrival. Returned to chair at end of session.   Transfers   Sit to Stand 7  Independent   Stand to Sit 7  Independent   Additional Comments /80   Ambulation/Elevation   Gait pattern WNL;Step through pattern  (increased medial/lateral sway)   Gait Assistance 7  Independent   Assistive Device None   Distance 180'x1 without AD, indep   Stair Management Assistance 7  Independent   Stair Management Technique No rails;Foreward;Reciprocal   Number of Stairs 5  ( steps)   Ambulation/Elevation Additional Comments steady, no gross LOB.   Balance   Static Sitting Normal   Dynamic Sitting Normal   Static Standing Normal   Dynamic Standing Fair +   Ambulatory Fair +   Endurance Deficit   Endurance Deficit No   Activity Tolerance   Activity Tolerance Patient tolerated treatment well   Medical Staff Made Aware RN, OT, CM   Nurse Made Aware RN  cleared/updated   Assessment   Prognosis Excellent   Problem List Decreased strength;Impaired balance   Assessment Pt is a 23 y.o. female admitted to [facility] on 2024 for SIRS (systemic inflammatory response syndrome). Pt initially presenting to ED status post  on 1/15/2024 who presents with abdominal pain and nausea. Hospital stay with SIRS, pyelonephritis, transminitis, and pelvic abscess. Significant pmhx: obesity, recent COVID-19, depression. PT was consulted and pt was seen on 2024 for mobility assessment and d/c planning. Activity orders for up and OOB as tolerated with active PT orders. PTA, pt reports indep with amb without AD/ADLs/IADLs. Pt presents with deficits in BLE strength and balance, however, appears to be functioning near baseline. Deficits may be present 2/2 recent  impacting core stability. Pt is currently functioning at indep level for functional transfers, amb without AD, and stair negotiation. Pt demonstrated steady, step through gait patterning for unlimited household distances and is currently at low risk for falls. Fall risk and education on benefits of cont mobility provided with good understanding. VSS without complaints during session. Please refer to flowsheet for additional objective findings. No acute skilled PT needs at this time, PT eval only. Recommend daily amb with restorative and non-PT staff daily to prevent further functional decline while in hospital. No DME needs at this time.  Pt with no c/f regarding d/c to home. Cleared for safe mobility and d/c to home when medically ready with no post-acute rehab needs.   Barriers to Discharge None   Goals   Patient Goals Go home   Plan   PT Frequency   (eval only. D/C PT)   Discharge Recommendation   Rehab Resource Intensity Level, PT No post-acute rehabilitation needs   Additional Comments The patient's AM-PAC Basic Mobility Inpatient Short Form Raw Score is 24. A Raw score of greater than 16 suggests the  patient may benefit from discharge to home. Please also refer to the recommendation of the Physical Therapist for safe discharge planning.   Additional Comments 2 Will place on restorative for daily amb to prevent functional decline while in hospital.   AM-PAC Basic Mobility Inpatient   Turning in Flat Bed Without Bedrails 4   Lying on Back to Sitting on Edge of Flat Bed Without Bedrails 4   Moving Bed to Chair 4   Standing Up From Chair Using Arms 4   Walk in Room 4   Climb 3-5 Stairs With Railing 4   Basic Mobility Inpatient Raw Score 24   Basic Mobility Standardized Score 57.68   Highest Level Of Mobility   JH-HLM Goal 8: Walk 250 feet or more   JH-HLM Achieved 7: Walk 25 feet or more   End of Consult   Patient Position at End of Consult Bedside chair;All needs within reach;Other (comment)  (Pt stable. RN cleared/updated.)     Co-eval with skilled OT 2* potential dec activity tolerance.     (Please find full objective findings from PT assessment regarding body systems outlined above).     Hx/personal factors: co-morbidities, inaccessible home, and obesity, recent ,  Examination: assessed body system, balance, endurance, amb, D/C disposition & fall risk, impairments in systems including multiple body structures involved; musculoskeletal (strength, BMI), neuromuscular (balance, gait), cardiopulmonary (vitals,), cognition; activity limitations (difficulties executing an action); participation restrictions (problems associated w involvement in life situations)  Clinical: unpredictable (ongoing medical status and abnormal lab values)  Complexity: low     Claude Capellan, PT,DPT   3:13 PM  24

## 2024-01-29 NOTE — CONSULTS
Consultation - Obstetrics & Gynecology  Grismerlin Fernandez 23 y.o. female MRN: [id number]  Unit/Bed#: E2 -01 Encounter: [id number]      Assessment/Plan     Status post repeat low transverse  section  Assessment & Plan  S/p RLTCS 1/15/24    CTAP : 4.2 cm enhancing fluid collection at the right lower abdominal pelvic wall deep to the surgical incision site concerning for developing abscess.     Incision c/d/I  small well-circumcised mobile mass palpable deep to incision on right side     No surgical intervention necessary at this time. Recommend antibiotics, patient currently ordered for ceftriaxone  Consider course of keflex on discharge         Patient with transaminitis: LFTs 330/380. Low suspicion for preeclampsia at this time. Patient has no history of hypertensive disorder of pregnancy and no other lab abnormalities. PC ratio is 0.17.     Patient seen and evaluated with Dr. Louise.    Inpatient consult to Obstetrics / Gynecology  Consult performed by: Alex Carvajal MD  Consult ordered by: Ga Batista MD        History of Present Illness   Physician Requesting Consult: Reyes Thornton MD  Reason for Consult / Principal Problem: [facility]    HPI: Grismerlin Fernandez is a 23 y.o.  now POD#14 from [facility] who presented with nausea after taking a large amount of tylenol yesterday for abdominal pain. She cannot recall exactly how much she took, but right now she is not having any pain just nausea. CT findings concerning for small fluid collection deep to the incision that is suspicious for a developing abscess.     Discussed with her that she should alternate her pain medications starting with adding ibuprofen to avoid taking too much tylenol.     Review of Systems   Constitutional:  Negative for chills and fever.   Eyes:  Negative for visual disturbance.   Respiratory:  Negative for shortness of breath.    Cardiovascular:  Negative for chest pain.  "  Gastrointestinal:  Positive for nausea. Negative for abdominal pain, diarrhea and vomiting.   Genitourinary:  Negative for dysuria, flank pain, hematuria, vaginal bleeding and vaginal discharge.   Skin:  Negative for rash.   Neurological:  Negative for dizziness, numbness and headaches.   All other systems reviewed and are negative.      Historical Information   Past Medical History:   Diagnosis Date    Depression      Past Surgical History:   Procedure Laterality Date    ABDOMINOPLASTY      [place]  DELIVERY ONLY N/A 2020    Procedure:  SECTION ();  Surgeon: Adair Clifton MD;  Location: [facility];  Service: Obstetrics    CO  DELIVERY ONLY N/A 1/15/2024    Procedure:  SECTION ();  Surgeon: Yardlie Toussaint-Foster, DO;  Location: [facility];  Service: Obstetrics     OB History    Para Term  AB Living   2 2 2 0 0 2   SAB IAB Ectopic Multiple Live Births   0 0 0 0 2      # Outcome Date GA Lbr Silas/2nd Weight Sex Delivery Anes PTL Lv   2 Term 01/15/24 39w4d  3300 g (7 lb 4.4 oz) M    ENRICO   1 Term 20 39w5d  2470 g (5 lb 7.1 oz) M CS-LTranv EPI N ENRICO      Complications: Fetal Intolerance     Family History   Problem Relation Age of Onset    No Known Problems Mother     No Known Problems Father     No Known Problems Sister     No Known Problems Brother     Cancer Neg Hx     Diabetes Neg Hx      Social History   Social History     Substance and Sexual Activity   Alcohol Use Not Currently    Comment: \"sometimes\"     Social History     Substance and Sexual Activity   Drug Use Never     Social History     Tobacco Use   Smoking Status Former    Types: Pipe   Smokeless Tobacco Never       Meds/Allergies   Current Facility-Administered Medications   Medication Dose Route Frequency    acetylcysteine (ACETADOTE) 10,000 mg in dextrose 5 % 1,000 mL IVPB  100 mg/kg Intravenous Once    acetylcysteine (ACETADOTE) 5,000 mg in dextrose 5 % 500 mL IVPB  50 mg/kg " Intravenous Once       No Known Allergies    Objective   Vitals: Blood pressure 139/65, pulse 78, temperature 98.5 °F (36.9 °C), temperature source Oral, resp. rate 20, weight 96 kg (211 lb 10.3 oz), last menstrual period 04/19/2023, SpO2 98%, currently breastfeeding. Body mass index is 38.71 kg/m².      Intake/Output Summary (Last 24 hours) at 1/29/2024 0600  Last data filed at 1/29/2024 0538  Gross per 24 hour   Intake 2300 ml   Output --   Net 2300 ml       Invasive Devices       Peripheral Intravenous Line  Duration             Peripheral IV 01/29/24 Left Antecubital <1 day    Peripheral IV 01/29/24 Right;Ventral (anterior) Forearm <1 day                    Physical Exam  Constitutional:       General: She is not in acute distress.     Appearance: Normal appearance.   HENT:      Head: Normocephalic and atraumatic.   Eyes:      Extraocular Movements: Extraocular movements intact.   Cardiovascular:      Rate and Rhythm: Normal rate.      Pulses: Normal pulses.   Pulmonary:      Effort: Pulmonary effort is normal. No respiratory distress.   Abdominal:      General: There is no distension.      Palpations: Abdomen is soft.      Tenderness: There is no abdominal tenderness. There is no guarding.      Comments: Incision c/d/I, small well-circumcised mobile mass palpable deep to incision on right side   Musculoskeletal:         General: Normal range of motion.      Cervical back: Normal range of motion.   Neurological:      General: No focal deficit present.      Mental Status: She is alert. Mental status is at baseline.   Skin:     General: Skin is warm and dry.   Psychiatric:         Mood and Affect: Mood normal.         Behavior: Behavior normal.   Vitals reviewed. Exam conducted with a chaperone present.          Lab Results:   Recent Results (from the past 24 hour(s))   CBC and differential    Collection Time: 01/29/24 12:25 AM   Result Value Ref Range    WBC 15.06 (H) 4.31 - 10.16 Thousand/uL    RBC 4.65 3.81 -  "5.12 Million/uL    Hemoglobin 12.1 11.5 - 15.4 g/dL    Hematocrit 38.0 34.8 - 46.1 %    MCV 82 82 - 98 fL    MCH 26.0 (L) 26.8 - 34.3 pg    MCHC 31.8 31.4 - 37.4 g/dL    RDW 14.8 11.6 - 15.1 %    MPV 9.0 8.9 - 12.7 fL    Platelets 446 (H) 149 - 390 Thousands/uL    nRBC 0 /100 WBCs    Neutrophils Relative 81 (H) 43 - 75 %    Immat GRANS % 1 0 - 2 %    Lymphocytes Relative 10 (L) 14 - 44 %    Monocytes Relative 6 4 - 12 %    Eosinophils Relative 1 0 - 6 %    Basophils Relative 1 0 - 1 %    Neutrophils Absolute 12.30 (H) 1.85 - 7.62 Thousands/µL    Immature Grans Absolute 0.13 0.00 - 0.20 Thousand/uL    Lymphocytes Absolute 1.46 0.60 - 4.47 Thousands/µL    Monocytes Absolute 0.92 0.17 - 1.22 Thousand/µL    Eosinophils Absolute 0.17 0.00 - 0.61 Thousand/µL    Basophils Absolute 0.08 0.00 - 0.10 Thousands/µL   Comprehensive metabolic panel    Collection Time: 01/29/24 12:25 AM   Result Value Ref Range    Sodium 136 135 - 147 mmol/L    Potassium 3.6 3.5 - 5.3 mmol/L    Chloride 108 96 - 108 mmol/L    CO2 22 21 - 32 mmol/L    ANION GAP 6 mmol/L    BUN 12 5 - 25 mg/dL    Creatinine 0.66 0.60 - 1.30 mg/dL    Glucose 110 65 - 140 mg/dL    Calcium 8.7 8.4 - 10.2 mg/dL     (H) 13 - 39 U/L     (H) 7 - 52 U/L    Alkaline Phosphatase 105 (H) 34 - 104 U/L    Total Protein 7.5 6.4 - 8.4 g/dL    Albumin 3.8 3.5 - 5.0 g/dL    Total Bilirubin 0.52 0.20 - 1.00 mg/dL    eGFR 125 ml/min/1.73sq m   Protime-INR    Collection Time: 01/29/24 12:25 AM   Result Value Ref Range    Protime 19.1 (H) 11.6 - 14.5 seconds    INR 1.58 (H) 0.84 - 1.19   APTT    Collection Time: 01/29/24 12:25 AM   Result Value Ref Range    PTT 32 23 - 37 seconds   Lipase    Collection Time: 01/29/24 12:25 AM   Result Value Ref Range    Lipase 23 11 - 82 u/L   Acetaminophen level-\"If concentration is detectable, please discuss with medical  on call.\"    Collection Time: 01/29/24 12:25 AM   Result Value Ref Range    Acetaminophen Level 8 (L) 10 " - 20 ug/mL   Urine Macroscopic, POC    Collection Time: 24  2:09 AM   Result Value Ref Range    Color, UA Yellow     Clarity, UA Clear     pH, UA 6.0 4.5 - 8.0    Leukocytes, UA Small (A) Negative    Nitrite, UA Negative Negative    Protein, UA 30 (1+) (A) Negative mg/dl    Glucose, UA Negative Negative mg/dl    Ketones, UA Negative Negative mg/dl    Urobilinogen, UA >=8.0 (A) 0.2, 1.0 E.U./dl E.U./dl    Bilirubin, UA Negative Negative    Occult Blood, UA Large (A) Negative    Specific Gravity, UA 1.020 1.003 - 1.030   Urine Microscopic    Collection Time: 24  2:09 AM   Result Value Ref Range    RBC, UA Innumerable (A) None Seen, 1-2 /hpf    WBC, UA 30-50 (A) None Seen, 1-2 /hpf    Epithelial Cells Occasional None Seen, Occasional /hpf    Bacteria, UA None Seen None Seen, Occasional /hpf    MUCUS THREADS Moderate (A) None Seen   Protein / creatinine ratio, urine    Collection Time: 24  3:23 AM   Result Value Ref Range    Creatinine, Ur 154.3 Reference range not established. mg/dL    Protein Urine Random 27 Reference range not established. mg/dL    Prot/Creat Ratio, Ur 0.17 (H) 0.00 - 0.10   ECG 12 lead    Collection Time: 24  3:54 AM   Result Value Ref Range    Ventricular Rate 73 BPM    Atrial Rate 73 BPM    MD Interval 118 ms    QRSD Interval 82 ms    QT Interval 386 ms    QTC Interval 425 ms    P [place] 57 degrees    QRS Axis 85 degrees    T Wave Axis 18 degrees       Imaging:      CTAP 24:  Postsurgical changes of recent  noted with hyperemia along the lower abdominopelvic wall.  4.2 cm enhancing fluid collection at the right lower abdominal pelvic wall deep to the surgical incision site concerning for developing abscess. GYN/surgical consult advised.     Findings above suspicious for pyelonephritis, right worse than left and concomitant cystitis although evaluation of the latter somewhat limited secondary to underdistention and adjacent postsurgical change.  No evidence of  intrarenal abscess. Clinical correlation recommended.     Otherwise mild edematous and inflammatory stranding in the pelvis overall within normal limits for expected postoperative change. No discrete abdominal pelvic collection is seen.      Alex Carvajal MD  PGY-2, OB/GYN  1/29/2024, 6:00 AM

## 2024-01-29 NOTE — ED NOTES
RN was in room when patient vomit and accidentally urinated the bed. Patient was given underwear, paper scrubs, and new bedding linens.      Jeannine Olivas RN  01/29/24 0352

## 2024-01-29 NOTE — ED NOTES
Name:  Max Junior  Room:  5019/9737-62  MRN:    [id number]    Discharge Summary      This discharge summary is in conjunction with a complete physical exam done on the day of discharge. Discharging Physician: Armand Callahan MD      Admit: 8/6/2023  Discharge:  8/11/2023     Diagnoses this Admission    Principal Problem:    Abdominal pain  Active Problems:    Nausea and vomiting    Hyperlactatemia    Primary hypertension    Cannabis use disorder    Esophagitis  Resolved Problems:    * No resolved hospital problems. *          Procedures (Please Review Full Report for Details)      Consults    IP CONSULT TO GI      HPI:  Patient is a 72-year-old male with a PMHx of HTN, T2DM, fibromyalgia, IBS, DJD and PAF who presented to the ED with worsening nausea, NBNB emesis and epigastric abdominal pain. Endorsed that his pain has been chronic and worsened over past few days. Otherwise, unable to provide specific details. No fevers, chills, SOB, CP, cough, change in chronic diarrhea, or any       Hospital Course    # Abdominal pain with intractable nausea and vomiting  #Esophagitis and duodenitis   #Possible colitis  #Hyperemesis cannabis use disorder? - Endorsed worsening nausea, NBNB emesis and epigastric abdominal pain. No sick contacts or travel. Has chronic diarrhea from IBS.     - IVF   -IV PPI   -bentyl prn   -prn zofran   -morphine prn --> percocet  -advance diet as tolerated   -stool studies pending   -on IV cipro and flagyl for possible colitis   -GI consulted, EGD yesterday with severe esophagitis and duodenitis   -started on carafate      #Constipation   -KUB neg for obstruction or constipation   -bowel regimen   Had BM     #HAGMA   # Lactic acidosis  #Sepsis criteria -  WBC, LA, +possible source (likely reactive from dehydration)  -could be reactive   Received 4L IVF with LA elevation --> 2.0 now   -AG closed and bicarb WNLs   -blood cultures pending --> NGTD   -stool studies pending  -IV RN entered room to find patient falling asleep, vomit on the floor, and urine saturation of the bed and floor. Patient was holding her 2 week old baby with bottle in mouth. RN and another RN took the baby, changed him and placed back in carseat with straps. Patient bed and self changed. Patient reminded on safety of herself and baby. Continued calling family to get someone to come for the baby. Patient was able to get ahold of her sister. Awaiting sisters arrival.     Jeannine Olivas RN  01/29/24 3399     NORVASC  Take 1 tablet by mouth daily     Eliquis 5 MG Tabs tablet  Generic drug: apixaban  TAKE 1 TABLET BY MOUTH TWICE DAILY     ondansetron 4 MG disintegrating tablet  Commonly known as: ZOFRAN-ODT  Take 1 tablet by mouth 3 times daily as needed for Nausea or Vomiting            STOP taking these medications      famotidine 20 MG tablet  Commonly known as: PEPCID     metFORMIN 500 MG tablet  Commonly known as: GLUCOPHAGE               Where to Get Your Medications        These medications were sent to [address] [phone] - F [phone]  [address] [place] [id number]      Phone: [phone]   ciprofloxacin 500 MG tablet  metroNIDAZOLE 500 MG tablet  pantoprazole 40 MG tablet  sucralfate 1 GM tablet           Discharge Condition/Location: Stable    Follow Up: Follow up with PCP.         Price Quigley MD 8/11/2023 12:19 PM

## 2024-01-29 NOTE — ED NOTES
Patient instructed that she will need to call for someone to stay to take care of the baby or to take the baby home. Patient states she tried to call and could not get a hold of anyone. Patient instructed to continue to try calling.     RN helped transfer baby to mom at this time, so she could feed the baby.     Jeannine Olivas RN  01/29/24 [id number]

## 2024-01-29 NOTE — ASSESSMENT & PLAN NOTE
"Patient reporting suprapubic pain and nausea x 3 days in combination with her right flank pain  CT a/p demonstrating \"Postsurgical changes of recent  noted with hyperemia along the lower abdominopelvic wall. 4.2 cm enhancing fluid collection at the right lower abdominal pelvic wall deep to the surgical incision site concerning for developing abscess. GYN/surgical consult advised.\"  OB/GYN consult pending, recommendations appreciated  Continue patient on ceftriaxone initiated in the ED, and then transition to oral keflex   "

## 2024-01-29 NOTE — ASSESSMENT & PLAN NOTE
Patient relates she took Tylenol Saturday, 1/27: She took between 7-8 tablets of 500 mg of Tylenol in a 24-hour period.  Denies any other Tylenol use  Patient states she was taking this pain for abdominal cramping  Acetaminophen level on admission 8:    Pt was started on NAC in ER  Pt notes her nausea resolved  Reviewed with Toxicology team: will cont NAC infusion until LFTs trend down x 2   Check CK  Repeat LFT and tylenol level pending this am

## 2024-01-29 NOTE — CONSULTS
Consultation - Infectious Disease   Grismerlin Bertin 23 y.o. female MRN: [id number]  Unit/Bed#: E2 -01 Encounter: [id number]    IMPRESSION & RECOMMENDATIONS:   1. SIRS. Present on admission. Tachycardia and leukocytosis. In patient with recent c section. Upon review of her available past medical records it appears she has a chronic leukocytosis ranging from 11.17 - 18.59 since 2019. Consider abdominal pelvic source as patient has small fluid collection noted deep to her  site although OBGYN have low suspicion for abscess. Renal hypodensities also noted on imaging but in setting of recent pregnancy, inconclusive UA, and lack of  symptoms, low suspicion for UTI. Blood cultures are pending. The patient has been started on IV ceftriaxone which she's tolerated without difficulty. Given lack of clear infectious source I recommend stopping antibiotic for now. If patient develops new pain or fever would recommend asking IR to evaluation fluid collection for possible drainage.  -stop IV ceftriaxone  -monitor patient off antibiotic  -monitor CBCD and BMP  -follow up blood cultures  -if patient develops new pelvic pain or fever, recommend IR assessment of fluid collection for possible aspiration/culture/drain placement  -monitor vitals  -supportive care    2. Abnormal CT A/P.I personally reviewed patient's new CT A/P which showed a mall fluid collection noted deep to her  site and B/L renal hypodensities without hydronephrosis. Unclear role of recent pregnancy in renal findings. OBGYN has assessed and have low suspicion for pelvic abscess. Unclear if these findings contributed to her SIRS presentation above. Recommend monitoring patient off antibiotics for now. If patient develops new pelvic pain or fever, recommend IR assessment of fluid collection for possible aspiration/culture/drain placement  -monitor patient off antibiotics  -serial abdominal exams  -monitor GI/ symptoms  -if patient  develops new pelvic pain or fever, recommend IR assessment of fluid collection for possible aspiration/culture/drain placement  -continue follow up with OBGYN    3. Elevated LFTs. Likely due to recent Tylenol intake. Unclear if this played a role in her nausea below. She has been started on NAC. Gastroenterology has assessed. Additional labs and RUQ US have been ordered. Patient without tenderness over the liver on exam.  -check LFTs tomorrow  -follow up GI work up  -follow up RUQ US  -continue follow up with GI    4. Nausea. Per patient she is concerned this is related to her recent Tylenol intact. Reports she was using the Tylenol for pain control post . I personally reviewed her CT A/P which showed unremarkable gallbladder, liver, spleen, pancrease, stomach, and bowel. Kidneys without hydronephrosis or perinephric stranding. Per patient her nausea has resolved today. GI is working up with additional lab work and RUQ US.  -serial GI exams  -monitor GI symptoms  -follow up GI work up above  -continue follow up with GI    5. S/P c section. Surgery occurring on 1/15/2024 resulting in viable male. Patient reports she would like to continue nursing. Has been pumping today and is currently dumping her breast milk due to receiving inpatient medications. She would like to continue nursing once she's home. OBGYN has assessed, no plans for further intervention at this time.  -will consider breastfeeding plan when prescribing medications  -continue follow up with OBGYN    We will follow along with the patient.  Above plan was discussed in detail with patient at the bedside.  Above plan was discussed in detail with SLIM     HISTORY OF PRESENT ILLNESS:  Reason for Consult: SIRS, pelvic abscess in female  HPI: Grismerlin Fernandez is a 23 y.o. year old female who presented to the [facility] ED on 2024 with nausea, abdominal pain, and R sided flank pain. She is s/p  on 1/15/2024. Admits to taking  Tylenol for her pain at home with no relief.    Upon arrival to the ED the patient's temperature was 98.5. HR was 102. RR was 18. O2 saturation was 98% on room air. BP was 146/92. WBC count was 15.06. Lactic acid was 1.1. Creatinine was 0.66 with GFR = 125. LFTs were elevated. UA with small leukocytes, protein, blood, innumerable RBC, and 30-50 WBC. The urine was sent for culture. Blood cultures were sent. The patient completed a CT A/P which showed post-op  changes with hyperemia along the lower abdominal pelvic wall and 4.2cm enhancing fluid collection in the R lower abdominal pelvic wall deep to the surgical site. The patient was given ceftriaxone. She was admitted for additional medical management.    After her admission she was continued on Ceftriaxone. OBGYN assessed and have low suspicion for abscess. Gastroenterology was consulted. Further lab work was recommend. RUQ US ordered. Toxicology was consulted given patient's recent tylenol use. We have been asked for formal consult for SIRS, pelvic abscess in female.    The patient has depression. She has a history of  and abdominoplasty. She is a former smoker. She has no known drug allergies.    REVIEW OF SYSTEMS:  Patient reports she's doing better this morning. Reports the nausea was what had her the most worried at home. Tells me she hasn't been nauseous since she was admitted. Reports some mild occasional pain along her c section scar but tells me she thinks it's healing alright, has not noticed any drainage. She has had no vomiting or diarrhea. Report no dysuria, hesitancy, frequency, or urgency. She denies flank pain. She denies fever, chills, sweats, shakes. She denies sore throat, runny nose, and sinus congestion. She denies chest pain, cough, SOB. She denies rashes or lesions to her skin. Reports she wants to be careful about what medications she takes as she'd like to continue nursing if possible, is going to dump her breast milk  "today. A complete 12 point system-based review of systems is otherwise negative.    PAST MEDICAL HISTORY:  Past Medical History:   Diagnosis Date    Depression      Past Surgical History:   Procedure Laterality Date    ABDOMINOPLASTY      [place]  DELIV[place] ONLY N/A 2020    Procedure:  SECTION ();  Surgeon: Adair Clifton MD;  Location: [facility];  Service: Obstetrics    [place]  DEL[place]ERY ONLY N/A 1/15/2024    Procedure:  SECTION ();  Surgeon: Yardlie Toussaint-Foster, DO;  Location: [facility];  Service: Obstetrics     FAMILY HISTORY:  Non-contributory    SOCIAL HISTORY:  Social History   Social History     Substance and Sexual Activity   Alcohol Use Not Currently    Comment: \"sometimes\"     Social History     Substance and Sexual Activity   Drug Use Never     Social History     Tobacco Use   Smoking Status Former    Types: Pipe   Smokeless Tobacco Never     ALLERGIES:  No Known Allergies    MEDICATIONS:  All current active medications have been reviewed.    ANTIBIOTICS:  Ceftriaxone - stop  Antibiotics 1    PHYSICAL EXAM:  Temp:  [96.8 °F (36 °C)-98.5 °F (36.9 °C)] 96.8 °F (36 °C)  HR:  [] 75  Resp:  [14-20] 14  BP: (139-147)/(65-92) 147/74  SpO2:  [98 %-99 %] 99 %  Temp (24hrs), Av.9 °F (36.6 °C), Min:96.8 °F (36 °C), Max:98.5 °F (36.9 °C)  Current: Temperature: (!) 96.8 °F (36 °C)    Intake/Output Summary (Last 24 hours) at 2024 1002  Last data filed at 2024 0954  Gross per 24 hour   Intake 2625 ml   Output --   Net 2625 ml     General Appearance:  Appearing tired but well, nontoxic, and in no distress. She appears comfortable semi-supine in bed, moved around easily during my exam.   Head:  Normocephalic, without obvious abnormality, atraumatic.   Eyes:  Conjunctiva pink and sclera anicteric, both eyes.   Nose: Nares normal, mucosa normal, no drainage.   Throat: Oropharynx moist without lesions.   Neck: Supple, symmetrical, no adenopathy, no " tenderness/mass/nodules.   Back:   Symmetric, ROM normal, no CVA tenderness.   Lungs:   Clear to auscultation bilaterally, respirations unlabored on room air.   Chest Wall:  No tenderness or deformity.   Heart:  RRR; no murmur, rub or gallop.   Abdomen:   Soft, non-distended, positive bowel sounds throughout. Lower abdominal horizontal  scar overlying old scarring is healing, some intact scabbing, no drainage, no spreading erythema, no tenderness reported during palpation.   Extremities: No cyanosis or clubbing, no edema.   Skin: No rashes noted on exposed skin.   Lymph nodes: Cervical, supraclavicular nodes normal.   Neurologic: Alert and oriented times 3, follows commands, speech is organized and appropriate, symmetric facial movement.     LABS, IMAGING, & OTHER STUDIES:  Lab Results:  I have personally reviewed pertinent labs.  Results from last 7 days   Lab Units 24  0904 24  0025   WBC Thousand/uL 15.07* 15.06*   HEMOGLOBIN g/dL 10.6* 12.1   PLATELETS Thousands/uL 352 446*     Results from last 7 days   Lab Units 24  0904 24  0025   POTASSIUM mmol/L 3.5 3.6   CHLORIDE mmol/L 107 108   CO2 mmol/L 22 22   BUN mg/dL 8 12   CREATININE mg/dL 0.52* 0.66   EGFR ml/min/1.73sq m 136 125   CALCIUM mg/dL 8.2* 8.7   AST U/L 785* 330*   ALT U/L 816* 384*   ALK PHOS U/L 85 105*     Imaging Studies:   I have personally reviewed pertinent imaging study reports and images in PACS.    CT ABDOMEN PELVIS WITH CONTRAST 2024 - I personally reviewed patient's study which showed no abnormalities in the lower chest. Liver, gallbladder, spleen, and pancreas are unremarkable. B/L kidneys with tiny regions of hypoenhancement, no hydronephrosis. Stomach and bowel are unremarkable without obstruction. Some fluid noted inside the endometrial cavity with heterogenous attenuation of the uterus. Bladder is under-distended with wall thickening. Abdominal wall with subcutaneous edema and inflammatory changes  with focal enhancing fluid collection at the R lower abdominal pelvic wall deep to the incision site measuring approximately 1.9 x 4.2 x 2.3 cm.    Other Studies:   Blood cultures are pending.  Urine culture is pending.

## 2024-01-29 NOTE — OCCUPATIONAL THERAPY NOTE
Occupational Therapy Evaluation     Patient Name: Grismerlin Fernandez  Today's Date: 2024  Problem List  Principal Problem:    SIRS (systemic inflammatory response syndrome) (HCC)  Active Problems:    Status post repeat low transverse  section    Transaminitis    Pelvic abscess    Pyelonephritis    Tylenol ingestion    Past Medical History  Past Medical History:   Diagnosis Date    Depression      Past Surgical History  Past Surgical History:   Procedure Laterality Date    ABDOMINOPLASTY      [place]  DEL[place] ONLY N/A 2020    Procedure:  SECTION ();  Surgeon: Adair Clifton MD;  Location: [facility];  Service: Obstetrics    [place] N/A 1/15/2024    Procedure:  SECTION ();  Surgeon: Yardlie Toussaint-Foster, DO;  Location: [facility];  Service: Obstetrics           24 1142   OT Last Visit   OT Visit Date 24   Note Type   Note type Evaluation   Pain Assessment   Pain Assessment Tool FLACC   Pain Score No Pain   Pain Rating: FLACC (Rest) - Face 0   Pain Rating: FLACC (Rest) - Legs 0   Pain Rating: FLACC (Rest) - Activity 0   Pain Rating: FLACC (Rest) - Cry 0   Pain Rating: FLACC (Rest) - Consolability 0   Score: FLACC (Rest) 0   Restrictions/Precautions   Weight Bearing Precautions Per Order No   Other Precautions Multiple lines   Home Living   Type of Home Apartment   Home Layout Stairs to enter without rails  (2 YENI)   Bathroom Shower/Tub Tub/shower unit   Bathroom Toilet Standard   Additional Comments No DME at home   Prior Function   Level of [place] Independent with ADLs;Independent with functional mobility   Lives With Spouse;Son  (two sons (one ))   IADLs Independent with driving;Independent with meal prep;Independent with medication management   Falls in the last 6 months 0   Vocational   (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 - falls; + driving   Reciprocal Relationships  and sons   Service to Others worked in a [employer]   Intrinsic Gratification time with kids   Subjective   Subjective \"I want to go home\"   ADL   Where Assessed Chair   Eating Assistance 7  Independent   Grooming Assistance 7  Independent   UB Bathing Assistance 7  Independent   LB Bathing Assistance 7  Independent   UB Dressing Assistance 7  Independent   LB Dressing Assistance 7  Independent   Toileting Assistance  7  Independent   Functional Assistance 7  Independent   Transfers   Sit to Stand 7  Independent   Stand to Sit 7  Independent   Additional Comments Bp: 127/80 (Chair)   Functional Mobility   Functional Mobility 5  Supervision  (2* IV line)   Additional items   (w/o device)   Balance   Static Sitting Normal   Dynamic Sitting Normal   Static Standing Normal   Dynamic Standing Fair +   Activity Tolerance   Activity Tolerance Patient tolerated treatment well   Medical Staff Made Aware nsg, P.T., CM, MD   RUE Assessment   RUE Assessment WFL   RUE Strength   RUE Overall Strength Within Functional Limits - able to perform ADL tasks with strength  (4+/5 throughout)   LUE Assessment   LUE Assessment WFL   LUE Strength   LUE Overall Strength   (4+/5 throughout)   Hand Function   Gross Motor Coordination Functional   Fine Motor Coordination Functional   Sensation   Light Touch No apparent deficits   Proprioception   Proprioception No apparent deficits   Vision-Basic Assessment   Current Vision   (wears glasses)   Vision - Complex Assessment   Acuity   (impaired--glasses unavailable)   Psychosocial   Psychosocial (WDL) WDL   Perception   Inattention/Neglect Appears intact   Cognition   Overall Cognitive Status WFL   Arousal/Participation Alert   Attention Within functional limits   Orientation Level Oriented X4   Memory Within functional limits   Following Commands Follows all commands and directions without difficulty " "  Assessment   Limitation Decreased endurance   Prognosis Good   Assessment Pt is a 23 y.o female was admitted to the hospital () 2* symptoms of right flank pain, pevlic pain, and abdominal pain; CT scan with \"4.2 cm enhancing fluid collection at the right lower abdominal pelvic wall deep to the surgical incision site concerning for developing abscess. Pt noted with dx of SIRS. Pt with PMH of  (1/15/24), depression. Pt states PTA she was independent with ADL/IADLs, transfers, and functional mobility. - falls; + driving. During OT intial eval, pt demonstrated slight deficits with her activity tolerance, functional balance, and b/l UE strength. Pt was able to demonstrate good ADL status and states no concerns about going directly home. Pt would benefit from a restorative program on the unit to improve her overall endurance, strength, and balance. Acute OT tx not indicated at this time 2* limited ADL deficits. The patient's raw score on the AM-PAC Daily Activity Inpatient Short Form is 24. A raw score of greater than or equal to 19 suggests the patient may benefit from discharge to home. Please refer to the recommendation of the Occupational Therapist for safe discharge planning.   Goals   Patient Goals \"to go home\"   Plan   OT Frequency Eval only   Discharge Recommendation   Rehab Resource Intensity Level, OT No post-acute rehabilitation needs   AM-PAC Daily Activity Inpatient   Lower Body Dressing 4   Bathing 4   Toileting 4   Upper Body Dressing 4   Grooming 4   Eating 4   Daily Activity Raw Score 24   Daily Activity Standardized Score (Calc for Raw Score >=11) 57.54   AM-PAC Applied Cognition Inpatient   Following a Speech/Presentation 4   Understanding Ordinary Conversation 4   Taking Medications 4   Remembering Where Things Are Placed or Put Away 4   Remembering List of 4-5 Errands 4   Taking Care of Complicated Tasks 4   Applied Cognition Raw Score 24   Applied Cognition Standardized Score 62.21 "   Alex Tatum

## 2024-01-29 NOTE — ASSESSMENT & PLAN NOTE
S/p RLTCS 1/15/24  CTAP 1/29: 4.2 cm enhancing fluid collection at the right lower abdominal pelvic wall deep to the surgical incision site concerning for developing abscess.   Incision c/d/I, small well-circumcised mobile mass palpable deep to incision on right side consistent with subcutaneous knot   No surgical intervention necessary at this time. Recommend antibiotics, patient currently ordered for ceftriaxone  Consider course of keflex on discharge

## 2024-01-29 NOTE — CONSULTS
INTERPROFESSIONAL (PHONE) CONSULTATION - Medical Toxicology  Grismerlin Fernandez 23 y.o. female MRN: [id number]  Unit/Bed#: E2 -01 Encounter: [id number]      Reason for Consult / Principal Problem: Hepatotoxicity in the setting of elevated acetaminophen level  Inpatient consult to Toxicology  Consult performed by: Otilia Ordaz MD  Consult ordered by: Jake Walsh PA-C        01/29/24      ASSESSMENT:  23-year-old woman admitted for abdominal pain, found to have elevated LFTs in the setting of possible increased acetaminophen use, detectable acetaminophen level 8, leukocytosis, elevated INR.    RECOMMENDATIONS:    If we are taking the patient's self-reported dosing at her word, she took potentially 3.5 g of acetaminophen over a 24-hour period, which is well under toxicity.  In that case, I would continue to explore other causes of liver toxicity - follow-up CK, right upper quadrant ultrasound, hepatitis panel (if indicated).    In the meantime, avoid administering hepatotoxic medications.  Order N-acetylcysteine as a continuous infusion for coverage of acetaminophen related hepatotoxicity.  Can be discontinued after LFTs this showed downtrending x 2.    For further questions, please contact the medical  on call via [facility] [employer] between 8am and 9pm. If between 9pm and 8am, please reach out to the Poison Center at [phone].     Please see additional teaching note below:    Medical Toxicology Teaching Note  [facility]  Acetaminophen Toxicity  Last revised October 2017     Acetaminophen (Tylenol) is a nonopiod analgesic and antipyretic medication found in many over-the-counter and prescription products such as Tylenol PM, Norco, Percocet, Nyquil, Vicks Formula 44-D. The recommended maximum daily dose of acetaminophen for adults is 3g/day, and 75-90mg/kg/day for children. Alcoholics may safely take Tylenol in therapeutic doses, but they may be at increased risk for  hepatotoxicity in overdose.     Mechanism of Toxicity: Acetaminophen is primarily metabolized by the liver. In therapeutic doses, about 90% of acetaminophen is conjugated to nontoxic metabolites (glucoronides and sulfates). A small portion (<5%) is conjugated by cytochrome P450 enzyme, subunit CYP2E1, to a toxic metabolite, N-acetyl-p-benzoquinoneimine (NAPQI). This metabolite is further conjugated by glutathione, to nontoxic metabolites eliminated by the kidneys.   Liver Injury:  In toxic doses, the usual metabolic pathways are overwhelmed; acetaminophen is shunted to the cytochrome P450 pathway, creating NAPQI. Glutathione stores are depleted and NAPQI is produced. Cellular injury and hepatic necrosis may occur as NAPQI accumulates.   Renal Injury:  Cytochrome P450 activity in the kidneys is thought to cause direct renal damage. Renal insufficiency may also develop during fulminant hepatic failure due to hepatorenal syndrome. Renal toxicity is usually associated with liver injury.   Pharmacokinetics:  Acetaminophen is rapidly absorbed. Peak levels occur within  minutes with normal doses. Delayed absorption may occur with sustained release products or with co-ingestions that slow the GI tract (opiods, anticholinergics). The elimination half-life is 1-3 hours after therapeutic doses and may extend to 12 hours after overdose.   Toxic Dose:  Toxicity in adults may occur with acute ingestions of 7g, and 200mg/kg in children. Hepatic injury following chronic ingestions may occur at any dose above the daily recommended dose.     Clinical Presentation:    Acute Ingestion: Within 8 hrs of an acute ingestion, there are usually few symptoms. Between 8-30 hours after a toxic, acute ingestion, a transaminitis will develop. Nausea, vomiting, and right upper quadrant pain may occur. Within 12-36 hours, worsening AST/ALT develops with elevated bilirubin and INR. The most severe cases will develop fulminant liver failure  with hepatic encephalopathy and acidosis, usually within 3-7 days post overdose. The patient should be evaluated for a liver transplantation.   Repeated Supra-therapeutic Ingestion: Due to a sub-acute course, patients may present anywhere along a spectrum - normal LFTS to asymptomatic elevation of enzymes to hepatic failure.     Diagnosis   Acute Ingestion (Time of Ingestion Known): After an acute ingestion at a known time, obtain a 4-hour post-ingestion serum acetaminophen level and plot the level on the Rumack-Godwin’s nomogram (see below). This nomogram is used to predict the likelihood of hepatic toxicity based on the level of acetaminophen between 4 and 24 hours post-ingestion. The nomogram CANNOT be used if the time of ingestion is unknown.   The dotted line (Rumack-Godwin line), marking a 4-hour level at 200 mcg/ml, is the original line developed from the study above which hepatic toxicity will probably occur. The solid line (Treatment Line), marking a 4-hour level at 150ug/ml. is the treatment line accepted as the standard of care in the [place] and is 25% lower as a safety margin. If the patient’s serum APAP level falls above the treatment line, start treatment with N-acetylcysteine (NAC). (see Treatment below)           Acute Ingestion (Time of Ingestion Unknown) or Repeated Supra-therapeutic Ingestion An acetaminophen level CANNOT be plotted on the Rumack-Godwin’s nomogram. Draw an APAP level and AST/ALT at time of presentation. Anyone with an APAP level> 10mcg/ml OR elevated AST/ALT should start NAC. (see Treatment below)     TREATMENT   Emergency and Supportive Care: Treat nausea and vomiting to protect airway and support safe administration of charcoal and NAC, when indicated (see below). Provide standard supportive care for liver and renal failure. Contact liver transplant team if fulminant hepatic failure occurs.   Decontamination:  Administer activated charcoal within 2 hours of ingestion  (consider later if extended release preparations). Use antiemetics for nausea. Activated charcoal does bind to NAC, but the effect is not thought to be clinically significant. Gastric emptying is not recommended.   Specific Drugs and Antidotes.   Acute Ingestion Treat with NAC if the APAP level falls above the Treatment Line on the nomogram. The maximal benefit occurs if given within 8 hours of acute ingestion. Therefore, it is recommended to empirically start NAC before a level is obtained if there is a reasonable concern of a toxic ingestion presenting close to 8 hours or beyond. In late presenters (>8hrs), start NAC and treat for a full course or longer if LFTS remain abnormal. Treatment maybe stopped when AST/ALT peak and then downtrend, with an INR <2 and patient is clinically well. If abnormal labs persist, continue NAC and call Toxicology. There are two routes of administration for NAC, oral and IV. The treatment protocols are described below.   Acute Ingestion (Time of Ingestion Unknown) or Repeated Supra-therapeutic Ingestion   The nomogram CANNOT be used to estimate the risk of hepatotoxicity. At presentation, check a serum APAP level and AST/ALT. If the APAP level is above 10 mcg/ml or the AST/ALT are elevated, start NAC treatment for 12 hours. If abnormalities persist, continue NAC treatment and call toxicology. If the APAP level is undetectable and AST and ALT are downtrending at the end of 12 hours, treatment may be stopped.     Intravenous (Acetadote)   Loading dose- 150mg/kg infused over 15-60 minutes   Maintenance Infusion #1- 50mg/kg (12.5mg/kg/hr) over 4 hours   Maintenance Infusion #2 -100mg/kg (6.25 mg/kg/hr) until treatment endpoint   Treatment Endpoint: 20 hours or more   NAC should be continued for the full course.   NAC can be stopped when APAP is undetectable, AST/ALT have peaked and are downtrending, and patient appears clinically well. Consultation with a medical  /poison  center is recommended before changes in the duration of therapy are made.     Acetaminophen Toxicity Do’s and Don’ts   Acute Ingestions   DO give charcoal for decontamination within 2 hours of ingestion if the patient can adequately protect their airway.   DO start NAC empirically, i.e. without an APAP level, if the ingestion is likely a large overdose presenting at 8 hours or more after ingestion.   DO contact the Liver Transplant Team early if liver failure is developing.   DO NOT get a level before 4hrs post-ingestion if the time of ingestion is certain in an acute overdose.   DO NOT stop NAC therapy until full course is finished or truncated therapy is recommended by the Poison Center.   Repeated Supra-Therapeutic Ingestions (RSI)   DO ask patients with pain complaints (toothaches, back pain, cancer) about the amount of acetaminophen they use.   DO NOT use the Naya-Payton nomogram to determine if the APAP level is toxic.   DO NOT stop NAC therapy until full course is finished or truncated therapy is recommended by the Poison Center.   NAC Protocols   DO stop IV NAC if an anaphylactoid reaction occurs (rare). Treat the reaction appropriately and call the Poison Center for recommendations on continued NAC therapy.   DO give charcoal with oral NAC when charcoal is indicated.   References   Ehsan PINEDA Acetaminophen. In Ehsan PINEDA, Marianna TRIANA, Regina M et al eds. Medical Toxicology 3rd edition. [place]: [employer], 2004: pp.723-737.   Tha FAGAN Acetaminophen. In Tha FAGAN       Hx and PE limited by the dynamics of a phone consultation. I have not personally interviewed or evaluated the patient, but only advised based on the information provided to me. Primary provider is responsible for all clinical decisions.     Pertinent history, physical exam and clinical findings and course discussed: Grismerlin Fernandez is a 23 y.o. year old female who was admitted overnight for right flank pain,  "pelvic pain, abdominal pain, found to have elevated transaminases, pelvic abscess, pyelonephritis, meeting SIRS criteria.  Past medical history significant for recent .  Patient self-reported that she was taking a lot of acetaminophen to help with her pain.  She estimates approximately 8 tabs of acetaminophen over a 24-hour period.  These were noted in the ED to be elevated, with a detectable acetaminophen level of 8.  Toxicology was consulted for possible acetaminophen related hepatotoxicity.    Review of systems and physical exam not performed by me.    Historical Information   Past Medical History:   Diagnosis Date    Depression      Past Surgical History:   Procedure Laterality Date    ABDOMINOPLASTY      ID  DELIVERY ONLY N/A 2020    Procedure:  SECTION ();  Surgeon: Adair Clifton MD;  Location: [facility];  Service: Obstetrics    ID  DELIVERY ONLY N/A 1/15/2024    Procedure:  SECTION ();  Surgeon: Yardlie Toussaint-Foster, DO;  Location: [facility];  Service: Obstetrics     Social History   Social History     Substance and Sexual Activity   Alcohol Use Not Currently    Comment: \"sometimes\"     Social History     Substance and Sexual Activity   Drug Use Never     Social History     Tobacco Use   Smoking Status Former    Types: Pipe   Smokeless Tobacco Never     Family History   Problem Relation Age of Onset    No Known Problems Mother     No Known Problems Father     No Known Problems Sister     No Known Problems Brother     Cancer Neg Hx     Diabetes Neg Hx         Prior to Admission medications    Medication Sig Start Date End Date Taking? Authorizing Provider   ibuprofen (MOTRIN) 600 mg tablet Take 1 tablet (600 mg total) by mouth every 6 (six) hours  Patient not taking: Reported on 2024   PER Le       Current Facility-Administered Medications   Medication Dose Route Frequency    acetylcysteine (ACETADOTE) 10,000 mg in " dextrose 5 % 1,000 mL IVPB  100 mg/kg Intravenous Once    enoxaparin (LOVENOX) subcutaneous injection 40 mg  40 mg Subcutaneous Daily    multi-electrolyte (PLASMALYTE-A/ISOLYTE-S PH 7.4) IV solution  100 mL/hr Intravenous Continuous       No Known Allergies    Objective       Intake/Output Summary (Last 24 hours) at 1/29/2024 1011  Last data filed at 1/29/2024 0954  Gross per 24 hour   Intake 2625 ml   Output --   Net 2625 ml       Invasive Devices:   Peripheral IV 01/29/24 Right;Ventral (anterior) Forearm (Active)   Site Assessment [facility] 01/29/24 0608   Dressing Type Transparent 01/29/24 0608   Line Status Saline locked 01/29/24 0608   Dressing Status Clean;Dry;Intact 01/29/24 0608   Dressing Change Due 02/02/24 01/29/24 0608       Peripheral IV 01/29/24 Left Antecubital (Active)   Site Assessment [facility] 01/29/24 0608   Dressing Type Transparent 01/29/24 0608   Line Status Flushed;Infusing 01/29/24 0608   Dressing Status Clean;Dry;Intact 01/29/24 0608   Dressing Change Due 02/02/24 01/29/24 0608   Reason Not Rotated Not due 01/29/24 0608       Vitals   Vitals:    01/29/24 0225 01/29/24 0350 01/29/24 0605 01/29/24 0734   BP: 142/68 139/65 (P) 151/68 147/74   TempSrc:   (P) Oral Temporal   Pulse: 86 78 (P) 72 75   Resp: 18 20 (P) 18 14   Patient Position - Orthostatic VS: Sitting Lying (P) Lying Lying   Temp:   (P) 98.5 °F (36.9 °C) (!) 96.8 °F (36 °C)         EKG, Pathology, and/or Other Studies: I have personally reviewed pertinent reports.    Normal sinus rhythm 73, QRS 82, QTc 425, no ST elevation or depression, no ectopy, terminal R.  Overall pression: No toxicologic findings    Lab Results:     Labs:    Results from last 7 days   Lab Units 01/29/24  0904 01/29/24  0025   WBC Thousand/uL 15.07* 15.06*   HEMOGLOBIN g/dL 10.6* 12.1   HEMATOCRIT % 32.8* 38.0   PLATELETS Thousands/uL 352 446*   NEUTROS PCT % 89* 81*   LYMPHS PCT % 6* 10*   MONOS PCT % 4 6   EOS PCT % 0 1      Results from last 7 days   Lab Units  "01/29/24  0904   SODIUM mmol/L 137   POTASSIUM mmol/L 3.5   CHLORIDE mmol/L 107   CO2 mmol/L 22   BUN mg/dL 8   CREATININE mg/dL 0.52*   CALCIUM mg/dL 8.2*   ALK PHOS U/L 85   ALT U/L 816*   AST U/L 785*      Results from last 7 days   Lab Units 01/29/24  0904 01/29/24  0025   INR  1.66* 1.58*   PTT seconds  --  32     Results from last 7 days   Lab Units 01/29/24  0616   LACTIC ACID mmol/L 1.1     No results found for: \"TROPONINI\"      Results from last 7 days   Lab Units 01/29/24  0025   ACETAMINOPHEN LVL ug/mL 8*     Invalid input(s): \"EXTPREGUR\"      Imaging Studies: I have personally reviewed pertinent reports.      Counseling / Coordination of Care  Total time spent today 45 minutes. This was a phone consultation.       "

## 2024-01-29 NOTE — ED NOTES
Pt unable to urinate at this time. Understands needs urine sample.     Yessenia Pierre RN  01/29/24 0031

## 2024-01-29 NOTE — H&P
"[facility]  H&P  Name: Grismerlin Fernandez 23 y.o. female I MRN: [id number]  Unit/Bed#: E2 -01 I Date of Admission: 2024   Date of Service: 2024 I Hospital Day: 0      Assessment/Plan   * SIRS (systemic inflammatory response syndrome) (HCC)  Assessment & Plan  Patient with PMHx of  on 1/15 presented to the ED with complaints of right flank pain, suprapubic pain, and abdominal pain x 3 days  Pt fulfilling SIRS with tachycardia and leukocytosis of 15.06  Suspect related to pyelonephritis and pelvic abscess v. post-surgical inflammation  Pt afebrile   Lactic acid, procalc, and blood cultures pending  Continue IV fluids  Ceftriaxone initiated in the ED, continue      Transaminitis  Assessment & Plan  CMP with new transaminitis  She reported concern that her nausea is from taking \"too many Tylenol\". However, pt reports taking only about 8 tylenol over the last 3 days to help control her right flank and pelvic pain, only taking 2 at time.   Tylenol level 8  Suspect this is less likely the cause of her transaminits   Pt initiated on NAC in the ED, continue for now  RUQ US pending to rule out other etiology  INR 1.58  Continue to trend CMP, INR, and lactic acid   Seen in consult by OB/GYN who reports low suspicion for preeclampsia at this time  Med tox and GI consults pending     Pyelonephritis  Assessment & Plan  CT a/p demonstrating \"Findings above suspicious for pyelonephritis, right worse than left and concomitant cystitis although evaluation of the latter somewhat limited secondary to underdistention and adjacent postsurgical change. No evidence of intrarenal abscess. Clinical correlation recommended.\"  UA with evidence of infection  Urine culture pending  Ceftriaxone initiated in the ED, continue    Pelvic abscess  Assessment & Plan  Patient reporting suprapubic pain and nausea x 3 days in combination with her right flank pain  CT a/p demonstrating \"Postsurgical " "changes of recent  noted with hyperemia along the lower abdominopelvic wall. 4.2 cm enhancing fluid collection at the right lower abdominal pelvic wall deep to the surgical incision site concerning for developing abscess. GYN/surgical consult advised.\"  OB/GYN consult pending, recommendations appreciated  Continue patient on ceftriaxone initiated in the ED, and then transition to oral keflex            VTE Pharmacologic Prophylaxis: VTE Score: 3 Moderate Risk (Score 3-4) - Pharmacological DVT Prophylaxis Ordered: enoxaparin (Lovenox).  Code Status: Level 1 - Full Code   Discussion with family: Updated  (sister) via phone.    Anticipated Length of Stay: Patient will be admitted on an inpatient basis with an anticipated length of stay of greater than 2 midnights secondary to SIRS, pyelo, pelvic abscess, transaminitis.    Total Time Spent on Date of Encounter in care of patient: 65 minutes This time was spent on one or more of the following: performing physical exam; counseling and coordination of care; obtaining or reviewing history; documenting in the medical record; reviewing/ordering tests, medications or procedures; communicating with other healthcare professionals and discussing with patient's family/caregivers.    Chief Complaint: \"I am nauseous because I took too much Tylenol\"    History of Present Illness:  Grismerlin Fernandez is a 23 y.o. female who presents with SIRS, pyelo, transminitis, and pelvic abscess. Pt reports that about 3 days ago, she developed right flank pain and pelvic pain. She states that she took \"a lot of tylenol\" to deal with this pain. However, when asked exactly how much, she reports about 8 tylenol over the 3 days, taking 2 tylenol at a time. She reports that since taking \"so many tylenol I am nauseous.\" Explained to patient this is unlikely the cause of her nausea and the way she described taking the tylenol appeared to be safe administration. Pt reports that " her right flank pain and suprapubic pain has resolved, but she continues to have intermittent nausea. She denies any other symptoms such as fever, chills, dysuria, urgency, frequency, or hematuria. She denies any chest pain, palpitations, SOB, RUQ pain, diaphoresis, or pedal edema.     Review of Systems:  Review of Systems   Constitutional:  Positive for fatigue. Negative for appetite change, chills, diaphoresis and fever.   HENT:  Negative for congestion, rhinorrhea and sore throat.    Eyes:  Negative for photophobia and visual disturbance.   Respiratory:  Negative for cough, shortness of breath and wheezing.    Cardiovascular:  Negative for chest pain, palpitations and leg swelling.   Gastrointestinal:  Positive for abdominal pain (Suprapubic) and nausea. Negative for abdominal distention, blood in stool, constipation, diarrhea and vomiting.   Genitourinary:  Positive for flank pain (right) and pelvic pain. Negative for dysuria and hematuria.   Musculoskeletal:  Negative for arthralgias, back pain, myalgias and neck stiffness.   Skin:  Negative for color change and rash.   Neurological:  Negative for dizziness, seizures, syncope, weakness, light-headedness and headaches.   Psychiatric/Behavioral:  Negative for agitation, behavioral problems and confusion. The patient is not nervous/anxious.    All other systems reviewed and are negative.      Past Medical and Surgical History:   Past Medical History:   Diagnosis Date    Depression        Past Surgical History:   Procedure Laterality Date    ABDOMINOPLASTY      [place]  DELIVERY ONLY N/A 2020    Procedure:  SECTION ();  Surgeon: Adair Clifton MD;  Location: [facility];  Service: Obstetrics    [place]  DELIVERY ONLY N/A 1/15/2024    Procedure:  SECTION ();  Surgeon: Yardlie Toussaint-Foster, DO;  Location: [facility];  Service: Obstetrics       Meds/Allergies:  Prior to Admission medications    Medication Sig Start Date End  "Date Taking? Authorizing Provider   ibuprofen (MOTRIN) 600 mg tablet Take 1 tablet (600 mg total) by mouth every 6 (six) hours  Patient not taking: Reported on 1/29/2024 1/17/24   PER Le have reviewed home medications with patient personally.    Allergies: No Known Allergies    Social History:  Marital Status: Single   Patient Pre-hospital Living Situation: Home  Patient Pre-hospital Level of Mobility: walks  Patient Pre-hospital Diet Restrictions: none  Substance Use History:   Social History     Substance and Sexual Activity   Alcohol Use Not Currently    Comment: \"sometimes\"     Social History     Tobacco Use   Smoking Status Former    Types: Pipe   Smokeless Tobacco Never     Social History     Substance and Sexual Activity   Drug Use Never       Family History:  Family History   Problem Relation Age of Onset    No Known Problems Mother     No Known Problems Father     No Known Problems Sister     No Known Problems Brother     Cancer Neg Hx     Diabetes Neg Hx        Physical Exam:     Vitals:   Blood Pressure: (P) 151/68 (01/29/24 0605)  Pulse: (P) 72 (01/29/24 0605)  Temperature: (P) 98.5 °F (36.9 °C) (01/29/24 0605)  Temp Source: (P) Oral (01/29/24 0605)  Respirations: (P) 18 (01/29/24 0605)  Height: (P) 5' 2\" (157.5 cm) (01/29/24 0605)  Weight - Scale: (P) 99.1 kg (218 lb 7.6 oz) (01/29/24 0605)  SpO2: (P) 99 % (01/29/24 0605)    Physical Exam  Vitals and nursing note reviewed.   Constitutional:       General: She is not in acute distress.     Appearance: She is well-developed. She is not ill-appearing.   HENT:      Head: Normocephalic and atraumatic.      Nose: Nose normal. No congestion.      Mouth/Throat:      Mouth: Mucous membranes are moist.      Pharynx: Oropharynx is clear.   Eyes:      Conjunctiva/sclera: Conjunctivae normal.   Cardiovascular:      Rate and Rhythm: Normal rate and regular rhythm.      Heart sounds: Normal heart sounds. No murmur heard.     No friction rub. No " gallop.   Pulmonary:      Effort: Pulmonary effort is normal. No respiratory distress.      Breath sounds: Normal breath sounds. No wheezing, rhonchi or rales.   Abdominal:      General: Bowel sounds are normal. There is no distension.      Palpations: Abdomen is soft.      Tenderness: There is no abdominal tenderness.   Musculoskeletal:      Cervical back: Neck supple.      Right lower leg: No edema.      Left lower leg: No edema.   Skin:     General: Skin is warm and dry.      Capillary Refill: Capillary refill takes less than 2 seconds.   Neurological:      General: No focal deficit present.      Mental Status: She is alert and oriented to person, place, and time. Mental status is at baseline.   Psychiatric:         Mood and Affect: Mood normal.         Behavior: Behavior normal.          Additional Data:     Lab Results:  Results from last 7 days   Lab Units 24  0025   WBC Thousand/uL 15.06*   HEMOGLOBIN g/dL 12.1   HEMATOCRIT % 38.0   PLATELETS Thousands/uL 446*   NEUTROS PCT % 81*   LYMPHS PCT % 10*   MONOS PCT % 6   EOS PCT % 1     Results from last 7 days   Lab Units 24  0025   SODIUM mmol/L 136   POTASSIUM mmol/L 3.6   CHLORIDE mmol/L 108   CO2 mmol/L 22   BUN mg/dL 12   CREATININE mg/dL 0.66   ANION GAP mmol/L 6   CALCIUM mg/dL 8.7   ALBUMIN g/dL 3.8   TOTAL BILIRUBIN mg/dL 0.52   ALK PHOS U/L 105*   ALT U/L 384*   AST U/L 330*   GLUCOSE RANDOM mg/dL 110     Results from last 7 days   Lab Units 24  0025   INR  1.58*                   Lines/Drains:  Invasive Devices       Peripheral Intravenous Line  Duration             Peripheral IV 24 Left Antecubital <1 day    Peripheral IV 24 Right;Ventral (anterior) Forearm <1 day                        Imaging: Reviewed radiology reports from this admission including: abdominal/pelvic CT  CT abdomen pelvis with contrast   Final Result by Tonio Gallardo MD ( [id number])      Postsurgical changes of recent  noted with hyperemia  along the lower abdominopelvic wall.   4.2 cm enhancing fluid collection at the right lower abdominal pelvic wall deep to the surgical incision site concerning for developing abscess. GYN/surgical consult advised.      Findings above suspicious for pyelonephritis, right worse than left and concomitant cystitis although evaluation of the latter somewhat limited secondary to underdistention and adjacent postsurgical change.   No evidence of intrarenal abscess. Clinical correlation recommended.      Otherwise mild edematous and inflammatory stranding in the pelvis overall within normal limits for expected postoperative change. No discrete abdominal pelvic collection is seen.      Above findings discussed with Dr. Batista at 4:25 a.m. on 1/29/2024.      Workstation performed: [id number]         US right upper quadrant    (Results Pending)       EKG and Other Studies Reviewed on Admission:   EKG: NSR. HR 73.    ** Please Note: This note has been constructed using a voice recognition system. **

## 2024-01-29 NOTE — PLAN OF CARE
Problem: PAIN - ADULT  Goal: Verbalizes/displays adequate comfort level or baseline comfort level  Description: Interventions:  - Encourage patient to monitor pain and request assistance  - Assess pain using appropriate pain scale  - Administer analgesics based on type and severity of pain and evaluate response  - Implement non-pharmacological measures as appropriate and evaluate response  - Consider cultural and social influences on pain and pain management  - Notify physician/advanced practitioner if interventions unsuccessful or patient reports new pain  Outcome: Progressing     Problem: INFECTION - ADULT  Goal: Absence or prevention of progression during hospitalization  Description: INTERVENTIONS:  - Assess and monitor for signs and symptoms of infection  - Monitor lab/diagnostic results  - Monitor all insertion sites, i.e. indwelling lines, tubes, and drains  - Monitor endotracheal if appropriate and nasal secretions for changes in amount and color  - [place] appropriate cooling/warming therapies per order  - Administer medications as ordered  - Instruct and encourage patient and family to use good hand hygiene technique  - Identify and instruct in appropriate isolation precautions for identified infection/condition  Outcome: Progressing  Goal: Absence of fever/infection during neutropenic period  Description: INTERVENTIONS:  - Monitor WBC    Outcome: Progressing     Problem: SAFETY ADULT  Goal: Patient will remain free of falls  Description: INTERVENTIONS:  - Educate patient/family on patient safety including physical limitations  - Instruct patient to call for assistance with activity   - Consult OT/PT to assist with strengthening/mobility   - Keep Call bell within reach  - Keep bed low and locked with side rails adjusted as appropriate  - Keep care items and personal belongings within reach  - Initiate and maintain comfort rounds  - Make Fall Risk Sign visible to staff  - Offer Toileting every 2 Hours,  in advance of need  - Initiate/Maintain bed alarm  - Obtain necessary fall risk management equipment: yellow socks  - Apply yellow socks and bracelet for high fall risk patients  - Consider moving patient to room near nurses station  Outcome: Progressing  Goal: Maintain or return to baseline ADL function  Description: INTERVENTIONS:  -  Assess patient's ability to carry out ADLs; assess patient's baseline for ADL function and identify physical deficits which impact ability to perform ADLs (bathing, care of mouth/teeth, toileting, grooming, dressing, etc.)  - Assess/evaluate cause of self-care deficits   - Assess range of motion  - Assess patient's mobility; develop plan if impaired  - Assess patient's need for assistive devices and provide as appropriate  - Encourage maximum independence but intervene and supervise when necessary  - Involve family in performance of ADLs  - Assess for home care needs following discharge   - Consider OT consult to assist with ADL evaluation and planning for discharge  - Provide patient education as appropriate  Outcome: Progressing  Goal: Maintains/Returns to pre admission functional level  Description: INTERVENTIONS:  - Perform AM-PAC 6 Click Basic Mobility/ Daily Activity assessment daily.  - Set and communicate daily mobility goal to care team and patient/family/caregiver.   - Collaborate with rehabilitation services on mobility goals if consulted  - Perform Range of Motion 3 times a day.  - Reposition patient every 2 hours.  - Dangle patient 3 times a day  - Stand patient 3 times a day  - Ambulate patient 3 times a day  - Out of bed to chair 3 times a day   - Out of bed for meals 3 times a day  - Out of bed for toileting  - Record patient progress and toleration of activity level   Outcome: Progressing     Problem: DISCHARGE PLANNING  Goal: Discharge to home or other facility with appropriate resources  Description: INTERVENTIONS:  - Identify barriers to discharge w/patient and  caregiver  - Arrange for needed discharge resources and transportation as appropriate  - Identify discharge learning needs (meds, wound care, etc.)  - Arrange for interpretive services to assist at discharge as needed  - Refer to Case Management Department for coordinating discharge planning if the patient needs post-hospital services based on physician/advanced practitioner order or complex needs related to functional status, cognitive ability, or social support system  Outcome: Progressing     Problem: Knowledge Deficit  Goal: Patient/family/caregiver demonstrates understanding of disease process, treatment plan, medications, and discharge instructions  Description: Complete learning assessment and assess knowledge base.  Interventions:  - Provide teaching at level of understanding  - Provide teaching via preferred learning methods  Outcome: Progressing     Problem: METABOLIC, FLUID AND ELECTROLYTES - ADULT  Goal: Electrolytes maintained within normal limits  Description: INTERVENTIONS:  - Monitor labs and assess patient for signs and symptoms of electrolyte imbalances  - Administer electrolyte replacement as ordered  - Monitor response to electrolyte replacements, including repeat lab results as appropriate  - Instruct patient on fluid and nutrition as appropriate  Outcome: Progressing  Goal: Fluid balance maintained  Description: INTERVENTIONS:  - Monitor labs   - Monitor I/O and WT  - Instruct patient on fluid and nutrition as appropriate  - Assess for signs & symptoms of volume excess or deficit  Outcome: Progressing  Goal: Glucose maintained within target range  Description: INTERVENTIONS:  - Monitor Blood Glucose as ordered  - Assess for signs and symptoms of hyperglycemia and hypoglycemia  - Administer ordered medications to maintain glucose within target range  - Assess nutritional intake and initiate nutrition service referral as needed  Outcome: Progressing

## 2024-01-29 NOTE — ASSESSMENT & PLAN NOTE
"CMP with new transaminitis  She reported concern that her nausea is from taking \"too many Tylenol\". However, pt reports taking only about 8 tylenol over the last 3 days to help control her right flank and pelvic pain, only taking 2 at time.   Tylenol level 8  Suspect this is less likely the cause of her transaminits   Pt initiated on NAC in the ED, continue for now  RUQ US pending to rule out other etiology  INR 1.58  Continue to trend CMP, INR, and lactic acid   Seen in consult by OB/GYN who reports low suspicion for preeclampsia at this time  Med tox and GI consults pending   "

## 2024-01-29 NOTE — ASSESSMENT & PLAN NOTE
"CMP with new transaminitis on admission: , , alkaline phosphatase 105.  Normal bilirubin  She reported concern that her nausea is from taking \"too many Tylenol\". However, pt reports taking only about 8 tylenol over the 3 days PTA to help control her right flank and pelvic pain, only taking 2 at time.   Tylenol level 8  Suspect this is less likely the cause of her transaminits   Pt initiated on NAC in the ED, continue for now  RUQ US pending   INR 1.58  Continue to trend CMP, INR, and lactic acid   Seen in consult by OB/GYN who reports low suspicion for preeclampsia at this time  Med tox and GI consults pending   Repeat LFTs/tylenol level pending this am  "

## 2024-01-29 NOTE — ASSESSMENT & PLAN NOTE
"CT a/p demonstrating \"Findings above suspicious for pyelonephritis, right worse than left and concomitant cystitis although evaluation of the latter somewhat limited secondary to underdistention and adjacent postsurgical change. No evidence of intrarenal abscess. Clinical correlation recommended.\"  UA with negative nitrates, small leukocyte esterase, 30-50 white blood cells:  Urine culture pending  Prescribed empiric antibiotics for possible pelvic abscess  "

## 2024-01-29 NOTE — ASSESSMENT & PLAN NOTE
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

## 2024-01-29 NOTE — ASSESSMENT & PLAN NOTE
"Patient reporting suprapubic pain and nausea x 3 days in combination with her right flank pain  CT a/p demonstrating \"Postsurgical changes of recent  noted with hyperemia along the lower abdominopelvic wall. 4.2 cm enhancing fluid collection at the right lower abdominal pelvic wall deep to the surgical incision site concerning for developing abscess. GYN/surgical consult advised.\"  OB/GYN team following  Cont abx with zosyn  "

## 2024-01-29 NOTE — ASSESSMENT & PLAN NOTE
"CT a/p demonstrating \"Findings above suspicious for pyelonephritis, right worse than left and concomitant cystitis although evaluation of the latter somewhat limited secondary to underdistention and adjacent postsurgical change. No evidence of intrarenal abscess. Clinical correlation recommended.\"  UA with evidence of infection  Urine culture pending  Ceftriaxone initiated in the ED, continue  "

## 2024-01-29 NOTE — ED NOTES
RN answered call bell. Patient is constantly making herself vomited with her finger. Patient was advised not to do this. Provider was made aware.     Jeannine Olivas RN  01/29/24 0764

## 2024-01-29 NOTE — ASSESSMENT & PLAN NOTE
S/p RLTCS 1/15/24    CTAP 1/29: 4.2 cm enhancing fluid collection at the right lower abdominal pelvic wall deep to the surgical incision site concerning for developing abscess.     Incision c/d/I  small well-circumcised mobile mass palpable deep to incision on right side     No surgical intervention necessary at this time. Recommend antibiotics, patient currently ordered for ceftriaxone  Consider course of keflex on discharge

## 2024-01-30 PROBLEM — R93.429 ABNORMAL CT SCAN, KIDNEY: Status: ACTIVE | Noted: 2024-01-29

## 2024-01-30 PROBLEM — R18.8 PELVIC FLUID COLLECTION: Status: ACTIVE | Noted: 2024-01-29

## 2024-01-30 LAB
ALBUMIN SERPL BCP-MCNC: 3.1 G/DL (ref 3.5–5)
ALBUMIN SERPL BCP-MCNC: 3.2 G/DL (ref 3.5–5)
ALP SERPL-CCNC: 89 U/L (ref 34–104)
ALP SERPL-CCNC: 99 U/L (ref 34–104)
ALT SERPL W P-5'-P-CCNC: 2131 U/L (ref 7–52)
ALT SERPL W P-5'-P-CCNC: 2466 U/L (ref 7–52)
AMMONIA PLAS-SCNC: 61 UMOL/L (ref 18–72)
ANA SER QL IA: NEGATIVE
ANION GAP SERPL CALCULATED.3IONS-SCNC: 8 MMOL/L
AST SERPL W P-5'-P-CCNC: 1528 U/L (ref 13–39)
AST SERPL W P-5'-P-CCNC: 814 U/L (ref 13–39)
BASOPHILS # BLD AUTO: 0.03 THOUSANDS/ÂΜL (ref 0–0.1)
BASOPHILS NFR BLD AUTO: 0 % (ref 0–1)
BILIRUB DIRECT SERPL-MCNC: 0.12 MG/DL (ref 0–0.2)
BILIRUB SERPL-MCNC: 0.48 MG/DL (ref 0.2–1)
BILIRUB SERPL-MCNC: 0.5 MG/DL (ref 0.2–1)
BLD SMEAR INTERP: NORMAL
BUN SERPL-MCNC: 4 MG/DL (ref 5–25)
CALCIUM ALBUM COR SERPL-MCNC: 9 MG/DL (ref 8.3–10.1)
CALCIUM SERPL-MCNC: 8.3 MG/DL (ref 8.4–10.2)
CHLORIDE SERPL-SCNC: 107 MMOL/L (ref 96–108)
CMV IGG SERPL IA-ACNC: 7.9 U/ML (ref 0–0.59)
CMV IGM SERPL IA-ACNC: <30 AU/ML (ref 0–29.9)
CO2 SERPL-SCNC: 23 MMOL/L (ref 21–32)
CREAT SERPL-MCNC: 0.45 MG/DL (ref 0.6–1.3)
EBV VCA IGM SER IA-ACNC: <36 U/ML (ref 0–35.9)
EOSINOPHIL # BLD AUTO: 0.1 THOUSAND/ÂΜL (ref 0–0.61)
EOSINOPHIL NFR BLD AUTO: 1 % (ref 0–6)
ERYTHROCYTE [DISTWIDTH] IN BLOOD BY AUTOMATED COUNT: 14.8 % (ref 11.6–15.1)
GFR SERPL CREATININE-BSD FRML MDRD: 142 ML/MIN/1.73SQ M
GLUCOSE SERPL-MCNC: 103 MG/DL (ref 65–140)
HCT VFR BLD AUTO: 32.6 % (ref 34.8–46.1)
HGB BLD-MCNC: 10.5 G/DL (ref 11.5–15.4)
HIV 1+2 AB+HIV1 P24 AG SERPL QL IA: NORMAL
HIV1 P24 AG SER QL: NORMAL
IGA SERPL-MCNC: 501 MG/DL (ref 66–433)
IGG SERPL-MCNC: 1105 MG/DL (ref 635–1741)
IGM SERPL-MCNC: 155 MG/DL (ref 45–281)
IMM GRANULOCYTES # BLD AUTO: 0.12 THOUSAND/UL (ref 0–0.2)
IMM GRANULOCYTES NFR BLD AUTO: 1 % (ref 0–2)
INR PPP: 1.84 (ref 0.84–1.19)
LACTATE SERPL-SCNC: 0.9 MMOL/L (ref 0.5–2)
LDH SERPL-CCNC: 1493 U/L (ref 140–271)
LYMPHOCYTES # BLD AUTO: 1.9 THOUSANDS/ÂΜL (ref 0.6–4.47)
LYMPHOCYTES NFR BLD AUTO: 15 % (ref 14–44)
MAGNESIUM SERPL-MCNC: 1.7 MG/DL (ref 1.9–2.7)
MCH RBC QN AUTO: 25.5 PG (ref 26.8–34.3)
MCHC RBC AUTO-ENTMCNC: 32.2 G/DL (ref 31.4–37.4)
MCV RBC AUTO: 79 FL (ref 82–98)
MONOCYTES # BLD AUTO: 1.28 THOUSAND/ÂΜL (ref 0.17–1.22)
MONOCYTES NFR BLD AUTO: 10 % (ref 4–12)
NEUTROPHILS # BLD AUTO: 8.98 THOUSANDS/ÂΜL (ref 1.85–7.62)
NEUTS SEG NFR BLD AUTO: 73 % (ref 43–75)
NRBC BLD AUTO-RTO: 0 /100 WBCS
PLATELET # BLD AUTO: 299 THOUSANDS/UL (ref 149–390)
PMV BLD AUTO: 9.5 FL (ref 8.9–12.7)
POTASSIUM SERPL-SCNC: 3.2 MMOL/L (ref 3.5–5.3)
PROCALCITONIN SERPL-MCNC: 34.43 NG/ML
PROT SERPL-MCNC: 6.3 G/DL (ref 6.4–8.4)
PROT SERPL-MCNC: 6.6 G/DL (ref 6.4–8.4)
PROTHROMBIN TIME: 21.4 SECONDS (ref 11.6–14.5)
RBC # BLD AUTO: 4.11 MILLION/UL (ref 3.81–5.12)
SODIUM SERPL-SCNC: 138 MMOL/L (ref 135–147)
WBC # BLD AUTO: 12.41 THOUSAND/UL (ref 4.31–10.16)

## 2024-01-30 PROCEDURE — 99232 SBSQ HOSP IP/OBS MODERATE 35: CPT | Performed by: OBSTETRICS & GYNECOLOGY

## 2024-01-30 PROCEDURE — 87529 HSV DNA AMP PROBE: CPT

## 2024-01-30 PROCEDURE — 86015 ACTIN ANTIBODY EACH: CPT

## 2024-01-30 PROCEDURE — 99232 SBSQ HOSP IP/OBS MODERATE 35: CPT | Performed by: INTERNAL MEDICINE

## 2024-01-30 PROCEDURE — 99233 SBSQ HOSP IP/OBS HIGH 50: CPT | Performed by: INTERNAL MEDICINE

## 2024-01-30 PROCEDURE — 84145 PROCALCITONIN (PCT): CPT

## 2024-01-30 PROCEDURE — 80076 HEPATIC FUNCTION PANEL: CPT

## 2024-01-30 PROCEDURE — 82103 ALPHA-1-ANTITRYPSIN TOTAL: CPT

## 2024-01-30 PROCEDURE — 86790 VIRUS ANTIBODY NOS: CPT | Performed by: INTERNAL MEDICINE

## 2024-01-30 PROCEDURE — 85025 COMPLETE CBC W/AUTO DIFF WBC: CPT | Performed by: INTERNAL MEDICINE

## 2024-01-30 PROCEDURE — 86038 ANTINUCLEAR ANTIBODIES: CPT

## 2024-01-30 PROCEDURE — 83735 ASSAY OF MAGNESIUM: CPT

## 2024-01-30 PROCEDURE — 83010 ASSAY OF HAPTOGLOBIN QUANT: CPT

## 2024-01-30 PROCEDURE — 86381 MITOCHONDRIAL ANTIBODY EACH: CPT

## 2024-01-30 PROCEDURE — 82390 ASSAY OF CERULOPLASMIN: CPT

## 2024-01-30 PROCEDURE — 83605 ASSAY OF LACTIC ACID: CPT

## 2024-01-30 PROCEDURE — 85610 PROTHROMBIN TIME: CPT | Performed by: INTERNAL MEDICINE

## 2024-01-30 PROCEDURE — 80053 COMPREHEN METABOLIC PANEL: CPT | Performed by: INTERNAL MEDICINE

## 2024-01-30 PROCEDURE — 87806 HIV AG W/HIV1&2 ANTB W/OPTIC: CPT | Performed by: NURSE PRACTITIONER

## 2024-01-30 PROCEDURE — 82784 ASSAY IGA/IGD/IGG/IGM EACH: CPT

## 2024-01-30 PROCEDURE — 82104 ALPHA-1-ANTITRYPSIN PHENO: CPT

## 2024-01-30 PROCEDURE — 82140 ASSAY OF AMMONIA: CPT | Performed by: INTERNAL MEDICINE

## 2024-01-30 PROCEDURE — 83615 LACTATE (LD) (LDH) ENZYME: CPT

## 2024-01-30 RX ORDER — POTASSIUM CHLORIDE 20 MEQ/1
40 TABLET, EXTENDED RELEASE ORAL ONCE
Status: COMPLETED | OUTPATIENT
Start: 2024-01-30 | End: 2024-01-30

## 2024-01-30 RX ADMIN — ENOXAPARIN SODIUM 40 MG: 40 INJECTION SUBCUTANEOUS at 09:50

## 2024-01-30 RX ADMIN — POTASSIUM CHLORIDE 40 MEQ: 1500 TABLET, EXTENDED RELEASE ORAL at 17:11

## 2024-01-30 NOTE — ASSESSMENT & PLAN NOTE
"CT a/p demonstrating \"Findings above suspicious for pyelonephritis, right worse than left and concomitant cystitis although evaluation of the latter somewhat limited secondary to underdistention and adjacent postsurgical change. No evidence of intrarenal abscess. Clinical correlation recommended.\"  UA with negative nitrates, small leukocyte esterase, 30-50 white blood cells:  Urine culture pending  Not felt to represent pyelonephritis: Due to wedge-shaped bilateral renal hypodensities, consideration for infarct  Reviewed previous records, patient did not have any hypotensive episodes  Patient did have recent COVID infection 1/1 and could be considered hypercoagulable secondary to that  Will check hypercoagulable panel  Patient denies any personal or family history of clotting disorders  "

## 2024-01-30 NOTE — ASSESSMENT & PLAN NOTE
"CMP with new transaminitis on admission: , , alkaline phosphatase 105.  Normal bilirubin  She reported concern that her nausea is from taking \"too many Tylenol\". However, pt reports taking only about 8 tylenol over the 3 days PTA to help control her right flank and pelvic pain, only taking 2 at time.   Tylenol level 8  Suspect this is less likely the cause of her transaminits   Pt initiated on NAC in the ED, continue for now  RUQ US with Dopplers unremarkable  INR 1.58-->1.8  Transaminases worsened, and peaked to an AST of 2367: ALT 2232, with normal bilirubin  Appreciate evaluation and recommendation by GI and infectious disease team  A) viral:   Negative acute hepatitis panel, HIV  Pending: Hepatitis E, HSV, EBV, CMV, ceruloplasmin, alpha-1 antitrypsin, AMA, anti-smooth muscle  B) ischemic:  no h/o hypotension with delivery  C) thrombotic:  wedge shaped hypodensity of B kidneys:  question hypercoag state  Check hypercoag panel  Abd US with dopplers neg for thrombus  D) structural:  CT abdomen without evidence of gallstones, obstruction, or dilated ducts  Right upper quadrant ultrasound and Dopplers unremarkable  E) medication:  not felt to be tylenol toxicity  Pt with recent Nexplanon implant 2 weeks ago:  per GYN-  if felt to be etiology of DILI may need explant.  "

## 2024-01-30 NOTE — ASSESSMENT & PLAN NOTE
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  Per OB, fluid collection deep to CT scan not felt to be abscess  Continue to monitor off antibiotics  "

## 2024-01-30 NOTE — UTILIZATION REVIEW
Notification of Unplanned, Urgent, or   Emergency Inpatient Admission   AUTHORIZATION REQUEST   [place] [address]  Tax ID: [id number]  NPI: [id number]  Place of Service: [facility]  Admission Level of Care: Inpatient  Place of Service Code: 21     Attending Physician Information  Attending Name and NPI#: Anjali Valladares Md [[id number]]  Phone: [phone]     Admission Information  Inpatient Admission Date/Time: 1/29/24  5:07 AM  Discharge Date/Time: No discharge date for patient encounter.  Admitting Diagnosis Code/Description:  Abdominal pain [R10.9]  Pyelonephritis [N12]  Nausea and vomiting [R11.2]  Elevated LFTs [R79.89]  Post-operative wound abscess [T81.49XA]  Toxic effect of acetaminophen, accidental or unintentional, initial encounter [T39.1X1A]     Utilization Review Contact  Humaira Ku, Utilization   Phone: [phone]  Fax: [phone]  Email: [email or web address]  Contact for approvals/pending authorizations, clinical reviews, and discharge.     Physician Advisory Services Contact  Medical Necessity Denial & Lxeb-zm-Odsm Discussion  Phone: [phone]  Fax: [phone]  Email: [email or web address]     DISCHARGE SUPPORT TEAM:  For Patients Discharge Needs & Updates  Phone: [phone] opt. 2 Fax: [phone]  Email: [email or web address]

## 2024-01-30 NOTE — PROGRESS NOTES
[facility]  Progress Note  Encounter: [id number]     PATIENT INFO     Name: Grismerlin Fernandez  YOB: 2000   Age: 23 y.o.   Sex: female   MRN: [id number]  Unit/Bed#: E2 -01     ASSESSMENT & PLAN     Ms. Denton is a 23 F with hx of obesity and recent  delivery on 1/15/24 who presented with SIRS and new transaminitis.      # Transaminitis   Worsening transaminitis today:  ->2367->1528, ->2232->2466,  ->89, T. bili 0.52->0.5; INR 1.66->1.8; tylenol level down from 8->2  RUQ U/S with patetn hepatic and portal veins ; no budd chiari  CMV IgG +, IgM- , not likely source of acute infection; Acute hepatitis panel negative for Hep B and A  Not likely HELLP during pregnancy per obgyn  CT imaging was negative for cholelithiasis or inflammation, and liver was found to be unremarkable.    Patient did have total 3.5 g of Tylenol past 1 to 2 days which is within acceptable amount <4g/day.  Tylenol level was 8. No apparent jaundice, changes in mentation or RUQ pain.   Differentials include viral hepatitis, drug-induced liver injury secondary to antibiotic use (azithromycin, penicillin, cefazolin on 1/15 during ), DILI from Tylenol, budd chiari, autoimmune hepatitis.  No hypotension to indicate ischemic hepatitis.   Prior HCV neg 2023, HB S Ag neg 2023, HB S ab 35      Plan:  At this time no jaundice or encephalopathy, acute liver injury, but not acute liver failure. Follow up viral causes for hepatitis.   Trend daily LFTs and INR  Follow up HSV, EBV, AMA, ASMA, IgG, RADHA, A1AT, Hepatitis E IgM  Monitor for symptoms  Avoid tylenol use at this time  S/p NAC     # Pyelonephritis  Pyelonephritis and cystitis on CT abdomen pelvis on 2024; + UA  On IV zosyn per primary     # R pelvic abscess  Recent  delivery on 1/15/24 with new R pelvic abscess on CT  On IV Zosyn  Gynecology consulted     # SIRS  WBC 15K - improving   Likely in  "setting of pelvic abscess and pyelnonephritis  On IV antibiotics     SUBJECTIVE     Patient without any acute events overnight. Continues to feel well without confusion or abdominal pain. Denies N/V/D or fevers and chills. Denies tremors. Aox4.      OBJECTIVE     Objective   Blood pressure 114/60, pulse 86, temperature 97.7 °F (36.5 °C), temperature source Temporal, resp. rate 18, height 5' 2\" (1.575 m), weight 99.1 kg (218 lb 7.6 oz), last menstrual period 2023, SpO2 97%, currently breastfeeding. Body mass index is 39.96 kg/m².  No intake or output data in the 24 hours ending 24 1723  Medication Administration - last 24 hours from 2024 1723 to 2024 1723         Date/Time Order Dose Route Action Action by     2024 2020 EST acetylcysteine (ACETADOTE) 10,000 mg in dextrose 5 % 1,000 mL IVPB 0 mg Intravenous Stopped Segundo Mccoy RN     2024 0950 EST enoxaparin (LOVENOX) subcutaneous injection 40 mg 40 mg Subcutaneous Given Rosanne Nicole RN     2024 EST acetylcysteine (ACETADOTE) 10,000 mg in dextrose 5 % 1,000 mL IVPB 10,000 mg Intravenous New Bag Aylin Arzate RN     2024 1711 EST potassium chloride (Klor-Con M20) CR tablet 40 mEq 40 mEq Oral Given Rosanne Nicole RN            General Appearance:   Alert, cooperative, no distress; aox4;    HEENT:   Normocephalic, atraumatic, anicteric     Lungs:   Equal chest rise, respirations unlabored    Heart:   Regular rate and rhythm   Abdomen:   Soft, non-tender, non-distended; normal bowel sounds; no masses, no organomegaly ;  scar intact. no asterixis   Rectal:   Deferred    Extremities:   No cyanosis, clubbing or edema    Neuro:   Moves all 4 extremities    Skin:   No jaundice, rashes, or lesions      Invasive Devices       Peripheral Intravenous Line  Duration             Peripheral IV 24 Left Antecubital 1 day    Peripheral IV 24 Right;Ventral (anterior) Forearm 1 day                     " LABORATORY RESULTS     Admission on 01/28/2024   Component Date Value    WBC 01/29/2024 15.06 (H)     RBC 01/29/2024 4.65     Hemoglobin 01/29/2024 12.1     Hematocrit 01/29/2024 38.0     MCV 01/29/2024 82     MCH 01/29/2024 26.0 (L)     MCHC 01/29/2024 31.8     RDW 01/29/2024 14.8     MPV 01/29/2024 9.0     Platelets 01/29/2024 446 (H)     nRBC 01/29/2024 0     Neutrophils Relative 01/29/2024 81 (H)     Immat GRANS % 01/29/2024 1     Lymphocytes Relative 01/29/2024 10 (L)     Monocytes Relative 01/29/2024 6     Eosinophils Relative 01/29/2024 1     Basophils Relative 01/29/2024 1     Neutrophils Absolute 01/29/2024 12.30 (H)     Immature Grans Absolute 01/29/2024 0.13     Lymphocytes Absolute 01/29/2024 1.46     Monocytes Absolute 01/29/2024 0.92     Eosinophils Absolute 01/29/2024 0.17     Basophils Absolute 01/29/2024 0.08     Sodium 01/29/2024 136     Potassium 01/29/2024 3.6     Chloride 01/29/2024 108     CO2 01/29/2024 22     ANION GAP 01/29/2024 6     BUN 01/29/2024 12     Creatinine 01/29/2024 0.66     Glucose 01/29/2024 110     Calcium 01/29/2024 8.7     AST 01/29/2024 330 (H)     ALT 01/29/2024 384 (H)     Alkaline Phosphatase 01/29/2024 105 (H)     Total Protein 01/29/2024 7.5     Albumin 01/29/2024 3.8     Total Bilirubin 01/29/2024 0.52     eGFR 01/29/2024 125     Protime 01/29/2024 19.1 (H)     INR 01/29/2024 1.58 (H)     PTT 01/29/2024 32     Lipase 01/29/2024 23     Acetaminophen Level 01/29/2024 8 (L)     Color, UA 01/29/2024 Yellow     Clarity, UA 01/29/2024 Clear     pH, UA 01/29/2024 6.0     Leukocytes, UA 01/29/2024 Small (A)     Nitrite, UA 01/29/2024 Negative     Protein, UA 01/29/2024 30 (1+) (A)     Glucose, UA 01/29/2024 Negative     Ketones, UA 01/29/2024 Negative     Urobilinogen, UA 01/29/2024 >=8.0 (A)     Bilirubin, UA 01/29/2024 Negative     Occult Blood, UA 01/29/2024 Large (A)     Specific [place], UA 01/29/2024 1.020     RBC, UA 01/29/2024 Innumerable (A)     WBC, UA 01/29/2024  30-50 (A)     Epithelial Cells 01/29/2024 Occasional     Bacteria, UA 01/29/2024 None Seen     MUCUS THREADS 01/29/2024 Moderate (A)     Creatinine, Ur 01/29/2024 154.3     Protein Urine Random 01/29/2024 27     Prot/Creat Ratio, Ur 01/29/2024 0.17 (H)     Ventricular Rate 01/29/2024 73     Atrial Rate 01/29/2024 73     CT Interval 01/29/2024 118     QRSD Interval 01/29/2024 82     QT Interval 01/29/2024 386     QTC Interval 01/29/2024 425     P [place] 01/29/2024 57     QRS Axis 01/29/2024 85     T Wave [place] 01/29/2024 18     LACTIC ACID 01/29/2024 1.1     Procalcitonin 01/29/2024 54.28 (H)     Blood Culture 01/29/2024 No Growth at 24 hrs.     Blood Culture 01/29/2024 No Growth at 24 hrs.     WBC 01/29/2024 15.07 (H)     RBC 01/29/2024 4.10     Hemoglobin 01/29/2024 10.6 (L)     Hematocrit 01/29/2024 32.8 (L)     MCV 01/29/2024 80 (L)     MCH 01/29/2024 25.9 (L)     MCHC 01/29/2024 32.3     RDW 01/29/2024 14.6     MPV 01/29/2024 9.3     Platelets 01/29/2024 352     nRBC 01/29/2024 0     Neutrophils Relative 01/29/2024 89 (H)     Immat GRANS % 01/29/2024 1     Lymphocytes Relative 01/29/2024 6 (L)     Monocytes Relative 01/29/2024 4     Eosinophils Relative 01/29/2024 0     Basophils Relative 01/29/2024 0     Neutrophils Absolute 01/29/2024 13.36 (H)     Immature Grans Absolute 01/29/2024 0.12     Lymphocytes Absolute 01/29/2024 0.90     Monocytes Absolute 01/29/2024 0.67     Eosinophils Absolute 01/29/2024 0.00     Basophils Absolute 01/29/2024 0.02     Sodium 01/29/2024 137     Potassium 01/29/2024 3.5     Chloride 01/29/2024 107     CO2 01/29/2024 22     ANION GAP 01/29/2024 8     BUN 01/29/2024 8     Creatinine 01/29/2024 0.52 (L)     Glucose 01/29/2024 145 (H)     Calcium 01/29/2024 8.2 (L)     Corrected Calcium 01/29/2024 8.9     AST 01/29/2024 785 (H)     ALT 01/29/2024 816 (H)     Alkaline Phosphatase 01/29/2024 85     Total Protein 01/29/2024 6.4     Albumin 01/29/2024 3.1 (L)     Total Bilirubin 01/29/2024  0.56     eGFR 01/29/2024 136     Protime 01/29/2024 19.8 (H)     INR 01/29/2024 1.66 (H)     Total CK 01/29/2024 46     Acetaminophen Level 01/29/2024 2 (L)     Hepatitis B Surface Ag 01/29/2024 Non-reactive     Hep A IgM 01/29/2024 Non-reactive     Hepatitis C Ab 01/29/2024 Non-reactive     Hep B C IgM 01/29/2024 Non-reactive     CMV IGG 01/29/2024 7.90 (H)     CMV IgM 01/29/2024 <30.0     EBV VCA IgM 01/29/2024 <36.0     Hep B Core Total Ab 01/29/2024 Non-reactive     Total Bilirubin 01/29/2024 0.35     Bilirubin, Direct 01/29/2024 0.15     Alkaline Phosphatase 01/29/2024 88     AST 01/29/2024 2,367 (H)     ALT 01/29/2024 2,232 (H)     Total Protein 01/29/2024 6.2 (L)     Albumin 01/29/2024 3.0 (L)     LACTIC ACID 01/30/2024 0.9     Magnesium 01/30/2024 1.7 (L)     Procalcitonin 01/30/2024 34.43 (H)     WBC 01/30/2024 12.41 (H)     RBC 01/30/2024 4.11     Hemoglobin 01/30/2024 10.5 (L)     Hematocrit 01/30/2024 32.6 (L)     MCV 01/30/2024 79 (L)     MCH 01/30/2024 25.5 (L)     MCHC 01/30/2024 32.2     RDW 01/30/2024 14.8     MPV 01/30/2024 9.5     Platelets 01/30/2024 299     nRBC 01/30/2024 0     Neutrophils Relative 01/30/2024 73     Immat GRANS % 01/30/2024 1     Lymphocytes Relative 01/30/2024 15     Monocytes Relative 01/30/2024 10     Eosinophils Relative 01/30/2024 1     Basophils Relative 01/30/2024 0     Neutrophils Absolute 01/30/2024 8.98 (H)     Immature Grans Absolute 01/30/2024 0.12     Lymphocytes Absolute 01/30/2024 1.90     Monocytes Absolute 01/30/2024 1.28 (H)     Eosinophils Absolute 01/30/2024 0.10     Basophils Absolute 01/30/2024 0.03     Protime 01/30/2024 21.4 (H)     INR 01/30/2024 1.84 (H)     Sodium 01/30/2024 138     Potassium 01/30/2024 3.2 (L)     Chloride 01/30/2024 107     CO2 01/30/2024 23     ANION GAP 01/30/2024 8     BUN 01/30/2024 4 (L)     Creatinine 01/30/2024 0.45 (L)     Glucose 01/30/2024 103     Calcium 01/30/2024 8.3 (L)     Corrected Calcium 01/30/2024 9.0     AST  01/30/2024 1,528 (H)     ALT 01/30/2024 2,466 (H)     Alkaline Phosphatase 01/30/2024 89     Total Protein 01/30/2024 6.3 (L)     Albumin 01/30/2024 3.1 (L)     Total Bilirubin 01/30/2024 0.50     eGFR 01/30/2024 142     Ammonia 01/30/2024 61     LD 01/30/2024 1,493 (H)     Hemolysis Smear 01/30/2024 No Schistocytes or Helmet Cells noted     IGA 01/30/2024 501 (H)     IGG 01/30/2024 1,105     IGM 01/30/2024 155     Rapid HIV 1 AND 2 01/30/2024 Non-Reactive     HIV-1 P24 Ag Screen 01/30/2024 Non-Reactive     Total Bilirubin 01/30/2024 0.48     Bilirubin, Direct 01/30/2024 0.12     Alkaline Phosphatase 01/30/2024 99     AST 01/30/2024 814 (H)     ALT 01/30/2024 2,131 (H)     Total Protein 01/30/2024 6.6     Albumin 01/30/2024 3.2 (L)         IMAGING RESULTS     US right upper quadrant with liver dopplers    Result Date: 1/29/2024  Narrative: RIGHT UPPER QUADRANT ULTRASOUND WITH LIVER DOPPLER INDICATION: Elevated LFTs. COMPARISON: 1/29/2024 TECHNIQUE: Real-time ultrasound of the right upper quadrant was performed with a curvilinear transducer with both volumetric sweeps and still imaging techniques. Color and spectral Doppler evaluation of the hepatic vasculature was performed. FINDINGS: PANCREAS: Visualized portions of the pancreas are within normal limits. AORTA AND IVC: Visualized portions are normal for patient age. LIVER: Size: Within normal range. The liver measures 16.7 cm in the midclavicular line. Contour: Surface contour is smooth. Parenchyma: Echogenicity and echotexture are within normal limits. No liver mass identified. LIVER DOPPLER: The main portal vein and primary branch segments are patent and hepatopetal with normal spectral waveform. Hepatic veins are patent. Spectral waveforms within normal limits. Main hepatic artery appears normal size, patent with normal spectral waveform. BILIARY: No gallbladder findings. No intrahepatic biliary dilatation. CBD measures 2.0 mm. No choledocholithiasis. KIDNEY:  Right kidney measures 11.5 x 6.0 x 6.0 cm. Volume 213.7 mL Kidney within normal limits. ASCITES: None.     Impression: Normal. Workstation performed: [id number]     CT abdomen pelvis with contrast    Result Date: 2024  Narrative: CT ABDOMEN AND PELVIS WITH IV CONTRAST INDICATION: Abdominal pain, acute, nonlocalized Abdominal pain s/p , elevated LFTs. COMPARISON: 2020. TECHNIQUE: CT examination of the abdomen and pelvis was performed. Multiplanar 2D reformatted images were created from the source data. This examination, like all CT scans performed in the [facility], was performed utilizing techniques to minimize radiation dose exposure, including the use of iterative reconstruction and automated exposure control. Radiation dose length product (DLP) for this visit: 854.94 mGy-cm IV Contrast: 100 mL of iohexol (OMNIPAQUE) Enteric Contrast: Not administered. FINDINGS: ABDOMEN LOWER CHEST: No clinically significant abnormality in the visualized lower chest. LIVER/BILIARY TREE: Unremarkable. GALLBLADDER: No calcified gallstones. No pericholecystic inflammatory change. SPLEEN: Unremarkable. PANCREAS: Unremarkable. ADRENAL GLANDS: Unremarkable. KIDNEYS/URETERS: Tiny wedge-shaped hypoenhancing areas are seen in the bilateral kidneys, right greater than left. No hydronephrosis. STOMACH AND BOWEL: Unremarkable. APPENDIX: No findings to suggest appendicitis. ABDOMINOPELVIC CAVITY: No ascites. No pneumoperitoneum. No lymphadenopathy. VESSELS: Unremarkable for patient's age. PELVIS REPRODUCTIVE ORGANS: Heterogeneous attenuation of the uterus with some fluid seen within the endometrial cavity. There is some stranding seen along the anterior margin of the uterus. Overall findings are within normal limits given patient's history of recent . URINARY BLADDER: Limited by underdistention. There is wall thickening and perivesicular stranding noted although somewhat difficult to assess in the  setting of adjacent postsurgical change. ABDOMINAL WALL/INGUINAL REGIONS: Subcutaneous edematous and inflammatory changes seen along the lower abdominal pelvic wall consistent with patient history of recent . There is a focal enhancing fluid collection seen at the right lower abdominal  pelvic wall deep to the incision site measuring approximately 1.9 x 4.2 x 2.3 cm (AP by transverse by CC) suspicious for developing abscess (601:25, 2:125). BONES: No acute fracture or suspicious osseous lesion.     Impression: Postsurgical changes of recent  noted with hyperemia along the lower abdominopelvic wall. 4.2 cm enhancing fluid collection at the right lower abdominal pelvic wall deep to the surgical incision site concerning for developing abscess. GYN/surgical consult advised. Findings above suspicious for pyelonephritis, right worse than left and concomitant cystitis although evaluation of the latter somewhat limited secondary to underdistention and adjacent postsurgical change. No evidence of intrarenal abscess. Clinical correlation recommended. Otherwise mild edematous and inflammatory stranding in the pelvis overall within normal limits for expected postoperative change. No discrete abdominal pelvic collection is seen. Above findings discussed with Dr. Batista at 4:25 a.m. on 2024. Workstation performed: [id number]     I have personally reviewed any available and pertinent imaging study reports.        ** Please Note: This note is constructed using a voice recognition dictation system. **

## 2024-01-30 NOTE — ASSESSMENT & PLAN NOTE
Cont post op care  Pt breastfeeding:  breast pump ordered:  d/w pt to pump and discard milk:  she states she has been using pump.   Offered lactation consultant to help with using breast pump:  she declines  Emphasized to pt importance of pumping to prevent milk supply from drying up

## 2024-01-30 NOTE — PLAN OF CARE
Problem: PAIN - ADULT  Goal: Verbalizes/displays adequate comfort level or baseline comfort level  Description: Interventions:  - Encourage patient to monitor pain and request assistance  - Assess pain using appropriate pain scale  - Administer analgesics based on type and severity of pain and evaluate response  - Implement non-pharmacological measures as appropriate and evaluate response  - Consider cultural and social influences on pain and pain management  - Notify physician/advanced practitioner if interventions unsuccessful or patient reports new pain  Outcome: Progressing     Problem: INFECTION - ADULT  Goal: Absence or prevention of progression during hospitalization  Description: INTERVENTIONS:  - Assess and monitor for signs and symptoms of infection  - Monitor lab/diagnostic results  - Monitor all insertion sites, i.e. indwelling lines, tubes, and drains  - Monitor endotracheal if appropriate and nasal secretions for changes in amount and color  - [place] appropriate cooling/warming therapies per order  - Administer medications as ordered  - Instruct and encourage patient and family to use good hand hygiene technique  - Identify and instruct in appropriate isolation precautions for identified infection/condition  Outcome: Progressing     Problem: INFECTION - ADULT  Goal: Absence of fever/infection during neutropenic period  Description: INTERVENTIONS:  - Monitor WBC    Outcome: Progressing     Problem: SAFETY ADULT  Goal: Patient will remain free of falls  Description: INTERVENTIONS:  - Educate patient/family on patient safety including physical limitations  - Instruct patient to call for assistance with activity   - Consult OT/PT to assist with strengthening/mobility   - Keep Call bell within reach  - Keep bed low and locked with side rails adjusted as appropriate  - Keep care items and personal belongings within reach  - Initiate and maintain comfort rounds  - Make Fall Risk Sign visible to staff  -  Offer Toileting every 2 Hours, in advance of need  - Initiate/Maintain bed alarm  - Obtain necessary fall risk management equipment: bed alarm, call bell,  socks  - Apply yellow socks and bracelet for high fall risk patients  - Consider moving patient to room near nurses station  Outcome: Progressing     Problem: SAFETY ADULT  Goal: Maintain or return to baseline ADL function  Description: INTERVENTIONS:  -  Assess patient's ability to carry out ADLs; assess patient's baseline for ADL function and identify physical deficits which impact ability to perform ADLs (bathing, care of mouth/teeth, toileting, grooming, dressing, etc.)  - Assess/evaluate cause of self-care deficits   - Assess range of motion  - Assess patient's mobility; develop plan if impaired  - Assess patient's need for assistive devices and provide as appropriate  - Encourage maximum independence but intervene and supervise when necessary  - Involve family in performance of ADLs  - Assess for home care needs following discharge   - Consider OT consult to assist with ADL evaluation and planning for discharge  - Provide patient education as appropriate  Outcome: Progressing     Problem: SAFETY ADULT  Goal: Maintains/Returns to pre admission functional level  Description: INTERVENTIONS:  - Perform AM-PAC 6 Click Basic Mobility/ Daily Activity assessment daily.  - Set and communicate daily mobility goal to care team and patient/family/caregiver.   - Collaborate with rehabilitation services on mobility goals if consulted  - Perform Range of Motion 3 times a day.  - Reposition patient every 2 hours.  - Dangle patient 3 times a day  - Stand patient 3 times a day  - Ambulate patient 3 times a day  - Out of bed to chair 3 times a day   - Out of bed for meals 3 times a day  - Out of bed for toileting  - Record patient progress and toleration of activity level   Outcome: Progressing     Problem: DISCHARGE PLANNING  Goal: Discharge to home or other facility  with appropriate resources  Description: INTERVENTIONS:  - Identify barriers to discharge w/patient and caregiver  - Arrange for needed discharge resources and transportation as appropriate  - Identify discharge learning needs (meds, wound care, etc.)  - Arrange for interpretive services to assist at discharge as needed  - Refer to Case Management Department for coordinating discharge planning if the patient needs post-hospital services based on physician/advanced practitioner order or complex needs related to functional status, cognitive ability, or social support system  Outcome: Progressing     Problem: Knowledge Deficit  Goal: Patient/family/caregiver demonstrates understanding of disease process, treatment plan, medications, and discharge instructions  Description: Complete learning assessment and assess knowledge base.  Interventions:  - Provide teaching at level of understanding  - Provide teaching via preferred learning methods  Outcome: Progressing     Problem: METABOLIC, FLUID AND ELECTROLYTES - ADULT  Goal: Electrolytes maintained within normal limits  Description: INTERVENTIONS:  - Monitor labs and assess patient for signs and symptoms of electrolyte imbalances  - Administer electrolyte replacement as ordered  - Monitor response to electrolyte replacements, including repeat lab results as appropriate  - Instruct patient on fluid and nutrition as appropriate  Outcome: Progressing     Problem: METABOLIC, FLUID AND ELECTROLYTES - ADULT  Goal: Fluid balance maintained  Description: INTERVENTIONS:  - Monitor labs   - Monitor I/O and WT  - Instruct patient on fluid and nutrition as appropriate  - Assess for signs & symptoms of volume excess or deficit  Outcome: Progressing     Problem: METABOLIC, FLUID AND ELECTROLYTES - ADULT  Goal: Glucose maintained within target range  Description: INTERVENTIONS:  - Monitor Blood Glucose as ordered  - Assess for signs and symptoms of hyperglycemia and hypoglycemia  -  Administer ordered medications to maintain glucose within target range  - Assess nutritional intake and initiate nutrition service referral as needed  Outcome: Progressing

## 2024-01-30 NOTE — ASSESSMENT & PLAN NOTE
Patient relates she took Tylenol Saturday, 1/27: She took between 7-8 tablets of 500 mg of Tylenol in a 24-hour period.  Denies any other Tylenol use  Patient states she was taking this pain for abdominal cramping  Acetaminophen level on admission 8:    Pt was started on NAC in ER  Pt notes her nausea resolved  Reviewed with Toxicology team: will cont NAC infusion until LFTs trend down x 2   normal CK  Repeat tylenol level 2  Discussed again with toxicology team 1/30: Continue acetylcysteine infusion until transaminases improved x 2

## 2024-01-30 NOTE — ASSESSMENT & PLAN NOTE
Patient with transaminitis: LFTs 330/380 -> 785/816 -> 2367/2232 -> AM pending   Low suspicion for preeclampsia at this time. Patient has no history of hypertensive disorder of pregnancy and no other lab abnormalities. PC ratio is 0.17   Hep B/Hep C negative   EBV/CMV pending   Management per [facility]IM   Low suspicion that hepatotoxicity is secondary to Nexplanon placement however if AST/ALT worsening or not improving in the setting of no other clear etiologies consider Nexplanon removal while inpatient

## 2024-01-30 NOTE — ASSESSMENT & PLAN NOTE
"Patient reporting suprapubic pain and nausea x 3 days in combination with her right flank pain  CT a/p demonstrating \"Postsurgical changes of recent  noted with hyperemia along the lower abdominopelvic wall. 4.2 cm enhancing fluid collection at the right lower abdominal pelvic wall deep to the surgical incision site concerning for developing abscess. GYN/surgical consult advised.\"  OB/GYN team following: Do not feel that fluid collection represents abscess  Patient being monitored off antibiotics  Per ID: If there is suspicion for abscess, not able to rule this out prior to fluid aspiration and culture  Recommend repeat imaging in 1-2 weeks to reassess fluid collection  "

## 2024-01-30 NOTE — PLAN OF CARE
Problem: PAIN - ADULT  Goal: Verbalizes/displays adequate comfort level or baseline comfort level  Description: Interventions:  - Encourage patient to monitor pain and request assistance  - Assess pain using appropriate pain scale  - Administer analgesics based on type and severity of pain and evaluate response  - Implement non-pharmacological measures as appropriate and evaluate response  - Consider cultural and social influences on pain and pain management  - Notify physician/advanced practitioner if interventions unsuccessful or patient reports new pain  Outcome: Progressing     Problem: INFECTION - ADULT  Goal: Absence or prevention of progression during hospitalization  Description: INTERVENTIONS:  - Assess and monitor for signs and symptoms of infection  - Monitor lab/diagnostic results  - Monitor all insertion sites, i.e. indwelling lines, tubes, and drains  - Monitor endotracheal if appropriate and nasal secretions for changes in amount and color  - [place] appropriate cooling/warming therapies per order  - Administer medications as ordered  - Instruct and encourage patient and family to use good hand hygiene technique  - Identify and instruct in appropriate isolation precautions for identified infection/condition  Outcome: Progressing  Goal: Absence of fever/infection during neutropenic period  Description: INTERVENTIONS:  - Monitor WBC    Outcome: Progressing     Problem: SAFETY ADULT  Goal: Patient will remain free of falls  Description: INTERVENTIONS:  - Educate patient/family on patient safety including physical limitations  - Instruct patient to call for assistance with activity   - Consult OT/PT to assist with strengthening/mobility   - Keep Call bell within reach  - Keep bed low and locked with side rails adjusted as appropriate  - Keep care items and personal belongings within reach  - Initiate and maintain comfort rounds  - Make Fall Risk Sign visible to staff  - Offer Toileting every 2 Hours,  in advance of need  - Initiate/Maintain bed alarm  - Obtain necessary fall risk management equipment:   - Apply yellow socks and bracelet for high fall risk patients  - Consider moving patient to room near nurses station  Outcome: Progressing  Goal: Maintain or return to baseline ADL function  Description: INTERVENTIONS:  -  Assess patient's ability to carry out ADLs; assess patient's baseline for ADL function and identify physical deficits which impact ability to perform ADLs (bathing, care of mouth/teeth, toileting, grooming, dressing, etc.)  - Assess/evaluate cause of self-care deficits   - Assess range of motion  - Assess patient's mobility; develop plan if impaired  - Assess patient's need for assistive devices and provide as appropriate  - Encourage maximum independence but intervene and supervise when necessary  - Involve family in performance of ADLs  - Assess for home care needs following discharge   - Consider OT consult to assist with ADL evaluation and planning for discharge  - Provide patient education as appropriate  Outcome: Progressing  Goal: Maintains/Returns to pre admission functional level  Description: INTERVENTIONS:  - Perform AM-PAC 6 Click Basic Mobility/ Daily Activity assessment daily.  - Set and communicate daily mobility goal to care team and patient/family/caregiver.   - Collaborate with rehabilitation services on mobility goals if consulted  - Perform Range of Motion 3 times a day.  - Reposition patient every 2 hours.  - Dangle patient 3 times a day  - Stand patient 3 times a day  - Ambulate patient 3 times a day  - Out of bed to chair 3 times a day   - Out of bed for meals 3 times a day  - Out of bed for toileting  - Record patient progress and toleration of activity level   Outcome: Progressing     Problem: DISCHARGE PLANNING  Goal: Discharge to home or other facility with appropriate resources  Description: INTERVENTIONS:  - Identify barriers to discharge w/patient and caregiver  -  Arrange for needed discharge resources and transportation as appropriate  - Identify discharge learning needs (meds, wound care, etc.)  - Arrange for interpretive services to assist at discharge as needed  - Refer to Case Management Department for coordinating discharge planning if the patient needs post-hospital services based on physician/advanced practitioner order or complex needs related to functional status, cognitive ability, or social support system  Outcome: Progressing     Problem: Knowledge Deficit  Goal: Patient/family/caregiver demonstrates understanding of disease process, treatment plan, medications, and discharge instructions  Description: Complete learning assessment and assess knowledge base.  Interventions:  - Provide teaching at level of understanding  - Provide teaching via preferred learning methods  Outcome: Progressing     Problem: METABOLIC, FLUID AND ELECTROLYTES - ADULT  Goal: Electrolytes maintained within normal limits  Description: INTERVENTIONS:  - Monitor labs and assess patient for signs and symptoms of electrolyte imbalances  - Administer electrolyte replacement as ordered  - Monitor response to electrolyte replacements, including repeat lab results as appropriate  - Instruct patient on fluid and nutrition as appropriate  Outcome: Progressing  Goal: Fluid balance maintained  Description: INTERVENTIONS:  - Monitor labs   - Monitor I/O and WT  - Instruct patient on fluid and nutrition as appropriate  - Assess for signs & symptoms of volume excess or deficit  Outcome: Progressing  Goal: Glucose maintained within target range  Description: INTERVENTIONS:  - Monitor Blood Glucose as ordered  - Assess for signs and symptoms of hyperglycemia and hypoglycemia  - Administer ordered medications to maintain glucose within target range  - Assess nutritional intake and initiate nutrition service referral as needed  Outcome: Progressing

## 2024-01-30 NOTE — PROGRESS NOTES
"Received a call from the patient's RN ~ 1940 asking for another Acetadote infusion. Per med orders, the next infusion should not be due until 1/30 0200. The RN checked the rate of the \"16-hr\" liter bag and it was 131.3 ml/hr instead of 65.6 ml/hr that it should have been. Reviewed with Dr. Otilia Ordaz, [facility]  on duty. Ok to start another 16 hr bag continuous at 65.6 ml/hr.  "

## 2024-01-30 NOTE — UTILIZATION REVIEW
"Initial Clinical Review    Admission: Date/Time/Statement:   Admission Orders (From admission, onward)       Ordered        24 0507  INPATIENT ADMISSION  Once                          Orders Placed This Encounter   Procedures    INPATIENT ADMISSION     Standing Status:   Standing     Number of Occurrences:   1     Order Specific Question:   Level of Care     Answer:   Med Surg [16]     Order Specific Question:   Estimated length of stay     Answer:   More than 2 Midnights     Order Specific Question:   Certification     Answer:   I certify that inpatient services are medically necessary for this patient for a duration of greater than two midnights. See H&P and MD Progress Notes for additional information about the patient's course of treatment.     ED Arrival Information       Expected   -    Arrival   2024 22:52    Acuity   Urgent              Means of arrival   Walk-In    Escorted by   Self    Service   Hospitalist    Admission type   Emergency              Arrival complaint   medical problem             Chief Complaint   Patient presents with    Abdominal Pain     Pt reports right sided flank pain since yesterday. Has been taking tylenol at home with no relief. Also reports bad taste in mouth and nausea. Had  1/15/24.        Initial Presentation: 23 y.o. female presents to ED from home  with right flank and pelvic pain for past  3 days.  States she  \" took a   lot of tylenol\"  to deal with pain.   However, when asked exactly how much, she reports about 8 tylenol over the 3 days, taking 2 tylenol at a time. Feels  nausea  from tylenol. Tylenol unlikely  cause  of nausea, seems to have taken tylenol safely.  Flank and s/p pain  improved on  ED arrival, still with nausea.  Labs  reveal transaminitis.  Met SIRS  criteria with tachycardia,  WBC  15.06.  PMH  is  recent  c/section  1/15.   Ct scan shows suspicion for  pyelo/developing abscess. Admit  Ip with  SIRS,  likely R/T pyelo/pelvic abscess, " Transaminitis, Pyelo  and pelvic  abscess and plan is  monitor labs, urine culture, VANDANA, OB consult, NAC  initiated   in ED, GI/toxicology/ID  consult, antiemetics and pain control as needed.      ID  consult'  SIRS  response, unclear if any infectious cause,  leukocytosis seems  chronic.  Unsure  of  CT findings  indicative  of infectious pyelo.  No dysuria/no CVAT/afebrile.  Continue VANDANA for now.      Toxicology consult  Tylenol  intake reported well under  toxic level. Explore other  causes of  liver toxicity.  Continue   NAC  until LFT's  downtrend.    Gi consult  Elevated LFTs: Her LFTs are elevated in hepatocellular predominant pattern.  Etiologies include drug-induced liver injury, though her Tylenol use was lower not significantly higher than the recommended daily dose.  Other causes of drug-induced liver injury include medications (antibiotics) received during her prior admission, viral, postpartum  HELLP (however suspicion for preeclampsia is low per OB/GYN ) portal vein thrombus, ischemic hepatopathy.  We will send serologic testing for viral causes of acute liver injury occluding EBV, CMV, HSV in addition to acute hepatitis panel  Continue to trend liver enzymes  Follow-up right upper quadrant ultrasound with Doppler      Date:   1/30      Day 2:   Patients LFTs have drastically risen. AST was 2367, ALT 2232, with normal bilirubin and alk phos suggesting hepatocellular injury. Will need to consider viral causes.  Monitor  now off antibiotics.  F/U  cultures. May need   IR  intervention.  Continue serial abdominal exams.   Continue  close monitoring.      ED Triage Vitals [01/28/24 2319]   Temperature Pulse Respirations Blood Pressure SpO2   98.5 °F (36.9 °C) 102 18 146/92 98 %      Temp Source Heart Rate Source Patient Position - Orthostatic VS BP Location FiO2 (%)   Oral Monitor Sitting Right arm --      Pain Score       6          Wt Readings from Last 1 Encounters:   01/29/24 99.1 kg (218 lb 7.6 oz)      Additional Vital Signs:   97.4 °F (36.3 °C) Abnormal  101 18 120/59 -- 98 % None (Room air) Lying    24 2349 97.6 °F (36.4 °C) 98 18 129/60 87 98 % None (Room air) Lying   24 1534 98.3 °F (36.8 °C) 79 16 128/97 103 98 % None (Room air) Lying   24 0734 96.8 °F (36 °C) Abnormal  75 14 147/74 91 99 % None (Room air) Lying   24 0350 -- 78 20 139/65 94 98 % None (Room air) Lying   24 0225 -- 86 18 142/68 -- 99 % -- Sitting   24 2319 98.5 °F (36.9 °C) 102 18 146/92 -- 98 % None (Room air) Sitting     Pertinent Labs/Diagnostic Test Results:   US right upper quadrant with liver dopplers   Final Result by Turner Garces MD ([id number])      Normal.      Workstation performed: [id number]         CT abdomen pelvis with contrast   Final Result by Tonio Gallardo MD ( [id number])      Postsurgical changes of recent  noted with hyperemia along the lower abdominopelvic wall.   4.2 cm enhancing fluid collection at the right lower abdominal pelvic wall deep to the surgical incision site concerning for developing abscess. GYN/surgical consult advised.      Findings above suspicious for pyelonephritis, right worse than left and concomitant cystitis although evaluation of the latter somewhat limited secondary to underdistention and adjacent postsurgical change.   No evidence of intrarenal abscess. Clinical correlation recommended.      Otherwise mild edematous and inflammatory stranding in the pelvis overall within normal limits for expected postoperative change. No discrete abdominal pelvic collection is seen.      Above findings discussed with Dr. Batista at 4:25 a.m. on 2024.      Workstation performed: [id number]               Results from last 7 days   Lab Units 24  0616 24  0904 24  0025   WBC Thousand/uL 12.41* 15.07* 15.06*   HEMOGLOBIN g/dL 10.5* 10.6* 12.1   HEMATOCRIT % 32.6* 32.8* 38.0   PLATELETS Thousands/uL 299 352 446*   NEUTROS ABS Thousands/µL  8.98* 13.36* 12.30*         Results from last 7 days   Lab Units 01/30/24  0616 01/29/24  0904 01/29/24  0025   SODIUM mmol/L 138 137 136   POTASSIUM mmol/L 3.2* 3.5 3.6   CHLORIDE mmol/L 107 107 108   CO2 mmol/L 23 22 22   ANION GAP mmol/L 8 8 6   BUN mg/dL 4* 8 12   CREATININE mg/dL 0.45* 0.52* 0.66   EGFR ml/min/1.73sq m 142 136 125   CALCIUM mg/dL 8.3* 8.2* 8.7   MAGNESIUM mg/dL 1.7*  --   --      Results from last 7 days   Lab Units 01/30/24  0616 01/29/24  1705 01/29/24  0904 01/29/24  0025   AST U/L 1,528* 2,367* 785* 330*   ALT U/L 2,466* 2,232* 816* 384*   ALK PHOS U/L 89 88 85 105*   TOTAL PROTEIN g/dL 6.3* 6.2* 6.4 7.5   ALBUMIN g/dL 3.1* 3.0* 3.1* 3.8   TOTAL BILIRUBIN mg/dL 0.50 0.35 0.56 0.52   BILIRUBIN DIRECT mg/dL  --  0.15  --   --    AMMONIA umol/L 61  --   --   --          Results from last 7 days   Lab Units 01/30/24  0616 01/29/24  0904 01/29/24  0025   GLUCOSE RANDOM mg/dL 103 145* 110           Results from last 7 days   Lab Units 01/29/24  0904   CK TOTAL U/L 46             Results from last 7 days   Lab Units 01/30/24  0616 01/29/24  0904 01/29/24  0025   PROTIME seconds 21.4* 19.8* 19.1*   INR  1.84* 1.66* 1.58*   PTT seconds  --   --  32         Results from last 7 days   Lab Units 01/30/24  0616 01/29/24  0615   PROCALCITONIN ng/ml 34.43* 54.28*     Results from last 7 days   Lab Units 01/30/24  0616 01/29/24  0616   LACTIC ACID mmol/L 0.9 1.1                             Results from last 7 days   Lab Units 01/29/24  1217   HEP B S AG  Non-reactive   HEP C AB  Non-reactive   HEP B C IGM  Non-reactive   HEP B C TOTAL AB  Non-reactive     Results from last 7 days   Lab Units 01/29/24  0025   LIPASE u/L 23                 Results from last 7 days   Lab Units 01/29/24  0323 01/29/24  0209   CLARITY UA   --  Clear   COLOR UA   --  Yellow   SPEC GRAV UA   --  1.020   PH UA   --  6.0   GLUCOSE UA mg/dl  --  Negative   KETONES UA mg/dl  --  Negative   BLOOD UA   --  Large*   PROTEIN UA mg/dl  --   30 (1+)*   NITRITE UA   --  Negative   BILIRUBIN UA   --  Negative   UROBILINOGEN UA E.U./dl  --  >=8.0*   LEUKOCYTES UA   --  Small*   WBC UA /hpf  --  30-50*   RBC UA /hpf  --  Innumerable*   BACTERIA UA /hpf  --  None Seen   EPITHELIAL CELLS WET PREP /hpf  --  Occasional   MUCUS THREADS   --  Moderate*   CREATININE UR mg/dL 154.3  --    PROTEIN UR mg/dL 27  --    PROT/CREAT RATIO UR  0.17*  --                  Results from last 7 days   Lab Units 01/29/24  0904 01/29/24  0025   ACETAMINOPHEN LVL ug/mL 2* 8*                 Results from last 7 days   Lab Units 01/29/24  0622 01/29/24  0615   BLOOD CULTURE  Received in Microbiology Lab. Culture in Progress. Received in Microbiology Lab. Culture in Progress.                   ED Treatment:   Medication Administration from 01/28/2024 2252 to 01/29/2024 0553         Date/Time Order Dose Route Action Comments     01/29/2024 0026 EST ondansetron (ZOFRAN) injection 4 mg 4 mg Intravenous Given --     01/29/2024 0157 EST lactated ringers bolus 1,000 mL 0 mL Intravenous Stopped --     01/29/2024 0026 EST lactated ringers bolus 1,000 mL 1,000 mL Intravenous New Bag --     01/29/2024 0344 EST acetylcysteine (ACETADOTE) 15,000 mg in dextrose 5 % 200 mL IVPB 0 mg Intravenous Stopped --     01/29/2024 0205 EST acetylcysteine (ACETADOTE) 15,000 mg in dextrose 5 % 200 mL IVPB 15,000 mg Intravenous New Bag --     01/29/2024 0438 EST acetylcysteine (ACETADOTE) 5,000 mg in dextrose 5 % 500 mL IVPB 5,000 mg Intravenous New Bag --     01/29/2024 0225 EST iohexol (OMNIPAQUE) 350 MG/ML injection (MULTI-DOSE) 100 mL 100 mL Intravenous Given --     01/29/2024 0259 EST ondansetron (ZOFRAN) injection 4 mg 4 mg Intravenous Given --     01/29/2024 0351 EST magnesium sulfate 2 g/50 mL IVPB (premix) 2 g 0 g Intravenous Stopped --     01/29/2024 0331 EST magnesium sulfate 2 g/50 mL IVPB (premix) 2 g 2 g Intravenous New Bag per provider to give over 20 minutes     01/29/2024 0329 EST droperidol  (INAPSINE) injection 1.25 mg 1.25 mg Intravenous Given --     01/29/2024 0538 EST lactated ringers bolus 1,000 mL 0 mL Intravenous Stopped --     01/29/2024 0342 EST lactated ringers bolus 1,000 mL 1,000 mL Intravenous New Bag --     01/29/2024 0515 EST cefTRIAXone (ROCEPHIN) IVPB (premix in dextrose) 1,000 mg 50 mL 0 mg Intravenous Stopped --     01/29/2024 0435 EST cefTRIAXone (ROCEPHIN) IVPB (premix in dextrose) 1,000 mg 50 mL 1,000 mg Intravenous New Bag --              Admitting Diagnosis: Abdominal pain [R10.9]  Pyelonephritis [N12]  Nausea and vomiting [R11.2]  Elevated LFTs [R79.89]  Post-operative wound abscess [T81.49XA]  Toxic effect of acetaminophen, accidental or unintentional, initial encounter [T39.1X1A]  Age/Sex: 23 y.o. female  Admission Orders:  Scheduled Medications:  enoxaparin, 40 mg, Subcutaneous, Daily      Continuous IV Infusions:  acetylcysteine, 100 mg/kg, Intravenous, Continuous  multi-electrolyte, 100 mL/hr, Intravenous, Continuous      PRN Meds:       IP CONSULT TO OB GYN  IP CONSULT TO GASTROENTEROLOGY  IP CONSULT TO TOXICOLOGY  IP CONSULT TO INFECTIOUS DISEASES    Network Utilization Review Department  ATTENTION: Please call with any questions or concerns to [phone] and carefully listen to the prompts so that you are directed to the right person. All voicemails are confidential.   For Discharge needs, contact Care Management DC Support Team at [phone] opt. [phone]  Send all requests for admission clinical reviews, approved or denied determinations and any other requests to dedicated fax number below belonging to the campus where the patient is receiving treatment. List of dedicated fax numbers for the Facilities:  FACILITY NAME UR FAX NUMBER   ADMISSION DENIALS (Administrative/Medical Necessity) [phone]   DISCHARGE SUPPORT TEAM (NETWORK) [phone]   PARENT CHILD HEALTH (Maternity/NICU/Pediatrics) [phone]   [facility] [phone]   [facility]  [facility] [phone]   [facility] [phone]   [facility] [phone]   [facility] [phone]   [facility] [phone]   [facility] [phone]   [facility] [phone]   [facility] [phone]   [facility] [phone]   [facility] [phone]   [facility] [phone]

## 2024-01-30 NOTE — PROGRESS NOTES
Progress Note - Infectious Disease   Grismerlin Fernandez 23 y.o. female MRN: [id number]  Unit/Bed#: E2 -01 Encounter: [id number]    Impression/Plan:  1. SIRS. Present on admission. Tachycardia and leukocytosis. In patient with recent c section. Upon review of her available past medical records it appears she has a chronic leukocytosis ranging from 11.17 - 18.59 since 2019. Consider abdominal pelvic source as patient has small fluid collection noted deep to her  site although OBGYN have low suspicion for abscess. Renal hypodensities also noted on imaging but in setting of recent pregnancy, inconclusive UA, and lack of  symptoms, low suspicion for UTI. Blood cultures are pending. Given lack of clear infectious source the patient is being monitored of antibiotic for now.  -monitor patient off antibiotic  -monitor CBCD and BMP  -follow up blood cultures  -if patient develops new pelvic pain or fever, recommend IR assessment of fluid collection for possible aspiration/culture/drain placement  -monitor vitals  -supportive care     2. Abnormal CT A/P. New CT A/P showed a small fluid collection noted deep to her  site and B/L renal hypodensities without hydronephrosis. Unclear role of recent pregnancy in renal findings. Perhaps a post-op seroma near surgical site? OBGYN has assessed and have low suspicion for pelvic abscess. Unclear if these findings contributed to her SIRS presentation above. Recommend monitoring patient off antibiotics for now. If patient develops new pelvic pain or fever, recommend IR assessment of fluid collection for possible aspiration/culture/drain placement  -monitor patient off antibiotics  -serial abdominal exams  -monitor GI/ symptoms  -if patient develops new pelvic pain or fever, recommend IR assessment of fluid collection for possible aspiration/culture/drain placement  -continue follow up with OBGYN     3. Elevated LFTs. CT A/P which showed unremarkable  gallbladder and liver. RUQ US was normal. Consider role of recent Tylenol intake although she was under toxicity level per toxicology assessment. She has been started on NAC. Gastroenterology has assessed. Patient without tenderness over the liver on exam. Acute hepatitis panel was negative. EBV, CMV, and HSV studies are pending. She should complete additional rapid HIV as she was not screened since last year. HSV can cause signficant liver failure in pregnant women. In postpartum state I would continue to worry about this as a potential source of LFT issue. If LFTs do not improve would recommend liver biopsy for more accurate HSV testing and possible start of empiric valtrex.  -check LFTs tomorrow  -check rapid HIV screen  -follow up viral studies  -continue follow up with GI     4. Nausea. Per patient she is concerned this is related to her recent Tylenol intact. Reports she was using the Tylenol for pain control post . CT A/P which showed unremarkable gallbladder, liver, spleen, pancrease, stomach, and bowel. Kidneys without hydronephrosis or perinephric stranding. Per patient her nausea has resolved today. GI is following.  -serial GI exams  -monitor GI symptoms  -follow up GI work up above  -continue follow up with GI     5. S/P c section. Surgery occurring on 1/15/2024 resulting in viable male. Patient reports she would like to continue nursing. Has been pumping today and is currently dumping her breast milk due to receiving inpatient medications. She would like to continue nursing once she's home. OBGYN has assessed, no plans for further intervention at this time.  -will consider breastfeeding plan when prescribing medications  -continue follow up with OBGYN    Above plan was discussed in detail with patient at the bedside.  Above plan was discussed in detail with SUSAN who agrees with ongoing liver work up.    Antibiotics:  No current antibiotic use    Subjective:  Patient reports she's feeling well  today. Is hopeful to go home as soon as possible, reports she can get a ride this morning. She denies pain at her surgical site, tells me the wound is healed with no active drainage. She denies abdominal, pelvic, and flank pain. She denies nausea, vomiting, diarrhea. She has no fever, chills, sweats, shakes; no cough, shortness of breath, or chest pain. No new symptoms.    Objective:  Vitals:  Temp:  [96.8 °F (36 °C)-98.3 °F (36.8 °C)] 97.6 °F (36.4 °C)  HR:  [75-98] 98  Resp:  [14-18] 18  BP: (128-147)/(60-97) 129/60  SpO2:  [98 %-99 %] 98 %  Temp (24hrs), Av.6 °F (36.4 °C), Min:96.8 °F (36 °C), Max:98.3 °F (36.8 °C)  Current: Temperature: 97.6 °F (36.4 °C)    Physical Exam:   General Appearance:  Alert, interactive, nontoxic, no acute distress. She appears comfortable sitting up in bed having breakfast.   Throat: Oropharynx moist without lesions.    Lungs:   Clear to auscultation bilaterally; no wheezes, rhonchi or rales; respirations unlabored on room air.   Heart:  RRR; no murmur, rub or gallop.   Abdomen:   Soft, non-tender, non-distended, positive bowel sounds. Lower abdominal horizontal incision healing with mild scabbing, no active drainage/bleeding, no spreading erythema from site, no tenderness with palpation.     Extremities: No clubbing or cyanosis, no edema.   Skin: No new rashes noted on exposed skin.     Labs, Imaging, & Other studies:   All pertinent labs and imaging studies were personally reviewed  Results from last 7 days   Lab Units 24  0904 24  0025   WBC Thousand/uL 15.07* 15.06*   HEMOGLOBIN g/dL 10.6* 12.1   PLATELETS Thousands/uL 352 446*     Results from last 7 days   Lab Units 24  1705 24  0904 24  0025   POTASSIUM mmol/L  --  3.5 3.6   CHLORIDE mmol/L  --  107 108   CO2 mmol/L  --  22 22   BUN mg/dL  --  8 12   CREATININE mg/dL  --  0.52* 0.66   EGFR ml/min/1.73sq m  --  136 125   CALCIUM mg/dL  --  8.2* 8.7   AST U/L 2,367* 785* 330*   ALT U/L 2,232*  816* 384*   ALK PHOS U/L 88 85 105*     Results from last 7 days   Lab Units 01/29/24  0622 01/29/24  0615   BLOOD CULTURE  Received in Microbiology Lab. Culture in Progress. Received in Microbiology Lab. Culture in Progress.

## 2024-01-30 NOTE — PROGRESS NOTES
"Gyn Progress Note   Grismerlin Fernandez 23 y.o. female MRN: [id number]  Unit/Bed#: E2 -01 Encounter: [id number]      A/P:  Grismerlin is postop day #15 from repeat low-transverse  section now admitted after meeting SIRS criteria and found to have elevated liver enzymes  Transaminitis  Assessment & Plan  Patient with transaminitis: LFTs 330/380 -> 785/816 -> 2367/2232 -> AM pending   Low suspicion for preeclampsia at this time. Patient has no history of hypertensive disorder of pregnancy and no other lab abnormalities. PC ratio is 0.17   Hep B/Hep C negative   EBV/CMV pending   Management per SLIM   Low suspicion that hepatotoxicity is secondary to Nexplanon placement however if AST/ALT worsening or not improving in the setting of no other clear etiologies consider Nexplanon removal while inpatient     Status post repeat low transverse  section  Assessment & Plan  S/p RLTCS 1/15/24  CTAP : 4.2 cm enhancing fluid collection at the right lower abdominal pelvic wall deep to the surgical incision site concerning for developing abscess.   Incision c/d/I, small well-circumcised mobile mass palpable deep to incision on right side consistent with subcutaneous knot   No surgical intervention necessary at this time. Recommend antibiotics, patient currently ordered for ceftriaxone  Consider course of keflex on discharge          Subjective:   Patient has no complaints. Grismerlin denies having any pain.  She states her lochia is very minimal at this time during her recovery.  She continues to pump.  She denies any fevers, chills, chest pain or shortness of breath.    /60 (BP Location: Right arm)   Pulse 98   Temp 97.6 °F (36.4 °C) (Temporal)   Resp 18   Ht 5' 2\" (1.575 m)   Wt 99.1 kg (218 lb 7.6 oz)   LMP 2023 (Approximate)   SpO2 98%   BMI 39.96 kg/m²     Lab Results   Component Value Date    WBC 12.41 (H) 2024    HGB 10.5 (L) 2024    HCT 32.6 (L) 2024    MCV " "79 (L) 01/30/2024     01/30/2024       Lab Results   Component Value Date    CALCIUM 8.2 (L) 01/29/2024    K 3.5 01/29/2024    CO2 22 01/29/2024     01/29/2024    BUN 8 01/29/2024    CREATININE 0.52 (L) 01/29/2024       No results found for: \"POCGLU\"    I/O last 3 completed shifts:  In: 2625 [I.V.:325; IV Piggyback:2300]  Out: -   No intake/output data recorded.    Physical Exam  Constitutional:       General: She is not in acute distress.     Appearance: Normal appearance.   HENT:      Head: Normocephalic and atraumatic.   Cardiovascular:      Rate and Rhythm: Normal rate.   Pulmonary:      Effort: Pulmonary effort is normal.   Abdominal:      Palpations: Abdomen is soft.      Tenderness: There is no abdominal tenderness.      Comments: Pfannenstiel incision clean dry and intact with small area of exposed underlying subcutaneous in the midline without any active bleeding  No areas of fluctuance palpated  Small nodular area on right side of incision consistent with not from subcutaneous stitch     Neurological:      General: No focal deficit present.      Mental Status: She is alert.   Skin:     General: Skin is warm and dry.   Psychiatric:         Mood and Affect: Mood normal.         Desiree Johnson MD  Obstetrics & Gynecology PGY-3  1/30/2024  7:23 AM      "

## 2024-01-30 NOTE — PROGRESS NOTES
"[facility]  Progress Note  Name: Grismerlin Fernandez I  MRN: [id number]  Unit/Bed#: E2 -01 I Date of Admission: 2024   Date of Service: 2024 I Hospital Day: 1    Assessment/Plan   * SIRS (systemic inflammatory response syndrome) (HCC)  Assessment & Plan  Patient is a 23-year-old female with past medical history significant for  on 1/15 who presented to the ED with complaints of right flank pain, suprapubic pain, and abdominal pain x 3 days    Pt fulfilling SIRS with tachycardia with  and leukocytosis of 15: presentation concerning for sepsis, POA  CT scan with \"4.2 cm enhancing fluid collection at the right lower abdominal pelvic wall deep to the surgical incision site concerning for developing abscess\"  OB/GYN team evaluation appreciated: 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  Per OB, fluid collection deep to CT scan not felt to be abscess  Continue to monitor off antibiotics    Pelvic fluid collection  Assessment & Plan  Patient reporting suprapubic pain and nausea x 3 days in combination with her right flank pain  CT a/p demonstrating \"Postsurgical changes of recent  noted with hyperemia along the lower abdominopelvic wall. 4.2 cm enhancing fluid collection at the right lower abdominal pelvic wall deep to the surgical incision site concerning for " "developing abscess. GYN/surgical consult advised.\"  OB/GYN team following: Do not feel that fluid collection represents abscess  Patient being monitored off antibiotics  Per ID: If there is suspicion for abscess, not able to rule this out prior to fluid aspiration and culture  Recommend repeat imaging in 1-2 weeks to reassess fluid collection    Transaminitis  Assessment & Plan  CMP with new transaminitis on admission: , , alkaline phosphatase 105.  Normal bilirubin  She reported concern that her nausea is from taking \"too many Tylenol\". However, pt reports taking only about 8 tylenol over the 3 days PTA to help control her right flank and pelvic pain, only taking 2 at time.   Tylenol level 8  Suspect this is less likely the cause of her transaminits   Pt initiated on NAC in the ED, continue for now  RUQ US with Dopplers unremarkable  INR 1.58-->1.8  Transaminases worsened, and peaked to an AST of 2367: ALT 2232, with normal bilirubin  Appreciate evaluation and recommendation by GI and infectious disease team  A) viral:   Negative acute hepatitis panel, HIV  Pending: Hepatitis E, HSV, EBV, CMV, ceruloplasmin, alpha-1 antitrypsin, AMA, anti-smooth muscle  B) ischemic:  no h/o hypotension with delivery  C) thrombotic:  wedge shaped hypodensity of B kidneys:  question hypercoag state  Check hypercoag panel  Abd US with dopplers neg for thrombus  D) structural:  CT abdomen without evidence of gallstones, obstruction, or dilated ducts  Right upper quadrant ultrasound and Dopplers unremarkable  E) medication:  not felt to be tylenol toxicity  Pt with recent Nexplanon implant 2 weeks ago:  per GYN-  if felt to be etiology of DILI may need explant.    Tylenol ingestion  Assessment & Plan  Patient relates she took Tylenol Saturday, 1/27: She took between 7-8 tablets of 500 mg of Tylenol in a 24-hour period.  Denies any other Tylenol use  Patient states she was taking this pain for abdominal " "cramping  Acetaminophen level on admission 8:    Pt was started on NAC in ER  Pt notes her nausea resolved  Reviewed with Toxicology team: will cont NAC infusion until LFTs trend down x 2   normal CK  Repeat tylenol level 2  Discussed again with toxicology team : Continue acetylcysteine infusion until transaminases improved x 2    Abnormal CT scan, kidney  Assessment & Plan  CT a/p demonstrating \"Findings above suspicious for pyelonephritis, right worse than left and concomitant cystitis although evaluation of the latter somewhat limited secondary to underdistention and adjacent postsurgical change. No evidence of intrarenal abscess. Clinical correlation recommended.\"  UA with negative nitrates, small leukocyte esterase, 30-50 white blood cells:  Urine culture pending  Not felt to represent pyelonephritis: Due to wedge-shaped bilateral renal hypodensities, consideration for infarct  Reviewed previous records, patient did not have any hypotensive episodes  Patient did have recent COVID infection  and could be considered hypercoagulable secondary to that  Will check hypercoagulable panel  Patient denies any personal or family history of clotting disorders    Status post repeat low transverse  section  Assessment & Plan  Cont post op care  Pt breastfeeding:  breast pump ordered:  d/w pt to pump and discard milk:  she states she has been using pump.   Offered lactation consultant to help with using breast pump:  she declines  Emphasized to pt importance of pumping to prevent milk supply from drying up                 Family:  offered to call pts  with update in [place] citizen, she declines    Chun pts nurse and CM  [facility] GI: Dr. Delilah Mccollum ID: Dr. Schafer/FADIA ALBARADO    VTE Pharmacologic Prophylaxis: Enoxaparin (Lovenox)  VTE Mechanical Prophylaxis: sequential compression device        Certification Statement: The patient will continue to require additional inpatient hospital stay due to need for further " acute intervention for transaminitis    Status: inpatient     ===================================================================    Subjective:  Patient denies any nausea or vomiting.  Denies any GI upset.  Notes she is tolerating p.o. without any difficulty.  Denies any pain anywhere.  Indicates her previous abdominal/pelvic pain has resolved.  Denies any back pain.  Denies any shortness of breath or cough.  Denies any dizziness or lightheadedness.  Urinating without any difficulty.  Patient relates she use the breast pump, but notes it was difficult to use.  She declines the lactation consultant coming to her room to help her.  She denies any other complaints      Physical Exam:   Temp:  [97.4 °F (36.3 °C)-97.7 °F (36.5 °C)] 97.7 °F (36.5 °C)  HR:  [] 86  Resp:  [18] 18  BP: (114-129)/(59-60) 114/60    Gen:  Pleasant, non-tachypnic, non-dyspnic.  Conversant.  Heart: regular rate and rhythm, S1S2 present, no murmur, rub or gallop  Lungs: clear to ausculatation bilaterally.  No wheezing, crackles, or rhonchi. No accessory muscle use or respiratory distress.  Abd: soft, non-tender, non-distended. NABS, no guarding, rebound or peritoneal signs.  Extremities: no clubbing, cyanosis or edema.  2+pedal pulses bilaterally. Full range of motion  Neuro: awake, alert.  Fluent speech.  Interactive.  No somnolence or lethargy  Skin: warm and dry: no petechiae, purpura and rash.    LABS:   Results from last 7 days   Lab Units 01/30/24  0616 01/29/24  0904 01/29/24  0025   WBC Thousand/uL 12.41* 15.07* 15.06*   HEMOGLOBIN g/dL 10.5* 10.6* 12.1   HEMATOCRIT % 32.6* 32.8* 38.0   PLATELETS Thousands/uL 299 352 446*     Results from last 7 days   Lab Units 01/30/24  0616 01/29/24  0904 01/29/24  0025   POTASSIUM mmol/L 3.2* 3.5 3.6   CHLORIDE mmol/L 107 107 108   CO2 mmol/L 23 22 22   BUN mg/dL 4* 8 12   CREATININE mg/dL 0.45* 0.52* 0.66   CALCIUM mg/dL 8.3* 8.2* 8.7       Hospital Data:  1/29: Right upper quadrant ultrasound  with liver Dopplers  Normal     CT abdomen/pelvis:  Postsurgical changes of recent  noted with hyperemia along the lower abdominopelvic wall.  4.2 cm enhancing fluid collection at the right lower abdominal pelvic wall deep to the surgical incision site concerning for developing abscess. GYN/surgical consult advised.  Findings above suspicious for pyelonephritis, right worse than left and concomitant cystitis although evaluation of the latter somewhat limited secondary to underdistention and adjacent postsurgical change.  No evidence of intrarenal abscess. Clinical correlation recommended.  otherwise mild edematous and inflammatory stranding in the pelvis overall within normal limits for expected postoperative change. No discrete abdominal pelvic collection is seen.     Microbiology  : Blood culture: negative x 2  : Urine culture: Pending        ---------------------------------------------------------------------------------------------------------------  This note has been constructed using a voice recognition system.

## 2024-01-30 NOTE — QUICK NOTE
Reviewed repeat LFTs:  transaminases of 2,000    Called GI on call and discussed case  Cont to monitor closely  Closely monitor mentation  Cont: NAC  RUQ US normal  Off abx:  last dose was ceftriaxone at 4am

## 2024-01-31 ENCOUNTER — TELEPHONE (OUTPATIENT)
Dept: FAMILY MEDICINE CLINIC | Facility: CLINIC | Age: 24
End: 2024-01-31

## 2024-01-31 ENCOUNTER — TRANSITIONAL CARE MANAGEMENT (OUTPATIENT)
Dept: FAMILY MEDICINE CLINIC | Facility: CLINIC | Age: 24
End: 2024-01-31

## 2024-01-31 VITALS
HEART RATE: 67 BPM | HEIGHT: 62 IN | BODY MASS INDEX: 38.91 KG/M2 | WEIGHT: 211.42 LBS | TEMPERATURE: 97.7 F | DIASTOLIC BLOOD PRESSURE: 64 MMHG | OXYGEN SATURATION: 99 % | RESPIRATION RATE: 14 BRPM | SYSTOLIC BLOOD PRESSURE: 114 MMHG

## 2024-01-31 LAB
A1AT SERPL-MCNC: 227 MG/DL (ref 100–188)
ACTIN IGG SERPL-ACNC: 9 UNITS (ref 0–19)
ALBUMIN SERPL BCP-MCNC: 3.2 G/DL (ref 3.5–5)
ALP SERPL-CCNC: 88 U/L (ref 34–104)
ALT SERPL W P-5'-P-CCNC: 1428 U/L (ref 7–52)
ANION GAP SERPL CALCULATED.3IONS-SCNC: 4 MMOL/L
AST SERPL W P-5'-P-CCNC: 207 U/L (ref 13–39)
BACTERIA UR CULT: ABNORMAL
BACTERIA UR CULT: ABNORMAL
BASOPHILS # BLD AUTO: 0.06 THOUSANDS/ÂΜL (ref 0–0.1)
BASOPHILS NFR BLD AUTO: 1 % (ref 0–1)
BILIRUB SERPL-MCNC: 0.5 MG/DL (ref 0.2–1)
BUN SERPL-MCNC: 7 MG/DL (ref 5–25)
CALCIUM ALBUM COR SERPL-MCNC: 8.9 MG/DL (ref 8.3–10.1)
CALCIUM SERPL-MCNC: 8.3 MG/DL (ref 8.4–10.2)
CERULOPLASMIN SERPL-MCNC: 35.4 MG/DL (ref 19–39)
CHLORIDE SERPL-SCNC: 110 MMOL/L (ref 96–108)
CMV DNA SERPL NAA+PROBE-ACNC: NOT DETECTED [IU]/ML
CO2 SERPL-SCNC: 23 MMOL/L (ref 21–32)
CREAT SERPL-MCNC: 0.45 MG/DL (ref 0.6–1.3)
DEPRECATED AT III PPP: 72 % OF NORMAL (ref 92–136)
EOSINOPHIL # BLD AUTO: 0.48 THOUSAND/ÂΜL (ref 0–0.61)
EOSINOPHIL NFR BLD AUTO: 4 % (ref 0–6)
ERYTHROCYTE [DISTWIDTH] IN BLOOD BY AUTOMATED COUNT: 15.1 % (ref 11.6–15.1)
GFR SERPL CREATININE-BSD FRML MDRD: 142 ML/MIN/1.73SQ M
GLUCOSE SERPL-MCNC: 88 MG/DL (ref 65–140)
HAPTOGLOB SERPL-MCNC: 340 MG/DL (ref 33–278)
HCT VFR BLD AUTO: 31.9 % (ref 34.8–46.1)
HGB BLD-MCNC: 10.1 G/DL (ref 11.5–15.4)
IMM GRANULOCYTES # BLD AUTO: 0.06 THOUSAND/UL (ref 0–0.2)
IMM GRANULOCYTES NFR BLD AUTO: 1 % (ref 0–2)
INR PPP: 1.35 (ref 0.84–1.19)
LYMPHOCYTES # BLD AUTO: 3.19 THOUSANDS/ÂΜL (ref 0.6–4.47)
LYMPHOCYTES NFR BLD AUTO: 29 % (ref 14–44)
MCH RBC QN AUTO: 25.8 PG (ref 26.8–34.3)
MCHC RBC AUTO-ENTMCNC: 31.7 G/DL (ref 31.4–37.4)
MCV RBC AUTO: 82 FL (ref 82–98)
MITOCHONDRIA M2 IGG SER-ACNC: <20 UNITS (ref 0–20)
MONOCYTES # BLD AUTO: 1.22 THOUSAND/ÂΜL (ref 0.17–1.22)
MONOCYTES NFR BLD AUTO: 11 % (ref 4–12)
NEUTROPHILS # BLD AUTO: 5.86 THOUSANDS/ÂΜL (ref 1.85–7.62)
NEUTS SEG NFR BLD AUTO: 54 % (ref 43–75)
NRBC BLD AUTO-RTO: 0 /100 WBCS
PLATELET # BLD AUTO: 302 THOUSANDS/UL (ref 149–390)
PMV BLD AUTO: 9.7 FL (ref 8.9–12.7)
POTASSIUM SERPL-SCNC: 3.6 MMOL/L (ref 3.5–5.3)
PROT SERPL-MCNC: 6.4 G/DL (ref 6.4–8.4)
PROTHROMBIN TIME: 16.9 SECONDS (ref 11.6–14.5)
RBC # BLD AUTO: 3.91 MILLION/UL (ref 3.81–5.12)
SODIUM SERPL-SCNC: 137 MMOL/L (ref 135–147)
WBC # BLD AUTO: 10.87 THOUSAND/UL (ref 4.31–10.16)

## 2024-01-31 PROCEDURE — 85670 THROMBIN TIME PLASMA: CPT | Performed by: INTERNAL MEDICINE

## 2024-01-31 PROCEDURE — 85025 COMPLETE CBC W/AUTO DIFF WBC: CPT | Performed by: INTERNAL MEDICINE

## 2024-01-31 PROCEDURE — 85306 CLOT INHIBIT PROT S FREE: CPT | Performed by: INTERNAL MEDICINE

## 2024-01-31 PROCEDURE — 86146 BETA-2 GLYCOPROTEIN ANTIBODY: CPT | Performed by: INTERNAL MEDICINE

## 2024-01-31 PROCEDURE — 85303 CLOT INHIBIT PROT C ACTIVITY: CPT | Performed by: INTERNAL MEDICINE

## 2024-01-31 PROCEDURE — 85705 THROMBOPLASTIN INHIBITION: CPT | Performed by: INTERNAL MEDICINE

## 2024-01-31 PROCEDURE — 80053 COMPREHEN METABOLIC PANEL: CPT | Performed by: INTERNAL MEDICINE

## 2024-01-31 PROCEDURE — 99232 SBSQ HOSP IP/OBS MODERATE 35: CPT | Performed by: INTERNAL MEDICINE

## 2024-01-31 PROCEDURE — 85305 CLOT INHIBIT PROT S TOTAL: CPT | Performed by: INTERNAL MEDICINE

## 2024-01-31 PROCEDURE — 85300 ANTITHROMBIN III ACTIVITY: CPT | Performed by: INTERNAL MEDICINE

## 2024-01-31 PROCEDURE — 85732 THROMBOPLASTIN TIME PARTIAL: CPT | Performed by: INTERNAL MEDICINE

## 2024-01-31 PROCEDURE — 86147 CARDIOLIPIN ANTIBODY EA IG: CPT | Performed by: INTERNAL MEDICINE

## 2024-01-31 PROCEDURE — 85610 PROTHROMBIN TIME: CPT | Performed by: INTERNAL MEDICINE

## 2024-01-31 PROCEDURE — 85613 RUSSELL VIPER VENOM DILUTED: CPT | Performed by: INTERNAL MEDICINE

## 2024-01-31 RX ADMIN — ACETYLCYSTEINE 10000 MG: 200 INJECTION, SOLUTION INTRAVENOUS at 00:03

## 2024-01-31 RX ADMIN — ENOXAPARIN SODIUM 40 MG: 40 INJECTION SUBCUTANEOUS at 08:06

## 2024-01-31 NOTE — QUICK NOTE
Patient left unit AMA this morning prior to ID CRNP evaluation. I did review her LFTs which showed good reduction of the AST and ALT. WBC count with improvement to 10.87. No indication to restart antibiotic at this time. Patient will hopefully maintain routine post-partum care with her OBGYN team for ongoing evaluation of her  site.

## 2024-01-31 NOTE — PLAN OF CARE
Problem: INFECTION - ADULT  Goal: Absence or prevention of progression during hospitalization  Description: INTERVENTIONS:  - Assess and monitor for signs and symptoms of infection  - Monitor lab/diagnostic results  - Monitor all insertion sites, i.e. indwelling lines, tubes, and drains  - Monitor endotracheal if appropriate and nasal secretions for changes in amount and color  - Nash appropriate cooling/warming therapies per order  - Administer medications as ordered  - Instruct and encourage patient and family to use good hand hygiene technique  - Identify and instruct in appropriate isolation precautions for identified infection/condition  Outcome: Progressing     Problem: DISCHARGE PLANNING  Goal: Discharge to home or other facility with appropriate resources  Description: INTERVENTIONS:  - Identify barriers to discharge w/patient and caregiver  - Arrange for needed discharge resources and transportation as appropriate  - Identify discharge learning needs (meds, wound care, etc.)  - Arrange for interpretive services to assist at discharge as needed  - Refer to Case Management Department for coordinating discharge planning if the patient needs post-hospital services based on physician/advanced practitioner order or complex needs related to functional status, cognitive ability, or social support system  Outcome: Progressing

## 2024-01-31 NOTE — TELEPHONE ENCOUNTER
Hi, this is Doctor Jacquelyn calling from [facility]. I have a patient who follows with [facility] who needs a new family doctor and I was hoping I could schedule that for her. First name is I'll spell it RUBIO as in Felix MALIK as in Tania HURD. Last name is Bertin JOHNSON as in Vivek RIDDLE. [birth year] date is 2 one 2000 and we're hoping to get her a hospital transition of care appointment within a week. If you could call me back so I could coordinate with the patient, my A cell phone number is [phone]. I'll be here at the hospital till 7:00 tonight. Thank you so much. Bye.      Called and scheduled patient for TCM.

## 2024-01-31 NOTE — DISCHARGE SUMMARY
"[facility]  Discharge- Grismerlin Fernandez 2000, 23 y.o. female MRN: [id number]  Unit/Bed#: E2 -01 Encounter: [id number]  Primary Care Provider: No primary care provider on file.   Date and time admitted to hospital: 2024 11:56 PM        Admission Date: 2024       LEFT AMA Date: 2024        Primary Diagnoses  Principal Problem:    SIRS (systemic inflammatory response syndrome) ([facility])  Active Problems:    Transaminitis    Pelvic fluid collection    Status post repeat low transverse  section    Abnormal CT scan, kidney    Tylenol ingestion  Resolved Problems:    * No resolved hospital problems. *      Hospital course by problem:  * SIRS (systemic inflammatory response syndrome) ([facility])  Assessment & Plan  Patient is a 23-year-old female with past medical history significant for  on 1/15 who presented to the ED with complaints of right flank pain, suprapubic pain, and abdominal pain x 3 days    Pt fulfilling SIRS with tachycardia with  and leukocytosis of 15: presentation concerning for sepsis, POA  CT scan with \"4.2 cm enhancing fluid collection at the right lower abdominal pelvic wall deep to the surgical incision site concerning for developing abscess\"  OB/GYN team evaluation appreciated: 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  Not likely pathogen  Procal 54, normal lactate  Pt was started on antibiotics with ceftriaxone: Was evaluated by ID team: Noted to have SIRS, but no definitive infectious " "etiology so additional antibiotics were held.  Per OB, fluid collection deep to CT scan not felt to be abscess  Patient was monitored off antibiotics and improved: Was afebrile, with a markedly improved white blood cell count  Patient left AMA this morning.  Patient at home, she will follow-up with the [facility] clinic  I personally called the  women's clinic and they will contact patient today to schedule a hospital follow-up/postpartum visit     Pelvic fluid collection  Assessment & Plan  Patient reporting suprapubic pain and nausea x 3 days in combination with her right flank pain  CT a/p demonstrating \"Postsurgical changes of recent  noted with hyperemia along the lower abdominopelvic wall. 4.2 cm enhancing fluid collection at the right lower abdominal pelvic wall deep to the surgical incision site concerning for developing abscess. GYN/surgical consult advised.\"  OB/GYN team following: Do not feel that fluid collection represents abscess  Patient being monitored off antibiotics  Per ID: If there is suspicion for abscess, not able to rule this out prior to fluid aspiration and culture  Recommend repeat imaging in 1-2 weeks to reassess fluid collection  Patient left AMA this morning  [facility] and we will call patient today to schedule an appointment for hospital follow-up/postpartum visit to be seen within the week    Transaminitis  Assessment & Plan  CMP with new transaminitis on admission: , , alkaline phosphatase 105.  Normal bilirubin  She reported concern that her nausea is from taking \"too many Tylenol\". However, pt reports taking only about 8 tylenol over the 3 days PTA to help control her right flank and pelvic pain, only taking 2 at time.   Tylenol level 8  Suspect this is less likely the cause of her transaminits   Pt initiated on NAC in the ED, continue for now  RUQ US with Dopplers unremarkable  INR 1.58-->1.8  Transaminases " "worsened, and peaked to an AST of 2367: ALT 2232, with normal bilirubin  Appreciate evaluation and recommendation by GI and infectious disease team  A) viral:   Negative acute hepatitis panel, HIV, CMV, RADHA  Pending: Hepatitis E, HSV, EBV, , ceruloplasmin, alpha-1 antitrypsin, AMA, anti-smooth muscle  B) ischemic:  no h/o hypotension with delivery  C) thrombotic:  wedge shaped hypodensity of B kidneys:  question hypercoag state  pending hypercoag panel  Abd US with dopplers neg for thrombus  D) structural:  CT abdomen without evidence of gallstones, obstruction, or dilated ducts  Right upper quadrant ultrasound and Dopplers unremarkable  E) medication:  not felt to be tylenol toxicity  Pt with recent Nexplanon implant 2 weeks ago:  per GYN-  if felt to be etiology of DILI may need explant.  Pt left AMA prior to complete work up:  Called [facility]:  pt has new patient appt this Friday 2/2 at 1:20pm:  will need referral to see GI as oupt:  also placed request for ambulatory referral in computer  Will need repeat LFT in 1 week  Today's LFTs continue to improve: , ALT 1428    Tylenol ingestion  Assessment & Plan  Patient relates she took Tylenol Saturday, 1/27: She took between 7-8 tablets of 500 mg of Tylenol in a 24-hour period.  Denies any other Tylenol use  Patient states she was taking this pain for abdominal cramping  Acetaminophen level on admission 8:    Pt was started on NAC in ER  Pt notes her nausea resolved  Reviewed with Toxicology team: will cont NAC infusion until LFTs trend down x 2   normal CK  Repeat tylenol level 2  Discussed again with toxicology team 1/30: Continue acetylcysteine infusion until transaminases improved x 2  Patient's LFTs have improved today, however she left AMA this morning       Abnormal CT scan, kidney  Assessment & Plan  CT a/p demonstrating \"Findings above suspicious for pyelonephritis, right worse than left and concomitant cystitis although evaluation of " "the latter somewhat limited secondary to underdistention and adjacent postsurgical change. No evidence of intrarenal abscess. Clinical correlation recommended.\"  UA with negative nitrates, small leukocyte esterase, 30-50 white blood cells:  Urine culture pending  Not felt to represent pyelonephritis: Due to wedge-shaped bilateral renal hypodensities, consideration for infarct  Reviewed previous records, patient did not have any hypotensive episodes  Patient did have recent COVID infection 1/1 and could be considered hypercoagulable secondary to that  Pending hypercoagulable panel  Patient denies any personal or family history of clotting disorders    Left AGAINST MEDICAL ADVICE:  Patient had been requesting discharge since the morning after admission.  It was discussed with her by myself as well as the specialist, need to remain in the hospital initially for evaluation of infection, and then her abnormal liver tests.  The degree of elevation and the concern for liver damage was discussed with her.  -Patient was noted to be absent from her room, her IV had been removed and was lying in her room.  She had left AGAINST MEDICAL ADVICE  -Contacted patient via phone after she left and discussed with her the urgent need to make sure she followed up with the GYN doctor as well as a family doctor  -I personally called the  women's health clinic: The  confirmed that she would call patient today to schedule a hospital discharge/postpartum appointment  I called the [facility] family practice office: Patient has appointment scheduled this Friday, February 2 at 1:20 PM: I called patient back and left a message on her voicemail regarding this appointment    Service:  [facility] Internal Medicine, [facility].    Consulting Providers   Toxicology: Dr. Jay  Infectious disease: Dr. Schafer  GI: Dr. Jaiyeola  GYN: Dr. Louise    Procedures Performed   none    Hospital Studies:  1/29: Right upper quadrant " ultrasound with liver Dopplers  Normal      CT abdomen/pelvis:  Postsurgical changes of recent  noted with hyperemia along the lower abdominopelvic wall.  4.2 cm enhancing fluid collection at the right lower abdominal pelvic wall deep to the surgical incision site concerning for developing abscess. GYN/surgical consult advised.  Findings above suspicious for pyelonephritis, right worse than left and concomitant cystitis although evaluation of the latter somewhat limited secondary to underdistention and adjacent postsurgical change.  No evidence of intrarenal abscess. Clinical correlation recommended.  otherwise mild edematous and inflammatory stranding in the pelvis overall within normal limits for expected postoperative change. No discrete abdominal pelvic collection is seen.     Microbiology  : Blood culture: negative x 2  : Urine culture: Pending    Results from last 7 days   Lab Units 24  0510 24  0616 24  0904   WBC Thousand/uL 10.87* 12.41* 15.07*   HEMOGLOBIN g/dL 10.1* 10.5* 10.6*   HEMATOCRIT % 31.9* 32.6* 32.8*   PLATELETS Thousands/uL 302 299 352     Results from last 7 days   Lab Units 24  0510 24  0616 24  0904   POTASSIUM mmol/L 3.6 3.2* 3.5   CHLORIDE mmol/L 110* 107 107   CO2 mmol/L 23 23 22   BUN mg/dL 7 4* 8   CREATININE mg/dL 0.45* 0.45* 0.52*   CALCIUM mg/dL 8.3* 8.3* 8.2*       History and Physical Exam:  Please refer to the Admission H&P note    Discharge Condition: Improved  Discharge Disposition: Elopement/ER Elopement    Discharge Note and Physical Exam:   Patient apparently removed her IV, and eloped from the hospital.  For the past 2 days patient has been requesting to leave the hospital and noted she wished to go home as she felt better.  I called patient at home this afternoon and she noted she felt fine.  She confirmed that she would follow-up with her GYN office.  I also discussed with her the need to follow-up with a family  doctor, for close monitoring of her liver tests        Vitals:    01/31/24 0733   BP: 114/64   Pulse: 67   Resp: 14   Temp: 97.7 °F (36.5 °C)   SpO2: 99%           Discharge Medications   Patient eloped    Discharge Follow Up Appointments:   SW: Family practice: February 2 at 1:20 PM;  called pt back to inform he:  left message on her voice mail  SW: Women's health: Office will call her today to schedule an appointment to be seen within 1 week for hospital follow-up/postpartum    .addendum:    7pm:  again called pt to ensure she received my message regarding her fu appt his Friday:  again got voice mail    This note has been constructed using a voice recognition system

## 2024-01-31 NOTE — NURSING NOTE
Pt IV in L AC is leaking. Myself and another nurse both tried sticking pt for new IV. Pt said she doesn't want another and to leave the one that is leaking. Myself and other nurse both explained about the pt medication going in continuously and that if it is leaking it is not going into her system. She said she is probably going home today anyways. SUSAN is aware.

## 2024-01-31 NOTE — RESTORATIVE TECHNICIAN NOTE
Restorative Technician Note      Patient Name: Grismerlin Fernandez     Restorative Tech Visit Date: 01/31/24  Note Type: Mobility  Patient Position Upon Consult: Supine  Activity Performed: Ambulated  Patient Position at End of Consult: Supine; All needs within reach

## 2024-01-31 NOTE — ASSESSMENT & PLAN NOTE
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  Not likely pathogen  Procal 54, normal lactate  Pt was started on antibiotics with ceftriaxone: Was evaluated by ID team: Noted to have SIRS, but no definitive infectious etiology so additional antibiotics were held.  Per OB, fluid collection deep to CT scan not felt to be abscess  Patient was monitored off antibiotics and improved: Was afebrile, with a markedly improved white blood cell count  Patient left AMA this morning.  Patient at home, she will follow-up with the [facility]  I personally called the  women's clinic and they will contact patient today to schedule a hospital follow-up/postpartum visit   "

## 2024-01-31 NOTE — ASSESSMENT & PLAN NOTE
"CMP with new transaminitis on admission: , , alkaline phosphatase 105.  Normal bilirubin  She reported concern that her nausea is from taking \"too many Tylenol\". However, pt reports taking only about 8 tylenol over the 3 days PTA to help control her right flank and pelvic pain, only taking 2 at time.   Tylenol level 8  Suspect this is less likely the cause of her transaminits   Pt initiated on NAC in the ED, continue for now  RUQ US with Dopplers unremarkable  INR 1.58-->1.8  Transaminases worsened, and peaked to an AST of 2367: ALT 2232, with normal bilirubin  Appreciate evaluation and recommendation by GI and infectious disease team  A) viral:   Negative acute hepatitis panel, HIV, CMV, RADHA  Pending: Hepatitis E, HSV, EBV, , ceruloplasmin, alpha-1 antitrypsin, AMA, anti-smooth muscle  B) ischemic:  no h/o hypotension with delivery  C) thrombotic:  wedge shaped hypodensity of B kidneys:  question hypercoag state  pending hypercoag panel  Abd US with dopplers neg for thrombus  D) structural:  CT abdomen without evidence of gallstones, obstruction, or dilated ducts  Right upper quadrant ultrasound and Dopplers unremarkable  E) medication:  not felt to be tylenol toxicity  Pt with recent Nexplanon implant 2 weeks ago:  per GYN-  if felt to be etiology of DILI may need explant.  Pt left AMA prior to complete work up:  Called [facility]:  pt has new patient appt this Friday 2/2 at 1:20pm:  will need referral to see GI as oupt:  also placed request for ambulatory referral in computer  Will need repeat LFT in 1 week  Today's LFTs continue to improve: , ALT 1428  "

## 2024-01-31 NOTE — NURSING NOTE
Call bell was ringing in the patient room. This RN was in another pt room. The PCA answered call bell and found that pt was not in room. The PCA notified this RN. This RN assessed the room and did not find the pt in room or on the unit. This RN found that the pt removed her IV and cut off her arm band and left items on table in pt room. This RN notified the unit charge nurse.

## 2024-01-31 NOTE — PROGRESS NOTES
[facility]  Progress Note  Encounter: [id number]     PATIENT INFO     Name: Grismerlin Fernandez  YOB: 2000   Age: 23 y.o.   Sex: female   MRN: [id number]  Unit/Bed#: E2 -01     ASSESSMENT & PLAN   Ms. Denton is a 23 F with hx of obesity and recent  delivery on 1/15/24 who presented with SIRS and new transaminitis.      # Transaminitis   Transaminitis and synthetic fx of liver has peaked and improving:   ->2367->1528 -> 814-> 207  ->2232->2466 ->2131 -> 1428  Alk phos and bilirubin remain WNL  INR 1.66->1.8 ->1.3; platelet count WNL  Workup:  RUQ U/S with patetn hepatic and portal veins ; no budd chiari  CMV IgG +, IgM-  (prior infection); Acute hepatitis panel negative for HAV & HBV, IgA 501, IgG 1105 WNL, IgM 155 WNL, EBV IgM neg, RADHA - , A1AT -  Not likely HELLP during pregnancy per obgyn  CT imaging was negative for cholelithiasis or inflammation, and liver was found to be unremarkable.    Patient did have total 3.5 g of Tylenol past 1 to 2 days which is within acceptable amount <4g/day.  Tylenol level was 8. No apparent jaundice, changes in mentation or RUQ pain.   Differentials include viral hepatitis, drug-induced liver injury secondary to antibiotic use (azithromycin, penicillin, cefazolin on 1/15 during ), DILI from Tylenol, budd chiari, autoimmune hepatitis.  No hypotension to indicate ischemic hepatitis.   Prior HCV neg 2023, HB S Ag neg 2023, HB S ab 35      Plan:  LFTs peaked and now improving   Per GI stable to discharge as patient request and monitor with CMP in 5 days; f/u with hepatology clinic in 2 weeks  Will need CMP and INR checked in 5 days post discharge  Follow up HSV,AMA, ASMA, IgG,  Hepatitis E IgM, ceruloplasmin  Follow up at hepatology clinic outpatient   Avoid tylenol use at this time     # Pyelonephritis  Pyelonephritis and cystitis on CT abdomen pelvis on 2024; + UA  On IV zosyn per  "primary     # R pelvic abscess  Recent  delivery on 1/15/24 with new R pelvic abscess on CT  On IV Zosyn  Gynecology consulted     # SIRS  WBC 15K - resolving  Likely in setting of pelvic abscess and pyelnonephritis  On IV antibiotics     SUBJECTIVE     Patient without any acute events overnight.  Seen and evaluated at bedside. Patient is requesting to leave by 9am to take care of her  baby. She feels well.  Denies any tremors, abdominal pain, skin color changes, confusion, nausea, vomiting, diarrhea, fevers, chills, cough.     OBJECTIVE     Objective   Blood pressure 134/63, pulse 72, temperature 97.5 °F (36.4 °C), temperature source Temporal, resp. rate 16, height 5' 2\" (1.575 m), weight 95.9 kg (211 lb 6.7 oz), last menstrual period 2023, SpO2 98%, currently breastfeeding. Body mass index is 38.67 kg/m².  No intake or output data in the 24 hours ending 24 0704  Medication Administration - last 24 hours from 2024 0704 to 2024 0704         Date/Time Order Dose Route Action Action by     2024 0950 EST enoxaparin (LOVENOX) subcutaneous injection 40 mg 40 mg Subcutaneous Given Rosanne Nicole RN     2024 0003 EST acetylcysteine (ACETADOTE) 10,000 mg in dextrose 5 % 1,000 mL IVPB 10,000 mg Intravenous New Bag Myrna Mcdermott RN     2024 1711 EST potassium chloride (Klor-Con M20) CR tablet 40 mEq 40 mEq Oral Given Rosanne Nicole RN            General Appearance:   Alert, cooperative, no distress   HEENT:   Normocephalic, atraumatic, anicteric     Lungs:   Equal chest rise, respirations unlabored    Heart:   Regular rate and rhythm   Abdomen:   Intact  scar, nontender abdomen, mass like nodule on RLQ by  site, Soft, non-distended; normal bowel sounds;    Rectal:   Deferred    Extremities:   No asterixis; No cyanosis, clubbing or edema    Neuro:   Moves all 4 extremities    Skin:   No jaundice, rashes, or lesions      Invasive Devices       " Peripheral Intravenous Line  Duration             Peripheral IV 24 Left Antecubital 2 days                     LABORATORY RESULTS     No results displayed because visit has over 200 results.           IMAGING RESULTS     US right upper quadrant with liver dopplers    Result Date: 2024  Narrative: RIGHT UPPER QUADRANT ULTRASOUND WITH LIVER DOPPLER INDICATION: Elevated LFTs. COMPARISON: 2024 TECHNIQUE: Real-time ultrasound of the right upper quadrant was performed with a curvilinear transducer with both volumetric sweeps and still imaging techniques. Color and spectral Doppler evaluation of the hepatic vasculature was performed. FINDINGS: PANCREAS: Visualized portions of the pancreas are within normal limits. AORTA AND IVC: Visualized portions are normal for patient age. LIVER: Size: Within normal range. The liver measures 16.7 cm in the midclavicular line. Contour: Surface contour is smooth. Parenchyma: Echogenicity and echotexture are within normal limits. No liver mass identified. LIVER DOPPLER: The main portal vein and primary branch segments are patent and hepatopetal with normal spectral waveform. Hepatic veins are patent. Spectral waveforms within normal limits. Main hepatic artery appears normal size, patent with normal spectral waveform. BILIARY: No gallbladder findings. No intrahepatic biliary dilatation. CBD measures 2.0 mm. No choledocholithiasis. KIDNEY: Right kidney measures 11.5 x 6.0 x 6.0 cm. Volume 213.7 mL Kidney within normal limits. ASCITES: None.     Impression: Normal. Workstation performed: [id number]     CT abdomen pelvis with contrast    Result Date: 2024  Narrative: CT ABDOMEN AND PELVIS WITH IV CONTRAST INDICATION: Abdominal pain, acute, nonlocalized Abdominal pain s/p , elevated LFTs. COMPARISON: 2020. TECHNIQUE: CT examination of the abdomen and pelvis was performed. Multiplanar 2D reformatted images were created from the source data. This examination,  like all CT scans performed in the [facility], was performed utilizing techniques to minimize radiation dose exposure, including the use of iterative reconstruction and automated exposure control. Radiation dose length product (DLP) for this visit: 854.94 mGy-cm IV Contrast: 100 mL of iohexol (OMNIPAQUE) Enteric Contrast: Not administered. FINDINGS: ABDOMEN LOWER CHEST: No clinically significant abnormality in the visualized lower chest. LIVER/BILIARY TREE: Unremarkable. GALLBLADDER: No calcified gallstones. No pericholecystic inflammatory change. SPLEEN: Unremarkable. PANCREAS: Unremarkable. ADRENAL GLANDS: Unremarkable. KIDNEYS/URETERS: Tiny wedge-shaped hypoenhancing areas are seen in the bilateral kidneys, right greater than left. No hydronephrosis. STOMACH AND BOWEL: Unremarkable. APPENDIX: No findings to suggest appendicitis. ABDOMINOPELVIC CAVITY: No ascites. No pneumoperitoneum. No lymphadenopathy. VESSELS: Unremarkable for patient's age. PELVIS REPRODUCTIVE ORGANS: Heterogeneous attenuation of the uterus with some fluid seen within the endometrial cavity. There is some stranding seen along the anterior margin of the uterus. Overall findings are within normal limits given patient's history of recent . URINARY BLADDER: Limited by underdistention. There is wall thickening and perivesicular stranding noted although somewhat difficult to assess in the setting of adjacent postsurgical change. ABDOMINAL WALL/INGUINAL REGIONS: Subcutaneous edematous and inflammatory changes seen along the lower abdominal pelvic wall consistent with patient history of recent . There is a focal enhancing fluid collection seen at the right lower abdominal  pelvic wall deep to the incision site measuring approximately 1.9 x 4.2 x 2.3 cm (AP by transverse by CC) suspicious for developing abscess (601:25, 2:125). BONES: No acute fracture or suspicious osseous lesion.     Impression: Postsurgical changes  of recent  noted with hyperemia along the lower abdominopelvic wall. 4.2 cm enhancing fluid collection at the right lower abdominal pelvic wall deep to the surgical incision site concerning for developing abscess. GYN/surgical consult advised. Findings above suspicious for pyelonephritis, right worse than left and concomitant cystitis although evaluation of the latter somewhat limited secondary to underdistention and adjacent postsurgical change. No evidence of intrarenal abscess. Clinical correlation recommended. Otherwise mild edematous and inflammatory stranding in the pelvis overall within normal limits for expected postoperative change. No discrete abdominal pelvic collection is seen. Above findings discussed with Dr. Batista at 4:25 a.m. on 2024. Workstation performed: [id number]     I have personally reviewed any available and pertinent imaging study reports.    Emilie Soyeon Kim, MD  Internal Medicine Residency, PGY-3  [facility], [place]  Available on [employer]    ** Please Note: This note is constructed using a voice recognition dictation system. **

## 2024-02-01 ENCOUNTER — TRANSITIONAL CARE MANAGEMENT (OUTPATIENT)
Dept: FAMILY MEDICINE CLINIC | Facility: CLINIC | Age: 24
End: 2024-02-01

## 2024-02-01 LAB
PROT C AG ACT/NOR PPP IA: 44 % OF NORMAL (ref 60–150)
PROT S ACT/NOR PPP: 41 % (ref 68–108)

## 2024-02-01 NOTE — ASSESSMENT & PLAN NOTE
"Patient reporting suprapubic pain and nausea x 3 days in combination with her right flank pain  CT a/p demonstrating \"Postsurgical changes of recent  noted with hyperemia along the lower abdominopelvic wall. 4.2 cm enhancing fluid collection at the right lower abdominal pelvic wall deep to the surgical incision site concerning for developing abscess. GYN/surgical consult advised.\"  OB/GYN team following: Do not feel that fluid collection represents abscess  Patient being monitored off antibiotics  Per ID: If there is suspicion for abscess, not able to rule this out prior to fluid aspiration and culture  Recommend repeat imaging in 1-2 weeks to reassess fluid collection  Patient left AMA this morning  I personally called the[facility] women's clinic and we will call patient today to schedule an appointment for hospital follow-up/postpartum visit to be seen within the week  "

## 2024-02-01 NOTE — ASSESSMENT & PLAN NOTE
"CT a/p demonstrating \"Findings above suspicious for pyelonephritis, right worse than left and concomitant cystitis although evaluation of the latter somewhat limited secondary to underdistention and adjacent postsurgical change. No evidence of intrarenal abscess. Clinical correlation recommended.\"  UA with negative nitrates, small leukocyte esterase, 30-50 white blood cells:  Urine culture pending  Not felt to represent pyelonephritis: Due to wedge-shaped bilateral renal hypodensities, consideration for infarct  Reviewed previous records, patient did not have any hypotensive episodes  Patient did have recent COVID infection 1/1 and could be considered hypercoagulable secondary to that  Pending hypercoagulable panel  Patient denies any personal or family history of clotting disorders  "

## 2024-02-01 NOTE — ASSESSMENT & PLAN NOTE
Patient relates she took Tylenol Saturday, 1/27: She took between 7-8 tablets of 500 mg of Tylenol in a 24-hour period.  Denies any other Tylenol use  Patient states she was taking this pain for abdominal cramping  Acetaminophen level on admission 8:    Pt was started on NAC in ER  Pt notes her nausea resolved  Reviewed with Toxicology team: will cont NAC infusion until LFTs trend down x 2   normal CK  Repeat tylenol level 2  Discussed again with toxicology team 1/30: Continue acetylcysteine infusion until transaminases improved x 2  Patient's LFTs have improved today, however she left AMA this morning

## 2024-02-01 NOTE — UTILIZATION REVIEW
NOTIFICATION OF ADMISSION DISCHARGE   This is a Notification of Discharge from [facility]. Please be advised that this patient has been discharge from our facility. Below you will find the admission and discharge date and time including the patient’s disposition.   UTILIZATION REVIEW CONTACT:  Humaira Ku  Utilization   Network Utilization Review Department  Phone: [phone] x carefully listen to the prompts. All voicemails are confidential.  Email: [email or web address]     ADMISSION INFORMATION  PRESENTATION DATE: 1/28/2024 11:56 PM  OBERVATION ADMISSION DATE:   INPATIENT ADMISSION DATE: 1/29/24  5:07 AM   DISCHARGE DATE: 1/31/2024 11:13 AM   DISPOSITION:Elopement/ER Elopement    Network Utilization Review Department  ATTENTION: Please call with any questions or concerns to [phone] and carefully listen to the prompts so that you are directed to the right person. All voicemails are confidential.   For Discharge needs, contact Care Management DC Support Team at [phone] opt. 2  Send all requests for admission clinical reviews, approved or denied determinations and any other requests to dedicated fax number below belonging to the campus where the patient is receiving treatment. List of dedicated fax numbers for the Facilities:  FACILITY NAME UR FAX NUMBER   ADMISSION DENIALS (Administrative/Medical Necessity) [phone]   DISCHARGE SUPPORT TEAM ([facility]) [phone]   PARENT CHILD HEALTH (Maternity/NICU/Pediatrics) [phone]   [facility] [phone]   [facility] [phone]   [facility] [phone]   [facility] [phone]   [facility] [phone]   [facility] [phone]   [facility] [phone]   [facility]  [phone]   [facility] [phone]   [facility] [phone]   [facility] [phone]   [facility] [phone]

## 2024-02-02 ENCOUNTER — DOCUMENTATION (OUTPATIENT)
Dept: OTHER | Facility: HOSPITAL | Age: 24
End: 2024-02-02

## 2024-02-02 PROBLEM — Z09 HOSPITAL DISCHARGE FOLLOW-UP: Status: ACTIVE | Noted: 2024-02-02

## 2024-02-02 LAB
APTT SCREEN TO CONFIRM RATIO: 0.83 RATIO (ref 0–1.34)
CONFIRM APTT/NORMAL: 42 SEC (ref 0–47.6)
HSV1 DNA SPEC QL NAA+PROBE: NEGATIVE
HSV2 DNA SPEC QL NAA+PROBE: NEGATIVE
LA PPP-IMP: NORMAL
PROT S ACT/NOR PPP: 84 % (ref 61–136)
PROT S PPP-ACNC: 46 % (ref 60–150)
SCREEN APTT: 37.6 SEC (ref 0–43.5)
SCREEN DRVVT: 36.1 SEC (ref 0–47)
THROMBIN TIME: 15.4 SEC (ref 0–23)

## 2024-02-02 NOTE — PROGRESS NOTES
Called patient to remind her of her 1:20pm appt today at [facility] for new PCP.  Left message on voicemail.

## 2024-02-03 LAB
BACTERIA BLD CULT: NORMAL
BACTERIA BLD CULT: NORMAL

## 2024-02-04 LAB — MISCELLANEOUS LAB TEST RESULT: NORMAL

## 2024-02-05 LAB
A1AT PHENOTYP SERPL IFE: ABNORMAL
A1AT SERPL-MCNC: 228 MG/DL (ref 100–188)

## 2024-02-09 LAB
B2 GLYCOPROT1 IGA SERPL IA-ACNC: 3.2
B2 GLYCOPROT1 IGG SERPL IA-ACNC: 6.6
B2 GLYCOPROT1 IGM SERPL IA-ACNC: <2.4
CARDIOLIPIN IGA SER IA-ACNC: 8.4
CARDIOLIPIN IGG SER IA-ACNC: 2.4
CARDIOLIPIN IGM SER IA-ACNC: 27

## 2024-02-19 NOTE — PROGRESS NOTES
[facility] - Outpatient Consultation  Grismerlin Fernandez 24 y.o. female MRN: [id number]  Encounter: [id number]    PCP:  No primary care provider on file., None  Referring Provider:  Anjali Valladares MD, [phone]        ASSESSMENT AND PLAN:      24 y.o. female with medical history significant for recent C section, whom we are seeing in consultation for elevated liver enzymes.    Patient was noted to have elevated liver enzymes following delivery which appear to be downtrending as of 3 weeks ago, however, the etiology of her liver enzyme elevation remains unclear.  She does note some use of Tylenol previously but Tylenol level was not elevated in the hospital.  Right upper quadrant ultrasound was unremarkable for underlying liver disease.  The degree of liver enzyme elevation does not seem consistent with fatty liver disease of pregnancy and no other concerns for HELLP syndrome.  All other other serologic workup including autoimmune was within normal limits.  Differential diagnosis also includes daily but patient denies any new medications or herbal supplements.  We discussed plan to continue to monitor liver enzymes for improvement.  Should they plateau or start to rise again, we will plan to proceed with liver biopsy to further investigate the etiology of her elevated liver enzymes.    -Follow-up iron studies and GGT.  -Monitor liver enzymes every 2 weeks.  If no improvement or worsening enzymes, we will plan for liver biopsy.    FOLLOW-UP:  Return in about 3 months (around 5/20/2024).    VISIT DIAGNOSES AND ORDERS:      1. Obesity (BMI 30-39.9)    2. Elevated LFTs      Orders Placed This Encounter   Procedures    Comprehensive metabolic panel    Gamma GT    Comprehensive metabolic panel    Ambulatory Referral to Nutrition Services    Ambulatory Referral to Weight Management     ______________________________________________________________________    HPI:  Ms. Grismerlin  Bertin is a 24 y.o. female with medical history significant for recent C section, whom we are seeing in consultation for elevated liver enzymes.    She was seen in the hospital with Dr. Jaiyeola on 24 for elevated liver enzymes with AST 2300, ALT 2200 along with nausea and abdominal pain. Abdominal ultrasound with dopplers was wnl.  She denies new medication or symptoms concurrent with the rise in her liver enzymes post delivery.   Denies heartburn, dysphagia, odynophagia, jaundice, nausea, vomiting, diarrhea, constipation, BRBPR, melena, abdominal pain, change in bowel habits, weight loss.     No EGD or colonoscopy.  Denies FH of colon cancer or other GI related issues.  RUQ US with liver dopplers wnl on 2023.    REVIEW OF SYSTEMS:  10 point ROS reviewed and negative except otherwise noted in the HPI above.     Historical Information   Patient Active Problem List   Diagnosis    Hx of  section    Obesity affecting pregnancy in second trimester    Status post repeat low transverse  section    History of intrauterine growth restriction in prior pregnancy, currently pregnant, first trimester    H/O abdominoplasty    Patient noncompliance    COVID-19 affecting pregnancy in third trimester    SIRS (systemic inflammatory response syndrome) (HCC)    Transaminitis    Pelvic fluid collection    Abnormal CT scan, kidney    Tylenol ingestion    Hospital discharge follow-up     Past Medical History:   Diagnosis Date    Depression      Past Surgical History:   Procedure Laterality Date    ABDOMINOPLASTY      CO  DELIVERY ONLY N/A 2020    Procedure:  SECTION ();  Surgeon: Adair Clifton MD;  Location: [facility];  Service: Obstetrics    CO  DELIVERY ONLY N/A 1/15/2024    Procedure:  SECTION ();  Surgeon: Yardlie Toussaint-Foster, DO;  Location: [facility];  Service: Obstetrics     Social History   Social History     Substance and Sexual Activity  "  Alcohol Use Not Currently    Comment: \"sometimes\"     Social History     Substance and Sexual Activity   Drug Use Never     Social History     Tobacco Use   Smoking Status Former    Types: Pipe   Smokeless Tobacco Never     Family History   Problem Relation Age of Onset    No Known Problems Mother     No Known Problems Father     No Known Problems Sister     No Known Problems Brother     Cancer Neg Hx     Diabetes Neg Hx        Meds/Allergies       Current Outpatient Medications:     ibuprofen (MOTRIN) 600 mg tablet    No Known Allergies    Objective     Blood pressure 110/72, pulse 88, temperature 97.9 °F (36.6 °C), temperature source Tympanic, height 5' 2\" (1.575 m), weight 97.3 kg (214 lb 9.6 oz), last menstrual period 04/19/2023, currently breastfeeding. Body mass index is 39.25 kg/m².    PHYSICAL EXAM:    General: Well-appearing, NAD  Eyes: no conjunctival icterus or pallor  Abdominal: Soft, non-tender, non-distended  Neuro: alert and oriented  Psych: Normal affect         Lab Results:   Lab Results   Component Value Date    K 3.6 01/31/2024    CO2 23 01/31/2024     (H) 01/31/2024    BUN 7 01/31/2024    CREATININE 0.45 (L) 01/31/2024     Lab Results   Component Value Date    WBC 10.87 (H) 01/31/2024    HGB 10.1 (L) 01/31/2024    HCT 31.9 (L) 01/31/2024    MCV 82 01/31/2024     01/31/2024     Lab Results   Component Value Date    TP 6.4 01/31/2024     (H) 01/31/2024    ALT 1,428 (H) 01/31/2024    BILIDIR 0.12 01/30/2024    INR 1.35 (H) 01/31/2024      No results found for: \"AFP\", \"IRON\", \"LABIRON\", \"FERRITIN\"  Lab Results   Component Value Date    HDL 31 (L) 11/24/2020    TRIG 60 11/24/2020       Radiology Results:   I have reviewed the results of any radiology studies performed within the last 90 days.    Sonia Tompkins D.O.  Fellow, PGY-5  Division of Gastroenterology & Hepatology  Available on [facility]  2/20/2024 2:44 PM    "

## 2024-02-20 ENCOUNTER — OFFICE VISIT (OUTPATIENT)
Dept: GASTROENTEROLOGY | Facility: CLINIC | Age: 24
End: 2024-02-20
Payer: MEDICARE

## 2024-02-20 VITALS
HEIGHT: 62 IN | TEMPERATURE: 97.9 F | WEIGHT: 214.6 LBS | HEART RATE: 88 BPM | BODY MASS INDEX: 39.49 KG/M2 | SYSTOLIC BLOOD PRESSURE: 110 MMHG | DIASTOLIC BLOOD PRESSURE: 72 MMHG

## 2024-02-20 DIAGNOSIS — R79.89 ELEVATED LFTS: ICD-10-CM

## 2024-02-20 DIAGNOSIS — E66.9 OBESITY (BMI 30-39.9): Primary | ICD-10-CM

## 2024-02-20 PROCEDURE — 99204 OFFICE O/P NEW MOD 45 MIN: CPT | Performed by: INTERNAL MEDICINE

## 2024-03-22 ENCOUNTER — TELEPHONE (OUTPATIENT)
Dept: GASTROENTEROLOGY | Facility: CLINIC | Age: 24
End: 2024-03-22

## 2024-04-16 ENCOUNTER — HOSPITAL ENCOUNTER (INPATIENT)
Facility: HOSPITAL | Age: 24
LOS: 1 days | DRG: 817 | End: 2024-04-18
Attending: EMERGENCY MEDICINE | Admitting: INTERNAL MEDICINE
Payer: MEDICARE

## 2024-04-16 DIAGNOSIS — T39.1X4D: ICD-10-CM

## 2024-04-16 DIAGNOSIS — T50.901A OVERDOSE: ICD-10-CM

## 2024-04-16 DIAGNOSIS — Z00.8 MEDICAL CLEARANCE FOR PSYCHIATRIC ADMISSION: ICD-10-CM

## 2024-04-16 DIAGNOSIS — T39.1X1A TYLENOL TOXICITY: Primary | ICD-10-CM

## 2024-04-16 LAB
ALBUMIN SERPL BCP-MCNC: 4.2 G/DL (ref 3.5–5)
ALP SERPL-CCNC: 80 U/L (ref 34–104)
ALT SERPL W P-5'-P-CCNC: 15 U/L (ref 7–52)
ANION GAP SERPL CALCULATED.3IONS-SCNC: 9 MMOL/L (ref 4–13)
APAP SERPL-MCNC: 194 UG/ML (ref 10–20)
AST SERPL W P-5'-P-CCNC: 15 U/L (ref 13–39)
ATRIAL RATE: 100 BPM
BASOPHILS # BLD AUTO: 0.05 THOUSANDS/ÂΜL (ref 0–0.1)
BASOPHILS NFR BLD AUTO: 0 % (ref 0–1)
BILIRUB SERPL-MCNC: 0.47 MG/DL (ref 0.2–1)
BUN SERPL-MCNC: 11 MG/DL (ref 5–25)
CALCIUM SERPL-MCNC: 9.4 MG/DL (ref 8.4–10.2)
CHLORIDE SERPL-SCNC: 105 MMOL/L (ref 96–108)
CO2 SERPL-SCNC: 25 MMOL/L (ref 21–32)
CREAT SERPL-MCNC: 0.76 MG/DL (ref 0.6–1.3)
EOSINOPHIL # BLD AUTO: 0.03 THOUSAND/ÂΜL (ref 0–0.61)
EOSINOPHIL NFR BLD AUTO: 0 % (ref 0–6)
ERYTHROCYTE [DISTWIDTH] IN BLOOD BY AUTOMATED COUNT: 14.1 % (ref 11.6–15.1)
ETHANOL SERPL-MCNC: <10 MG/DL
GFR SERPL CREATININE-BSD FRML MDRD: 110 ML/MIN/1.73SQ M
GLUCOSE SERPL-MCNC: 105 MG/DL (ref 65–140)
HCT VFR BLD AUTO: 39.8 % (ref 34.8–46.1)
HGB BLD-MCNC: 12.9 G/DL (ref 11.5–15.4)
IMM GRANULOCYTES # BLD AUTO: 0.06 THOUSAND/UL (ref 0–0.2)
IMM GRANULOCYTES NFR BLD AUTO: 0 % (ref 0–2)
LYMPHOCYTES # BLD AUTO: 3.77 THOUSANDS/ÂΜL (ref 0.6–4.47)
LYMPHOCYTES NFR BLD AUTO: 24 % (ref 14–44)
MCH RBC QN AUTO: 27 PG (ref 26.8–34.3)
MCHC RBC AUTO-ENTMCNC: 32.4 G/DL (ref 31.4–37.4)
MCV RBC AUTO: 83 FL (ref 82–98)
MONOCYTES # BLD AUTO: 1.22 THOUSAND/ÂΜL (ref 0.17–1.22)
MONOCYTES NFR BLD AUTO: 8 % (ref 4–12)
NEUTROPHILS # BLD AUTO: 10.68 THOUSANDS/ÂΜL (ref 1.85–7.62)
NEUTS SEG NFR BLD AUTO: 68 % (ref 43–75)
NRBC BLD AUTO-RTO: 0 /100 WBCS
P AXIS: 57 DEGREES
PLATELET # BLD AUTO: 399 THOUSANDS/UL (ref 149–390)
PMV BLD AUTO: 9.6 FL (ref 8.9–12.7)
POTASSIUM SERPL-SCNC: 3.7 MMOL/L (ref 3.5–5.3)
PR INTERVAL: 124 MS
PROT SERPL-MCNC: 8.1 G/DL (ref 6.4–8.4)
QRS AXIS: 72 DEGREES
QRSD INTERVAL: 86 MS
QT INTERVAL: 350 MS
QTC INTERVAL: 451 MS
RBC # BLD AUTO: 4.77 MILLION/UL (ref 3.81–5.12)
SALICYLATES SERPL-MCNC: <5 MG/DL (ref 3–20)
SODIUM SERPL-SCNC: 139 MMOL/L (ref 135–147)
T WAVE AXIS: 11 DEGREES
VENTRICULAR RATE: 100 BPM
WBC # BLD AUTO: 15.81 THOUSAND/UL (ref 4.31–10.16)

## 2024-04-16 PROCEDURE — 85025 COMPLETE CBC W/AUTO DIFF WBC: CPT

## 2024-04-16 PROCEDURE — 99284 EMERGENCY DEPT VISIT MOD MDM: CPT

## 2024-04-16 PROCEDURE — 84703 CHORIONIC GONADOTROPIN ASSAY: CPT | Performed by: INTERNAL MEDICINE

## 2024-04-16 PROCEDURE — 93010 ELECTROCARDIOGRAM REPORT: CPT | Performed by: INTERNAL MEDICINE

## 2024-04-16 PROCEDURE — 82077 ASSAY SPEC XCP UR&BREATH IA: CPT

## 2024-04-16 PROCEDURE — 80053 COMPREHEN METABOLIC PANEL: CPT

## 2024-04-16 PROCEDURE — 80143 DRUG ASSAY ACETAMINOPHEN: CPT

## 2024-04-16 PROCEDURE — 93005 ELECTROCARDIOGRAM TRACING: CPT

## 2024-04-16 PROCEDURE — 80179 DRUG ASSAY SALICYLATE: CPT

## 2024-04-16 PROCEDURE — 99285 EMERGENCY DEPT VISIT HI MDM: CPT | Performed by: EMERGENCY MEDICINE

## 2024-04-16 PROCEDURE — 36415 COLL VENOUS BLD VENIPUNCTURE: CPT

## 2024-04-16 NOTE — Clinical Note
Case was discussed with ** and the patient's admission status was agreed to be Admission Status: inpatient status to the service of  ** .

## 2024-04-17 ENCOUNTER — TELEPHONE (OUTPATIENT)
Dept: PSYCHIATRY | Facility: CLINIC | Age: 24
End: 2024-04-17

## 2024-04-17 PROBLEM — D72.825 BANDEMIA: Status: ACTIVE | Noted: 2024-04-17

## 2024-04-17 PROBLEM — T39.1X4D: Status: ACTIVE | Noted: 2024-01-29

## 2024-04-17 LAB
ALBUMIN SERPL BCP-MCNC: 3.6 G/DL (ref 3.5–5)
ALBUMIN SERPL BCP-MCNC: 3.7 G/DL (ref 3.5–5)
ALP SERPL-CCNC: 61 U/L (ref 34–104)
ALP SERPL-CCNC: 62 U/L (ref 34–104)
ALT SERPL W P-5'-P-CCNC: 12 U/L (ref 7–52)
ALT SERPL W P-5'-P-CCNC: 12 U/L (ref 7–52)
AMPHETAMINES SERPL QL SCN: NEGATIVE
ANION GAP SERPL CALCULATED.3IONS-SCNC: 6 MMOL/L (ref 4–13)
ANION GAP SERPL CALCULATED.3IONS-SCNC: 8 MMOL/L (ref 4–13)
APAP SERPL-MCNC: 2 UG/ML (ref 10–20)
AST SERPL W P-5'-P-CCNC: 11 U/L (ref 13–39)
AST SERPL W P-5'-P-CCNC: 11 U/L (ref 13–39)
BACTERIA UR QL AUTO: ABNORMAL /HPF
BARBITURATES UR QL: NEGATIVE
BASOPHILS # BLD AUTO: 0.03 THOUSANDS/ÂΜL (ref 0–0.1)
BASOPHILS NFR BLD AUTO: 0 % (ref 0–1)
BENZODIAZ UR QL: NEGATIVE
BILIRUB SERPL-MCNC: 0.39 MG/DL (ref 0.2–1)
BILIRUB SERPL-MCNC: 0.46 MG/DL (ref 0.2–1)
BILIRUB UR QL STRIP: NEGATIVE
BUN SERPL-MCNC: 5 MG/DL (ref 5–25)
BUN SERPL-MCNC: 8 MG/DL (ref 5–25)
CALCIUM SERPL-MCNC: 9.1 MG/DL (ref 8.4–10.2)
CALCIUM SERPL-MCNC: 9.1 MG/DL (ref 8.4–10.2)
CHLORIDE SERPL-SCNC: 105 MMOL/L (ref 96–108)
CHLORIDE SERPL-SCNC: 109 MMOL/L (ref 96–108)
CLARITY UR: ABNORMAL
CO2 SERPL-SCNC: 22 MMOL/L (ref 21–32)
CO2 SERPL-SCNC: 23 MMOL/L (ref 21–32)
COCAINE UR QL: NEGATIVE
COLOR UR: ABNORMAL
CREAT SERPL-MCNC: 0.55 MG/DL (ref 0.6–1.3)
CREAT SERPL-MCNC: 0.56 MG/DL (ref 0.6–1.3)
EOSINOPHIL # BLD AUTO: 0.02 THOUSAND/ÂΜL (ref 0–0.61)
EOSINOPHIL NFR BLD AUTO: 0 % (ref 0–6)
ERYTHROCYTE [DISTWIDTH] IN BLOOD BY AUTOMATED COUNT: 14.1 % (ref 11.6–15.1)
FENTANYL UR QL SCN: NEGATIVE
GFR SERPL CREATININE-BSD FRML MDRD: 131 ML/MIN/1.73SQ M
GFR SERPL CREATININE-BSD FRML MDRD: 132 ML/MIN/1.73SQ M
GLUCOSE SERPL-MCNC: 108 MG/DL (ref 65–140)
GLUCOSE SERPL-MCNC: 115 MG/DL (ref 65–140)
GLUCOSE SERPL-MCNC: 92 MG/DL (ref 65–140)
GLUCOSE UR STRIP-MCNC: NEGATIVE MG/DL
HCG SERPL QL: NEGATIVE
HCT VFR BLD AUTO: 37.3 % (ref 34.8–46.1)
HGB BLD-MCNC: 12.2 G/DL (ref 11.5–15.4)
HGB UR QL STRIP.AUTO: ABNORMAL
HYDROCODONE UR QL SCN: NEGATIVE
IMM GRANULOCYTES # BLD AUTO: 0.04 THOUSAND/UL (ref 0–0.2)
IMM GRANULOCYTES NFR BLD AUTO: 0 % (ref 0–2)
KETONES UR STRIP-MCNC: ABNORMAL MG/DL
LEUKOCYTE ESTERASE UR QL STRIP: ABNORMAL
LYMPHOCYTES # BLD AUTO: 2.45 THOUSANDS/ÂΜL (ref 0.6–4.47)
LYMPHOCYTES NFR BLD AUTO: 22 % (ref 14–44)
MCH RBC QN AUTO: 26.6 PG (ref 26.8–34.3)
MCHC RBC AUTO-ENTMCNC: 32.7 G/DL (ref 31.4–37.4)
MCV RBC AUTO: 81 FL (ref 82–98)
METHADONE UR QL: NEGATIVE
MONOCYTES # BLD AUTO: 0.83 THOUSAND/ÂΜL (ref 0.17–1.22)
MONOCYTES NFR BLD AUTO: 8 % (ref 4–12)
MUCOUS THREADS UR QL AUTO: ABNORMAL
NEUTROPHILS # BLD AUTO: 7.71 THOUSANDS/ÂΜL (ref 1.85–7.62)
NEUTS SEG NFR BLD AUTO: 70 % (ref 43–75)
NITRITE UR QL STRIP: NEGATIVE
NON-SQ EPI CELLS URNS QL MICRO: ABNORMAL /HPF
NRBC BLD AUTO-RTO: 0 /100 WBCS
OPIATES UR QL SCN: NEGATIVE
OXYCODONE+OXYMORPHONE UR QL SCN: NEGATIVE
PCP UR QL: NEGATIVE
PH UR STRIP.AUTO: 5 [PH]
PLATELET # BLD AUTO: 356 THOUSANDS/UL (ref 149–390)
PMV BLD AUTO: 9.2 FL (ref 8.9–12.7)
POTASSIUM SERPL-SCNC: 3 MMOL/L (ref 3.5–5.3)
POTASSIUM SERPL-SCNC: 3.7 MMOL/L (ref 3.5–5.3)
PROT SERPL-MCNC: 6.9 G/DL (ref 6.4–8.4)
PROT SERPL-MCNC: 7 G/DL (ref 6.4–8.4)
PROT UR STRIP-MCNC: ABNORMAL MG/DL
RBC # BLD AUTO: 4.58 MILLION/UL (ref 3.81–5.12)
RBC #/AREA URNS AUTO: ABNORMAL /HPF
SODIUM SERPL-SCNC: 136 MMOL/L (ref 135–147)
SODIUM SERPL-SCNC: 137 MMOL/L (ref 135–147)
SP GR UR STRIP.AUTO: 1.03 (ref 1–1.03)
THC UR QL: NEGATIVE
UROBILINOGEN UR QL STRIP.AUTO: 0.2 E.U./DL
WBC # BLD AUTO: 11.08 THOUSAND/UL (ref 4.31–10.16)
WBC #/AREA URNS AUTO: ABNORMAL /HPF

## 2024-04-17 PROCEDURE — 80053 COMPREHEN METABOLIC PANEL: CPT | Performed by: INTERNAL MEDICINE

## 2024-04-17 PROCEDURE — 99221 1ST HOSP IP/OBS SF/LOW 40: CPT | Performed by: INTERNAL MEDICINE

## 2024-04-17 PROCEDURE — 82948 REAGENT STRIP/BLOOD GLUCOSE: CPT

## 2024-04-17 PROCEDURE — 80307 DRUG TEST PRSMV CHEM ANLYZR: CPT

## 2024-04-17 PROCEDURE — NC001 PR NO CHARGE: Performed by: EMERGENCY MEDICINE

## 2024-04-17 PROCEDURE — 80143 DRUG ASSAY ACETAMINOPHEN: CPT | Performed by: INTERNAL MEDICINE

## 2024-04-17 PROCEDURE — 96375 TX/PRO/DX INJ NEW DRUG ADDON: CPT

## 2024-04-17 PROCEDURE — 96374 THER/PROPH/DIAG INJ IV PUSH: CPT

## 2024-04-17 PROCEDURE — 81001 URINALYSIS AUTO W/SCOPE: CPT | Performed by: INTERNAL MEDICINE

## 2024-04-17 PROCEDURE — 99255 IP/OBS CONSLTJ NEW/EST HI 80: CPT | Performed by: PSYCHIATRY & NEUROLOGY

## 2024-04-17 PROCEDURE — 85025 COMPLETE CBC W/AUTO DIFF WBC: CPT | Performed by: INTERNAL MEDICINE

## 2024-04-17 RX ORDER — POTASSIUM CHLORIDE 20 MEQ/1
40 TABLET, EXTENDED RELEASE ORAL ONCE
Status: COMPLETED | OUTPATIENT
Start: 2024-04-17 | End: 2024-04-17

## 2024-04-17 RX ORDER — INSULIN LISPRO 100 [IU]/ML
1-5 INJECTION, SOLUTION INTRAVENOUS; SUBCUTANEOUS EVERY 6 HOURS SCHEDULED
Status: DISCONTINUED | OUTPATIENT
Start: 2024-04-17 | End: 2024-04-17

## 2024-04-17 RX ORDER — LANOLIN ALCOHOL/MO/W.PET/CERES
9 CREAM (GRAM) TOPICAL
Status: DISCONTINUED | OUTPATIENT
Start: 2024-04-17 | End: 2024-04-18 | Stop reason: HOSPADM

## 2024-04-17 RX ORDER — SODIUM CHLORIDE 9 MG/ML
100 INJECTION, SOLUTION INTRAVENOUS CONTINUOUS
Status: DISCONTINUED | OUTPATIENT
Start: 2024-04-17 | End: 2024-04-18

## 2024-04-17 RX ORDER — ONDANSETRON 2 MG/ML
4 INJECTION INTRAMUSCULAR; INTRAVENOUS ONCE
Status: COMPLETED | OUTPATIENT
Start: 2024-04-17 | End: 2024-04-17

## 2024-04-17 RX ORDER — ONDANSETRON 2 MG/ML
4 INJECTION INTRAMUSCULAR; INTRAVENOUS EVERY 6 HOURS PRN
Status: DISCONTINUED | OUTPATIENT
Start: 2024-04-17 | End: 2024-04-18 | Stop reason: HOSPADM

## 2024-04-17 RX ADMIN — ACETYLCYSTEINE 10000 MG: 6 INJECTION, SOLUTION INTRAVENOUS at 23:17

## 2024-04-17 RX ADMIN — ACETYLCYSTEINE 5000 MG: 6 INJECTION, SOLUTION INTRAVENOUS at 02:43

## 2024-04-17 RX ADMIN — ONDANSETRON 4 MG: 2 INJECTION INTRAMUSCULAR; INTRAVENOUS at 00:55

## 2024-04-17 RX ADMIN — SODIUM CHLORIDE 100 ML/HR: 0.9 INJECTION, SOLUTION INTRAVENOUS at 13:03

## 2024-04-17 RX ADMIN — ONDANSETRON 4 MG: 2 INJECTION INTRAMUSCULAR; INTRAVENOUS at 06:27

## 2024-04-17 RX ADMIN — SODIUM CHLORIDE 125 ML/HR: 0.9 INJECTION, SOLUTION INTRAVENOUS at 02:43

## 2024-04-17 RX ADMIN — ACETYLCYSTEINE 10000 MG: 6 INJECTION, SOLUTION INTRAVENOUS at 06:06

## 2024-04-17 RX ADMIN — MELATONIN 9 MG: 3 TAB ORAL at 22:05

## 2024-04-17 RX ADMIN — POTASSIUM CHLORIDE 40 MEQ: 1500 TABLET, EXTENDED RELEASE ORAL at 12:54

## 2024-04-17 RX ADMIN — ACETYLCYSTEINE 15000 MG: 6 INJECTION, SOLUTION INTRAVENOUS at 00:57

## 2024-04-17 NOTE — ASSESSMENT & PLAN NOTE
Patient presenting with complaints of taking 15 tylenols for a headache  Not very forthcoming with information, withdrawn with flat affect  Denies suicidal ideation/intent, stressors, altercations  Tylenol level elevated at 194, LFTs within normal limits  Cannot r/o suicidal attempt at this time  Place in observation unit with 1:1 observation  IVF @125ml/hr  On NAC protocol  Toxicology consulted, not available after 8pm  Follow up input in AM  Psychiatry consult

## 2024-04-17 NOTE — CASE MANAGEMENT
Case Management Assessment & Discharge Planning Note    Patient name Grismerlin Fernandez  Location [facility] /E5 -* MRN [id number]  : 2000 Date 2024       Current Admission Date: 2024  Current Admission Diagnosis:Tylenol ingestion, undetermined intent, subsequent encounter   Patient Active Problem List    Diagnosis Date Noted    Bandemia 2024    Hospital discharge follow-up 2024    SIRS (systemic inflammatory response syndrome) (HCC) 2024    Transaminitis 2024    Pelvic fluid collection 2024    Abnormal CT scan, kidney 2024    Tylenol ingestion, undetermined intent, subsequent encounter 2024    COVID-19 affecting pregnancy in third trimester 2024    Patient noncompliance 2023    H/O abdominoplasty 10/17/2023    Status post repeat low transverse  section 2023    History of intrauterine growth restriction in prior pregnancy, currently pregnant, first trimester 2023    Hx of  section 2023    Obesity affecting pregnancy in second trimester 2023      LOS (days): 0  Geometric Mean LOS (GMLOS) (days): 2.5  Days to GMLOS:1.9     OBJECTIVE:    Risk of Unplanned Readmission Score: 5.87         Current admission status: Inpatient       Preferred Pharmacy:   [id number] - [place] - [address]  [address] [address]  Phone: [phone] Fax: [phone]    Primary Care Provider: No primary care provider on file.    Primary Insurance: [employer] [facility] [place]  Secondary Insurance:     ASSESSMENT:  Active Health Care Proxies    There are no active Health Care Proxies on file.                      Patient Information  Mental Status: Alert  During Assessment patient was accompanied by: Not accompanied during assessment  Assessment information provided by:: Patient  Primary Caregiver: Self  Support Systems: Family members, Spouse/significant other  [facility]  Residence: [place]  What city do you live in?: [place]  Home entry access options. Select all that apply.: Stairs  Number of steps to enter home.: 5  Do the steps have railings?: Yes  Type of Current Residence: Apartment  Floor Level: 2  Upon entering residence, is there a bedroom on the main floor (no further steps)?: Yes  Upon entering residence, is there a bathroom on the main floor (no further steps)?: Yes  Living Arrangements: Lives w/ Daughter, Lives w/ Son  Is patient a ?: No    Activities of Daily Living Prior to Admission  Functional Status: Independent  Completes ADLs independently?: Yes  Ambulates independently?: Yes  Does patient currently own DME?: No  Does patient have a history of Outpatient Therapy (PT/OT)?: No  Does the patient have a history of Short-Term Rehab?: No  Does patient have a history of HHC?: No  Does patient currently have HHC?: No         Patient Information Continued  Does patient have prescription coverage?: Yes  Does patient receive dialysis treatments?: No  Does patient have a history of substance abuse?: No  Does patient have a history of Mental Health Diagnosis?: Yes (Depression)  Has patient received inpatient treatment related to mental health in the last 2 years?: No         Means of Transportation  Means of Transport to Appts:: Family transport      Social Determinants of Health (SDOH)      Flowsheet Row Most Recent Value   Housing Stability    In the last 12 months, was there a time when you were not able to pay the mortgage or rent on time? N   In the last 12 months, how many places have you lived? 1   In the last 12 months, was there a time when you did not have a steady place to sleep or slept in a shelter (including now)? N   Transportation Needs    In the past 12 months, has lack of transportation kept you from medical appointments or from getting medications? no   In the past 12 months, has lack of transportation kept you from meetings, work, or from getting  things needed for daily living? No   Food Insecurity    Within the past 12 months, you worried that your food would run out before you got the money to buy more. Never true   Within the past 12 months, the food you bought just didn't last and you didn't have money to get more. Never true   Utilities    In the past 12 months has the electric, gas, oil, or water company threatened to shut off services in your home? No            DISCHARGE DETAILS:    Discharge planning discussed with:: Pt        CM contacted family/caregiver?: No- see comments (Pt AAOx3 and request we dont contact right now)             Contacts  Patient Contacts: Umesh Brewer  Relationship to Patient:: Friend  Contact Method: Phone  Phone Number: [phone]  Reason/Outcome: Discharge Planning    Requested Home Health Care         Is the patient interested in HHC at discharge?: No    DME Referral Provided  Referral made for DME?: No

## 2024-04-17 NOTE — UTILIZATION REVIEW
Notification of Unplanned, Urgent, or   Emergency Inpatient Admission   AUTHORIZATION REQUEST   [place] [address]  Tax ID: [id number]  NPI: [id number]  Place of Service: Acute [facility]  Admission Level of Care: Inpatient  Place of Service Code: 21     RN is working on review will send once completed   Attending Physician Information  Attending Name and NPI#: Micki Jamison Md [[id number]]  Phone: [phone]     Admission Information  Inpatient Admission Date/Time: 4/17/24 12:57 AM  Discharge Date/Time: No discharge date for patient encounter.  Admitting Diagnosis Code/Description:  Overdose [T50.901A]  Tylenol toxicity [T39.1X1A]     Utilization Review Contact  Humaira Ku Utilization   Phone: [phone]  Fax: [phone]  Email: [email or web address]  Contact for approvals/pending authorizations, clinical reviews, and discharge.     Physician Advisory Services Contact  Medical Necessity Denial & [facility] Discussion  Phone: [phone]  Fax: [phone]  Email: [email or web address]     DISCHARGE SUPPORT TEAM:  For Patients Discharge Needs & Updates  Phone: [phone] opt. 2 Fax: [phone]  Email: [email or web address]

## 2024-04-17 NOTE — CONSULTS
Consultation - Medical Toxicology  Grismerlin Fernandez 24 y.o. female MRN: [id number]  Unit/Bed#: E5 -01 Encounter: [id number]      Reason for Consult / Principal Problem: Acetaminophen overdose      Inpatient consult to Toxicology     Date/Time  4/17/2024 12:31 PM     Performed by  Rashmi Lambert MD   Authorized by  Roselyn Mac DO      Local anesthesia used: no     Anesthesia   Local anesthesia used: no     Sedation   Patient sedated: no             04/17/24      ASSESSMENT:  24-year-old female with acute, likely intentional acetaminophen overdose.    RECOMMENDATIONS:  Presentation is consistent with an intentional acetaminophen overdose for the second time this year.  Please continue N-acetylcysteine per 21-hour protocol but since third infusion will stop overnight, please set to continuous to avoid any gap in treatment.  May repeat CMP and acetaminophen concentration tonight but will also need to assess them tomorrow morning to assure discontinuation of therapy as appropriate.  Please have psychiatry evaluate the patient.  It is very concerning that she has a toxic concentration in her serum at 5 hours that represents for more than her stated amount of ingested acetaminophen.  It is also concerning that this is the second time she has had an acetaminophen overdose this year and the last time she presented, she had significant hepatic injury from it.  Strongly suspect that the patient is either attempting suicide or is suffering from poor insight that is placing her at serious risk of harm.    For further questions, please call [facility]'s  Service or Patient Access Center to reach the medical  on call.     Please see additional teaching note below (if available)    Hx and PE limited by: Patient is not completely forthcoming    HPI: Grismerlin Fernandez is a 24 y.o. year old female who presents with acetaminophen overdose. She states that she took 15 to 17 tablets for  "a headache but this is inconsistent with her serum concentration that indicates a minimum of 30.  She was appropriately started on N-acetylcysteine and admitted to the hospital.    Review of Systems   Constitutional:  Negative for chills and fever.   HENT:  Negative for sore throat.    Eyes:  Negative for visual disturbance.   Respiratory:  Negative for cough and shortness of breath.    Cardiovascular:  Negative for chest pain and palpitations.   Gastrointestinal:  Negative for abdominal pain, blood in stool, diarrhea and vomiting.   Genitourinary:  Negative for hematuria.   Musculoskeletal:  Negative for arthralgias, back pain and myalgias.   Skin:  Negative for rash and wound.   Neurological:  Negative for dizziness, syncope and headaches.   Psychiatric/Behavioral:  Negative for self-injury and suicidal ideas.        Historical Information   Past Medical History:   Diagnosis Date    Depression      Past Surgical History:   Procedure Laterality Date    ABDOMINOPLASTY      [place]  DELIV[place] N/A 2020    Procedure:  SECTION ();  Surgeon: Adair Clifton MD;  Location: [facility];  Service: Obstetrics    [place] N/A 1/15/2024    Procedure:  SECTION ();  Surgeon: Yardlie Toussaint-Foster, DO;  Location: [facility];  Service: Obstetrics     Social History   Social History     Substance and Sexual Activity   Alcohol Use Not Currently    Comment: \"sometimes\"     Social History     Substance and Sexual Activity   Drug Use Never     Social History     Tobacco Use   Smoking Status Former    Types: Pipe   Smokeless Tobacco Never     Family History   Problem Relation Age of Onset    No Known Problems Mother     No Known Problems Father     No Known Problems Sister     No Known Problems Brother     Cancer Neg Hx     Diabetes Neg Hx         Prior to Admission medications    Medication Sig Start Date End Date Taking? Authorizing Provider   ibuprofen (MOTRIN) 600 mg tablet " Take 1 tablet (600 mg total) by mouth every 6 (six) hours  Patient not taking: Reported on 1/29/2024 1/17/24   PER Le       Current Facility-Administered Medications   Medication Dose Route Frequency    acetylcysteine (ACETADOTE) 10,000 mg in dextrose 5 % 1,000 mL IVPB  100 mg/kg Intravenous Once    acetylcysteine (ACETADOTE) 10,000 mg in dextrose 5 % 1,000 mL IVPB  100 mg/kg Intravenous Continuous    ondansetron (ZOFRAN) injection 4 mg  4 mg Intravenous Q6H PRN    potassium chloride (Klor-Con M20) CR tablet 40 mEq  40 mEq Oral Once    sodium chloride 0.9 % infusion  100 mL/hr Intravenous Continuous       No Known Allergies    Objective     No intake or output data in the 24 hours ending 04/17/24 1251    Invasive Devices:   Peripheral IV 04/16/24 Right Antecubital (Active)   Site Assessment WDL 04/17/24 0300   Dressing Type Transparent 04/17/24 0300   Line Status Flushed;Infusing 04/17/24 0300   Dressing Status Clean;Dry;Intact 04/17/24 0300       Vitals   Vitals:    04/16/24 2304 04/17/24 0101 04/17/24 0147 04/17/24 0741   BP: 123/73 144/67 133/84 123/78   TempSrc:    Oral   Pulse: 96 89 90 72   Resp: 16 17 17 17   Patient Position - Orthostatic VS: Lying Lying  Lying   Temp:   98.8 °F (37.1 °C) 97.9 °F (36.6 °C)       Physical Exam  Vitals and nursing note reviewed.   Constitutional:       Appearance: Normal appearance.   HENT:      Head: Normocephalic and atraumatic.   Cardiovascular:      Rate and Rhythm: Normal rate and regular rhythm.      Pulses: Normal pulses.      Heart sounds: Normal heart sounds.   Pulmonary:      Effort: Pulmonary effort is normal. No respiratory distress.      Breath sounds: Normal breath sounds. No stridor. No wheezing, rhonchi or rales.   Abdominal:      General: Abdomen is flat. Bowel sounds are normal.      Palpations: Abdomen is soft.      Tenderness: There is no abdominal tenderness. There is no guarding or rebound.   Musculoskeletal:         General: No swelling,  "tenderness or deformity. Normal range of motion.      Cervical back: Normal range of motion and neck supple.      Right lower leg: No edema.      Left lower leg: No edema.   Skin:     General: Skin is warm and dry.   Neurological:      General: No focal deficit present.      Mental Status: She is alert and oriented to person, place, and time.   Psychiatric:         Mood and Affect: Mood normal. Affect is flat.         Speech: Speech normal.         Behavior: Behavior is withdrawn.         Thought Content: Thought content normal. Thought content does not include homicidal or suicidal ideation. Thought content does not include suicidal plan.         Judgment: Judgment is impulsive.       EKG, Pathology, and Other Studies: I have personally reviewed pertinent reports.      Lab Results: I have personally reviewed pertinent reports.      Labs:  Results from last 7 days   Lab Units 04/16/24  2316   WBC Thousand/uL 15.81*   HEMOGLOBIN g/dL 12.9   HEMATOCRIT % 39.8   PLATELETS Thousands/uL 399*   SEGS PCT % 68   LYMPHO PCT % 24   MONO PCT % 8   EOS PCT % 0      Results from last 7 days   Lab Units 04/17/24  1117   POTASSIUM mmol/L 3.0*   CHLORIDE mmol/L 105   CO2 mmol/L 23   BUN mg/dL 8   CREATININE mg/dL 0.56*   CALCIUM mg/dL 9.1   ALK PHOS U/L 61   ALT U/L 12   AST U/L 11*              No results found for: \"TROPONINI\"      Results from last 7 days   Lab Units 04/16/24  2316   ACETAMINOPHEN LVL ug/mL 194*   ETHANOL LVL mg/dL <10   SALICYLATE LVL mg/dL <5       Counseling / Coordination of Care  Total floor / unit time spent today 35 minutes. Greater than 50% of total time was spent with the patient and / or family counseling and / or coordination of care.        "

## 2024-04-17 NOTE — ASSESSMENT & PLAN NOTE
24-year-old female resented with acetaminophen overdose.  She stated she was having a headache at home and took 15 tablets of acetaminophen.  Patient has history of suicidal attempt in 2016.  She is also postpartum and mentioned that she broke up with her boyfriend 5 months ago.  She has increased anxiety at home.  She denies any suicidal or homicidal ideation.    Toxicology input appreciated  Continue N-acetylcysteine  Repeat acetaminophen level  Psychiatry input appreciated

## 2024-04-17 NOTE — CONSULTS
TeleConsultation - Behavioral Health   Grismerlin Fernandez 24 y.o. female MRN: [id number]  Unit/Bed#: E5 -01 Encounter: [id number]        REQUIRED DOCUMENTATION:     1. This service was provided via Telemedicine.  2. Provider located at [place].  3. TeleMed provider: Venu Walker MD.  4. Identify all parties in room with patient during tele consult:  No one  5.Patient was then informed that this was a Telemedicine visit and that the exam was being conducted confidentially over secure lines. My office door was closed. No one else was in the room.  Patient acknowledged consent and understanding of privacy and security of the Telemedicine visit, and gave us permission to have the assistant stay in the room in order to assist with the history and to conduct the exam.  I informed the patient that I have reviewed their record in Epic and presented the opportunity for them to ask any questions regarding the visit today.  The patient agreed to participate.       Assessment/Plan     Principal Problem:    Tylenol ingestion, undetermined intent, subsequent encounter  Active Problems:    Bandemia    Assessment:    Unspecified depressive disorder  Unspecified anxiety disorder    Treatment Plan:    Continue with medical stabilization.    I have called her mother to get more information and it is going to voice mail.    We will recommend to gather more collateral information from her family members to find out how she was doing in the last few months and  if she expressed any suicidal thoughts to anyone.     Re-consult  Psychiatry tomorrow    Planned Medication Changes:    No psychotropic medication until she is medically cleared.    Current Medications:     Current Facility-Administered Medications   Medication Dose Route Frequency Provider Last Rate    acetylcysteine  100 mg/kg Intravenous Once Anai Reardon MD 10,000 mg (04/17/24 0606)    ondansetron  4 mg Intravenous Q6H PRN Enedina Guerra MD      sodium chloride  100  mL/hr Intravenous Continuous Micki Jamison  mL/hr (04/17/24 0851)       Other treatment modalities ordered as indicated:    Psychotherapy      Inpatient consult to Psychiatry  Consult performed by: Venu Walker MD  Consult ordered by: Roselyn Mac DO        Physician Requesting Consult: Micki Jamison MD  Principal Problem:Tylenol ingestion, undetermined intent, subsequent encounter    Reason for Consult: Tylenol toxicity    History of Present Illness      Patient is a 24 y.o. female with a history of Major Depressive Disorder who was presented to emergency department on 4/16/2024 due to Tylenol toxicity. Reportedly, patient has been noncompliance with medications and follow-up care.  Patient reported that she broke up with her boyfriend about 5 months ago and she has increased anxiety almost daily basis.  She was not able to give clear symptoms of anxiety but she reported aggressive symptoms crying spells, Feeling of hopelessness, with low energy motivation.  She reported at times she thinks too much about her current stressors being difficulty controlling her thoughts and worries resulting in difficulty sleeping hollered he has been taking care of 3 months baby and  3-year-old.  She has not been working currently getting help from her mother.  She reported that she is living alone her mother sometimes help her.  She denied any suicidal ideation in the last 6 months reported she never had any in her life stated that yesterday she took extra Tylenol for her pain and she was taking 4 at times took multiple times and 6-hour and she is aware that it can cause liver toxicity but she did not that it can lead to death.  She appeared to minimize her symptoms.  Similar episode happened in in January 2024 in which she has elevated liver enzymes after taking 8 Tylenol.  She has a history of suicidal attempt in 2016 when she overdosed on medication. No recent aggressive behavior was reported.  No homicidal  "ideation was reported.  No recent manic and psychotic symptoms was reported.  No substance abuse or alcohol abuse problem was.        Psychiatric Review Of Systems:     Sleep changes: no  appetite changes: no  weight changes: no  anxiety/panic: yes  peter: no  guilty/hopeless: yes  self injurious behavior/risky behavior: no    Historical Information     Past Psychiatric History:     Psychiatric Hospitalizations: Multiple past inpatient psychiatric admissions Outpatient Treatment History: past treatment with psychiatrist/Advanced Practitioner Suicide Attempts:  Yes, one attempt reported History of self-harm: No History of self injurious behavior was reported Violence History: No History of physically aggressive  behavior was reported    Substance Abuse History:    E-Cigarette/Vaping    E-Cigarette Use Never User           Social History       Tobacco History       Smoking Status  Former Smoking Tobacco Type  Pipe      Smokeless Tobacco Use  Never              Alcohol History       Alcohol Use Status  Not Currently Comment  \"sometimes\"              Drug Use       Drug Use Status  Never              Sexual Activity       Sexually Active  Yes Partners  Male              Activities of Daily Living    Not Asked                 Additional Substance Use Detail       Questions Responses    Problems Due to Past Use of Alcohol? No    Problems Due to Past Use of Substances? No    Alcohol Use Frequency Denies use in past 12 months    Cannabis frequency Never used    Comment: Never used on 11/23/2020     Heroin Frequency Denies use in past 12 months    Cocaine frequency Never used    Comment: Never used on 11/23/2020     Crack Cocaine Frequency Denies use in past 12 months    Methamphetamine Frequency Denies use in past 12 months    Narcotic Frequency Denies use in past 12 months    Benzodiazepine Frequency Denies use in past 12 months    Amphetamine frequency Denies use in past 12 months    Barbituate Frequency Denies use use in " "past 12 months    Inhalant frequency Never used    Comment: Never used on 11/23/2020     Hallucinogen frequency Never used    Comment: Never used on 11/23/2020     Ecstasy frequency Never used    Comment: Never used on 11/23/2020     Other drug frequency Never used    Comment: Never used on 11/23/2020     Opiate frequency Denies use in past 12 months    Last reviewed by Blanca Izquierdo RN on 4/16/2024            Social History:    Social History       Social History     Socioeconomic History    Marital status: Single     Spouse name: Not on file    Number of children: Not on file    Years of education: Not on file    Highest education level: Not on file   Occupational History    Not on file   Tobacco Use    Smoking status: Former     Types: Pipe    Smokeless tobacco: Never   Vaping Use    Vaping status: Never Used   Substance and Sexual Activity    Alcohol use: Not Currently     Comment: \"sometimes\"    Drug use: Never    Sexual activity: Yes     Partners: Male   Other Topics Concern    Not on file   Social History Narrative    Not on file     Social Determinants of Health     Financial Resource Strain: Low Risk  (1/8/2024)    Overall Financial Resource Strain (CARDIA)     Difficulty of Paying Living Expenses: Not hard at all   Food Insecurity: No Food Insecurity (1/8/2024)    Hunger Vital Sign     Worried About Running Out of Food in the Last Year: Never true     Ran Out of Food in the Last Year: Never true   Transportation Needs: No Transportation Needs (1/8/2024)    PRAPARE - Transportation     Lack of Transportation (Medical): No     Lack of Transportation (Non-Medical): No   Physical Activity: Not on file   Stress: Not on file   Social Connections: Not on file   Intimate Partner Violence: Not on file   Housing Stability: Not on file           Past Medical History:    Past Medical History:   Diagnosis Date    Depression           Meds/Allergies     No Known Allergies  all current active meds have been " reviewed    Medical Review Of Systems:    Review of Systems      Objective     Vital signs in last 24 hours:  Temp:  [97.9 °F (36.6 °C)-98.8 °F (37.1 °C)] 97.9 °F (36.6 °C)  HR:  [72-96] 72  Resp:  [16-17] 17  BP: (123-144)/(67-84) 123/78    No intake or output data in the 24 hours ending 04/17/24 0925    Mental Status Evaluation::    Appearance age appropriate, casually dressed   Behavior cooperative, calm   Speech normal rate, normal volume, normal pitch   Mood depressed   Affect normal range and intensity, appropriate   Thought Processes organized, goal directed   Associations intact associations   Thought Content no overt delusions   Perceptual Disturbances: no auditory hallucinations, no visual hallucinations   Abnormal Thoughts  Risk Potential Suicidal ideation - None  Homicidal ideation - None  Potential for aggression - No   Orientation oriented to person, place, time/date, and situation   Memory recent and remote memory grossly intact   Consciousness alert and awake   Attention Span Concentration Span attention span and concentration are age appropriate   Intellect appears to be of average intelligence   Insight intact   Judgement intact   Muscle Strength and  Gait No assessed   Motor Activity no abnormal movements   Language no difficulty naming common objects, no difficulty repeating a phrase, no difficulty writing a sentence   Fund of Knowledge adequate knowledge of current events  adequate fund of knowledge regarding past history  adequate fund of knowledge regarding vocabulary                Lab Results: I have personally reviewed all pertinent laboratory/tests results.     Most Recent Labs:   Lab Results   Component Value Date    WBC 15.81 (H) 04/16/2024    RBC 4.77 04/16/2024    HGB 12.9 04/16/2024    HCT 39.8 04/16/2024     (H) 04/16/2024    RDW 14.1 04/16/2024    NEUTROABS 10.68 (H) 04/16/2024    SODIUM 139 04/16/2024    K 3.7 04/16/2024     04/16/2024    CO2 25 04/16/2024    BUN 11  04/16/2024    CREATININE 0.76 04/16/2024    CALCIUM 9.4 04/16/2024    AST 15 04/16/2024    ALT 15 04/16/2024    ALKPHOS 80 04/16/2024    TP 8.1 04/16/2024    TBILI 0.47 04/16/2024    CHOLESTEROL 107 11/24/2020    TRIG 60 11/24/2020    HDL 31 (L) 11/24/2020    LDLCALC 64 11/24/2020    NONHDLC 76 11/24/2020    AMMONIA 61 01/30/2024    [id number] 3.920 11/24/2020    PREGSERUM Negative 04/16/2024    HCGQUANT 84,702 (H) 06/21/2023    RPR Non-Reactive 08/21/2020       Imaging Studies: No results found.  EKG/Pathology/Other Studies:   Lab Results   Component Value Date    VENTRATE 100 04/16/2024    ATRIALRATE 100 04/16/2024    PRINT 124 04/16/2024    QRSDINT 86 04/16/2024    QTINT 350 04/16/2024    QTCINT 451 04/16/2024    PAXIS 57 04/16/2024    QRSAXIS 72 04/16/2024    TWAVEAXIS 11 04/16/2024        Code Status: Level 1 - Full Code  Advance Directive and Living Will:      Power of :    POLST:      Screenings:    1. Nutrition Screening  Nutrition Assessment (completed by Staff):      2. Pain Screening  Pain Screening: Pain Assessment  Pain Assessment Tool: 0-10  Pain Score: 0    3. Suicide Screening                                                         [place]-SUICIDE SEVERITY RATING SCALE (C-SSRS)                            1. In the last month have you wished you were dead or wished you could go to sleep and not wake up? No  2. In the last month, have you actually had thoughts about killing yourself? No  6. Have you done anything, started to do anything, or prepared to do anything to end your life in the last 3 months? No  Suicide Risk Level : Low                 Counseling / Coordination of Care:    Total floor / unit time spent today 70 minutes. Greater than 50% of total time was spent with the patient and / or family counseling and / or coordination of care. A description of the counseling / coordination of care: Direct Patient Care, Chart Review, and Documentation         Quality 431: Preventive Care And Screening: Unhealthy Alcohol Use - Screening: Patient not identified as an unhealthy alcohol user when screened for unhealthy alcohol use using a systematic screening method Quality 226: Preventive Care And Screening: Tobacco Use: Screening And Cessation Intervention: Patient screened for tobacco use and is an ex/non-smoker Quality 130: Documentation Of Current Medications In The Medical Record: Current Medications Documented Detail Level: Detailed

## 2024-04-17 NOTE — ASSESSMENT & PLAN NOTE
On review of chart patient has chronic leukocytosis although unable to rule out acute infection at this time  In setting of abdominal pain will rule out urinary tract infection  Send urinalysis to assess further  Trend

## 2024-04-17 NOTE — ED PROVIDER NOTES
"History  Chief Complaint   Patient presents with    Overdose - Accidental     Pt reports headache that started around . Pt states she took tylenol till the headache was gone. Pt report taking approx 15 tylenol. Denies depression or SI.      HPI    Patient presenting after taking 15 500mg tablets of Tylenol at 8pm. She denies any co-ingestion. Denies and SI/HI but states she did attempt suicide by OD \"when I was a kid.\" She states she took the tylenol for a headache which is now resolved. She denies any symptoms other than mild abdominal pain. When challenged regarding insight about taking 15 Tylenol, patient states she usually takes 1-2 tylenol for a headache and this time took 15 at one time without pausing for thought. She is with flat affect and responds to most questions with one-word answers. On chart review, patient was admitted in 2024 for SIRS possibly due to pelvic fluid collection; was found to have transaminitis and patient at that time was concerned she had taken too much Tylenol. NAC was started however per last note prior to discharge, patient stated she took 8 Tylenol in a 24 hour period. Tylenol level on admission was 8; patient was on NAC throughout course of hospitalization. Patient discharged from that admission AMA.      Prior to Admission Medications   Prescriptions Last Dose Informant Patient Reported? Taking?   ibuprofen (MOTRIN) 600 mg tablet  Self No No   Sig: Take 1 tablet (600 mg total) by mouth every 6 (six) hours   Patient not taking: Reported on 2024      Facility-Administered Medications: None       Past Medical History:   Diagnosis Date    Depression        Past Surgical History:   Procedure Laterality Date    ABDOMINOPLASTY      [place]  DELIVERY ONLY N/A 2020    Procedure:  SECTION ();  Surgeon: Adair Clifton MD;  Location: [facility];  Service: Obstetrics    [place]  DEL[place] ONLY N/A 1/15/2024    Procedure:  SECTION " "();  Surgeon: Yardlie Toussaint-Foster, DO;  Location: [facility];  Service: Obstetrics       Family History   Problem Relation Age of Onset    No Known Problems Mother     No Known Problems Father     No Known Problems Sister     No Known Problems Brother     Cancer Neg Hx     Diabetes Neg Hx      I have reviewed and agree with the history as documented.    E-Cigarette/Vaping    E-Cigarette Use Never User      E-Cigarette/Vaping Substances    Nicotine No     THC No     CBD No     Flavoring No     Other No     Unknown No      Social History     Tobacco Use    Smoking status: Former     Types: Pipe    Smokeless tobacco: Never   Vaping Use    Vaping status: Never Used   Substance Use Topics    Alcohol use: Not Currently     Comment: \"sometimes\"    Drug use: Never        Review of Systems   Constitutional:  Negative for activity change, appetite change, chills, fatigue and fever.   Respiratory:  Negative for apnea, chest tightness and shortness of breath.    Cardiovascular:  Negative for chest pain.   Gastrointestinal:  Positive for abdominal pain. Negative for abdominal distention, diarrhea, nausea and vomiting.   Skin:  Negative for color change.   Neurological:  Positive for headaches. Negative for dizziness, tremors, syncope, weakness, light-headedness and numbness.       Physical Exam  ED Triage Vitals [24 2304]   Temp Pulse Respirations Blood Pressure SpO2   -- 96 16 123/73 100 %      Temp src Heart Rate Source Patient Position - Orthostatic VS BP Location FiO2 (%)   -- Monitor Lying Right arm --      Pain Score       --             Orthostatic Vital Signs  Vitals:    24 2304 24 0101   BP: 123/73 144/67   Pulse: 96 89   Patient Position - Orthostatic VS: Lying Lying       Physical Exam  Constitutional:       Appearance: Normal appearance.   HENT:      Head: Normocephalic and atraumatic.      Nose: Nose normal.      Mouth/Throat:      Mouth: Mucous membranes are moist.   Eyes:      Pupils: " Pupils are equal, round, and reactive to light.   Cardiovascular:      Rate and Rhythm: Tachycardia present.      Pulses: Normal pulses.   Pulmonary:      Effort: Pulmonary effort is normal.   Abdominal:      General: Abdomen is flat.   Skin:     General: Skin is warm and dry.      Coloration: Skin is not jaundiced or pale.      Findings: No rash.   Neurological:      General: No focal deficit present.      Mental Status: She is alert and oriented to person, place, and time.         ED Medications  Medications   acetylcysteine (ACETADOTE) 15,000 mg in dextrose 5 % 200 mL IVPB (15,000 mg Intravenous New Bag 4/17/24 0057)   acetylcysteine (ACETADOTE) 5,000 mg in dextrose 5 % 500 mL IVPB (has no administration in time range)   acetylcysteine (ACETADOTE) 10,000 mg in dextrose 5 % 1,000 mL IVPB (has no administration in time range)   ondansetron (ZOFRAN) injection 4 mg (4 mg Intravenous Given 4/17/24 0055)       Diagnostic Studies  Results Reviewed       Procedure Component Value Units Date/Time    Acetaminophen level-If concentration is detectable, please discuss with medical  on call. [[id number]]  (Abnormal) Collected: 04/16/24 2316    Lab Status: Final result Specimen: Blood from Arm, Left Updated: 04/16/24 2357     Acetaminophen Level 194 ug/mL     Salicylate level [[id number]]  (Normal) Collected: 04/16/24 2316    Lab Status: Final result Specimen: Blood from Arm, Left Updated: 04/16/24 2350     Salicylate Lvl <5 mg/dL     Comprehensive metabolic panel [[id number]] Collected: 04/16/24 2316    Lab Status: Final result Specimen: Blood from Arm, Left Updated: 04/16/24 2341     Sodium 139 mmol/L      Potassium 3.7 mmol/L      Chloride 105 mmol/L      CO2 25 mmol/L      ANION GAP 9 mmol/L      BUN 11 mg/dL      Creatinine 0.76 mg/dL      Glucose 105 mg/dL      Calcium 9.4 mg/dL      AST 15 U/L      ALT 15 U/L      Alkaline Phosphatase 80 U/L      Total Protein 8.1 g/dL      Albumin 4.2 g/dL      Total Bilirubin  0.47 mg/dL      eGFR 110 ml/min/1.73sq m     Narrative:      National Kid[employer] Foundation guidelines for Chronic Kidney Disease (CKD):     Stage 1 with normal or high GFR (GFR > 90 mL/min/1.73 square meters)    Stage 2 Mild CKD (GFR = 60-89 mL/min/1.73 square meters)    Stage 3A Moderate CKD (GFR = 45-59 mL/min/1.73 square meters)    Stage 3B Moderate CKD (GFR = 30-44 mL/min/1.73 square meters)    Stage 4 Severe CKD (GFR = 15-29 mL/min/1.73 square meters)    Stage 5 End Stage CKD (GFR <15 mL/min/1.73 square meters)  Note: GFR calculation is accurate only with a steady state creatinine    Ethanol [[id number]]  (Normal) Collected: 04/16/24 2316    Lab Status: Final result Specimen: Blood from Arm, Left Updated: 04/16/24 2341     Ethanol Lvl <10 mg/dL     CBC and differential [[id number]]  (Abnormal) Collected: 04/16/24 2316    Lab Status: Final result Specimen: Blood from Arm, Left Updated: 04/16/24 2326     WBC 15.81 Thousand/uL      RBC 4.77 Million/uL      Hemoglobin 12.9 g/dL      Hematocrit 39.8 %      MCV 83 fL      MCH 27.0 pg      MCHC 32.4 g/dL      RDW 14.1 %      MPV 9.6 fL      Platelets 399 Thousands/uL      nRBC 0 /100 WBCs      Segmented % 68 %      Immature Grans % 0 %      Lymphocytes % 24 %      Monocytes % 8 %      Eosinophils Relative 0 %      Basophils Relative 0 %      Absolute Neutrophils 10.68 Thousands/µL      Absolute Immature Grans 0.06 Thousand/uL      Absolute Lymphocytes 3.77 Thousands/µL      Absolute Monocytes 1.22 Thousand/µL      Eosinophils Absolute 0.03 Thousand/µL      Basophils Absolute 0.05 Thousands/µL     POCT pregnancy, urine [[id number]]     Lab Status: No result     Rapid drug screen, urine [[id number]]     Lab Status: No result Specimen: Urine                    No orders to display         Procedures  ECG 12 Lead Documentation Only    Date/Time: 4/16/2024 11:17 PM    Performed by: Anai Reardon MD  Authorized by: Anai Reardon MD    ECG reviewed by me, the ED Provider:  "yes    Patient location:  ED  Previous ECG:     Previous ECG:  Compared to current    Similarity:  No change    Comparison to cardiac monitor: Yes    Interpretation:     Interpretation: normal    Rate:     ECG rate assessment: normal    Rhythm:     Rhythm: sinus rhythm    Ectopy:     Ectopy: none    QRS:     QRS axis:  Normal  Conduction:     Conduction: normal    ST segments:     ST segments:  Normal  T waves:     T waves: normal          ED Course  ED Course as of 04/17/24 0133   Tue Apr 16, 2024   2317 Patient presenting after taking 15 500mg tablets of Tylenol at 8pm. She denies any co-ingestion. Denies and SI/HI but states she did attempt suicide by OD \"when I was a kid.\" She states she took the tylenol for a headache which is now resolved. She denies any symptoms other than mild abdominal pain. When challenged regarding insight about taking 15 Tylenol, patient states she usually takes 1-2 tylenol for a headache and this time took 15 at one time without pausing for thought. She is with flat affect and responds to most questions with one-word answers. On chart review, patient was admitted in January of 2024 for SIRS possibly due to pelvic fluid collection; was found to have transaminitis and patient at that time was concerned she had taken too much Tylenol. NAC was started however per last note prior to discharge, patient stated she took 8 Tylenol in a 24 hour period. Tylenol level on admission was 8; patient was on NAC throughout course of hospitalization. Patient discharged from that admission AMA.    2327 WBC(!): 15.81  10-16.4 over the last 3 months   2341 ETHANOL: <10   2341 Comprehensive metabolic panel  WNL; no transaminitis    Wed Apr 17, 2024   0039 ACETAMINOPHEN LEVEL(!!): 194  Within toxic range for 4 hour level. NAC ordered                                        Medical Decision Making  Amount and/or Complexity of Data Reviewed  Labs: ordered. Decision-making details documented in ED " Course.    Risk  Prescription drug management.  Decision regarding hospitalization.        Patient is a 24 y.o. female  who presents to the ED with tylenol overdose.    Vital signs stable. Exam as listed above    Differential diagnosis includes but is not limited to tylenol toxicity, potential suicide attempt though patient denies such, cannot rule-out co-ingestion      Plan cbc, cmp, UDS, POC urine preg, coma panel, EKG. NAC if 4 hour acetaminophen level within toxic range. Psych & Tox consult on admission.     View ED course above for further discussion on patient workup.     On review of previous records - see chart review in HPI.    All labs reviewed and utilized in the medical decision making process  All radiology studies independently viewed by me and interpreted by the radiologist.  I reviewed all testing with the patient.     Upon re-evaluation patient remains stable. Receiving first dose of NAC as acetaminophen level ~200 at 4 hours which is in the toxic range. Patient agreeable to be evaluated by psychiatry on admission.         Disposition  Final diagnoses:   Tylenol toxicity   Overdose     Time reflects when diagnosis was documented in both MDM as applicable and the Disposition within this note       Time User Action Codes Description Comment    4/17/2024 12:06 AM Anai Reardon Add [T39.1X1A] Tylenol toxicity     4/17/2024 12:56 AM Anai Reardon Add [T50.901A] Overdose           ED Disposition       ED Disposition   Admit    Condition   Stable    Date/Time   Wed Apr 17, 2024  1:33 AM    Comment   Case was discussed with Dr. Guerra and the patient's admission status was agreed to be Admission Status: inpatient status to the service of Dr. Guerra.               Follow-up Information    None         Current Discharge Medication List        CONTINUE these medications which have NOT CHANGED    Details   ibuprofen (MOTRIN) 600 mg tablet Take 1 tablet (600 mg total) by mouth every 6 (six) hours  Qty: 30  tablet, Refills: 0    Associated Diagnoses: Hx of  section           No discharge procedures on file.    PDMP Review       None             ED Provider  Attending physically available and evaluated Grismerlin Fernandez. I managed the patient along with the ED Attending.    Electronically Signed by           Anai Reardon MD  24 0133

## 2024-04-17 NOTE — PLAN OF CARE
Problem: SAFETY ADULT  Goal: Patient will remain free of falls  Description: INTERVENTIONS:  - Educate patient/family on patient safety including physical limitations  - Instruct patient to call for assistance with activity   - Consult OT/PT to assist with strengthening/mobility   - Keep Call bell within reach  - Keep bed low and locked with side rails adjusted as appropriate  - Keep care items and personal belongings within reach  - Initiate and maintain comfort rounds  - Make Fall Risk Sign visible to staff  - Offer Toileting every 2 Hours, in advance of need  - Initiate/Maintain alarm  - Obtain necessary fall risk management equipment:   - Apply yellow socks and bracelet for high fall risk patients  - Consider moving patient to room near nurses station  Outcome: Progressing  Goal: Maintain or return to baseline ADL function  Description: INTERVENTIONS:  -  Assess patient's ability to carry out ADLs; assess patient's baseline for ADL function and identify physical deficits which impact ability to perform ADLs (bathing, care of mouth/teeth, toileting, grooming, dressing, etc.)  - Assess/evaluate cause of self-care deficits   - Assess range of motion  - Assess patient's mobility; develop plan if impaired  - Assess patient's need for assistive devices and provide as appropriate  - Encourage maximum independence but intervene and supervise when necessary  - Involve family in performance of ADLs  - Assess for home care needs following discharge   - Consider OT consult to assist with ADL evaluation and planning for discharge  - Provide patient education as appropriate  Outcome: Progressing  Goal: Maintains/Returns to pre admission functional level  Description: INTERVENTIONS:  - Perform AM-PAC 6 Click Basic Mobility/ Daily Activity assessment daily.  - Set and communicate daily mobility goal to care team and patient/family/caregiver.   - Collaborate with rehabilitation services on mobility goals if consulted  -  Perform Range of Motion 3 times a day.  - Reposition patient every 2 hours.  - Dangle patient 3 times a day  - Stand patient 3 times a day  - Ambulate patient 3 times a day  - Out of bed to chair 3 times a day   - Out of bed for meals 3 times a day  - Out of bed for toileting  - Record patient progress and toleration of activity level   Outcome: Progressing     Problem: DISCHARGE PLANNING  Goal: Discharge to home or other facility with appropriate resources  Description: INTERVENTIONS:  - Identify barriers to discharge w/patient and caregiver  - Arrange for needed discharge resources and transportation as appropriate  - Identify discharge learning needs (meds, wound care, etc.)  - Arrange for interpretive services to assist at discharge as needed  - Refer to Case Management Department for coordinating discharge planning if the patient needs post-hospital services based on physician/advanced practitioner order or complex needs related to functional status, cognitive ability, or social support system  Outcome: Progressing     Problem: Knowledge Deficit  Goal: Patient/family/caregiver demonstrates understanding of disease process, treatment plan, medications, and discharge instructions  Description: Complete learning assessment and assess knowledge base.  Interventions:  - Provide teaching at level of understanding  - Provide teaching via preferred learning methods  Outcome: Progressing

## 2024-04-17 NOTE — H&P
[facility]  H&P  Name: Grismerlin Fernandez 24 y.o. female I MRN: [id number]  Unit/Bed#: E5 -01 I Date of Admission: 2024   Date of Service: 2024 I Hospital Day: 0      Assessment/Plan   * Tylenol ingestion, undetermined intent, subsequent encounter  Assessment & Plan  Patient presenting with complaints of taking 15 tylenols for a headache  Not very forthcoming with information, withdrawn with flat affect  Denies suicidal ideation/intent, stressors, altercations  Tylenol level elevated at 194, LFTs within normal limits  Cannot r/o suicidal attempt at this time  Place in observation unit with 1:1 observation  IVF @125ml/hr  On NAC protocol  Toxicology consulted, not available after 8pm  Follow up input in AM  Psychiatry consult    Bandemia  Assessment & Plan  On review of chart patient has chronic leukocytosis although unable to rule out acute infection at this time  In setting of abdominal pain will rule out urinary tract infection  Send urinalysis to assess further  Trend           VTE Prophylaxis:  Low risk   / sequential compression device   Code Status: Level 1  POLST: There is no POLST form on file for this patient (pre-hospital)  Discussion with family: No    Anticipated Length of Stay:  Patient will be admitted on an Inpatient basis with an anticipated length of stay of  1 - 2 midnights.   Justification for Hospital Stay: as above    Total Time for Visit, including Counseling / Coordination of Care: 45 minutes.  Greater than 50% of this total time spent on direct patient counseling and coordination of care.    Chief Complaint:   Tylenol overdose    History of Present Illness:    Grismerlin Fernandez is a 24 y.o. female with a past medical history of depression, suicidal attempts, impulsive behavior, status post  1/15/2024 presents with complaints of taking too much Tylenol.  Patient reports she was having a headache and therefore she took 15 Tylenol  medications.  Patient is a poor historian and is defensive with her responses.  When writer asked why she took 15 pills as opposed to 1 or 2 she is quiet and does not divulge why although she denies suicidal ideation/attempt.  She admits to nausea and right upper quadrant pain that began today.  Also asking for orange juice.  Otherwise has no other complaints.  Patient lives with her mother who is currently caring for her 3-month-old.  She denies recent altercations at home.  She takes no home medications regularly.  On chart review, patient was admitted 1/28/2024 with complaints of right flank pain.  At that time her LFTs were significantly elevated.  A CAT scan of the abdomen showed a pelvic fluid collection and possible pyelonephritis for which she was given antibiotics.  After evaluation by ID team antibiotics discontinued and OB/GYN team felt fluid collection not abscess and therefore was monitored off antibiotics.  LFTs improved and patient signed out against medical advice 3 days later.      In emergency room, vital signs stable.  Labs notable for Tylenol level of 194.  LFTs within normal limits.  Patient was started on NAC protocol with plans for toxicology and psychiatry input.      Review of Systems:    Review of Systems   Constitutional:  Negative for appetite change, chills and fever.   HENT:  Negative for rhinorrhea.    Eyes:  Negative for visual disturbance.   Respiratory:  Negative for cough and shortness of breath.    Cardiovascular:  Negative for chest pain.   Gastrointestinal:  Positive for abdominal pain and nausea. Negative for abdominal distention, diarrhea and vomiting.   Endocrine: Negative for polyuria.   Genitourinary:  Negative for dysuria.   Musculoskeletal:  Negative for arthralgias.   Skin:  Negative for rash.   Neurological:  Negative for dizziness, syncope and light-headedness.   Psychiatric/Behavioral:  Negative for suicidal ideas.        Past Medical and Surgical History:     Past  "Medical History:   Diagnosis Date    Depression        Past Surgical History:   Procedure Laterality Date    ABDOMINOPLASTY      [place]  DELIVERY ONLY N/A 2020    Procedure:  SECTION ();  Surgeon: Adair Clifton MD;  Location: [facility];  Service: Obstetrics    CT  DELIVERY ONLY N/A 1/15/2024    Procedure:  SECTION ();  Surgeon: Yardlie Toussaint-Foster, DO;  Location: [facility];  Service: Obstetrics       Meds/Allergies:    Prior to Admission medications    Medication Sig Start Date End Date Taking? Authorizing Provider   ibuprofen (MOTRIN) 600 mg tablet Take 1 tablet (600 mg total) by mouth every 6 (six) hours  Patient not taking: Reported on 2024   PER Le     I have reviewed home medications with patient personally.    Allergies: No Known Allergies    Social History:     Marital Status: Single   Substance Use History:   Social History     Substance and Sexual Activity   Alcohol Use Not Currently    Comment: \"sometimes\"     Social History     Tobacco Use   Smoking Status Former    Types: Pipe   Smokeless Tobacco Never     Social History     Substance and Sexual Activity   Drug Use Never       Family History:    non-contributory    Physical Exam:     Vitals:   Blood Pressure: 133/84 (24 0147)  Pulse: 90 (24 0147)  Temperature: 98.8 °F (37.1 °C) (24 0147)  Respirations: 17 (24 0147)  Weight - Scale: 93.9 kg (207 lb 0.2 oz) (24 2304)  SpO2: 97 % (24 0147)    Physical Exam  Constitutional:       General: She is not in acute distress.     Appearance: She is obese. She is not ill-appearing.   HENT:      Head: Normocephalic.   Eyes:      General: No scleral icterus.     Extraocular Movements: Extraocular movements intact.   Cardiovascular:      Rate and Rhythm: Normal rate and regular rhythm.      Heart sounds: No murmur heard.  Pulmonary:      Effort: Pulmonary effort is normal. No respiratory distress.      " Breath sounds: Normal breath sounds.   Abdominal:      General: Bowel sounds are normal. There is no distension.      Palpations: Abdomen is soft.      Tenderness: There is abdominal tenderness in the right upper quadrant and epigastric area. There is guarding.   Musculoskeletal:      Right lower leg: No edema.      Left lower leg: No edema.   Skin:     General: Skin is warm and dry.      Coloration: Skin is not jaundiced.   Neurological:      General: No focal deficit present.      Mental Status: She is alert.   Psychiatric:         Mood and Affect: Affect is blunt, flat and angry. Affect is not tearful.         Speech: Speech normal.         Behavior: Behavior is withdrawn.         Additional Data:     Lab Results: I have personally reviewed pertinent reports.      Results from last 7 days   Lab Units 04/16/24  2316   WBC Thousand/uL 15.81*   HEMOGLOBIN g/dL 12.9   HEMATOCRIT % 39.8   PLATELETS Thousands/uL 399*   SEGS PCT % 68   LYMPHO PCT % 24   MONO PCT % 8   EOS PCT % 0     Results from last 7 days   Lab Units 04/16/24  2316   SODIUM mmol/L 139   POTASSIUM mmol/L 3.7   CHLORIDE mmol/L 105   CO2 mmol/L 25   BUN mg/dL 11   CREATININE mg/dL 0.76   ANION GAP mmol/L 9   CALCIUM mg/dL 9.4   ALBUMIN g/dL 4.2   TOTAL BILIRUBIN mg/dL 0.47   ALK PHOS U/L 80   ALT U/L 15   AST U/L 15   GLUCOSE RANDOM mg/dL 105         Results from last 7 days   Lab Units 04/17/24  0256   POC GLUCOSE mg/dl 108               Imaging: I have personally reviewed pertinent reports.      No orders to display       EKG, Pathology, and Other Studies Reviewed on Admission:   EKG: Reviewed    [facility] Records Reviewed: Yes     ** Please Note: This note has been constructed using a voice recognition system. **

## 2024-04-17 NOTE — TREATMENT PLAN
Follow-up note-nonbillable    This is a 24-year-old female with history of depression, suicidal attempts, impulsive behavior, status post  1/15/2024 scented with taking excess amounts of Tylenol due to headache.  Patient states she took 15 tabs of Tylenol for persistent headache.     On exam patient is comfortable, denies any complaints, denies any suicidal or homicidal ideation    Assessment/plan:  Acetaminophen overdose  -Patient Tylenol level 194, LFTs were normal on admission  -Discussed case with toxicology recommend to continue NAC and repeat blood work at 7 PM and tomorrow morning    History of suicidal attempt  -Patient has history of suicide attempt in the past, also had rehab acetaminophen overdose in January as well, she is currently postpartum as well there are concerns for possible suicidal ideation although she is denying this  -Psychiatry input appreciated  -Continue one-to-one observation, patient cannot leave AMA at this point  -Have psych reevaluate tomorrow

## 2024-04-17 NOTE — PLAN OF CARE
Problem: SAFETY ADULT  Goal: Patient will remain free of falls  Description: INTERVENTIONS:  - Educate patient/family on patient safety including physical limitations  - Instruct patient to call for assistance with activity   - Consult OT/PT to assist with strengthening/mobility   - Keep Call bell within reach  - Keep bed low and locked with side rails adjusted as appropriate  - Keep care items and personal belongings within reach  - Initiate and maintain comfort rounds  - Make Fall Risk Sign visible to staff  - Apply yellow socks and bracelet for high fall risk patients  - Consider moving patient to room near nurses station  4/17/2024 1226 by Katharina Sharpe RN  Outcome: Progressing  4/17/2024 1225 by Katharina Sharpe RN  Outcome: Progressing  Goal: Maintain or return to baseline ADL function  Description: INTERVENTIONS:  -  Assess patient's ability to carry out ADLs; assess patient's baseline for ADL function and identify physical deficits which impact ability to perform ADLs (bathing, care of mouth/teeth, toileting, grooming, dressing, etc.)  - Assess/evaluate cause of self-care deficits   - Assess range of motion  - Assess patient's mobility; develop plan if impaired  - Assess patient's need for assistive devices and provide as appropriate  - Encourage maximum independence but intervene and supervise when necessary  - Involve family in performance of ADLs  - Assess for home care needs following discharge   - Consider OT consult to assist with ADL evaluation and planning for discharge  - Provide patient education as appropriate  4/17/2024 1226 by Katharina Sharpe RN  Outcome: Progressing  4/17/2024 1225 by Katharina Sharpe RN  Outcome: Progressing  Goal: Maintains/Returns to pre admission functional level  Description: INTERVENTIONS:  - Perform AM-PAC 6 Click Basic Mobility/ Daily Activity assessment daily.  - Set and communicate daily mobility goal to care team and patient/family/caregiver.   - Collaborate with  rehabilitation services on mobility goals if consulted  - Out of bed for toileting  - Record patient progress and toleration of activity level   4/17/2024 1226 by Katharina Sharpe RN  Outcome: Progressing  4/17/2024 1225 by Katharina Sharpe RN  Outcome: Progressing     Problem: DISCHARGE PLANNING  Goal: Discharge to home or other facility with appropriate resources  Description: INTERVENTIONS:  - Identify barriers to discharge w/patient and caregiver  - Arrange for needed discharge resources and transportation as appropriate  - Identify discharge learning needs (meds, wound care, etc.)  - Arrange for interpretive services to assist at discharge as needed  - Refer to Case Management Department for coordinating discharge planning if the patient needs post-hospital services based on physician/advanced practitioner order or complex needs related to functional status, cognitive ability, or social support system  4/17/2024 1226 by Katharina Sharpe RN  Outcome: Progressing  4/17/2024 1225 by Katharina Sharpe RN  Outcome: Progressing     Problem: Knowledge Deficit  Goal: Patient/family/caregiver demonstrates understanding of disease process, treatment plan, medications, and discharge instructions  Description: Complete learning assessment and assess knowledge base.  Interventions:  - Provide teaching at level of understanding  - Provide teaching via preferred learning methods  4/17/2024 1226 by Katharina Sharpe RN  Outcome: Progressing  4/17/2024 1225 by Katharina Sharpe RN  Outcome: Progressing

## 2024-04-18 ENCOUNTER — HOSPITAL ENCOUNTER (INPATIENT)
Facility: HOSPITAL | Age: 24
LOS: 3 days | Discharge: HOME | DRG: 755 | End: 2024-04-21
Attending: STUDENT IN AN ORGANIZED HEALTH CARE EDUCATION/TRAINING PROGRAM | Admitting: STUDENT IN AN ORGANIZED HEALTH CARE EDUCATION/TRAINING PROGRAM
Payer: COMMERCIAL

## 2024-04-18 VITALS
DIASTOLIC BLOOD PRESSURE: 79 MMHG | WEIGHT: 207.01 LBS | RESPIRATION RATE: 20 BRPM | BODY MASS INDEX: 37.86 KG/M2 | OXYGEN SATURATION: 95 % | SYSTOLIC BLOOD PRESSURE: 125 MMHG | TEMPERATURE: 97.9 F | HEART RATE: 80 BPM

## 2024-04-18 DIAGNOSIS — Z00.8 MEDICAL CLEARANCE FOR PSYCHIATRIC ADMISSION: ICD-10-CM

## 2024-04-18 LAB
ALBUMIN SERPL BCP-MCNC: 3.4 G/DL (ref 3.5–5)
ALP SERPL-CCNC: 55 U/L (ref 34–104)
ALT SERPL W P-5'-P-CCNC: 13 U/L (ref 7–52)
ANION GAP SERPL CALCULATED.3IONS-SCNC: 6 MMOL/L (ref 4–13)
APAP SERPL-MCNC: <2 UG/ML (ref 10–20)
AST SERPL W P-5'-P-CCNC: 14 U/L (ref 13–39)
BASOPHILS # BLD AUTO: 0.05 THOUSANDS/ÂΜL (ref 0–0.1)
BASOPHILS NFR BLD AUTO: 0 % (ref 0–1)
BILIRUB SERPL-MCNC: 0.25 MG/DL (ref 0.2–1)
BUN SERPL-MCNC: 6 MG/DL (ref 5–25)
CALCIUM ALBUM COR SERPL-MCNC: 9.3 MG/DL (ref 8.3–10.1)
CALCIUM SERPL-MCNC: 8.8 MG/DL (ref 8.4–10.2)
CHLORIDE SERPL-SCNC: 109 MMOL/L (ref 96–108)
CO2 SERPL-SCNC: 24 MMOL/L (ref 21–32)
CREAT SERPL-MCNC: 0.62 MG/DL (ref 0.6–1.3)
EOSINOPHIL # BLD AUTO: 0.08 THOUSAND/ÂΜL (ref 0–0.61)
EOSINOPHIL NFR BLD AUTO: 1 % (ref 0–6)
ERYTHROCYTE [DISTWIDTH] IN BLOOD BY AUTOMATED COUNT: 14.1 % (ref 11.6–15.1)
GFR SERPL CREATININE-BSD FRML MDRD: 127 ML/MIN/1.73SQ M
GLUCOSE SERPL-MCNC: 118 MG/DL (ref 65–140)
HCT VFR BLD AUTO: 35.6 % (ref 34.8–46.1)
HGB BLD-MCNC: 11.3 G/DL (ref 11.5–15.4)
IMM GRANULOCYTES # BLD AUTO: 0.06 THOUSAND/UL (ref 0–0.2)
IMM GRANULOCYTES NFR BLD AUTO: 1 % (ref 0–2)
LYMPHOCYTES # BLD AUTO: 3.72 THOUSANDS/ÂΜL (ref 0.6–4.47)
LYMPHOCYTES NFR BLD AUTO: 31 % (ref 14–44)
MCH RBC QN AUTO: 26.7 PG (ref 26.8–34.3)
MCHC RBC AUTO-ENTMCNC: 31.7 G/DL (ref 31.4–37.4)
MCV RBC AUTO: 84 FL (ref 82–98)
MONOCYTES # BLD AUTO: 0.86 THOUSAND/ÂΜL (ref 0.17–1.22)
MONOCYTES NFR BLD AUTO: 7 % (ref 4–12)
NEUTROPHILS # BLD AUTO: 7.11 THOUSANDS/ÂΜL (ref 1.85–7.62)
NEUTS SEG NFR BLD AUTO: 60 % (ref 43–75)
NRBC BLD AUTO-RTO: 0 /100 WBCS
PLATELET # BLD AUTO: 345 THOUSANDS/UL (ref 149–390)
PMV BLD AUTO: 9.8 FL (ref 8.9–12.7)
POTASSIUM SERPL-SCNC: 3.8 MMOL/L (ref 3.5–5.3)
PROT SERPL-MCNC: 6.3 G/DL (ref 6.4–8.4)
RBC # BLD AUTO: 4.23 MILLION/UL (ref 3.81–5.12)
SODIUM SERPL-SCNC: 139 MMOL/L (ref 135–147)
WBC # BLD AUTO: 11.88 THOUSAND/UL (ref 4.31–10.16)

## 2024-04-18 PROCEDURE — 99239 HOSP IP/OBS DSCHRG MGMT >30: CPT | Performed by: INTERNAL MEDICINE

## 2024-04-18 PROCEDURE — 80053 COMPREHEN METABOLIC PANEL: CPT | Performed by: INTERNAL MEDICINE

## 2024-04-18 PROCEDURE — 99233 SBSQ HOSP IP/OBS HIGH 50: CPT | Performed by: PSYCHIATRY & NEUROLOGY

## 2024-04-18 PROCEDURE — 85025 COMPLETE CBC W/AUTO DIFF WBC: CPT | Performed by: INTERNAL MEDICINE

## 2024-04-18 PROCEDURE — 80143 DRUG ASSAY ACETAMINOPHEN: CPT | Performed by: INTERNAL MEDICINE

## 2024-04-18 RX ORDER — OLANZAPINE 5 MG/1
5 TABLET ORAL
Status: CANCELLED | OUTPATIENT
Start: 2024-04-18

## 2024-04-18 RX ORDER — POLYETHYLENE GLYCOL 3350 17 G/17G
17 POWDER, FOR SOLUTION ORAL DAILY PRN
Status: CANCELLED | OUTPATIENT
Start: 2024-04-18

## 2024-04-18 RX ORDER — PROPRANOLOL HYDROCHLORIDE 10 MG/1
10 TABLET ORAL EVERY 8 HOURS PRN
Status: DISCONTINUED | OUTPATIENT
Start: 2024-04-18 | End: 2024-04-21 | Stop reason: HOSPADM

## 2024-04-18 RX ORDER — LANOLIN ALCOHOL/MO/W.PET/CERES
9 CREAM (GRAM) TOPICAL
Status: CANCELLED | OUTPATIENT
Start: 2024-04-18

## 2024-04-18 RX ORDER — OLANZAPINE 10 MG/1
10 TABLET ORAL
Status: DISCONTINUED | OUTPATIENT
Start: 2024-04-18 | End: 2024-04-21 | Stop reason: HOSPADM

## 2024-04-18 RX ORDER — DIPHENHYDRAMINE HYDROCHLORIDE 50 MG/ML
50 INJECTION INTRAMUSCULAR; INTRAVENOUS EVERY 6 HOURS PRN
Status: DISCONTINUED | OUTPATIENT
Start: 2024-04-18 | End: 2024-04-21 | Stop reason: HOSPADM

## 2024-04-18 RX ORDER — HYDROXYZINE HYDROCHLORIDE 25 MG/1
50 TABLET, FILM COATED ORAL
Status: CANCELLED | OUTPATIENT
Start: 2024-04-18

## 2024-04-18 RX ORDER — OLANZAPINE 10 MG/2ML
5 INJECTION, POWDER, FOR SOLUTION INTRAMUSCULAR
Status: CANCELLED | OUTPATIENT
Start: 2024-04-18

## 2024-04-18 RX ORDER — POLYETHYLENE GLYCOL 3350 17 G/17G
17 POWDER, FOR SOLUTION ORAL DAILY PRN
Status: DISCONTINUED | OUTPATIENT
Start: 2024-04-18 | End: 2024-04-21 | Stop reason: HOSPADM

## 2024-04-18 RX ORDER — HYDROXYZINE 50 MG/1
100 TABLET, FILM COATED ORAL
Status: DISCONTINUED | OUTPATIENT
Start: 2024-04-18 | End: 2024-04-21 | Stop reason: HOSPADM

## 2024-04-18 RX ORDER — ACETAMINOPHEN 325 MG/1
975 TABLET ORAL EVERY 6 HOURS PRN
Status: DISCONTINUED | OUTPATIENT
Start: 2024-04-18 | End: 2024-04-21 | Stop reason: HOSPADM

## 2024-04-18 RX ORDER — ACETAMINOPHEN 325 MG/1
650 TABLET ORAL EVERY 6 HOURS PRN
Status: CANCELLED | OUTPATIENT
Start: 2024-04-18

## 2024-04-18 RX ORDER — BENZTROPINE MESYLATE 1 MG/ML
1 INJECTION INTRAMUSCULAR; INTRAVENOUS
Status: CANCELLED | OUTPATIENT
Start: 2024-04-18

## 2024-04-18 RX ORDER — HYDROXYZINE HYDROCHLORIDE 25 MG/1
100 TABLET, FILM COATED ORAL
Status: CANCELLED | OUTPATIENT
Start: 2024-04-18

## 2024-04-18 RX ORDER — OLANZAPINE 10 MG/2ML
5 INJECTION, POWDER, FOR SOLUTION INTRAMUSCULAR
Status: DISCONTINUED | OUTPATIENT
Start: 2024-04-18 | End: 2024-04-21 | Stop reason: HOSPADM

## 2024-04-18 RX ORDER — LANOLIN ALCOHOL/MO/W.PET/CERES
9 CREAM (GRAM) TOPICAL
Status: DISCONTINUED | OUTPATIENT
Start: 2024-04-18 | End: 2024-04-19

## 2024-04-18 RX ORDER — ACETAMINOPHEN 325 MG/1
650 TABLET ORAL EVERY 4 HOURS PRN
Status: CANCELLED | OUTPATIENT
Start: 2024-04-18

## 2024-04-18 RX ORDER — OLANZAPINE 10 MG/2ML
10 INJECTION, POWDER, FOR SOLUTION INTRAMUSCULAR
Status: DISCONTINUED | OUTPATIENT
Start: 2024-04-18 | End: 2024-04-21 | Stop reason: HOSPADM

## 2024-04-18 RX ORDER — PROPRANOLOL HYDROCHLORIDE 10 MG/1
10 TABLET ORAL EVERY 8 HOURS PRN
Status: CANCELLED | OUTPATIENT
Start: 2024-04-18

## 2024-04-18 RX ORDER — MAGNESIUM HYDROXIDE/ALUMINUM HYDROXICE/SIMETHICONE 120; 1200; 1200 MG/30ML; MG/30ML; MG/30ML
30 SUSPENSION ORAL EVERY 4 HOURS PRN
Status: CANCELLED | OUTPATIENT
Start: 2024-04-18

## 2024-04-18 RX ORDER — DIPHENHYDRAMINE HYDROCHLORIDE 50 MG/ML
50 INJECTION INTRAMUSCULAR; INTRAVENOUS EVERY 6 HOURS PRN
Status: CANCELLED | OUTPATIENT
Start: 2024-04-18

## 2024-04-18 RX ORDER — OLANZAPINE 10 MG/2ML
10 INJECTION, POWDER, FOR SOLUTION INTRAMUSCULAR
Status: CANCELLED | OUTPATIENT
Start: 2024-04-18

## 2024-04-18 RX ORDER — BENZTROPINE MESYLATE 1 MG/ML
1 INJECTION INTRAMUSCULAR; INTRAVENOUS
Status: DISCONTINUED | OUTPATIENT
Start: 2024-04-18 | End: 2024-04-21 | Stop reason: HOSPADM

## 2024-04-18 RX ORDER — BISACODYL 10 MG
10 SUPPOSITORY, RECTAL RECTAL DAILY PRN
Status: DISCONTINUED | OUTPATIENT
Start: 2024-04-18 | End: 2024-04-21 | Stop reason: HOSPADM

## 2024-04-18 RX ORDER — OLANZAPINE 5 MG/1
2.5 TABLET ORAL
Status: CANCELLED | OUTPATIENT
Start: 2024-04-18

## 2024-04-18 RX ORDER — ACETAMINOPHEN 325 MG/1
975 TABLET ORAL EVERY 6 HOURS PRN
Status: CANCELLED | OUTPATIENT
Start: 2024-04-18

## 2024-04-18 RX ORDER — MAGNESIUM HYDROXIDE/ALUMINUM HYDROXICE/SIMETHICONE 120; 1200; 1200 MG/30ML; MG/30ML; MG/30ML
30 SUSPENSION ORAL EVERY 4 HOURS PRN
Status: DISCONTINUED | OUTPATIENT
Start: 2024-04-18 | End: 2024-04-21 | Stop reason: HOSPADM

## 2024-04-18 RX ORDER — HYDROXYZINE HYDROCHLORIDE 25 MG/1
25 TABLET, FILM COATED ORAL
Status: DISCONTINUED | OUTPATIENT
Start: 2024-04-18 | End: 2024-04-21 | Stop reason: HOSPADM

## 2024-04-18 RX ORDER — LORAZEPAM 2 MG/ML
2 INJECTION INTRAMUSCULAR EVERY 6 HOURS PRN
Status: DISCONTINUED | OUTPATIENT
Start: 2024-04-18 | End: 2024-04-21 | Stop reason: HOSPADM

## 2024-04-18 RX ORDER — OLANZAPINE 5 MG/1
5 TABLET ORAL
Status: DISCONTINUED | OUTPATIENT
Start: 2024-04-18 | End: 2024-04-21 | Stop reason: HOSPADM

## 2024-04-18 RX ORDER — TRAZODONE HYDROCHLORIDE 50 MG/1
50 TABLET ORAL
Status: DISCONTINUED | OUTPATIENT
Start: 2024-04-18 | End: 2024-04-21 | Stop reason: HOSPADM

## 2024-04-18 RX ORDER — ACETAMINOPHEN 325 MG/1
650 TABLET ORAL EVERY 6 HOURS PRN
Status: DISCONTINUED | OUTPATIENT
Start: 2024-04-18 | End: 2024-04-21 | Stop reason: HOSPADM

## 2024-04-18 RX ORDER — AMOXICILLIN 250 MG
1 CAPSULE ORAL DAILY PRN
Status: CANCELLED | OUTPATIENT
Start: 2024-04-18

## 2024-04-18 RX ORDER — AMOXICILLIN 250 MG
1 CAPSULE ORAL DAILY PRN
Status: DISCONTINUED | OUTPATIENT
Start: 2024-04-18 | End: 2024-04-21 | Stop reason: HOSPADM

## 2024-04-18 RX ORDER — OLANZAPINE 2.5 MG/1
2.5 TABLET, FILM COATED ORAL
Status: DISCONTINUED | OUTPATIENT
Start: 2024-04-18 | End: 2024-04-21 | Stop reason: HOSPADM

## 2024-04-18 RX ORDER — BENZTROPINE MESYLATE 1 MG/1
1 TABLET ORAL
Status: DISCONTINUED | OUTPATIENT
Start: 2024-04-18 | End: 2024-04-21 | Stop reason: HOSPADM

## 2024-04-18 RX ORDER — TRAZODONE HYDROCHLORIDE 50 MG/1
50 TABLET ORAL
Status: CANCELLED | OUTPATIENT
Start: 2024-04-18

## 2024-04-18 RX ORDER — BISACODYL 10 MG
10 SUPPOSITORY, RECTAL RECTAL DAILY PRN
Status: CANCELLED | OUTPATIENT
Start: 2024-04-18

## 2024-04-18 RX ORDER — HYDROXYZINE 50 MG/1
50 TABLET, FILM COATED ORAL
Status: DISCONTINUED | OUTPATIENT
Start: 2024-04-18 | End: 2024-04-21 | Stop reason: HOSPADM

## 2024-04-18 RX ORDER — LORAZEPAM 2 MG/ML
2 INJECTION INTRAMUSCULAR EVERY 6 HOURS PRN
Status: CANCELLED | OUTPATIENT
Start: 2024-04-18

## 2024-04-18 RX ORDER — HYDROXYZINE HYDROCHLORIDE 25 MG/1
25 TABLET, FILM COATED ORAL
Status: CANCELLED | OUTPATIENT
Start: 2024-04-18

## 2024-04-18 RX ORDER — OLANZAPINE 5 MG/1
10 TABLET ORAL
Status: CANCELLED | OUTPATIENT
Start: 2024-04-18

## 2024-04-18 RX ORDER — BENZTROPINE MESYLATE 1 MG/1
1 TABLET ORAL
Status: CANCELLED | OUTPATIENT
Start: 2024-04-18

## 2024-04-18 RX ORDER — ACETAMINOPHEN 325 MG/1
650 TABLET ORAL EVERY 4 HOURS PRN
Status: DISCONTINUED | OUTPATIENT
Start: 2024-04-18 | End: 2024-04-21 | Stop reason: HOSPADM

## 2024-04-18 RX ADMIN — Medication 9 MG: at 21:18

## 2024-04-18 NOTE — PROGRESS NOTES
-Red shirt  -Ripped blue alphonse shorts  -Pink bra  -Red thongs  -(2) Hair brushes  -[employer]gan Shampoo [employer] Conditioner  -Iphone w/ clear case  -Phone  (usb wire & box piece)  -Pablito (7210)  -Hair net  -Hospital Toiletries (deodorant,toothbrush,toothpaste,lotion,basin,mouth wash)  -Black Foam runners    Bedside belongings;  -Red shirt  -Pink bra  -Red thongs  -Hair brush  -Hair net  -Black Foam runners

## 2024-04-18 NOTE — PLAN OF CARE
Problem: SAFETY ADULT  Goal: Patient will remain free of falls  Description: INTERVENTIONS:  - Educate patient/family on patient safety including physical limitations  - Instruct patient to call for assistance with activity   - Consult OT/PT to assist with strengthening/mobility   - Keep Call bell within reach  - Keep bed low and locked with side rails adjusted as appropriate  - Keep care items and personal belongings within reach  - Initiate and maintain comfort rounds  - Make Fall Risk Sign visible to staff  - Apply yellow socks and bracelet for high fall risk patients  - Consider moving patient to room near nurses station  4/18/2024 1425 by Colleen Hughes-Behler, RN  Outcome: Adequate for Discharge  4/18/2024 0946 by Colleen Hughes-Behler, RN  Outcome: Progressing  Goal: Maintain or return to baseline ADL function  Description: INTERVENTIONS:  -  Assess patient's ability to carry out ADLs; assess patient's baseline for ADL function and identify physical deficits which impact ability to perform ADLs (bathing, care of mouth/teeth, toileting, grooming, dressing, etc.)  - Assess/evaluate cause of self-care deficits   - Assess range of motion  - Assess patient's mobility; develop plan if impaired  - Assess patient's need for assistive devices and provide as appropriate  - Encourage maximum independence but intervene and supervise when necessary  - Involve family in performance of ADLs  - Assess for home care needs following discharge   - Consider OT consult to assist with ADL evaluation and planning for discharge  - Provide patient education as appropriate  4/18/2024 1425 by Colleen Hughes-Behler, RN  Outcome: Adequate for Discharge  4/18/2024 0946 by Colleen Hughes-Behler, RN  Outcome: Progressing  Goal: Maintains/Returns to pre admission functional level  Description: INTERVENTIONS:  - Perform AM-PAC 6 Click Basic Mobility/ Daily Activity assessment daily.  - Set and communicate daily mobility goal to care team  and patient/family/caregiver.   - Collaborate with rehabilitation services on mobility goals if consulted  - Out of bed for toileting  - Record patient progress and toleration of activity level   4/18/2024 1425 by Colleen Hughes-Behler, RN  Outcome: Adequate for Discharge  4/18/2024 0946 by Colleen Hughes-Behler, RN  Outcome: Progressing     Problem: DISCHARGE PLANNING  Goal: Discharge to home or other facility with appropriate resources  Description: INTERVENTIONS:  - Identify barriers to discharge w/patient and caregiver  - Arrange for needed discharge resources and transportation as appropriate  - Identify discharge learning needs (meds, wound care, etc.)  - Arrange for interpretive services to assist at discharge as needed  - Refer to Case Management Department for coordinating discharge planning if the patient needs post-hospital services based on physician/advanced practitioner order or complex needs related to functional status, cognitive ability, or social support system  4/18/2024 1425 by Colleen Hughes-Behler, RN  Outcome: Adequate for Discharge  4/18/2024 0946 by Colleen Hughes-Behler, RN  Outcome: Progressing     Problem: Knowledge Deficit  Goal: Patient/family/caregiver demonstrates understanding of disease process, treatment plan, medications, and discharge instructions  Description: Complete learning assessment and assess knowledge base.  Interventions:  - Provide teaching at level of understanding  - Provide teaching via preferred learning methods  4/18/2024 1425 by Colleen Hughes-Behler, RN  Outcome: Adequate for Discharge  4/18/2024 0946 by Colleen Hughes-Behler, RN  Outcome: Progressing     Problem: Risk for Self Injury/Neglect  Goal: Treatment Goal: Remain safe during length of stay, learn and adopt new coping skills, and be free of self-injurious ideation, impulses and acts at the time of discharge  4/18/2024 1425 by Colleen Hughes-Behler, RN  Outcome: Adequate for Discharge  4/18/2024 0946 by  Colleen Hughes-Behler, RN  Outcome: Progressing  Goal: Verbalize thoughts and feelings  Description: Interventions:  - Assess and re-assess patient's lethality and potential for self-injury  - Engage patient in 1:1 interactions, daily, for a minimum of 15 minutes  - Encourage patient to express feelings, fears, frustrations, hopes  - Establish rapport/trust with patient   4/18/2024 1425 by Colleen Hughes-Behler, RN  Outcome: Adequate for Discharge  4/18/2024 0946 by Colleen Hughes-Behler, RN  Outcome: Progressing  Goal: Refrain from harming self  Description: Interventions:  - Monitor patient closely, per order  - Develop a trusting relationship  - Supervise medication ingestion, monitor effects and side effects   4/18/2024 1425 by Colleen Hughes-Behler, RN  Outcome: Adequate for Discharge  4/18/2024 0946 by Colleen Hughes-Behler, RN  Outcome: Progressing  Goal: Attend and participate in unit activities, including therapeutic, recreational, and educational groups  Description: Interventions:  - Provide therapeutic and educational activities daily, encourage attendance and participation, and document same in the medical record  - Obtain collateral information, encourage visitation and family involvement in care   4/18/2024 1425 by Colleen Hughes-Behler, RN  Outcome: Adequate for Discharge  4/18/2024 0946 by Colleen Hughes-Behler, RN  Outcome: Progressing  Goal: Recognize maladaptive responses and adopt new coping mechanisms  4/18/2024 1425 by Colleen Hughes-Behler, RN  Outcome: Adequate for Discharge  4/18/2024 0946 by Colleen Hughes-Behler, RN  Outcome: Progressing  Goal: Complete daily ADLs, including personal hygiene independently, as able  Description: Interventions:  - Observe, teach, and assist patient with ADLS  - Monitor and promote a balance of rest/activity, with adequate nutrition and elimination  4/18/2024 1425 by Colleen Hughes-Behler, RN  Outcome: Adequate for Discharge  4/18/2024 0946 by Anjali  Hughes-Behler, RN  Outcome: Progressing

## 2024-04-18 NOTE — CASE MANAGEMENT
Case Management Assessment & Discharge Planning Note    Patient name Grismerlin Fernandez  Location [facility] /E5 -* MRN [id number]  : 2000 Date 2024       Current Admission Date: 2024  Current Admission Diagnosis:Tylenol ingestion, undetermined intent, subsequent encounter   Patient Active Problem List    Diagnosis Date Noted    Bandemia 2024    Hospital discharge follow-up 2024    SIRS (systemic inflammatory response syndrome) (HCC) 2024    Transaminitis 2024    Pelvic fluid collection 2024    Abnormal CT scan, kidney 2024    Tylenol ingestion, undetermined intent, subsequent encounter 2024    COVID-19 affecting pregnancy in third trimester 2024    Patient noncompliance 2023    H/O abdominoplasty 10/17/2023    Status post repeat low transverse  section 2023    History of intrauterine growth restriction in prior pregnancy, currently pregnant, first trimester 2023    Hx of  section 2023    Obesity affecting pregnancy in second trimester 2023      LOS (days): 1  Geometric Mean LOS (GMLOS) (days): 2.5  Days to GMLOS:1.5     OBJECTIVE:    Risk of Unplanned Readmission Score: 5.97         Current admission status: Inpatient       Preferred Pharmacy:   [id number] [place] - [address] [facility]  [address] [place] [address]  Phone: [phone] Fax: [phone]    Primary Care Provider: No primary care provider on file.    Primary Insurance: [facility]  Secondary Insurance:     ASSESSMENT:  Active Health Care Proxies    There are no active Health Care Proxies on file.       Advance Directives  Does patient have a Health Care POA?: No  Was patient offered paperwork?:  (declined)  Does patient currently have a Health Care decision maker?: No  Does patient have Advance Directives?: No  Was patient offered paperwork?: Yes (declined)  Primary Contact: Per pt  it is her mother Pella         Readmission Root Cause  30 Day Readmission: No    Patient Information  Mental Status: Alert, Other (Comment) (currently on 1:1 due to potential suicidal attempt)  During Assessment patient was accompanied by: Not accompanied during assessment  Assessment information provided by:: Patient  Primary Caregiver: Self  Support Systems: Parent, Spouse/significant other  County of Residence: [place]  What [facility] do you live in?: [place]  Type of Current Residence: Apartment  Floor Level: 1  Upon entering residence, is there a bedroom on the main floor (no further steps)?: Yes  Upon entering residence, is there a bathroom on the main floor (no further steps)?: Yes  Living Arrangements: Other (Comment) (Per pt. she lives with her 3 year old and 3 month old.)  Is patient a ?: No              Patient Information Continued  Income Source: Other (Comment) (Currently she is employes but on maternity leave)                Social Determinants of Health (SDOH)      Flowsheet Row Most Recent Value   Housing Stability    In the last 12 months, was there a time when you were not able to pay the mortgage or rent on time? N   In the last 12 months, how many places have you lived? 1   In the last 12 months, was there a time when you did not have a steady place to sleep or slept in a shelter (including now)? N   Transportation Needs    In the past 12 months, has lack of transportation kept you from medical appointments or from getting medications? no   In the past 12 months, has lack of transportation kept you from meetings, work, or from getting things needed for daily living? No   Food Insecurity    Within the past 12 months, you worried that your food would run out before you got the money to buy more. Never true   Within the past 12 months, the food you bought just didn't last and you didn't have money to get more. Never true   Utilities    In the past 12 months has the electric, gas, oil, or  water company threatened to shut off services in your home? No            DISCHARGE DETAILS:                                                                                                 Additional Comments: CM has attempted call mother around 1700  to verify infomration and to confirm who has been responsible for the children of the patient  Contact information in EPIC was not for mother, but for S.O., Umesh.  Call went to  x 2 and it was not Umesh's name on .  Message was not left by te CM.  Plan to reattempt call in am.  Dr. Jamison aware.

## 2024-04-18 NOTE — CASE MANAGEMENT
Case Management Discharge Planning Note    Patient name Grismerlin Fernandez  Location [facility] /E5 -* MRN [id number]  : 2000 Date 2024       Current Admission Date: 2024  Current Admission Diagnosis:Tylenol ingestion, undetermined intent, subsequent encounter   Patient Active Problem List    Diagnosis Date Noted    Bandemia 2024    Hospital discharge follow-up 2024    SIRS (systemic inflammatory response syndrome) (HCC) 2024    Transaminitis 2024    Pelvic fluid collection 2024    Abnormal CT scan, kidney 2024    Tylenol ingestion, undetermined intent, subsequent encounter 2024    COVID-19 affecting pregnancy in third trimester 2024    Patient noncompliance 2023    H/O abdominoplasty 10/17/2023    Status post repeat low transverse  section 2023    History of intrauterine growth restriction in prior pregnancy, currently pregnant, first trimester 2023    Hx of  section 2023    Obesity affecting pregnancy in second trimester 2023      LOS (days): 1  Geometric Mean LOS (GMLOS) (days): 2.5  Days to GMLOS:0.9     OBJECTIVE:  Risk of Unplanned Readmission Score: 6.44         Current admission status: Inpatient   Preferred Pharmacy:   [id number] [place] - [address] [place] [address]  [address] [facility] [address]  Phone: [phone] Fax: [phone]    Primary Care Provider: No primary care provider on file.    Primary Insurance: [employer]  Secondary Insurance:     DISCHARGE DETAILS:                                                                                                 Additional Comments: 201 was sent to [facility] Intake. Pt has been cleared to go to psych.  [employer] was called and auth pending.  - call [phone] when arrives to unit.  INfo sent to Network intake.  Psych transport was set up.  [facility] called and report filed.

## 2024-04-18 NOTE — PLAN OF CARE
Problem: SAFETY ADULT  Goal: Patient will remain free of falls  Description: INTERVENTIONS:  - Educate patient/family on patient safety including physical limitations  - Instruct patient to call for assistance with activity   - Consult OT/PT to assist with strengthening/mobility   - Keep Call bell within reach  - Keep bed low and locked with side rails adjusted as appropriate  - Keep care items and personal belongings within reach  - Initiate and maintain comfort rounds  - Make Fall Risk Sign visible to staff  - Apply yellow socks and bracelet for high fall risk patients  - Consider moving patient to room near nurses station  Outcome: Progressing  Goal: Maintain or return to baseline ADL function  Description: INTERVENTIONS:  -  Assess patient's ability to carry out ADLs; assess patient's baseline for ADL function and identify physical deficits which impact ability to perform ADLs (bathing, care of mouth/teeth, toileting, grooming, dressing, etc.)  - Assess/evaluate cause of self-care deficits   - Assess range of motion  - Assess patient's mobility; develop plan if impaired  - Assess patient's need for assistive devices and provide as appropriate  - Encourage maximum independence but intervene and supervise when necessary  - Involve family in performance of ADLs  - Assess for home care needs following discharge   - Consider OT consult to assist with ADL evaluation and planning for discharge  - Provide patient education as appropriate  Outcome: Progressing  Goal: Maintains/Returns to pre admission functional level  Description: INTERVENTIONS:  - Perform AM-PAC 6 Click Basic Mobility/ Daily Activity assessment daily.  - Set and communicate daily mobility goal to care team and patient/family/caregiver.   - Collaborate with rehabilitation services on mobility goals if consulted  - Out of bed for toileting  - Record patient progress and toleration of activity level   Outcome: Progressing     Problem: DISCHARGE  PLANNING  Goal: Discharge to home or other facility with appropriate resources  Description: INTERVENTIONS:  - Identify barriers to discharge w/patient and caregiver  - Arrange for needed discharge resources and transportation as appropriate  - Identify discharge learning needs (meds, wound care, etc.)  - Arrange for interpretive services to assist at discharge as needed  - Refer to Case Management Department for coordinating discharge planning if the patient needs post-hospital services based on physician/advanced practitioner order or complex needs related to functional status, cognitive ability, or social support system  Outcome: Progressing     Problem: Knowledge Deficit  Goal: Patient/family/caregiver demonstrates understanding of disease process, treatment plan, medications, and discharge instructions  Description: Complete learning assessment and assess knowledge base.  Interventions:  - Provide teaching at level of understanding  - Provide teaching via preferred learning methods  Outcome: Progressing     Problem: Risk for Self Injury/Neglect  Goal: Treatment Goal: Remain safe during length of stay, learn and adopt new coping skills, and be free of self-injurious ideation, impulses and acts at the time of discharge  Outcome: Progressing  Goal: Verbalize thoughts and feelings  Description: Interventions:  - Assess and re-assess patient's lethality and potential for self-injury  - Engage patient in 1:1 interactions, daily, for a minimum of 15 minutes  - Encourage patient to express feelings, fears, frustrations, hopes  - Establish rapport/trust with patient   Outcome: Progressing  Goal: Refrain from harming self  Description: Interventions:  - Monitor patient closely, per order  - Develop a trusting relationship  - Supervise medication ingestion, monitor effects and side effects   Outcome: Progressing  Goal: Attend and participate in unit activities, including therapeutic, recreational, and educational  groups  Description: Interventions:  - Provide therapeutic and educational activities daily, encourage attendance and participation, and document same in the medical record  - Obtain collateral information, encourage visitation and family involvement in care   Outcome: Progressing  Goal: Recognize maladaptive responses and adopt new coping mechanisms  Outcome: Progressing  Goal: Complete daily ADLs, including personal hygiene independently, as able  Description: Interventions:  - Observe, teach, and assist patient with ADLS  - Monitor and promote a balance of rest/activity, with adequate nutrition and elimination  Outcome: Progressing

## 2024-04-18 NOTE — NURSING NOTE
Reached out to SLIM due to patients frequent request to have her personal phone in the room with her, per overnight SLIM, patient okay to keep cell phone in room, in person 1:1 remains in place, patient observed to be sleeping, no needs verbalized at this time. Day shift RN made aware.

## 2024-04-18 NOTE — TELEMEDICINE
TeleConsultation - Behavioral Health   Grismerlin Fernandez 24 y.o. female MRN: [id number]  Unit/Bed#: E5 -01 Encounter: [id number]        REQUIRED DOCUMENTATION:     1. This service was provided via Telemedicine.  2. Provider located at [place]  3. TeleMed provider: Ravi Chaparro MD.  4. Identify all parties in room with patient during tele consult:  pt  5.Patient was then informed that this was a Telemedicine visit and that the exam was being conducted confidentially over secure lines. My office door was closed. No one else was in the room.  Patient acknowledged consent and understanding of privacy and security of the Telemedicine visit, and gave us permission to have the assistant stay in the room in order to assist with the history and to conduct the exam.  I informed the patient that I have reviewed their record in Epic and presented the opportunity for them to ask any questions regarding the visit today.  The patient agreed to participate.       Assessment/Plan     Present on Admission:  **None**    Assessment:    Unspecified depressive disorder  Unspecified anxiety disorder    Treatment Plan:    Given the severity of this postpartum patient's recent stressors and her intentional overdose and her very ambiguous response to questions as well as her refusal to give the correct telephone numbers to contact her mother for collateral information, inpatient psychiatric treatment is indicated voluntarily, and if not then involuntarily, for provision of precautions, further diagnostic evaluation and treatment stabilization.  Continual observation suicide precautions are indicated.  May consider as needed Ativan or Zyprexa if needed for anxiety or agitation.  Reconsult psychiatry as needed.    Current Medications:     Current Facility-Administered Medications   Medication Dose Route Frequency Provider Last Rate    acetylcysteine  100 mg/kg Intravenous Continuous Micki Jamison MD 10,000 mg (04/17/24 7572)     melatonin  9 mg Oral HS PER Lopez      ondansetron  4 mg Intravenous Q6H PRN Enedina Guerra MD      sodium chloride  100 mL/hr Intravenous Continuous Micki Jamison  mL/hr (04/17/24 1303)       Risks / Benefits of Treatment:    Risks, benefits, and possible side effects of medications explained to patient and patient verbalizes understanding.      Other treatment modalities recommended as indicated:    Patient psychiatric treatment      Inpatient consult to Psychiatry  Consult performed by: Ravi Chaparro MD  Consult ordered by: Micki Jamison MD        Physician Requesting Consult: Micki Jamison MD  Principal Problem:Tylenol ingestion, undetermined intent, subsequent encounter    Reason for Consult: Intentional overdose      History of Present Illness      This is a follow-up psychiatry consult to that provided yesterday by Dr. Walker.  Please see that consult for details.  In summary from that consult:  Patient is a 24 y.o. female with a history of Major Depressive Disorder who was presented to emergency department on 4/16/2024 due to Tylenol toxicity. Reportedly, patient has been noncompliance with medications and follow-up care.  Patient reported that she broke up with her boyfriend about 5 months ago and she has increased anxiety almost daily basis.  She was not able to give clear symptoms of anxiety but she reported aggressive symptoms crying spells, Feeling of hopelessness, with low energy motivation.  She reported at times she thinks too much about her current stressors being difficulty controlling her thoughts and worries resulting in difficulty sleeping hollered he has been taking care of 3 months baby and  3-year-old.  She has not been working currently getting help from her mother.  She reported that she is living alone her mother sometimes help her.  She denied any suicidal ideation in the last 6 months reported she never had any in her life stated that yesterday she took extra  "Tylenol for her pain and she was taking 4 at times took multiple times and 6-hour and she is aware that it can cause liver toxicity but she did not that it can lead to death.  She appeared to minimize her symptoms.  Similar episode happened in in January 2024 in which she has elevated liver enzymes after taking 8 Tylenol.  She has a history of suicidal attempt in 2016 when she overdosed on medication. No recent aggressive behavior was reported.  No homicidal ideation was reported.  No recent manic and psychotic symptoms was reported.  No substance abuse or alcohol abuse problem was.         Psychiatric Review Of Systems:      Sleep changes: no  appetite changes: no  weight changes: no  anxiety/panic: yes  peter: no  guilty/hopeless: yes  self injurious behavior/risky behavior: no           Historical Information   Past Psychiatric History:      Psychiatric Hospitalizations: Multiple past inpatient psychiatric admissions Outpatient Treatment History: past treatment with psychiatrist/Advanced Practitioner Suicide Attempts:  Yes, one attempt reported History of self-harm: No History of self injurious behavior was reported Violence History: No History of physically aggressive  behavior was reported     Substance Abuse History:           E-Cigarette/Vaping    E-Cigarette Use Never User              Social History         Tobacco History         Smoking Status  Former Smoking Tobacco Type  Pipe        Smokeless Tobacco Use  Never                  Alcohol History         Alcohol Use Status  Not Currently Comment  \"sometimes\"                  Drug Use         Drug Use Status  Never                  Sexual Activity         Sexually Active  Yes Partners  Male                  Activities of Daily Living    Not Asked                      Additional Substance Use Detail         Questions Responses     Problems Due to Past Use of Alcohol? No     Problems Due to Past Use of Substances? No     Alcohol Use Frequency Denies use in " "past 12 months     Cannabis frequency Never used     Comment: Never used on 11/23/2020       Heroin Frequency Denies use in past 12 months     Cocaine frequency Never used     Comment: Never used on 11/23/2020       Crack Cocaine Frequency Denies use in past 12 months     Methamphetamine Frequency Denies use in past 12 months     Narcotic Frequency Denies use in past 12 months     Benzodiazepine Frequency Denies use in past 12 months     Amphetamine frequency Denies use in past 12 months     Barbituate Frequency Denies use use in past 12 months     Inhalant frequency Never used     Comment: Never used on 11/23/2020       Hallucinogen frequency Never used     Comment: Never used on 11/23/2020       Ecstasy frequency Never used     Comment: Never used on 11/23/2020       Other drug frequency Never used     Comment: Never used on 11/23/2020       Opiate frequency Denies use in past 12 months     Last reviewed by Blanca Izquierdo RN on 4/16/2024                Social History:           Social History   Social History               Socioeconomic History    Marital status: Single       Spouse name: Not on file    Number of children: Not on file    Years of education: Not on file    Highest education level: Not on file   Occupational History    Not on file   Tobacco Use    Smoking status: Former       Types: Pipe    Smokeless tobacco: Never   Vaping Use    Vaping status: Never Used   Substance and Sexual Activity    Alcohol use: Not Currently       Comment: \"sometimes\"    Drug use: Never    Sexual activity: Yes       Partners: Male   Other Topics Concern    Not on file   Social History Narrative    Not on file      Social Determinants of Health           Financial Resource Strain: Low Risk  (1/8/2024)     Overall Financial Resource Strain (CARDIA)      Difficulty of Paying Living Expenses: Not hard at all   Food Insecurity: No Food Insecurity (1/8/2024)     Hunger Vital Sign      Worried About Running Out of Food in the " "Last Year: Never true      Ran Out of Food in the Last Year: Never true   Transportation Needs: No Transportation Needs (1/8/2024)     PRAPARE - Transportation      Lack of Transportation (Medical): No      Lack of Transportation (Non-Medical): No   Physical Activity: Not on file   Stress: Not on file   Social Connections: Not on file   Intimate Partner Violence: Not on file   Housing Stability: Not on file                  Past Medical History:     Medical History[]Expand by Default        Past Medical History:   Diagnosis Date    Depression           Medical History Pertinent Negatives                  Meds/Allergies   No Known Allergies  all current active meds have been reviewed     Interval Hospital course: Nursing reports no behavioral disturbances.  Dr. Walker was not able to give response at the phone number of the patient provided for her mother for collateral information.  Dr. Jamison was provided this morning with 3 different telephone numbers for the patient's mother by patient and all 3 of them were incorrect.  When confronted by Dr. Magaña, the patient admitted she had provided follow-up numbers \"because I told she that you do not need to call her\".    Mental status examination: The patient was alert and well oriented in all spheres.  She made good eye contact.  Affect was somewhat depressed but she was pleasant.  Responses to questions were very brief and ambiguous.  She appeared extremely guarded and minimizing.  She presents with appropriate attention to grooming.  Speech is unremarkable.  Sensorium is clear.  Thought process is logical and linear.  Thought content is reality based.  Associations are tight.  Memory appears very vague for recall of historical information but I suspect that this was intentional rather than associated with memory deficit.  She appears to be of average intelligence by use of vocabulary, general fund of knowledge, sentence structure and syntax.  She denies active suicidal " "ideation at this time.  She denies homicidal ideation.  She denies hallucinations and other psychotic features.  She admits that she has been experiencing anxiety and depression \"but I can get help for that on outpatient basis\".  She was very ambiguous in her response when asked why she took so many Tylenol and admitted that the  thought she took many more than the 15 tablets she has suggested she may have taken.  She claims that she did not realize that the Tylenol overdose could be lethal despite the fact that she has presented for overdose before.  When asked about a prior suicide attempt she admitted that she attempted suicide in 2016 but cannot recall what she overdosed.  She stated that she had completely forgotten that she had ever attempted suicide before until she was provided yesterday by her physician that her history noted a prior suicide attempt.  She has demonstrated gravely impaired insight and judgment in the setting of taking care of her 3-month-old baby and 3-year-old child.    Past Medical History:   Diagnosis Date    Depression        Medical Review Of Systems:    Review of Systems    Meds/Allergies     all current active meds have been reviewed  No Known Allergies    Objective     Vital signs in last 24 hours:  Temp:  [97.9 °F (36.6 °C)-98.8 °F (37.1 °C)] 97.9 °F (36.6 °C)  HR:  [80-82] 80  Resp:  [18-20] 20  BP: (125-141)/(79-87) 125/79      Intake/Output Summary (Last 24 hours) at 4/18/2024 0920  Last data filed at 4/18/2024 0230  Gross per 24 hour   Intake 480 ml   Output --   Net 480 ml       Lab Results: I have personally reviewed all pertinent laboratory/tests results.         Imaging Studies: No results found.  EKG/Pathology/Other Studies:   Lab Results   Component Value Date    VENTRATE 100 04/16/2024    ATRIALRATE 100 04/16/2024    PRINT 124 04/16/2024    QRSDINT 86 04/16/2024    QTINT 350 04/16/2024    QTCINT 451 04/16/2024    PAXIS 57 04/16/2024    QRSAXIS 72 04/16/2024    " MONICAAXIS 11 04/16/2024        Code Status: Level 1 - Full Code  Advance Directive and Living Will:      Power of :    POLST:      Screenings:    1. Nutrition Screening  Not available on chart    2. Pain Screening  Not available on chart    3. Suicide Screening  Not available on chart    Counseling / Coordination of Care:    Total floor / unit time spent today 30 minutes. Greater than 50% of total time was spent with the patient and / or family counseling and / or coordination of care. A description of the counseling / coordination of care: Chart review, patient evaluation, coordination communication with staff, nursing and provider.

## 2024-04-18 NOTE — DISCHARGE SUMMARY
[facility]  Discharge- Grismerlin Fernandez 2000, 24 y.o. female MRN: [id number]  Unit/Bed#: E5 -01 Encounter: [id number]  Primary Care Provider: No primary care provider on file.   Date and time admitted to hospital: 4/16/2024 10:59 PM    * Tylenol ingestion, undetermined intent, subsequent encounter  Assessment & Plan  24-year-old female presented with acetaminophen overdose.  She stated she was having a headache at home and took 15 tablets of acetaminophen.  Patient has history of suicidal attempt in 2016.  She is also postpartum and mentioned that she broke up with her boyfriend 5 months ago.  She has increased anxiety at home.  She denies any suicidal or homicidal ideation.    Toxicology input appreciated  Improved blood work, patient asymptomatic, N-acetylcysteine discontinued  Psychiatry input appreciated recommending inpatient psych, patient was agreeable and signed a 201  Discharge to [facility] behavioral health unit today    Transition of Care Discharge Summary - [facility] Internal Medicine    Patient Information: Grismerlin Fernandez 24 y.o. female MRN: [id number]  Unit/Bed#: E5 -01 Encounter: [id number]    Discharging Physician / Practitioner: Micki Jamison MD  PCP: No primary care provider on file.  Admission Date: 4/16/2024  Discharge Date: 04/18/24    Disposition:      Other: [facility] behavioral health      Reason for Admission: Acetaminophen overdose    Discharge Diagnoses:     Principal Problem:    Tylenol ingestion, undetermined intent, subsequent encounter  Active Problems:    Bandemia  Resolved Problems:    * No resolved hospital problems. *      Consultations During Hospital Stay:  IP CONSULT TO TOXICOLOGY  IP CONSULT TO PSYCHIATRY  IP CONSULT TO PSYCHIATRY      Procedures Performed:     none    Medication Adjustments and Discharge Medications:  Medication Dosing Tapers - Please refer to Discharge Medication List for details  on any medication dosing tapers (if applicable to patient).  Discharge Medication List: See after visit summary for reconciled discharge medications.     Wound Care Recommendations:  When applicable, please see wound care section of After Visit Summary.    Diet Recommendations at Discharge:  Diet -        Diet Orders   (From admission, onward)                 Start     Ordered    04/17/24 0847  Diet Regular; Regular House  Diet effective now        References:    Adult Nutrition Support Algorithm    RD Therapeutic Diet Order Protocol   Question Answer Comment   Diet Type Regular    Regular Regular House    RD to adjust diet per protocol? Yes        04/17/24 0846                  Fluid Restriction - No Fluid Restriction at Discharge.      Significant Findings / Test Results:     No results found.      Hospital Course:     Grismerlin Fernandez is a 24 y.o. female patient who originally presented to the hospital on 4/16/2024 due to acetaminophen overdose.  Patient states she was having a headache at home and took 15 tablets of acetaminophen.  She is recently postpartum, broke up with her boyfriend who is a father of her 3-month-old child 5 months ago.  She also has history of a suicide attempt 2016.  Admits to having increased anxiety at home.  Appreciated psychiatry and toxicology consult.  Given patient's history and current stressors and her acetaminophen overdose.  Patient also refusing to give for correct telephone number to contact her mother for collateral information.  Psychiatry recommended inpatient psychiatric treatment patient was agreeable and signed a 201.    Please see above problem list for further details.      Condition at Discharge: good     Discharge Day Visit / Exam:     Subjective: Seen and examined at bedside, states frustration wants to go home and take care of her kids.  Denies any suicidal homicidal ideation.  Does admit that she needs help.    Vitals: Blood Pressure: 125/79 (04/18/24  0700)  Pulse: 80 (04/18/24 0700)  Temperature: 97.9 °F (36.6 °C) (04/18/24 0700)  Temp Source: Oral (04/18/24 0700)  Respirations: 20 (04/18/24 0700)  Weight - Scale: 93.9 kg (207 lb 0.2 oz) (04/16/24 2304)  SpO2: 95 % (04/18/24 0700)    Physical Exam:    Constitutional: Patient is oriented to person, place and time, no acute distress  HEENT:  Normocephalic, atraumatic  Cardiovascular: Normal S1S2, RRR, No murmurs/rubs/gallops appreciated.  Pulmonary:  Bilateral air entry, No rhonchi/rales/wheezing appreciated  Abdominal: Soft, Bowel sounds present, Non-tender, Non-distended  Extremities:  No cyanosis, clubbing or edema.   Neurological: awake, alert    Discharge instructions/Information to patient and family:   See after visit summary section titled Discharge Instructions for information provided to patient and family.      Planned Readmission: no      Discharge Statement:  I spent 35 minutes discharging the patient. This time was spent on the day of discharge. I had direct contact with the patient on the day of discharge. Greater than 50% of the total time was spent examining patient, answering all patient questions, arranging and discussing plan of care with patient as well as directly providing post-discharge instructions.  Additional time then spent on discharge activities.    ** Please Note: This note has been constructed using a voice recognition system **

## 2024-04-18 NOTE — PLAN OF CARE
RN will meet with pt at least twice per day to assess for any concerns. Teach about prescribed medications and diagnosis. Pt will be taught and encouraged to utilize healthy coping skills.

## 2024-04-18 NOTE — PLAN OF CARE
Problem: DEPRESSION  Goal: Will be euthymic at discharge  Description: INTERVENTIONS:  - Administer medication as ordered  - Provide emotional support via 1:1 interaction with staff  - Encourage involvement in milieu/groups/activities  - Monitor for social isolation  Outcome: Progressing     Problem: ANXIETY  Goal: Will report anxiety at manageable levels  Description: INTERVENTIONS:  - Administer medication as ordered  - Teach and encourage coping skills  - Provide emotional support  - Assess patient/family for anxiety and ability to cope  Outcome: Progressing  Goal: By discharge: Patient will verbalize 2 strategies to deal with anxiety  Description: Interventions:  - Identify any obvious source/trigger to anxiety  - Staff will assist patient in applying identified coping technique/skills  - Encourage attendance of scheduled groups and activities  Outcome: Progressing

## 2024-04-18 NOTE — UTILIZATION REVIEW
Initial Clinical Review    Admission: Date/Time/Statement:   Admission Orders (From admission, onward)       Ordered        04/17/24 0057  INPATIENT ADMISSION  Once            Signed and Held  ED TO DIFFERENT CAMPUS [facility] UNIT or INPATIENT MEDICAL UNIT to [facility] UNIT (using Discharge Readmit Navigator) - Admit Patient to  Behavioral Health Unit  Once                          Orders Placed This Encounter   Procedures    INPATIENT ADMISSION     Standing Status:   Standing     Number of Occurrences:   1     Order Specific Question:   Level of Care     Answer:   Med Surg [16]     Order Specific Question:   Estimated length of stay     Answer:   More than 2 Midnights     Order Specific Question:   Certification     Answer:   I certify that inpatient services are medically necessary for this patient for a duration of greater than two midnights. See H&P and MD Progress Notes for additional information about the patient's course of treatment.     ED Arrival Information       Expected   -    Arrival   4/16/2024 22:57    Acuity   Urgent              Means of arrival   Walk-In    Escorted by   Self    Service   Hospitalist    Admission type   Emergency              Arrival complaint   drug overdose/suicidal             Chief Complaint   Patient presents with    Overdose - Accidental     Pt reports headache that started around 2000. Pt states she took tylenol till the headache was gone. Pt report taking approx 15 tylenol. Denies depression or SI.        Initial Presentation: 24 y.o. female presents to ED from home after  taking  too much tylenol.  States  she took tylenol for a  headache, took 15  tablets.  Defensive with responses, poor historian. Not relaying  reason for taking  15 tylenol as opposed to 1-2, denies  SI or attempt. Complains of nausea and  RUQ pain, started  prior to admission.   Records show  an admission  1/24 with right flank pain.  LFT's  significantly elevated then.  CT scan  then showed possible  pyelo/fluid collection and given  antibiotics.  GYN felt  it was not an abscess, LFT's  improved, patient signed  AMA.  Labs reveal tylenol level  194, LFT's  normal and started  NAC protocol. PMH  is  depression, suicide attempts, impulsive behavior  and  C/section  1/15/24.  Admit  Ip with  Tylenol ingestion, undetermined intent, bandemia and plan is  toxicology consult, NAC protocol, pschy consult, 1:1  observation and IVF.    Behavioral Health consult  Need  re consult in am,  not providing much information. No psychotrophic  meds  until medically cleared.     Toxicology consult  Presentation  consistent with an intentional tylenol  overdose, 2nd  time this year. Continue  NAC   for  21 hours.  Strongly suspect that the patient is either attempting suicide or is suffering from poor insight that is placing her at serious risk of harm.     Date:  4/18   Day 2:   Plan d/c  to IP pschy unit    ED Triage Vitals   Temperature Pulse Respirations Blood Pressure SpO2   04/17/24 0147 04/16/24 2304 04/16/24 2304 04/16/24 2304 04/16/24 2304   98.8 °F (37.1 °C) 96 16 123/73 100 %      Temp Source Heart Rate Source Patient Position - Orthostatic VS BP Location FiO2 (%)   04/17/24 0741 04/16/24 2304 04/16/24 2304 04/16/24 2304 --   Oral Monitor Lying Right arm       Pain Score       04/17/24 0320       No Pain          Wt Readings from Last 1 Encounters:   04/16/24 93.9 kg (207 lb 0.2 oz)     Additional Vital Signs:     97.9 °F (36.6 °C) 80 20 125/79 -- 95 % None (Room air) Lying    04/17/24 2328 98.8 °F (37.1 °C) 82 18 141/87 97 94 % None (Room air) Lying   04/17/24 0741 97.9 °F (36.6 °C) 72 17 123/78 87 100 % None (Room air) Lying   04/17/24 01:47:48 98.8 °F (37.1 °C) 90 17 133/84 100 97 % -- --   04/17/24 0101 -- 89 17 144/67 -- 100 % None (Room air) Lying   04/16/24 2304 -- 96 16 123/73 -- 100 % None (Room air) Lying     Pertinent Labs/Diagnostic Test Results:     Results from last 7 days   Lab Units 04/18/24  0511  04/17/24 1919 04/16/24  2316   WBC Thousand/uL 11.88* 11.08* 15.81*   HEMOGLOBIN g/dL 11.3* 12.2 12.9   HEMATOCRIT % 35.6 37.3 39.8   PLATELETS Thousands/uL 345 356 399*   TOTAL NEUT ABS Thousands/µL 7.11 7.71* 10.68*         Results from last 7 days   Lab Units 04/18/24 0511 04/17/24 1919 04/17/24 1117 04/16/24  2316   SODIUM mmol/L 139 137 136 139   POTASSIUM mmol/L 3.8 3.7 3.0* 3.7   CHLORIDE mmol/L 109* 109* 105 105   CO2 mmol/L 24 22 23 25   ANION GAP mmol/L 6 6 8 9   BUN mg/dL 6 5 8 11   CREATININE mg/dL 0.62 0.55* 0.56* 0.76   EGFR ml/min/1.73sq m 127 132 131 110   CALCIUM mg/dL 8.8 9.1 9.1 9.4     Results from last 7 days   Lab Units 04/18/24 0511 04/17/24 1919 04/17/24 1117 04/16/24  2316   AST U/L 14 11* 11* 15   ALT U/L 13 12 12 15   ALK PHOS U/L 55 62 61 80   TOTAL PROTEIN g/dL 6.3* 7.0 6.9 8.1   ALBUMIN g/dL 3.4* 3.7 3.6 4.2   TOTAL BILIRUBIN mg/dL 0.25 0.39 0.46 0.47     Results from last 7 days   Lab Units 04/17/24  0256   POC GLUCOSE mg/dl 108     Results from last 7 days   Lab Units 04/18/24 0511 04/17/24 1919 04/17/24 1117 04/16/24  2316   GLUCOSE RANDOM mg/dL 118 115 92 105               Results from last 7 days   Lab Units 04/17/24  0623   CLARITY UA  Slightly Cloudy   COLOR UA  Light Yellow   SPEC GRAV UA  1.030   PH UA  5.0   GLUCOSE UA mg/dl Negative   KETONES UA mg/dl 40 (2+)*   BLOOD UA  Large*   PROTEIN UA mg/dl 30 (1+)*   NITRITE UA  Negative   BILIRUBIN UA  Negative   UROBILINOGEN UA E.U./dl 0.2   LEUKOCYTES UA  Trace*   WBC UA /hpf Innumerable*   RBC UA /hpf Innumerable*   BACTERIA UA /hpf Occasional   EPITHELIAL CELLS WET PREP /hpf Moderate*   MUCUS THREADS  Occasional*             Results from last 7 days   Lab Units 04/17/24  0623   AMPH/METH  Negative   BARBITURATE UR  Negative   BENZODIAZEPINE UR  Negative   COCAINE UR  Negative   METHADONE URINE  Negative   OPIATE UR  Negative   PCP UR  Negative   THC UR  Negative     Results from last 7 days   Lab Units 04/18/24  0511  04/17/24  1919 04/16/24  2316   ETHANOL LVL mg/dL  --   --  <10   ACETAMINOPHEN LVL ug/mL <2* 2* 194*   SALICYLATE LVL mg/dL  --   --  <5         ED Treatment:   Medication Administration from 04/16/2024 2257 to 04/17/2024 0127         Date/Time Order Dose Route Action Comments     04/17/2024 0057 EDT acetylcysteine (ACETADOTE) 15,000 mg in dextrose 5 % 200 mL IVPB 15,000 mg Intravenous New Bag --     04/17/2024 0055 EDT ondansetron (ZOFRAN) injection 4 mg 4 mg Intravenous Given --              Admitting Diagnosis: Overdose [T50.901A]  Tylenol toxicity [T39.1X1A]  Age/Sex: 24 y.o. female  Admission Orders:  Scheduled Medications:  melatonin, 9 mg, Oral, HS      Continuous IV Infusions:  acetylcysteine, 100 mg/kg, Intravenous, Continuous  sodium chloride, 100 mL/hr, Intravenous, Continuous      PRN Meds:  ondansetron, 4 mg, Intravenous, Q6H PRN    Suicide precautions  1:1  observation    IP CONSULT TO TOXICOLOGY  IP CONSULT TO PSYCHIATRY  IP CONSULT TO PSYCHIATRY    Network Utilization Review Department  ATTENTION: Please call with any questions or concerns to [phone] and carefully listen to the prompts so that you are directed to the right person. All voicemails are confidential.   For Discharge needs, contact Care Management DC Support Team at [phone] opt. 2  Send all requests for admission clinical reviews, approved or denied determinations and any other requests to dedicated fax number below belonging to the [facility] where the patient is receiving treatment. List of dedicated fax numbers for the Facilities:  FACILITY NAME UR FAX NUMBER   ADMISSION DENIALS (Administrative/Medical Necessity) [phone]   DISCHARGE SUPPORT TEAM (NETWORK) [phone]   PARENT CHILD HEALTH (Maternity/NICU/Pediatrics) [phone]   [facility] [phone]   [facility] [phone]   [facility] [phone]   [facility] [phone]   [facility] [phone]   [facility] [phone]   [facility] [phone]   [facility] [phone]   [facility] [phone]   [facility] [phone]   [facility] [phone]   [facility] [phone]

## 2024-04-18 NOTE — QUICK NOTE
LFTs remain WNL.  APAP < 2    Provided there are no additional medical concerns, the patient may be cleared from a toxicological standpoint by the primary provider at the end of the recommended observation period, if the patient is asymptomatic, with normal mental status and vital signs, and otherwise normal exam. Please call medical  on call immediately to discuss any changes in clinical course or laboratory abnormalities.

## 2024-04-18 NOTE — TELEPHONE ENCOUNTER
Consult placed on [facility] queue at 0814 to be seen by an [facility] psychiatrist.

## 2024-04-18 NOTE — NURSING NOTE
"Pt is a 201 from [facility] s/p OD on Tylenol. Pt denies it being an attempt, states \"accidentally\" took 10-11 Tylenol pills for a headache. Per nurse report, toxicology believes actual amount to be around 30. Pt 4 months post partum with a 4 year old at home. Pt lives alone. Mother taking care of children at this time. Pt denies SI/HI or hallucination. 1 attempt in 2016, doesn't recall how. Pt asking for 72 hour notice immediately upon entering exam room. 72 hour initiated 04/18 @5632. UDS(-). Denies tobacco or etoh.    Med req completed and pt scored Moderate risk on lifetime C-SSRS. Notified Dr. Mahan. No new orders.  "

## 2024-04-19 ENCOUNTER — TELEPHONE (OUTPATIENT)
Dept: PSYCHIATRY | Facility: CLINIC | Age: 24
End: 2024-04-19

## 2024-04-19 LAB
25(OH)D3 SERPL-MCNC: 9.3 NG/ML (ref 30–100)
ATRIAL RATE: 74 BPM
CHOLEST SERPL-MCNC: 101 MG/DL
EST. AVERAGE GLUCOSE BLD GHB EST-MCNC: 100 MG/DL
FOLATE SERPL-MCNC: 6.7 NG/ML
HBA1C MFR BLD: 5.1 %
HDLC SERPL-MCNC: 38 MG/DL
LDLC SERPL CALC-MCNC: 53 MG/DL (ref 0–100)
NONHDLC SERPL-MCNC: 63 MG/DL
P AXIS: 48 DEGREES
PR INTERVAL: 120 MS
QRS AXIS: 47 DEGREES
QRSD INTERVAL: 84 MS
QT INTERVAL: 366 MS
QTC INTERVAL: 406 MS
T WAVE AXIS: 5 DEGREES
TRIGL SERPL-MCNC: 50 MG/DL
TSH SERPL DL<=0.05 MIU/L-ACNC: 1.08 UIU/ML (ref 0.45–4.5)
VENTRICULAR RATE: 74 BPM
VIT B12 SERPL-MCNC: 279 PG/ML (ref 180–914)

## 2024-04-19 PROCEDURE — 93005 ELECTROCARDIOGRAM TRACING: CPT

## 2024-04-19 PROCEDURE — 93010 ELECTROCARDIOGRAM REPORT: CPT | Performed by: INTERNAL MEDICINE

## 2024-04-19 PROCEDURE — 80061 LIPID PANEL: CPT | Performed by: PSYCHIATRY & NEUROLOGY

## 2024-04-19 PROCEDURE — 83036 HEMOGLOBIN GLYCOSYLATED A1C: CPT | Performed by: PSYCHIATRY & NEUROLOGY

## 2024-04-19 PROCEDURE — 99223 1ST HOSP IP/OBS HIGH 75: CPT | Performed by: PSYCHIATRY & NEUROLOGY

## 2024-04-19 PROCEDURE — 82306 VITAMIN D 25 HYDROXY: CPT | Performed by: PSYCHIATRY & NEUROLOGY

## 2024-04-19 PROCEDURE — 82607 VITAMIN B-12: CPT | Performed by: PSYCHIATRY & NEUROLOGY

## 2024-04-19 PROCEDURE — 99253 IP/OBS CNSLTJ NEW/EST LOW 45: CPT | Performed by: PHYSICIAN ASSISTANT

## 2024-04-19 PROCEDURE — 84443 ASSAY THYROID STIM HORMONE: CPT | Performed by: PSYCHIATRY & NEUROLOGY

## 2024-04-19 PROCEDURE — 82746 ASSAY OF FOLIC ACID SERUM: CPT | Performed by: PSYCHIATRY & NEUROLOGY

## 2024-04-19 RX ADMIN — HYDROXYZINE HYDROCHLORIDE 100 MG: 50 TABLET, FILM COATED ORAL at 23:34

## 2024-04-19 RX ADMIN — TRAZODONE HYDROCHLORIDE 50 MG: 50 TABLET ORAL at 21:22

## 2024-04-19 NOTE — ASSESSMENT & PLAN NOTE
Presents from [facility] following acetaminophen overdose  Case had been discussed with toxicology and patient was medically cleared for transfer to [facility]  Further plan per psychiatry

## 2024-04-19 NOTE — NURSING NOTE
"Upon approach patient lying in bedroom, still complaining of feeling \"cold\" but refused hot packs. States she misses her children and is requesting to Facetime them. RN notified Case Manger, whom approved Facetime with children tonight at 5:00 PM. Patient hopeful for interaction. Denies SI/HI/AVH and encouraged to approach staff if ideations occur. Plan of care ongoing. Denies unmet needs.   "

## 2024-04-19 NOTE — UTILIZATION REVIEW
NOTIFICATION OF ADMISSION DISCHARGE   This is a Notification of Discharge from [facility]. Please be advised that this patient has been discharge from our facility. Below you will find the admission and discharge date and time including the patient’s disposition.   UTILIZATION REVIEW CONTACT:  Humaira Ku  Utilization   Network Utilization Review Department  Phone: [phone] x carefully listen to the prompts. All voicemails are confidential.  Email: [email or web address]     ADMISSION INFORMATION  PRESENTATION DATE: 4/16/2024 10:59 PM  OBERVATION ADMISSION DATE:   INPATIENT ADMISSION DATE: 4/17/24 12:57 AM   DISCHARGE DATE: 4/18/2024  5:07 PM   DISPOSITION:[facility] Utilization Review Department  ATTENTION: Please call with any questions or concerns to [phone] and carefully listen to the prompts so that you are directed to the right person. All voicemails are confidential.   For Discharge needs, contact Care Management DC Support Team at [phone] opt. 2  Send all requests for admission clinical reviews, approved or denied determinations and any other requests to dedicated fax number below belonging to the campus where the patient is receiving treatment. List of dedicated fax numbers for the Facilities:  FACILITY NAME UR FAX NUMBER   ADMISSION DENIALS (Administrative/Medical Necessity) [phone]   DISCHARGE SUPPORT TEAM (Health system) [phone]   PARENT CHILD HEALTH (Maternity/NICU/Pediatrics) [phone]   [facility] [phone]   [facility] [phone]   [facility] [phone]   [facility] [phone]   [facility] [phone]   [facility] [phone]   [facility] [phone]   [facility]  [phone]   [facility] [phone]   [facility] [phone]   [facility] [phone]   [facility] [phone]

## 2024-04-19 NOTE — CASE MANAGEMENT
"INTAKE    Readmit score:  Red 26   Confirmed Address   [address] [facility] [address]    [place]     Resides in the home with/can return?:    Pt lives with two children (ages 3 y.o and 3 months) Pt can return.     Pt reported her mom is caring for her children at this time.      Confirmed Phone Number: [phone]    Commitment Status/Admitted from: [address] [place] [address] Surg   Presenting C/O:             \"Tylenol ingestion, undetermined intent, subsequent encounter  Assessment & Plan  24-year-old female presented with acetaminophen overdose.  She stated she was having a headache at home and took 15 tablets of acetaminophen.  Patient has history of suicidal attempt in 2016.  She is also postpartum and mentioned that she broke up with her boyfriend 5 months ago.  She has increased anxiety at home.  She denies any suicidal or homicidal ideation.     Toxicology input appreciated  Improved blood work, patient asymptomatic, N-acetylcysteine discontinued  Psychiatry input appreciated recommending inpatient psych, patient was agreeable and signed a 201  Discharge to [facility] behavioral health unit today.\"   Psychiatrist:    Pt is not taking medications   Therapist:    Pt would like referral for therapist.     CM scheduled pt at [facility]   Pt scheduled on 4/29 at 11am with Marjorie Driver at [facility]      ACT/ICM/CPS/WRT/SC: N/A   PCP:    N/A   Work/Income:      Pt reported she works Full-Time at a [employer]. Pt reported she was on maternity leave for her child and returns to work on Monday 4/22/24.     Pt reported she does not need return to work note since she was already out on Maternity Leave.      Legal/  Probation/[place] [employer]:    Denies   Access to Firearms:    Denies   Referrals Needed:  OP Therapy    Transport at Discharge:    Pt reported her mom can pick her up     Pt signed 72 hour notice on 4/18/24   Pt will DC on Sunday 4/21/24      IMM:   [place]inez Text OSVALDO:  "   Emergency Contact:     Umesh Brewer ([place]) [phone]    ROIs obtained:       [facility]    Insurance:     [place] [employer]    Audit:        PAWSS:  BAT:  UDS: Negative

## 2024-04-19 NOTE — H&P
"Psychiatric Evaluation - Behavioral Health   Grismerlin Fernandez 24 y.o. female MRN: [id number]  Unit/Bed#: [facility] 201-01 Encounter: [id number]    Assessment/Plan   Principal Problem:    Adjustment disorder with anxious mood  Active Problems:    Medical clearance for psychiatric admission    Tylenol ingestion, undetermined intent, subsequent encounter    BMI 38.0-38.9,adult      Plan:   Patient declines psychotropic medication.  Will observe the patient in the therapeutic milieu and then plan to discharge her Sunday, as she has signed a 72-hour notice to withdraw from treatment.  She is vehemently denying that this was a suicide attempt and is currently denying all symptoms.  Prior to my evaluation today, she was visible interacting with others appropriately and attending groups.  She is requesting outpatient therapy and OP will be the \"least restrictive\" environment psychiatrically appropriate for her.  Admit to [facility] inpatient psychiatry.  Medical management per SLIM.  Check admission labs: CMP, CBC, TSH, RPR, UDS, Lipid Panel, A1c.  Collaborate with case management for baseline assessment and disposition planning.  Chart reviewed and case discussed with treatment team.  Start/continue the following medications:  Current Facility-Administered Medications   Medication Dose Route Frequency Provider Last Rate    acetaminophen  650 mg Oral Q6H PRN Maura Soto, PA-JESSICA      acetaminophen  650 mg Oral Q4H PRN Maura Soto, PA-C      acetaminophen  975 mg Oral Q6H PRN Maura Soto, PA-C      aluminum-magnesium hydroxide-simethicone  30 mL Oral Q4H PRN Maura Soto, PA-C      Artificial Tears  1 drop Both Eyes Q3H PRN Maura Soto, PA-C      benztropine  1 mg Intramuscular Q4H PRN Max 6/day Maura Soto, PA-C      benztropine  1 mg Oral Q4H PRN Max 6/day Maura Soto, PA-C      bisacodyl  10 mg Rectal Daily PRN Maura Soto, PA-JESSICA      hydrOXYzine " "HCL  50 mg Oral Q6H PRN Max 4/day Maura Waltonn Stives, PA-C      Or    diphenhydrAMINE  50 mg Intramuscular Q6H PRN Maura Manjarrez Stives, PA-C      hydrOXYzine HCL  100 mg Oral Q6H PRN Max 4/day Maura Manjarrez Stives, PA-C      Or    LORazepam  2 mg Intramuscular Q6H PRN Maura Waltonn Stives, PA-C      hydrOXYzine HCL  25 mg Oral Q6H PRN Max 4/day Maura Waltonn Stives, PA-C      OLANZapine  10 mg Oral Q3H PRN Max 3/day Maura Waltonn Stives, PA-C      Or    OLANZapine  10 mg Intramuscular Q3H PRN Max 3/day Maura Manjarrez Stives, PA-C      OLANZapine  5 mg Oral Q3H PRN Max 6/day Maura Manjarrez Stives, PA-C      Or    OLANZapine  5 mg Intramuscular Q3H PRN Max 6/day Maura Manjarrez Stives, PA-C      OLANZapine  2.5 mg Oral Q3H PRN Max 8/day Maura Manjarrez Stives, PA-C      polyethylene glycol  17 g Oral Daily PRN Maura Manjarrez Stives, PA-C      propranolol  10 mg Oral Q8H PRN Maura Manjarrez Stives, PA-C      senna-docusate sodium  1 tablet Oral Daily PRN Maura Manjarrez Stives, PA-C      traZODone  50 mg Oral HS PRN Maura Manjarrez Stives, PA-C         Treatment options and alternatives were reviewed with the patient, who concurs with the above plan. Risks, benefits, and possible side effects of medications were explained to the patient, and she verbalizes understanding.      -----------------------------------    Chief Complaint: Unintentional OD    History of Present Illness     Per ED provider note:  Patient presenting after taking 15 500mg tablets of Tylenol at 8pm. She denies any co-ingestion. Denies and SI/HI but states she did attempt suicide by OD \"when I was a kid.\" She states she took the tylenol for a headache which is now resolved. She denies any symptoms other than mild abdominal pain. When challenged regarding insight about taking 15 Tylenol, patient states she usually takes 1-2 tylenol for a headache and this time took 15 at one time without pausing for thought. She is with flat affect and responds to most questions with " one-word answers. On chart review, patient was admitted in January of 2024 for SIRS possibly due to pelvic fluid collection; was found to have transaminitis and patient at that time was concerned she had taken too much Tylenol. NAC was started however per last note prior to discharge, patient stated she took 8 Tylenol in a 24 hour period. Tylenol level on admission was 8; patient was on NAC throughout course of hospitalization. Patient discharged from that admission AMA.     Per Psych CL note:  Patient is a 24 y.o. female with a history of Major Depressive Disorder who was presented to emergency department on 4/16/2024 due to Tylenol toxicity. Reportedly, patient has been noncompliance with medications and follow-up care.  Patient reported that she broke up with her boyfriend about 5 months ago and she has increased anxiety almost daily basis.  She was not able to give clear symptoms of anxiety but she reported aggressive symptoms crying spells, Feeling of hopelessness, with low energy motivation.  She reported at times she thinks too much about her current stressors being difficulty controlling her thoughts and worries resulting in difficulty sleeping hollered he has been taking care of 3 months baby and  3-year-old.  She has not been working currently getting help from her mother.  She reported that she is living alone her mother sometimes help her.  She denied any suicidal ideation in the last 6 months reported she never had any in her life stated that yesterday she took extra Tylenol for her pain and she was taking 4 at times took multiple times and 6-hour and she is aware that it can cause liver toxicity but she did not that it can lead to death.  She appeared to minimize her symptoms.  Similar episode happened in in January 2024 in which she has elevated liver enzymes after taking 8 Tylenol.  She has a history of suicidal attempt in 2016 when she overdosed on medication. No recent aggressive behavior was  "reported.  No homicidal ideation was reported.  No recent manic and psychotic symptoms was reported.  No substance abuse or alcohol abuse problem was.      On admission to Inpatient Psychiatric Unit:  Patient is a 24-year-old  but English-speaking female with an unclear past psychiatric history who presents voluntarily to inpatient U following a reported unintentional overdose of Tylenol.    Symptoms prior to hospitalization include: Anxious mood and headache.  Symptom severity is rated as mild.  Timeline is acute.  Mitigating factors are none.  Exacerbating stressors are headache.    On initial psychiatric evaluation today, the patient vehemently denies that this was a suicide attempt.  She reports that she took somewhere between 11-15 Tylenol because she had a headache that was not getting better and she thought that if she took more Tylenol the headache would improve.  The patient states that she is upset that she is on the inpatient behavioral health unit and that she saw 2 different psychiatrist while she was in the emergency department; she reports that the first psychiatrist told her that she would be able to go home, but then the second psychiatrist \"asked me two questions and then stated that I had to go to the hospital.\"  She denies any feelings of depression.  She is 4 months postpartum but states that she is not particularly stressed out although she does admit to feeling anxious at times.  She politely declines psychotropic medications.  She has 1 prior hospitalization at [facility] when she was a teenager and had 1 prior overdose attempt as a teenager.  She currently denies suicidal ideation, intent, or plan.  She denies manic or psychotic symptoms.  She denies any physical issues.  She denies substance use.  She denies access to lethal means.  She denies any family history of suicide attempt.  She signed a 72-hour notice to withdraw from treatment.  She is visible in the milieu and " "interacting appropriately with peers and was attending group prior to my encounter with her. She is requesting a referral to OP therapy.    Psychiatric Review Of Systems:  Medication side effects: none  Sleep: no change  Appetite: no change  Hygiene: able to tend to instrumental and basic ADLs  Anxiety Symptoms: +  Psychotic Symptoms: denies  Depression Symptoms: denies  Manic Symptoms: denies  PTSD Symptoms: denies  Suicidal Thoughts: denies  Homicidal Thoughts: denies    Medical Review Of Systems:   Patient denies headache or dizziness.   Patient denies chest pain or palpitations.  Patient denies difficulty breathing or wheezing.  Patient denies nausea, vomiting, or diarrhea.  Patient denies polyuria or polydipsia.  Patient denies weakness or numbness.  Pertinent positives as per HPI.    Historical Information     Psychiatric History:   Diagnoses: Unknown  Inpatient Hx: [facility]  Outpatient Hx: None  Medications/Trials: None    Substance Abuse History:  Social History     Substance and Sexual Activity   Alcohol Use Not Currently    Comment: \"sometimes\"     Social History     Substance and Sexual Activity   Drug Use Never       I spent time with Grismerlin in counseling and education on risk of substance abuse. I assessed motivation and encouraged her for treatment as appropriate.     Family History:   Family History   Problem Relation Age of Onset    No Known Problems Mother     No Known Problems Father     No Known Problems Sister     No Known Problems Brother     Cancer Neg Hx     Diabetes Neg Hx        Social History:  Highest education: 11th grade  Currently living: Alone  Relationships: Single  Children: 2  Occupation: Starting job on Monday at a [employer]    Rest of social history as per below:    Social History     Socioeconomic History    Marital status: Single     Spouse name: Not on file    Number of children: Not on file    Years of education: Not on file    Highest education level: Not on file " "  Occupational History    Not on file   Tobacco Use    Smoking status: Former     Types: Pipe    Smokeless tobacco: Never   Vaping Use    Vaping status: Never Used   Substance and Sexual Activity    Alcohol use: Not Currently     Comment: \"sometimes\"    Drug use: Never    Sexual activity: Yes     Partners: Male   Other Topics Concern    Not on file   Social History Narrative    Not on file     Social Determinants of Health     Financial Resource Strain: Low Risk  (1/8/2024)    Overall Financial Resource Strain (CARDIA)     Difficulty of Paying Living Expenses: Not hard at all   Food Insecurity: No Food Insecurity (4/17/2024)    Hunger Vital Sign     Worried About Running Out of Food in the Last Year: Never true     Ran Out of Food in the Last Year: Never true   Transportation Needs: No Transportation Needs (4/17/2024)    PRAPARE - Transportation     Lack of Transportation (Medical): No     Lack of Transportation (Non-Medical): No   Physical Activity: Not on file   Stress: Not on file   Social Connections: Not on file   Intimate Partner Violence: Not on file   Housing Stability: Low Risk  (4/17/2024)    Housing Stability Vital Sign     Unable to Pay for Housing in the Last Year: No     Number of Places Lived in the Last Year: 1     Unstable Housing in the Last Year: No       Past Medical History:   Past Medical History:   Diagnosis Date    Depression         -----------------------------------  Objective    Temp:  [98 °F (36.7 °C)-98.3 °F (36.8 °C)] 98 °F (36.7 °C)  HR:  [75-92] 92  Resp:  [18] 18  BP: (148-158)/(87-92) 158/87    Mental Status Evaluation:  Appearance: moderately kempt  Motor: WNL  Behavior: cooperative, pleasant  Speech: normal rate, rhythm, and volume  Mood: \"a little anxious about being here\"  Affect: mood-congruent, anxious appearing  Thought Process: organized and linear  Thought Content: denies auditory hallucinations, denies visual hallucinations, denies delusions  Risk Potential: denies " suicidal ideation, plan, or intent. Denies homicidal ideation  Sensorium: Oriented to person, place, time, and situation  Cognition: cognitive ability appears intact but was not quantitatively tested  Consciousness: alert and awake  Attention: currently intact  Insight: fair  Judgement: fair      Meds/Allergies   No Known Allergies      Behavioral Health Medications: all current active meds have been reviewed as per above. Changes as per above. Risks, benefits, indications, and alternatives to treatment were discussed with patient.     Laboratory results:  I have personally reviewed all pertinent laboratory/tests results.  Recent Results (from the past 48 hour(s))   Comprehensive metabolic panel    Collection Time: 04/17/24 11:17 AM   Result Value Ref Range    Sodium 136 135 - 147 mmol/L    Potassium 3.0 (L) 3.5 - 5.3 mmol/L    Chloride 105 96 - 108 mmol/L    CO2 23 21 - 32 mmol/L    ANION GAP 8 4 - 13 mmol/L    BUN 8 5 - 25 mg/dL    Creatinine 0.56 (L) 0.60 - 1.30 mg/dL    Glucose 92 65 - 140 mg/dL    Calcium 9.1 8.4 - 10.2 mg/dL    AST 11 (L) 13 - 39 U/L    ALT 12 7 - 52 U/L    Alkaline Phosphatase 61 34 - 104 U/L    Total Protein 6.9 6.4 - 8.4 g/dL    Albumin 3.6 3.5 - 5.0 g/dL    Total Bilirubin 0.46 0.20 - 1.00 mg/dL    eGFR 131 ml/min/1.73sq m   CBC and differential    Collection Time: 04/17/24  7:19 PM   Result Value Ref Range    WBC 11.08 (H) 4.31 - 10.16 Thousand/uL    RBC 4.58 3.81 - 5.12 Million/uL    Hemoglobin 12.2 11.5 - 15.4 g/dL    Hematocrit 37.3 34.8 - 46.1 %    MCV 81 (L) 82 - 98 fL    MCH 26.6 (L) 26.8 - 34.3 pg    MCHC 32.7 31.4 - 37.4 g/dL    RDW 14.1 11.6 - 15.1 %    MPV 9.2 8.9 - 12.7 fL    Platelets 356 149 - 390 Thousands/uL    nRBC 0 /100 WBCs    Segmented % 70 43 - 75 %    Immature Grans % 0 0 - 2 %    Lymphocytes % 22 14 - 44 %    Monocytes % 8 4 - 12 %    Eosinophils Relative 0 0 - 6 %    Basophils Relative 0 0 - 1 %    Absolute Neutrophils 7.71 (H) 1.85 - 7.62 Thousands/µL    Absolute  "Immature Grans 0.04 0.00 - 0.20 Thousand/uL    Absolute Lymphocytes 2.45 0.60 - 4.47 Thousands/µL    Absolute Monocytes 0.83 0.17 - 1.22 Thousand/µL    Eosinophils Absolute 0.02 0.00 - 0.61 Thousand/µL    Basophils Absolute 0.03 0.00 - 0.10 Thousands/µL   Comprehensive metabolic panel    Collection Time: 04/17/24  7:19 PM   Result Value Ref Range    Sodium 137 135 - 147 mmol/L    Potassium 3.7 3.5 - 5.3 mmol/L    Chloride 109 (H) 96 - 108 mmol/L    CO2 22 21 - 32 mmol/L    ANION GAP 6 4 - 13 mmol/L    BUN 5 5 - 25 mg/dL    Creatinine 0.55 (L) 0.60 - 1.30 mg/dL    Glucose 115 65 - 140 mg/dL    Calcium 9.1 8.4 - 10.2 mg/dL    AST 11 (L) 13 - 39 U/L    ALT 12 7 - 52 U/L    Alkaline Phosphatase 62 34 - 104 U/L    Total Protein 7.0 6.4 - 8.4 g/dL    Albumin 3.7 3.5 - 5.0 g/dL    Total Bilirubin 0.39 0.20 - 1.00 mg/dL    eGFR 132 ml/min/1.73sq m   Acetaminophen level-\"If concentration is detectable, please discuss with medical  on call.\"    Collection Time: 04/17/24  7:19 PM   Result Value Ref Range    Acetaminophen Level 2 (L) 10 - 20 ug/mL   CBC and differential    Collection Time: 04/18/24  5:11 AM   Result Value Ref Range    WBC 11.88 (H) 4.31 - 10.16 Thousand/uL    RBC 4.23 3.81 - 5.12 Million/uL    Hemoglobin 11.3 (L) 11.5 - 15.4 g/dL    Hematocrit 35.6 34.8 - 46.1 %    MCV 84 82 - 98 fL    MCH 26.7 (L) 26.8 - 34.3 pg    MCHC 31.7 31.4 - 37.4 g/dL    RDW 14.1 11.6 - 15.1 %    MPV 9.8 8.9 - 12.7 fL    Platelets 345 149 - 390 Thousands/uL    nRBC 0 /100 WBCs    Segmented % 60 43 - 75 %    Immature Grans % 1 0 - 2 %    Lymphocytes % 31 14 - 44 %    Monocytes % 7 4 - 12 %    Eosinophils Relative 1 0 - 6 %    Basophils Relative 0 0 - 1 %    Absolute Neutrophils 7.11 1.85 - 7.62 Thousands/µL    Absolute Immature Grans 0.06 0.00 - 0.20 Thousand/uL    Absolute Lymphocytes 3.72 0.60 - 4.47 Thousands/µL    Absolute Monocytes 0.86 0.17 - 1.22 Thousand/µL    Eosinophils Absolute 0.08 0.00 - 0.61 Thousand/µL    " "Basophils Absolute 0.05 0.00 - 0.10 Thousands/µL   Comprehensive metabolic panel    Collection Time: 04/18/24  5:11 AM   Result Value Ref Range    Sodium 139 135 - 147 mmol/L    Potassium 3.8 3.5 - 5.3 mmol/L    Chloride 109 (H) 96 - 108 mmol/L    CO2 24 21 - 32 mmol/L    ANION GAP 6 4 - 13 mmol/L    BUN 6 5 - 25 mg/dL    Creatinine 0.62 0.60 - 1.30 mg/dL    Glucose 118 65 - 140 mg/dL    Calcium 8.8 8.4 - 10.2 mg/dL    Corrected Calcium 9.3 8.3 - 10.1 mg/dL    AST 14 13 - 39 U/L    ALT 13 7 - 52 U/L    Alkaline Phosphatase 55 34 - 104 U/L    Total Protein 6.3 (L) 6.4 - 8.4 g/dL    Albumin 3.4 (L) 3.5 - 5.0 g/dL    Total Bilirubin 0.25 0.20 - 1.00 mg/dL    eGFR 127 ml/min/1.73sq m   Acetaminophen level-\"If concentration is detectable, please discuss with medical  on call.\"    Collection Time: 04/18/24  5:11 AM   Result Value Ref Range    Acetaminophen Level <2 (L) 10 - 20 ug/mL        Progress Toward Goals & Illness Status: Patient is not at goal. They are psychiatrically unstable and are not yet ready for discharge. The patient's condition currently requires active psychopharmacological medication management, interdisciplinary coordination with case management, and the utilization of adjunctive milieu and group therapy to augment psychopharmacological efficacy. The patient's risk of morbidity, and progression or decompensation of psychiatric disease, is high without this current treatment.           -----------------------------------    Risks / Benefits of Treatment:     Risks, benefits, and possible side effects of medications explained to patient. The patient verbalizes understanding and agreement for treatment.     Counseling / Coordination of Care:     Patient's presentation on admission and proposed treatment plan were discussed with the treatment team.  Diagnosis, medication changes and treatment plan were reviewed with the patient.  Recent stressors were discussed with the patient.  Events " leading to admission were reviewed with the patient.  Importance of medication and treatment compliance was reviewed with the patient.          Inpatient Psychiatric Certification:     Certification: Based upon physical, mental and social evaluations, I certify that inpatient psychiatric services are medically necessary for this patient for a duration of 5-7 midnights (exact duration TBD pending patient's response to psychiatric treatment) for the diagnosis listed above.     Available alternative community resources do not meet the patient's mental health care needs.  I further attest that an established written individualized plan of care has been implemented and is outlined in the patient's medical records.        This note has been constructed using a voice recognition system. There may be translation, syntax, or grammatical errors. If you have any questions, please contact the dictating provider.

## 2024-04-19 NOTE — DISCHARGE INSTR - OTHER ORDERS
[facility] CRISIS INFORMATION   [employer] is a confidential 7 days/week telephone support service manned by trained mental health consumers.  Warmline operates daily but is not able to accept calls between 2AM-6AM.   [employer] provides support, a listening ear and can provide information about available services. [employer] specializes in the concerns of mental health consumers, their families and friends.  However, we are also here for anyone who has a mental health concern, is confused about or just doesn't know anything about mental health or where to get information. To reach [facility], call [phone] accepts calls between 6:00 AM to 10:00 AM and from 4:00 PM to 12:00 AM.   Text CONNECT to 316212 from anywhere in the [place], anytime, about any type of crisis.  A live, trained Crisis Counselor receives the text and lets you know that they are here to listen.  The volunteer Crisis Counselor will help you move from a hot moment to a cool moment.  [facility] Crisis Intervention - licensed telephone and mobile crisis services that provide mental health assessments to all age groups regardless of income or insurance.  Crisis Intervention operates 24-hour/7 days a week. [facility] Crisis Intervention assists consumer who are experiencing a mental health emergency and lack the resources to assist themselves.  Immediate intervention for suicidal and depressed individuals with home visits/outreach being top priority. Crisis can be contacted at [phone].   The [place] [employer] Mental [employer]ness ([employer]) offers various education & support groups for you & your family.  For more information visit their website at   http://www.manuel-lv.org/.  Dial 2-1-1 to get connected/get help.  Free, confidential information & referral available 24/7: Aging Services, Child & Youth Services, Counseling, Education/Training, Food/Shelter/Clothing, Health Services, Parenting, Substance Abuse, Support Groups, Volunteer Opportunities, &  much more.  Phone: 2-1-1 or [phone], Web: www.zx117qwxt.org, Email: [email or web address]

## 2024-04-19 NOTE — TELEPHONE ENCOUNTER
----- Message from Radha Tamayo sent at 4/19/2024  9:36 AM EDT -----  Good morning,     This patient is being discharged on Sunday 4/21/24 and is interested in Outpatient Therapy. Any chance she can be scheduled at [place] or [place]?     Let me know, thanks so much!   Radha

## 2024-04-19 NOTE — DISCHARGE INSTR - APPOINTMENTS
You will be discharged to [facility] 1/[address] [address]   You confirmed that your cell phone number is [phone]        Camilla, or Caridad, our Behavioral Health Nurse Navigators, will be calling you after your discharge, on the phone number that you provided.  They will be available as an additional support, if needed.   If you wish to speak with one of them, you may contact Camilla at [phone] or Caridad at [phone].

## 2024-04-19 NOTE — TREATMENT PLAN
TREATMENT PLAN REVIEW - Behavioral Health Grismerlin Fernandez 24 y.o. 2000 female MRN: [id number]    [facility] Room / Bed: [facility] 201/[facility] 201-01 Encounter: [id number]          Admit Date/Time:  4/18/2024  5:48 PM    Treatment Team:   DO Homa Geronimo, RN  Brad Serrano  [facility]  Alden Morrison, ARIEL Paula, NATALIA Lindsey, NATALIA Krueger RN    Diagnosis: Principal Problem:    Adjustment disorder with anxious mood  Active Problems:    Medical clearance for psychiatric admission    Tylenol ingestion, undetermined intent, subsequent encounter    BMI 38.0-38.9,adult      Patient Strengths/Assets: communication skills    Patient Barriers/Limitations: resistance to treatment    Short Term Goals: decrease in suicidal thoughts, ability to stay safe on the unit    Long Term Goals: free of suicidal thoughts    Progress Towards Goals: improving gradually    Recommended Treatment: medication management, patient medication education, group therapy, milieu therapy, continued Behavioral Health psychiatric evaluation/assessment process    Treatment Frequency: daily medication monitoring, group and milieu therapy daily, monitoring through interdisciplinary rounds, monitoring through weekly patient care conferences    Expected Discharge Date:  3 days    Discharge Plan: discharge to home    Treatment Plan Created/Updated By: Kaushik Tiwari DO

## 2024-04-19 NOTE — CASE MANAGEMENT
"CM sent in basket message to [facility] to see if pt can be scheduled for OP Therapy.     They responded and reported:  \"Pt scheduled on 4/29 at 11am with Marjorie Driver at [facility].\"       "

## 2024-04-19 NOTE — NURSING NOTE
"Cooperative, withdrawn, and focused on discharge. Continues to report she only took \"a few extra pills to help my headache\" and denies suicide attempt. Reports feeling \"cold\" and is scant in conversation. RN gave patient one blanket and heating pack. Denies SI/HI/AVH and encouraged to approach staff if ideations occur. Cites self-protective factors as her two children. Consumed breakfast entirety. Did not attend group despite RN encouragement. Plan of care ongoing. Denies unmet needs.    "

## 2024-04-19 NOTE — CONSULTS
[facility]  Consult  Name: Grismerlin Fernandez 24 y.o. female I MRN: [id number]  Unit/Bed#: [facility] 201-01 I Date of Admission: 4/18/2024   Date of Service: 4/19/2024 I Hospital Day: 1    Inpatient consult for Medical Clearance for  patient  Consult performed by: Carmelina Fontana PA-C  Consult ordered by: Maura Soto PA-C          Assessment/Plan   Medical clearance for psychiatric admission  Assessment & Plan  Vital signs stable at time of assessment  CBC, CMP, TSH pending  EKG: NSR normal Qtc HR 74  Patient appears medically stable at this time for inpatient psychiatric treatment    BMI 38.0-38.9,adult  Assessment & Plan  BMI 38.01  Lifestyle modifications    Tylenol ingestion, undetermined intent, subsequent encounter  Assessment & Plan  Presents from [facility] following acetaminophen overdose  Case had been discussed with toxicology and patient was medically cleared for transfer to [facility]  Further plan per psychiatry         Recommendations for Discharge:  Follow-up with PCP after discharge    Total Time Spent on Date of Encounter in care of patient:  mins. This time was spent on one or more of the following: performing physical exam; counseling and coordination of care; obtaining or reviewing history; documenting in the medical record; reviewing/ordering tests, medications or procedures; communicating with other healthcare professionals and discussing with patient's family/caregivers.    Collaboration of Care: Were Recommendations Directly Discussed with Primary Treatment Team? No    History of Present Illness:  Grismerlin Fernandez is a 24 y.o. female who is originally admitted to the psychiatry service due to acetaminophen overdose. We are consulted for medical clearance.    Past medical history significant for obesity, depression.  Patient initially presented to [facility] after taking approximately 15 tabs of Tylenol.  Patient was  initially medically admitted and was started on NAC protocol with IV fluids.  Toxicology was consulted and patient was cleared for transfer to [facility].  Currently denies any chest pain/palpitations, shortness of breath, nausea/vomiting, abdominal pain, constipation or diarrhea, fever/chills.    Review of Systems:  Review of Systems   Constitutional:  Negative for chills, fatigue, fever and unexpected weight change.   HENT:  Negative for congestion, sore throat and trouble swallowing.    Eyes:  Negative for photophobia, pain and visual disturbance.   Respiratory:  Negative for cough, shortness of breath and wheezing.    Cardiovascular:  Negative for chest pain, palpitations and leg swelling.   Gastrointestinal:  Negative for abdominal pain, constipation, diarrhea, nausea and vomiting.   Endocrine: Negative for polyuria.   Genitourinary:  Negative for difficulty urinating, dysuria, flank pain, hematuria and urgency.   Musculoskeletal:  Negative for back pain, myalgias, neck pain and neck stiffness.   Skin:  Negative for pallor and rash.   Neurological:  Negative for dizziness, tremors, syncope, weakness, light-headedness, numbness and headaches.   Hematological:  Does not bruise/bleed easily.   Psychiatric/Behavioral:  Positive for dysphoric mood. Negative for agitation and confusion. The patient is nervous/anxious.        Past Medical and Surgical History:   Past Medical History:   Diagnosis Date    Depression        Past Surgical History:   Procedure Laterality Date    ABDOMINOPLASTY      [place]  DELIVERY ONLY N/A 2020    Procedure:  SECTION ();  Surgeon: Adair Clifton MD;  Location: [facility];  Service: Obstetrics    [place]  DELIVERY ONLY N/A 1/15/2024    Procedure:  SECTION ();  Surgeon: Yardlie Toussaint-Foster, DO;  Location: [facility];  Service: Obstetrics       Meds/Allergies:  all medications and allergies reviewed    Allergies: No Known Allergies    Social  "History:  Marital Status: Single  Substance Use History:   Social History     Substance and Sexual Activity   Alcohol Use Not Currently    Comment: \"sometimes\"     Social History     Tobacco Use   Smoking Status Former    Types: Pipe   Smokeless Tobacco Never     Social History     Substance and Sexual Activity   Drug Use Never       Family History:  Family History   Problem Relation Age of Onset    No Known Problems Mother     No Known Problems Father     No Known Problems Sister     No Known Problems Brother     Cancer Neg Hx     Diabetes Neg Hx        Physical Exam:   Vitals:   Blood Pressure: 158/87 (04/19/24 0732)  Pulse: 92 (04/19/24 0732)  Temperature: 98 °F (36.7 °C) (04/19/24 0732)  Temp Source: Tympanic (04/19/24 0732)  Respirations: 18 (04/19/24 0732)  Height: 5' 2\" (157.5 cm) (04/18/24 1749)  Weight - Scale: 94.3 kg (207 lb 12.8 oz) (04/18/24 1749)  SpO2: 99 % (04/19/24 0732)    Physical Exam  Vitals and nursing note reviewed.   Constitutional:       Appearance: Normal appearance.      Comments: No acute distress   HENT:      Head: Normocephalic.   Eyes:      General: No scleral icterus.     Extraocular Movements: Extraocular movements intact.      Conjunctiva/sclera: Conjunctivae normal.   Cardiovascular:      Rate and Rhythm: Normal rate and regular rhythm.      Heart sounds: S1 normal and S2 normal.   Pulmonary:      Effort: Pulmonary effort is normal.      Breath sounds: Normal breath sounds. No wheezing, rhonchi or rales.   Abdominal:      General: Bowel sounds are normal.      Palpations: Abdomen is soft.      Tenderness: There is no abdominal tenderness. There is no guarding or rebound.   Musculoskeletal:         General: No swelling, tenderness or deformity.      Cervical back: Normal range of motion.      Comments: Ambulating unit without difficulty, no edema   Skin:     General: Skin is warm and dry.   Neurological:      Mental Status: She is alert and oriented to person, place, and time. " "  Psychiatric:         Mood and Affect: Mood normal.         Speech: Speech normal.         Behavior: Behavior normal.          Additional Data:   Lab Results:    Results from last 7 days   Lab Units 04/18/24  0511   WBC Thousand/uL 11.88*   HEMOGLOBIN g/dL 11.3*   HEMATOCRIT % 35.6   PLATELETS Thousands/uL 345   SEGS PCT % 60   LYMPHO PCT % 31   MONO PCT % 7   EOS PCT % 1     Results from last 7 days   Lab Units 04/18/24  0511   SODIUM mmol/L 139   POTASSIUM mmol/L 3.8   CHLORIDE mmol/L 109*   CO2 mmol/L 24   BUN mg/dL 6   CREATININE mg/dL 0.62   ANION GAP mmol/L 6   CALCIUM mg/dL 8.8   ALBUMIN g/dL 3.4*   TOTAL BILIRUBIN mg/dL 0.25   ALK PHOS U/L 55   ALT U/L 13   AST U/L 14   GLUCOSE RANDOM mg/dL 118             No results found for: \"HGBA1C\"  Results from last 7 days   Lab Units 04/17/24  0256   POC GLUCOSE mg/dl 108           Imaging: No pertinent imaging reviewed.  No orders to display       EKG, Pathology, and Other Studies Reviewed on Admission:   EKG: NSR. HR 74.    ** Please Note: This note may have been constructed using a voice recognition system. **        "

## 2024-04-19 NOTE — ASSESSMENT & PLAN NOTE
Vital signs stable at time of assessment  CBC, CMP, TSH pending  EKG: NSR normal Qtc HR 74  Patient appears medically stable at this time for inpatient psychiatric treatment

## 2024-04-19 NOTE — CMS CERTIFICATION NOTE
Recertification: Based upon physical, mental and social evaluations, I certify that inpatient psychiatric services continue to be medically necessary for this patient for a duration of 3 midnights for the treatment of  Adjustment disorder with anxious mood Available alternative community resources still do not meet the patient's mental health care needs. I further attest that an established written individualized plan of care has been updated and is outlined in the patient's medical records.

## 2024-04-20 PROCEDURE — 99232 SBSQ HOSP IP/OBS MODERATE 35: CPT | Performed by: PSYCHIATRY & NEUROLOGY

## 2024-04-20 RX ORDER — LANOLIN ALCOHOL/MO/W.PET/CERES
3 CREAM (GRAM) TOPICAL ONCE
Status: COMPLETED | OUTPATIENT
Start: 2024-04-20 | End: 2024-04-20

## 2024-04-20 RX ADMIN — Medication 3 MG: at 22:53

## 2024-04-20 RX ADMIN — TRAZODONE HYDROCHLORIDE 50 MG: 50 TABLET ORAL at 21:05

## 2024-04-20 NOTE — NURSING NOTE
Pt resting in bed, declined lunch. Pt reports feeling tired. Stated difficulty falling asleep last night, received PRN Trazodone and Atarax. Writer discussed likelihood that drowsiness this morning could be due to PRN received last night. Pt expresses readiness to d/c tomorrow. Mom will call when arrives at hospital, pt's mother due to arrive at 1000. Pt denies SI, HI, AVH.

## 2024-04-20 NOTE — NURSING NOTE
"Pt approached the nursing station tearful, asking once again, at 1230 if she was able to use the phone to call her son at the hospital. This writer reminded pt that the phones get turned off at 10:00 and encouraged pt to make phone calls before they get turned off for the night. Pt continued to cry and say \"my son has asthma.\" Pt reports having anxiety due to inability to sleep and not being able to talk with her son. This writer offered Pt a PRN for severe anxiety, Weiss=25. Pt was agreeable and received Atarax 100mg. Upon reassessment one hour later pt was sleeping.     "

## 2024-04-20 NOTE — NURSING NOTE
Withdrawn to room, but did come out of room to make phone call. Completed ADLs including showering and laundry. Expressed eagerness for discharge tomorrow. Denies SI/HI/AVH and encouraged to approach staff if ideations occur. Did not make progress towards treatment goals as evidenced by absent group attendance and minimal peer interaction. Plan of care ongoing.

## 2024-04-20 NOTE — NURSING NOTE
Pt requested and received trazodone 50 mg PO for sleep @ 2122. Upon follow up pt is sleeping. PRN effective.

## 2024-04-20 NOTE — TREATMENT TEAM
04/20/24 0800   Team Meeting   Meeting Type Daily Rounds   Team Members Present   Team Members Present Physician;Nurse   Physician Team Member Te   Nursing Team Member Evans   Patient/Family Present   Patient Present No   Patient's Family Present No      AM rounds- facetime with child which went well however pt became anxious and wanting to call again overnight. Pt denies SI/HI, 72 hour notice remains 18kg 4315

## 2024-04-20 NOTE — PROGRESS NOTES
Progress Note - Behavioral Health   Grismerlin Fernandez 24 y.o. female MRN: [id number]  Unit/Bed#: [facility] 201-01 Encounter: [id number]    Assessment:  Principal Problem:    Adjustment disorder with anxious mood  Active Problems:    Medical clearance for psychiatric admission    Tylenol ingestion, undetermined intent, subsequent encounter    BMI 38.0-38.9,adult      Plan:  --Discharge tomorrow per 72 hr notice  --Continue with psychiatric hospitalization  --Continue with individual, group, and milieu therapy  --Continue the following medications:  Current Facility-Administered Medications   Medication Dose Route Frequency    acetaminophen (TYLENOL) tablet 650 mg  650 mg Oral Q6H PRN    acetaminophen (TYLENOL) tablet 650 mg  650 mg Oral Q4H PRN    acetaminophen (TYLENOL) tablet 975 mg  975 mg Oral Q6H PRN    aluminum-magnesium hydroxide-simethicone (MAALOX) oral suspension 30 mL  30 mL Oral Q4H PRN    Artificial Tears ophthalmic solution 1 drop  1 drop Both Eyes Q3H PRN    benztropine (COGENTIN) injection 1 mg  1 mg Intramuscular Q4H PRN Max 6/day    benztropine (COGENTIN) tablet 1 mg  1 mg Oral Q4H PRN Max 6/day    bisacodyl (DULCOLAX) rectal suppository 10 mg  10 mg Rectal Daily PRN    hydrOXYzine HCL (ATARAX) tablet 50 mg  50 mg Oral Q6H PRN Max 4/day    Or    diphenhydrAMINE (BENADRYL) injection 50 mg  50 mg Intramuscular Q6H PRN    hydrOXYzine HCL (ATARAX) tablet 100 mg  100 mg Oral Q6H PRN Max 4/day    Or    LORazepam (ATIVAN) injection 2 mg  2 mg Intramuscular Q6H PRN    hydrOXYzine HCL (ATARAX) tablet 25 mg  25 mg Oral Q6H PRN Max 4/day    OLANZapine (ZyPREXA) tablet 10 mg  10 mg Oral Q3H PRN Max 3/day    Or    OLANZapine (ZyPREXA) IM injection 10 mg  10 mg Intramuscular Q3H PRN Max 3/day    OLANZapine (ZyPREXA) tablet 5 mg  5 mg Oral Q3H PRN Max 6/day    Or    OLANZapine (ZyPREXA) IM injection 5 mg  5 mg Intramuscular Q3H PRN Max 6/day    OLANZapine (ZyPREXA) tablet 2.5 mg  2.5 mg Oral Q3H PRN Max 8/day     polyethylene glycol (MIRALAX) packet 17 g  17 g Oral Daily PRN    propranolol (INDERAL) tablet 10 mg  10 mg Oral Q8H PRN    senna-docusate sodium (SENOKOT S) 8.6-50 mg per tablet 1 tablet  1 tablet Oral Daily PRN    traZODone (DESYREL) tablet 50 mg  50 mg Oral HS PRN       Subjective: Patient was seen for continuation of care. Chart was reviewed and discussed with treatment team.     No acute behavioral events over the past 24 hours. Today, patient was seen and examined at bedside for continuation of care.     Patient is without complaint today.  She states that she is not feeling depressed nor suicidal and she is looking forward to being discharged tomorrow as per her 72-hour notice.  Last evening, the patient was able to FaceTime with her kids and became upset after the call was completed and was crying and requesting that the FaceTime be allowed to happen again.  She was ultimately redirectable and slept well.  She continues to be future oriented and deny all symptoms and continues to be able to articulate a safety plan for her safe return to the community tomorrow.    Patient denied adverse effects to their psychiatric medication regimen. Patient denied other new or worsening psychiatric symptoms/complaints at this time. Discussed the importance of continuing to take medications as prescribed, as well as the importance of continuing to attend groups on the unit.     Psychiatric Review of Systems:  Medication adverse effects: none  Sleep: unchanged  Appetite: unchanged  Behavior over the past 24 hours: as per above    Vitals:  Vitals:    04/20/24 0819   BP: 149/79   Pulse: 68   Resp:    Temp: 97.6 °F (36.4 °C)   SpO2:        Laboratory results:    I have personally reviewed all pertinent laboratory/tests results  Recent Results (from the past 48 hour(s))   TSH, 3rd generation with Free T4 reflex    Collection Time: 04/19/24  6:09 AM   Result Value Ref Range    TSH 3RD GENERATON 1.077 0.450 - 4.500 uIU/mL  "  Vitamin B12    Collection Time: 04/19/24  6:09 AM   Result Value Ref Range    Vitamin B-12 279 180 - 914 pg/mL   Folate    Collection Time: 04/19/24  6:09 AM   Result Value Ref Range    Folate 6.7 >5.9 ng/mL   Vitamin D 25 hydroxy    Collection Time: 04/19/24  6:09 AM   Result Value Ref Range    Vit D, 25-Hydroxy 9.3 (L) 30.0 - 100.0 ng/mL   Hemoglobin A1C    Collection Time: 04/19/24  6:09 AM   Result Value Ref Range    Hemoglobin A1C 5.1 Normal 4.0-5.6%; PreDiabetic 5.7-6.4%; Diabetic >=6.5%; Glycemic control for adults with diabetes <7.0% %     mg/dl   Lipid panel    Collection Time: 04/19/24  6:09 AM   Result Value Ref Range    Cholesterol 101 See Comment mg/dL    Triglycerides 50 See Comment mg/dL    HDL, Direct 38 (L) >=50 mg/dL    LDL Calculated 53 0 - 100 mg/dL    Non-HDL-Chol (CHOL-HDL) 63 mg/dl   ECG 12 lead    Collection Time: 04/19/24  6:35 AM   Result Value Ref Range    Ventricular Rate 74 BPM    Atrial Rate 74 BPM    WI Interval 120 ms    QRSD Interval 84 ms    QT Interval 366 ms    QTC Interval 406 ms    P Axis 48 degrees    QRS Axis 47 degrees    T Wave Axis 5 degrees        Current Medications:  Current medications as per above. All medications have been reviewed.   Risks, benefits, alternatives, and possible side effects of patient's psychiatric medications were discussed with patient.     Mental Status Evaluation:  Appearance: casually dressed, appears consistent with stated age  Motor: no psychomotor disturbances, no gait abnormalities  Behavior: cooperative, interacts with this writer appropriately  Speech: normal rate, rhythm, and volume  Mood: \"good\"  Affect: euthymic, normal range and intensity  Thought Process: organized, linear, and goal-oriented  Thought Content: denies auditory hallucinations, denies visual hallucinations, denies delusions  Risk Potential: denies suicidal ideation, plan, or intent. Denies homicidal ideation  Sensorium: Oriented to person, place, time, and " situation  Cognition: cognitive ability appears intact but was not quantitatively tested  Consciousness: alert and awake  Attention: able to focus without difficulty  Insight: improved  Judgement: improved      Progress Toward Goals & Illness Status: Patient is not at goal. They are not yet ready for discharge. The patient's condition currently requires active psychopharmacological medication management, interdisciplinary coordination with case management, and the utilization of adjunctive milieu and group therapy to augment psychopharmacological efficacy. The patient's risk of morbidity, and progression or decompensation of psychiatric disease, is higher without this current treatment.       This note has been constructed using a voice recognition system. There may be translation, syntax, or grammatical errors. If you have any questions, please contact the dictating provider.

## 2024-04-21 ENCOUNTER — HOSPITAL ENCOUNTER (OUTPATIENT)
Facility: HOSPITAL | Age: 24
Setting detail: OBSERVATION
End: 2024-04-22
Attending: EMERGENCY MEDICINE | Admitting: INTERNAL MEDICINE
Payer: MEDICARE

## 2024-04-21 ENCOUNTER — APPOINTMENT (EMERGENCY)
Dept: RADIOLOGY | Facility: HOSPITAL | Age: 24
End: 2024-04-21
Payer: MEDICARE

## 2024-04-21 VITALS
HEIGHT: 62 IN | TEMPERATURE: 97.6 F | BODY MASS INDEX: 37.91 KG/M2 | OXYGEN SATURATION: 98 % | HEART RATE: 80 BPM | RESPIRATION RATE: 18 BRPM | SYSTOLIC BLOOD PRESSURE: 128 MMHG | DIASTOLIC BLOOD PRESSURE: 74 MMHG | WEIGHT: 206 LBS

## 2024-04-21 DIAGNOSIS — T50.902A INTENTIONAL OVERDOSE, INITIAL ENCOUNTER (HCC): Primary | ICD-10-CM

## 2024-04-21 DIAGNOSIS — T14.91XA SUICIDE ATTEMPT (HCC): ICD-10-CM

## 2024-04-21 DIAGNOSIS — Z00.8 MEDICAL CLEARANCE FOR PSYCHIATRIC ADMISSION: ICD-10-CM

## 2024-04-21 LAB
ABO GROUP BLD: NORMAL
ALBUMIN SERPL BCP-MCNC: 4.1 G/DL (ref 3.5–5)
ALP SERPL-CCNC: 69 U/L (ref 34–104)
ALT SERPL W P-5'-P-CCNC: 10 U/L (ref 7–52)
ANION GAP SERPL CALCULATED.3IONS-SCNC: 9 MMOL/L (ref 4–13)
APAP SERPL-MCNC: <2 UG/ML (ref 10–20)
APTT PPP: 29 SECONDS (ref 23–37)
AST SERPL W P-5'-P-CCNC: 12 U/L (ref 13–39)
BASE EX.OXY STD BLDV CALC-SCNC: 85 % (ref 60–80)
BASE EXCESS BLDV CALC-SCNC: -2.5 MMOL/L
BASOPHILS # BLD AUTO: 0.06 THOUSANDS/ÂΜL (ref 0–0.1)
BASOPHILS NFR BLD AUTO: 0 % (ref 0–1)
BILIRUB SERPL-MCNC: 0.34 MG/DL (ref 0.2–1)
BLD GP AB SCN SERPL QL: NEGATIVE
BUN SERPL-MCNC: 12 MG/DL (ref 5–25)
CALCIUM SERPL-MCNC: 9.8 MG/DL (ref 8.4–10.2)
CHLORIDE SERPL-SCNC: 106 MMOL/L (ref 96–108)
CK SERPL-CCNC: 40 U/L (ref 26–192)
CO2 SERPL-SCNC: 24 MMOL/L (ref 21–32)
CREAT SERPL-MCNC: 0.62 MG/DL (ref 0.6–1.3)
EOSINOPHIL # BLD AUTO: 0.04 THOUSAND/ÂΜL (ref 0–0.61)
EOSINOPHIL NFR BLD AUTO: 0 % (ref 0–6)
ERYTHROCYTE [DISTWIDTH] IN BLOOD BY AUTOMATED COUNT: 14.6 % (ref 11.6–15.1)
ETHANOL SERPL-MCNC: <10 MG/DL
GFR SERPL CREATININE-BSD FRML MDRD: 127 ML/MIN/1.73SQ M
GLUCOSE SERPL-MCNC: 85 MG/DL (ref 65–140)
HCO3 BLDV-SCNC: 21.5 MMOL/L (ref 24–30)
HCT VFR BLD AUTO: 42.1 % (ref 34.8–46.1)
HGB BLD-MCNC: 13.3 G/DL (ref 11.5–15.4)
IMM GRANULOCYTES # BLD AUTO: 0.08 THOUSAND/UL (ref 0–0.2)
IMM GRANULOCYTES NFR BLD AUTO: 0 % (ref 0–2)
INR PPP: 1.12 (ref 0.84–1.19)
LACTATE SERPL-SCNC: 1 MMOL/L (ref 0.5–2)
LYMPHOCYTES # BLD AUTO: 3.68 THOUSANDS/ÂΜL (ref 0.6–4.47)
LYMPHOCYTES NFR BLD AUTO: 19 % (ref 14–44)
MAGNESIUM SERPL-MCNC: 1.9 MG/DL (ref 1.9–2.7)
MCH RBC QN AUTO: 26.6 PG (ref 26.8–34.3)
MCHC RBC AUTO-ENTMCNC: 31.6 G/DL (ref 31.4–37.4)
MCV RBC AUTO: 84 FL (ref 82–98)
MONOCYTES # BLD AUTO: 1.23 THOUSAND/ÂΜL (ref 0.17–1.22)
MONOCYTES NFR BLD AUTO: 6 % (ref 4–12)
NEUTROPHILS # BLD AUTO: 14.53 THOUSANDS/ÂΜL (ref 1.85–7.62)
NEUTS SEG NFR BLD AUTO: 75 % (ref 43–75)
NRBC BLD AUTO-RTO: 0 /100 WBCS
O2 CT BLDV-SCNC: 16 ML/DL
PCO2 BLDV: 34.7 MM HG (ref 42–50)
PH BLDV: 7.41 [PH] (ref 7.3–7.4)
PLATELET # BLD AUTO: 401 THOUSANDS/UL (ref 149–390)
PMV BLD AUTO: 9.8 FL (ref 8.9–12.7)
PO2 BLDV: 60.8 MM HG (ref 35–45)
POTASSIUM SERPL-SCNC: 3.9 MMOL/L (ref 3.5–5.3)
PROT SERPL-MCNC: 7.2 G/DL (ref 6.4–8.4)
PROTHROMBIN TIME: 14.7 SECONDS (ref 11.6–14.5)
RBC # BLD AUTO: 5 MILLION/UL (ref 3.81–5.12)
RH BLD: POSITIVE
SALICYLATES SERPL-MCNC: <5 MG/DL (ref 3–20)
SODIUM SERPL-SCNC: 139 MMOL/L (ref 135–147)
SPECIMEN EXPIRATION DATE: NORMAL
WBC # BLD AUTO: 19.62 THOUSAND/UL (ref 4.31–10.16)

## 2024-04-21 PROCEDURE — 99239 HOSP IP/OBS DSCHRG MGMT >30: CPT | Performed by: PSYCHIATRY & NEUROLOGY

## 2024-04-21 PROCEDURE — 85025 COMPLETE CBC W/AUTO DIFF WBC: CPT | Performed by: EMERGENCY MEDICINE

## 2024-04-21 PROCEDURE — 36415 COLL VENOUS BLD VENIPUNCTURE: CPT | Performed by: EMERGENCY MEDICINE

## 2024-04-21 PROCEDURE — 93005 ELECTROCARDIOGRAM TRACING: CPT

## 2024-04-21 PROCEDURE — 99285 EMERGENCY DEPT VISIT HI MDM: CPT

## 2024-04-21 PROCEDURE — 86901 BLOOD TYPING SEROLOGIC RH(D): CPT | Performed by: EMERGENCY MEDICINE

## 2024-04-21 PROCEDURE — 96366 THER/PROPH/DIAG IV INF ADDON: CPT

## 2024-04-21 PROCEDURE — 86850 RBC ANTIBODY SCREEN: CPT | Performed by: EMERGENCY MEDICINE

## 2024-04-21 PROCEDURE — 82550 ASSAY OF CK (CPK): CPT | Performed by: EMERGENCY MEDICINE

## 2024-04-21 PROCEDURE — 99285 EMERGENCY DEPT VISIT HI MDM: CPT | Performed by: EMERGENCY MEDICINE

## 2024-04-21 PROCEDURE — 82077 ASSAY SPEC XCP UR&BREATH IA: CPT | Performed by: EMERGENCY MEDICINE

## 2024-04-21 PROCEDURE — 82805 BLOOD GASES W/O2 SATURATION: CPT | Performed by: EMERGENCY MEDICINE

## 2024-04-21 PROCEDURE — 80143 DRUG ASSAY ACETAMINOPHEN: CPT | Performed by: EMERGENCY MEDICINE

## 2024-04-21 PROCEDURE — 74018 RADEX ABDOMEN 1 VIEW: CPT

## 2024-04-21 PROCEDURE — 85730 THROMBOPLASTIN TIME PARTIAL: CPT | Performed by: EMERGENCY MEDICINE

## 2024-04-21 PROCEDURE — 86900 BLOOD TYPING SEROLOGIC ABO: CPT | Performed by: EMERGENCY MEDICINE

## 2024-04-21 PROCEDURE — 80053 COMPREHEN METABOLIC PANEL: CPT | Performed by: EMERGENCY MEDICINE

## 2024-04-21 PROCEDURE — 80179 DRUG ASSAY SALICYLATE: CPT | Performed by: EMERGENCY MEDICINE

## 2024-04-21 PROCEDURE — 83735 ASSAY OF MAGNESIUM: CPT | Performed by: EMERGENCY MEDICINE

## 2024-04-21 PROCEDURE — 96365 THER/PROPH/DIAG IV INF INIT: CPT

## 2024-04-21 PROCEDURE — 83605 ASSAY OF LACTIC ACID: CPT | Performed by: EMERGENCY MEDICINE

## 2024-04-21 PROCEDURE — 85610 PROTHROMBIN TIME: CPT | Performed by: EMERGENCY MEDICINE

## 2024-04-21 RX ADMIN — SODIUM CHLORIDE, SODIUM LACTATE, POTASSIUM CHLORIDE, AND CALCIUM CHLORIDE 1000 ML: .6; .31; .03; .02 INJECTION, SOLUTION INTRAVENOUS at 21:29

## 2024-04-21 NOTE — Clinical Note
Case was discussed with SUSAN and the patient's admission status was agreed to be Admission Status: observation status to the service of Dr. culp .

## 2024-04-21 NOTE — TREATMENT TEAM
04/21/24 0851   Team Meeting   Meeting Type Daily Rounds   Team Members Present   Team Members Present Physician;Nurse   Physician Team Member Te   Nursing Team Member Evans   Patient/Family Present   Patient Present No   Patient's Family Present No     AM rounds-  feels ready for discharge, denies SI/HI. Pt withdrawn to room. Trazodone for sleep, effective.  72 signed 04/18 at 17:55 remains.

## 2024-04-21 NOTE — NURSING NOTE
Was given Melatonin 3 mg. Po prn/sleep at 2253. Good effect obtained, as was observed asleep in bed within an hr & remains asleep presently.

## 2024-04-21 NOTE — DISCHARGE SUMMARY
"  Discharge Summary - Behavioral Health   Grismerlin Fernandez 24 y.o. female MRN: [id number]  Unit/Bed#: [facility] 201-01 Encounter: [id number]     Admission Date: 4/18/2024         Discharge Date: 04/21/24    Attending Psychiatrist: Kaushik Tiwari DO    Reason for Admission/HPI (as per admission documentation):     Per ED provider note:  Patient presenting after taking 15 500mg tablets of Tylenol at 8pm. She denies any co-ingestion. Denies and SI/HI but states she did attempt suicide by OD \"when I was a kid.\" She states she took the tylenol for a headache which is now resolved. She denies any symptoms other than mild abdominal pain. When challenged regarding insight about taking 15 Tylenol, patient states she usually takes 1-2 tylenol for a headache and this time took 15 at one time without pausing for thought. She is with flat affect and responds to most questions with one-word answers. On chart review, patient was admitted in January of 2024 for SIRS possibly due to pelvic fluid collection; was found to have transaminitis and patient at that time was concerned she had taken too much Tylenol. NAC was started however per last note prior to discharge, patient stated she took 8 Tylenol in a 24 hour period. Tylenol level on admission was 8; patient was on NAC throughout course of hospitalization. Patient discharged from that admission AMA.      Per Psych CL note:  Patient is a 24 y.o. female with a history of Major Depressive Disorder who was presented to emergency department on 4/16/2024 due to Tylenol toxicity. Reportedly, patient has been noncompliance with medications and follow-up care.  Patient reported that she broke up with her boyfriend about 5 months ago and she has increased anxiety almost daily basis.  She was not able to give clear symptoms of anxiety but she reported aggressive symptoms crying spells, Feeling of hopelessness, with low energy motivation.  She reported at times she thinks too much about " her current stressors being difficulty controlling her thoughts and worries resulting in difficulty sleeping hollered he has been taking care of 3 months baby and  3-year-old.  She has not been working currently getting help from her mother.  She reported that she is living alone her mother sometimes help her.  She denied any suicidal ideation in the last 6 months reported she never had any in her life stated that yesterday she took extra Tylenol for her pain and she was taking 4 at times took multiple times and 6-hour and she is aware that it can cause liver toxicity but she did not that it can lead to death.  She appeared to minimize her symptoms.  Similar episode happened in in January 2024 in which she has elevated liver enzymes after taking 8 Tylenol.  She has a history of suicidal attempt in 2016 when she overdosed on medication. No recent aggressive behavior was reported.  No homicidal ideation was reported.  No recent manic and psychotic symptoms was reported.  No substance abuse or alcohol abuse problem was.      On admission to Inpatient Psychiatric Unit:  Patient is a 24-year-old  but English-speaking female with an unclear past psychiatric history who presents voluntarily to inpatient BHU following a reported unintentional overdose of Tylenol.     Symptoms prior to hospitalization include: Anxious mood and headache.  Symptom severity is rated as mild.  Timeline is acute.  Mitigating factors are none.  Exacerbating stressors are headache.     On initial psychiatric evaluation today, the patient vehemently denies that this was a suicide attempt.  She reports that she took somewhere between 11-15 Tylenol because she had a headache that was not getting better and she thought that if she took more Tylenol the headache would improve.  The patient states that she is upset that she is on the inpatient behavioral health unit and that she saw 2 different psychiatrist while she was in the emergency  "department; she reports that the first psychiatrist told her that she would be able to go home, but then the second psychiatrist \"asked me two questions and then stated that I had to go to the hospital.\"  She denies any feelings of depression.  She is 4 months postpartum but states that she is not particularly stressed out although she does admit to feeling anxious at times.  She politely declines psychotropic medications.  She has 1 prior hospitalization at [facility] when she was a teenager and had 1 prior overdose attempt as a teenager.  She currently denies suicidal ideation, intent, or plan.  She denies manic or psychotic symptoms.  She denies any physical issues.  She denies substance use.  She denies access to lethal means.  She denies any family history of suicide attempt.  She signed a 72-hour notice to withdraw from treatment.  She is visible in the milieu and interacting appropriately with peers and was attending group prior to my encounter with her. She is requesting a referral to OP therapy.     Psychiatric Review Of Systems:  Medication side effects: none  Sleep: no change  Appetite: no change  Hygiene: able to tend to instrumental and basic ADLs  Anxiety Symptoms: +  Psychotic Symptoms: denies  Depression Symptoms: denies  Manic Symptoms: denies  PTSD Symptoms: denies  Suicidal Thoughts: denies  Homicidal Thoughts: denies     Medical Review Of Systems:   Patient denies headache or dizziness.   Patient denies chest pain or palpitations.  Patient denies difficulty breathing or wheezing.  Patient denies nausea, vomiting, or diarrhea.  Patient denies polyuria or polydipsia.  Patient denies weakness or numbness.  Pertinent positives as per HPI.      Past Medical History:   Diagnosis Date    Depression      Past Surgical History:   Procedure Laterality Date    ABDOMINOPLASTY      [place]  DELIVERY ONLY N/A 2020    Procedure:  SECTION ();  Surgeon: Adair Clifton MD;  " Location: [facility] LD;  Service: Obstetrics    [place]  DELIVERY ONLY N/A 1/15/2024    Procedure:  SECTION ();  Surgeon: Yardlie Toussaint-Foster, DO;  Location: [facility] ;  Service: Obstetrics       Medications:    Current Facility-Administered Medications   Medication Dose Route Frequency Provider Last Rate    acetaminophen  650 mg Oral Q6H PRN Maura Josseline Stives, PA-C      acetaminophen  650 mg Oral Q4H PRN Maura Josseline Stives, PA-C      acetaminophen  975 mg Oral Q6H PRN Maura Josseline Stives, PA-C      aluminum-magnesium hydroxide-simethicone  30 mL Oral Q4H PRN Maura Josseline Stives, PA-C      Artificial Tears  1 drop Both Eyes Q3H PRN Maura Josseline Stives, PA-C      benztropine  1 mg Intramuscular Q4H PRN Max 6/day Maura Josseline Stives, PA-C      benztropine  1 mg Oral Q4H PRN Max 6/day Maura Josseline Stives, PA-C      bisacodyl  10 mg Rectal Daily PRN Maura Josseline Stives, PA-C      hydrOXYzine HCL  50 mg Oral Q6H PRN Max 4/day Maura Josseline Stives, PA-C      Or    diphenhydrAMINE  50 mg Intramuscular Q6H PRN Maura Josseline Stives, PA-C      hydrOXYzine HCL  100 mg Oral Q6H PRN Max 4/day Maura Josseline Stives, PA-C      Or    LORazepam  2 mg Intramuscular Q6H PRN Maura Josseline Stives, PA-C      hydrOXYzine HCL  25 mg Oral Q6H PRN Max 4/day Maura Josseline Stives, PA-C      OLANZapine  10 mg Oral Q3H PRN Max 3/day Maura Josseline Stives, PA-C      Or    OLANZapine  10 mg Intramuscular Q3H PRN Max 3/day Maura Josseline Stives, PA-C      OLANZapine  5 mg Oral Q3H PRN Max 6/day Maura Josseline Stives, PA-C      Or    OLANZapine  5 mg Intramuscular Q3H PRN Max 6/day Maura Josseline Stives, PA-C      OLANZapine  2.5 mg Oral Q3H PRN Max 8/day Maura Josseline Stives, PA-C      polyethylene glycol  17 g Oral Daily PRN Maura Josseline Stives, ARIEL      propranolol  10 mg Oral Q8H PRN Maura Soto, ARIEL      senna-docusate sodium  1 tablet Oral Daily PRN Maura Soto, ARIEL      traZODone  50 mg Oral HS PRN Maura Soto,  ARIEL         Allergies:     No Known Allergies    Objective     Vital signs in last 24 hours:    Temp:  [97.6 °F (36.4 °C)-98.1 °F (36.7 °C)] 97.6 °F (36.4 °C)  HR:  [] 80  Resp:  [18] 18  BP: (117-128)/(72-74) 128/74    No intake or output data in the 24 hours ending 04/21/24 0839    Hospital Course:     Grismerlin was admitted to the inpatient psychiatric unit on 4/18/2024. During their hospitalization, Grismerlin was encouraged to attend groups and interact appropriately and positively with others in the milieu.     Grismerlin was offered psychotropic medications for management of the above symptoms as described in her H&P, but the patient politely declined psychotropic medications, stating that she was disinterested in psychiatric treatment.Patient signed a 72 hour notice to withdraw from further treatment.    Over the course of her hospitalization, she was observed in the therapeutic milieu and did not demonstrate any acutely concerning, self-injurious, or aggressive behaviors.  She consistently denied all symptomatology.  She was visible interacting with others appropriately in the milieu and attended groups appropriately.  She was interested in following up in the outpatient setting with a therapist. She denied access to lethal means.    At the time of discharge, there were no criteria present through which Grismerlin could be kept involuntarily for further psychiatric hospitalization. Patient was able to articulate a safety plan upon discharge home, including going to any ED if their symptoms return or worsen, or calling 911. We discussed mitigating factors against suicidal behavior, and the patient was able to articulate these mitigating factors which outweighed any potential exacerbating stressors. Patient explicitly denied suicidal ideation, plan, or intent. They explicitly stated they felt safe to return to the community.     An outpatient discharge/follow up plan was discussed and coordinated  "between Grismerlin, this writer, and case management team.    Specific discharge disposition: Home w/ OP follow up. JT scheduled pt at [facility]   Pt scheduled on 4/29 at 11am with Marjorie Driver at [facility]     Mental Status at Time of Discharge:     Appearance: casually dressed, appears consistent with stated age  Motor: no psychomotor disturbances, no gait abnormalities  Behavior: cooperative, interacts with this writer appropriately  Speech: normal rate, rhythm, and volume  Mood: \"good\"  Affect: euthymic, normal range and intensity  Thought Process: organized, linear, and goal-oriented  Thought Content: denies auditory or visual hallucinations  Perception: denies delusions or other perceptual disturbances  Risk Potential: denies suicidal ideation, plan, or intent. Denies homicidal ideation  Sensorium: Oriented to person, place, time, and situation  Cognition: cognitive ability appears intact but was not quantitatively tested  Consciousness: alert and awake  Attention: able to focus without difficulty  Insight: improved  Judgement: improved       Suicide/Homicide Risk Assessment:    Risk of Harm to Self:   Patient denied suicidal thoughts, intent, plan, or acts of furtherance at the time of discharge.    Risk of Harm to Others:  Patient denied homicidal thoughts or intent at the time of discharge      Admission Diagnosis:    Principal Problem:    Adjustment disorder with anxious mood  Active Problems:    Medical clearance for psychiatric admission    Tylenol ingestion, undetermined intent, subsequent encounter    BMI 38.0-38.9,adult      Discharge Diagnosis:     Principal Problem:    Adjustment disorder with anxious mood  Active Problems:    Medical clearance for psychiatric admission    Tylenol ingestion, undetermined intent, subsequent encounter    BMI 38.0-38.9,adult  Resolved Problems:    * No resolved hospital problems. *      Laboratory Results: I have personally reviewed all pertinent " lab results.    No results found for this or any previous visit (from the past 48 hour(s)).     Discharge Medications:    See after visit summary for all reconciled discharge medications provided to patient and family.      Current Discharge Medication List           Current Discharge Medication List        STOP taking these medications       ibuprofen (MOTRIN) 600 mg tablet Comments:   Reason for Stopping:                Current Discharge Medication List           Current Discharge Medication List           Discharge instructions/Information to patient and family:     See after visit summary for information provided to patient and family.      Provisions for Follow-Up Care:    See after visit summary for information related to follow-up care and any pertinent home health orders.      Discharge Statement:    I spent 45 minutes discharging the patient. This time was spent on the day of discharge. I had direct contact with the patient on the day of discharge.     Additional documentation is required if more than 30 minutes were spent on discharge:    I had face-to-face contact with the patient and discussed discharge treatment plan  I reviewed the importance of compliance with medications and outpatient treatment after discharge with the patient.  I discussed discharge and treatment plan with the patient, nursing, and case management/social work.  I discussed the medication regimen and possible side effects of the medications with the patient prior to discharge. At the time of discharge they were tolerating psychiatric medications.  I discussed outpatient follow up with the patient as per treatment plan / aftercare summary.  I reviewed elements of the aftercare plan with the patient. I discussed that if symptoms worsen or reoccur, that the patient can return to the emergency room or dial 911 in an emergency.  I reviewed pertinent mitigating vs exacerbating stressors, as well as other risk factors, as they relate to  potential suicidal behavior.  I reviewed the patient's hospital course and current psychiatric disease status. As of today, they are now at goal. They are psychiatrically stable for discharge home. The combination of active psychopharmacological medication management, interdisciplinary coordination with case management, and adjunctive milieu and group therapy to augment psychopharmacological efficacy appears to have been successful. The patient's risk of morbidity, and progression or decompensation of psychiatric disease, is lower today as compared to the day of admission.      Kaushik Tiwari DO 04/21/24

## 2024-04-21 NOTE — PLAN OF CARE
Problem: DEPRESSION  Goal: Will be euthymic at discharge  Description: INTERVENTIONS:  - Administer medication as ordered  - Provide emotional support via 1:1 interaction with staff  - Encourage involvement in milieu/groups/activities  - Monitor for social isolation  Outcome: Adequate for Discharge     Problem: ANXIETY  Goal: Will report anxiety at manageable levels  Description: INTERVENTIONS:  - Administer medication as ordered  - Teach and encourage coping skills  - Provide emotional support  - Assess patient/family for anxiety and ability to cope  Outcome: Adequate for Discharge  Goal: By discharge: Patient will verbalize 2 strategies to deal with anxiety  Description: Interventions:  - Identify any obvious source/trigger to anxiety  - Staff will assist patient in applying identified coping technique/skills  - Encourage attendance of scheduled groups and activities  Outcome: Adequate for Discharge     Problem: DISCHARGE PLANNING  Goal: Discharge to home or other facility with appropriate resources  Description: INTERVENTIONS:  - Identify barriers to discharge w/patient and caregiver  - Arrange for needed discharge resources and transportation as appropriate  - Identify discharge learning needs (meds, wound care, etc.)  - Arrange for interpretive services to assist at discharge as needed  - Refer to Case Management Department for coordinating discharge planning if the patient needs post-hospital services based on physician/advanced practitioner order or complex needs related to functional status, cognitive ability, or social support system  Outcome: Adequate for Discharge     Problem: Ineffective Coping  Goal: Participates in unit activities  Description: Interventions:  - Provide therapeutic environment   - Provide required programming   - Redirect inappropriate behaviors   Outcome: Adequate for Discharge

## 2024-04-21 NOTE — NURSING NOTE
AVS reviewed with pt. No prescriptions. Belongings packed by staff, pt acknowledged all items being returned. Reviewed upcoming appointments. Pt denies questions or concerns. Transport provided by mother and friend.

## 2024-04-21 NOTE — BH TRANSITION RECORD
Contact Information: If you have any questions, concerns, pended studies, tests and/or procedures, or emergencies regarding your inpatient behavioral health visit. Please contact [place] [facility] [phone] and ask to speak to a , nurse or physician. A contact is available 24 hours/ 7 days a week at this number.     Summary of Procedures Performed During your Stay:  Below is a list of major procedures performed during your hospital stay and a summary of results:  - No major procedures performed.    Pending Studies (From admission, onward)      None          Please follow up on the above pending studies with your PCP and/or referring provider.

## 2024-04-21 NOTE — NURSING NOTE
Pt is awake, alert, walking halls. Awaiting discharge this morning. Mother is to pick pt up around 1000. Pt denies SI, HI, AVH. Showered and using phone.

## 2024-04-22 ENCOUNTER — HOSPITAL ENCOUNTER (INPATIENT)
Facility: HOSPITAL | Age: 24
LOS: 3 days | Discharge: HOME | DRG: 755 | End: 2024-04-25
Attending: STUDENT IN AN ORGANIZED HEALTH CARE EDUCATION/TRAINING PROGRAM | Admitting: STUDENT IN AN ORGANIZED HEALTH CARE EDUCATION/TRAINING PROGRAM
Payer: COMMERCIAL

## 2024-04-22 ENCOUNTER — TELEPHONE (OUTPATIENT)
Dept: PSYCHIATRY | Facility: CLINIC | Age: 24
End: 2024-04-22

## 2024-04-22 VITALS
HEART RATE: 93 BPM | TEMPERATURE: 99.4 F | DIASTOLIC BLOOD PRESSURE: 67 MMHG | RESPIRATION RATE: 18 BRPM | OXYGEN SATURATION: 97 % | BODY MASS INDEX: 37.89 KG/M2 | SYSTOLIC BLOOD PRESSURE: 138 MMHG | HEIGHT: 62 IN | WEIGHT: 205.91 LBS

## 2024-04-22 DIAGNOSIS — Z00.8 MEDICAL CLEARANCE FOR PSYCHIATRIC ADMISSION: ICD-10-CM

## 2024-04-22 PROBLEM — E66.09 CLASS 2 OBESITY DUE TO EXCESS CALORIES WITHOUT SERIOUS COMORBIDITY WITH BODY MASS INDEX (BMI) OF 37.0 TO 37.9 IN ADULT: Status: ACTIVE | Noted: 2024-04-22

## 2024-04-22 PROBLEM — E66.812 CLASS 2 OBESITY DUE TO EXCESS CALORIES WITHOUT SERIOUS COMORBIDITY WITH BODY MASS INDEX (BMI) OF 37.0 TO 37.9 IN ADULT: Status: ACTIVE | Noted: 2024-04-22

## 2024-04-22 PROBLEM — E55.9 VITAMIN D DEFICIENCY: Status: ACTIVE | Noted: 2024-04-22

## 2024-04-22 PROBLEM — T14.91XA SUICIDE ATTEMPT (HCC): Status: ACTIVE | Noted: 2024-04-22

## 2024-04-22 LAB
ALBUMIN SERPL BCP-MCNC: 3.7 G/DL (ref 3.5–5)
ALP SERPL-CCNC: 55 U/L (ref 34–104)
ALT SERPL W P-5'-P-CCNC: 9 U/L (ref 7–52)
AMPHETAMINES SERPL QL SCN: NEGATIVE
ANION GAP SERPL CALCULATED.3IONS-SCNC: 6 MMOL/L (ref 4–13)
AST SERPL W P-5'-P-CCNC: 9 U/L (ref 13–39)
ATRIAL RATE: 108 BPM
BARBITURATES UR QL: NEGATIVE
BASE EX.OXY STD BLDV CALC-SCNC: 94.7 % (ref 60–80)
BASE EXCESS BLDV CALC-SCNC: 0.3 MMOL/L
BASOPHILS # BLD AUTO: 0.05 THOUSANDS/ÂΜL (ref 0–0.1)
BASOPHILS NFR BLD AUTO: 0 % (ref 0–1)
BENZODIAZ UR QL: NEGATIVE
BILIRUB SERPL-MCNC: 0.55 MG/DL (ref 0.2–1)
BUN SERPL-MCNC: 12 MG/DL (ref 5–25)
CALCIUM SERPL-MCNC: 9.2 MG/DL (ref 8.4–10.2)
CHLORIDE SERPL-SCNC: 108 MMOL/L (ref 96–108)
CK SERPL-CCNC: 33 U/L (ref 26–192)
CO2 SERPL-SCNC: 26 MMOL/L (ref 21–32)
COCAINE UR QL: NEGATIVE
CREAT SERPL-MCNC: 0.63 MG/DL (ref 0.6–1.3)
EOSINOPHIL # BLD AUTO: 0.07 THOUSAND/ÂΜL (ref 0–0.61)
EOSINOPHIL NFR BLD AUTO: 1 % (ref 0–6)
ERYTHROCYTE [DISTWIDTH] IN BLOOD BY AUTOMATED COUNT: 14.5 % (ref 11.6–15.1)
FENTANYL UR QL SCN: NEGATIVE
GFR SERPL CREATININE-BSD FRML MDRD: 126 ML/MIN/1.73SQ M
GLUCOSE SERPL-MCNC: 93 MG/DL (ref 65–140)
GLUCOSE SERPL-MCNC: 95 MG/DL (ref 65–140)
HCO3 BLDV-SCNC: 24.3 MMOL/L (ref 24–30)
HCT VFR BLD AUTO: 36.5 % (ref 34.8–46.1)
HGB BLD-MCNC: 11.5 G/DL (ref 11.5–15.4)
HYDROCODONE UR QL SCN: NEGATIVE
IMM GRANULOCYTES # BLD AUTO: 0.07 THOUSAND/UL (ref 0–0.2)
IMM GRANULOCYTES NFR BLD AUTO: 1 % (ref 0–2)
LYMPHOCYTES # BLD AUTO: 3.64 THOUSANDS/ÂΜL (ref 0.6–4.47)
LYMPHOCYTES NFR BLD AUTO: 29 % (ref 14–44)
MAGNESIUM SERPL-MCNC: 1.9 MG/DL (ref 1.9–2.7)
MCH RBC QN AUTO: 26.3 PG (ref 26.8–34.3)
MCHC RBC AUTO-ENTMCNC: 31.5 G/DL (ref 31.4–37.4)
MCV RBC AUTO: 84 FL (ref 82–98)
METHADONE UR QL: NEGATIVE
MONOCYTES # BLD AUTO: 1 THOUSAND/ÂΜL (ref 0.17–1.22)
MONOCYTES NFR BLD AUTO: 8 % (ref 4–12)
NEUTROPHILS # BLD AUTO: 7.96 THOUSANDS/ÂΜL (ref 1.85–7.62)
NEUTS SEG NFR BLD AUTO: 61 % (ref 43–75)
NRBC BLD AUTO-RTO: 0 /100 WBCS
O2 CT BLDV-SCNC: 17 ML/DL
OPIATES UR QL SCN: NEGATIVE
OXYCODONE+OXYMORPHONE UR QL SCN: NEGATIVE
P AXIS: 42 DEGREES
PCO2 BLDV: 36.8 MM HG (ref 42–50)
PCP UR QL: NEGATIVE
PH BLDV: 7.44 [PH] (ref 7.3–7.4)
PLATELET # BLD AUTO: 331 THOUSANDS/UL (ref 149–390)
PMV BLD AUTO: 9.4 FL (ref 8.9–12.7)
PO2 BLDV: 136.2 MM HG (ref 35–45)
POTASSIUM SERPL-SCNC: 3.9 MMOL/L (ref 3.5–5.3)
PR INTERVAL: 130 MS
PROT SERPL-MCNC: 7 G/DL (ref 6.4–8.4)
QRS AXIS: 77 DEGREES
QRSD INTERVAL: 82 MS
QT INTERVAL: 320 MS
QTC INTERVAL: 428 MS
RBC # BLD AUTO: 4.37 MILLION/UL (ref 3.81–5.12)
SODIUM SERPL-SCNC: 140 MMOL/L (ref 135–147)
T WAVE AXIS: 4 DEGREES
THC UR QL: NEGATIVE
VENTRICULAR RATE: 108 BPM
WBC # BLD AUTO: 12.79 THOUSAND/UL (ref 4.31–10.16)

## 2024-04-22 PROCEDURE — 99239 HOSP IP/OBS DSCHRG MGMT >30: CPT | Performed by: INTERNAL MEDICINE

## 2024-04-22 PROCEDURE — 99222 1ST HOSP IP/OBS MODERATE 55: CPT

## 2024-04-22 PROCEDURE — 80053 COMPREHEN METABOLIC PANEL: CPT

## 2024-04-22 PROCEDURE — 82550 ASSAY OF CK (CPK): CPT

## 2024-04-22 PROCEDURE — 93010 ELECTROCARDIOGRAM REPORT: CPT | Performed by: INTERNAL MEDICINE

## 2024-04-22 PROCEDURE — 80307 DRUG TEST PRSMV CHEM ANLYZR: CPT

## 2024-04-22 PROCEDURE — 82948 REAGENT STRIP/BLOOD GLUCOSE: CPT

## 2024-04-22 PROCEDURE — 99245 OFF/OP CONSLTJ NEW/EST HI 55: CPT | Performed by: PSYCHIATRY & NEUROLOGY

## 2024-04-22 PROCEDURE — 99449 NTRPROF PH1/NTRNET/EHR 31/>: CPT | Performed by: EMERGENCY MEDICINE

## 2024-04-22 PROCEDURE — 82805 BLOOD GASES W/O2 SATURATION: CPT

## 2024-04-22 PROCEDURE — 85025 COMPLETE CBC W/AUTO DIFF WBC: CPT

## 2024-04-22 PROCEDURE — 83735 ASSAY OF MAGNESIUM: CPT

## 2024-04-22 RX ORDER — BENZTROPINE MESYLATE 1 MG/ML
1 INJECTION INTRAMUSCULAR; INTRAVENOUS
Status: DISCONTINUED | OUTPATIENT
Start: 2024-04-22 | End: 2024-04-25 | Stop reason: HOSPADM

## 2024-04-22 RX ORDER — BENZTROPINE MESYLATE 1 MG/ML
1 INJECTION INTRAMUSCULAR; INTRAVENOUS
Status: CANCELLED | OUTPATIENT
Start: 2024-04-22

## 2024-04-22 RX ORDER — OLANZAPINE 2.5 MG/1
2.5 TABLET, FILM COATED ORAL
Status: DISCONTINUED | OUTPATIENT
Start: 2024-04-22 | End: 2024-04-25 | Stop reason: HOSPADM

## 2024-04-22 RX ORDER — POLYETHYLENE GLYCOL 3350 17 G/17G
17 POWDER, FOR SOLUTION ORAL DAILY PRN
Status: CANCELLED | OUTPATIENT
Start: 2024-04-22

## 2024-04-22 RX ORDER — OLANZAPINE 5 MG/1
5 TABLET ORAL
Status: DISCONTINUED | OUTPATIENT
Start: 2024-04-22 | End: 2024-04-25 | Stop reason: HOSPADM

## 2024-04-22 RX ORDER — OLANZAPINE 5 MG/1
2.5 TABLET ORAL
Status: CANCELLED | OUTPATIENT
Start: 2024-04-22

## 2024-04-22 RX ORDER — DIPHENHYDRAMINE HYDROCHLORIDE 50 MG/ML
50 INJECTION INTRAMUSCULAR; INTRAVENOUS EVERY 6 HOURS PRN
Status: CANCELLED | OUTPATIENT
Start: 2024-04-22

## 2024-04-22 RX ORDER — HYDROXYZINE 50 MG/1
100 TABLET, FILM COATED ORAL
Status: DISCONTINUED | OUTPATIENT
Start: 2024-04-22 | End: 2024-04-25 | Stop reason: HOSPADM

## 2024-04-22 RX ORDER — POLYETHYLENE GLYCOL 3350 17 G/17G
17 POWDER, FOR SOLUTION ORAL DAILY PRN
Status: DISCONTINUED | OUTPATIENT
Start: 2024-04-22 | End: 2024-04-25 | Stop reason: HOSPADM

## 2024-04-22 RX ORDER — HYDROXYZINE 50 MG/1
50 TABLET, FILM COATED ORAL
Status: DISCONTINUED | OUTPATIENT
Start: 2024-04-22 | End: 2024-04-25 | Stop reason: HOSPADM

## 2024-04-22 RX ORDER — ACETAMINOPHEN 325 MG/1
650 TABLET ORAL EVERY 6 HOURS PRN
Status: CANCELLED | OUTPATIENT
Start: 2024-04-22

## 2024-04-22 RX ORDER — PROPRANOLOL HYDROCHLORIDE 10 MG/1
10 TABLET ORAL EVERY 8 HOURS PRN
Status: DISCONTINUED | OUTPATIENT
Start: 2024-04-22 | End: 2024-04-25 | Stop reason: HOSPADM

## 2024-04-22 RX ORDER — TRAZODONE HYDROCHLORIDE 50 MG/1
50 TABLET ORAL
Status: DISCONTINUED | OUTPATIENT
Start: 2024-04-22 | End: 2024-04-25 | Stop reason: HOSPADM

## 2024-04-22 RX ORDER — ACETAMINOPHEN 325 MG/1
650 TABLET ORAL EVERY 6 HOURS PRN
Status: DISCONTINUED | OUTPATIENT
Start: 2024-04-22 | End: 2024-04-25 | Stop reason: HOSPADM

## 2024-04-22 RX ORDER — AMOXICILLIN 250 MG
1 CAPSULE ORAL DAILY PRN
Status: CANCELLED | OUTPATIENT
Start: 2024-04-22

## 2024-04-22 RX ORDER — OLANZAPINE 5 MG/1
5 TABLET ORAL
Status: CANCELLED | OUTPATIENT
Start: 2024-04-22

## 2024-04-22 RX ORDER — PROPRANOLOL HYDROCHLORIDE 10 MG/1
10 TABLET ORAL EVERY 8 HOURS PRN
Status: CANCELLED | OUTPATIENT
Start: 2024-04-22

## 2024-04-22 RX ORDER — HYDROXYZINE HYDROCHLORIDE 25 MG/1
50 TABLET, FILM COATED ORAL
Status: CANCELLED | OUTPATIENT
Start: 2024-04-22

## 2024-04-22 RX ORDER — ACETAMINOPHEN 325 MG/1
650 TABLET ORAL EVERY 4 HOURS PRN
Status: DISCONTINUED | OUTPATIENT
Start: 2024-04-22 | End: 2024-04-25 | Stop reason: HOSPADM

## 2024-04-22 RX ORDER — HYDROXYZINE HYDROCHLORIDE 25 MG/1
100 TABLET, FILM COATED ORAL
Status: CANCELLED | OUTPATIENT
Start: 2024-04-22

## 2024-04-22 RX ORDER — ACETAMINOPHEN 325 MG/1
975 TABLET ORAL EVERY 6 HOURS PRN
Status: CANCELLED | OUTPATIENT
Start: 2024-04-22

## 2024-04-22 RX ORDER — MAGNESIUM HYDROXIDE/ALUMINUM HYDROXICE/SIMETHICONE 120; 1200; 1200 MG/30ML; MG/30ML; MG/30ML
30 SUSPENSION ORAL EVERY 4 HOURS PRN
Status: DISCONTINUED | OUTPATIENT
Start: 2024-04-22 | End: 2024-04-25 | Stop reason: HOSPADM

## 2024-04-22 RX ORDER — OLANZAPINE 10 MG/2ML
2.5 INJECTION, POWDER, FOR SOLUTION INTRAMUSCULAR
Status: DISCONTINUED | OUTPATIENT
Start: 2024-04-22 | End: 2024-04-25 | Stop reason: HOSPADM

## 2024-04-22 RX ORDER — OLANZAPINE 10 MG/2ML
2.5 INJECTION, POWDER, FOR SOLUTION INTRAMUSCULAR
Status: CANCELLED | OUTPATIENT
Start: 2024-04-22

## 2024-04-22 RX ORDER — MAGNESIUM HYDROXIDE/ALUMINUM HYDROXICE/SIMETHICONE 120; 1200; 1200 MG/30ML; MG/30ML; MG/30ML
30 SUSPENSION ORAL EVERY 4 HOURS PRN
Status: CANCELLED | OUTPATIENT
Start: 2024-04-22

## 2024-04-22 RX ORDER — HYDROXYZINE HYDROCHLORIDE 25 MG/1
25 TABLET, FILM COATED ORAL
Status: CANCELLED | OUTPATIENT
Start: 2024-04-22

## 2024-04-22 RX ORDER — ACETAMINOPHEN 325 MG/1
975 TABLET ORAL EVERY 6 HOURS PRN
Status: DISCONTINUED | OUTPATIENT
Start: 2024-04-22 | End: 2024-04-25 | Stop reason: HOSPADM

## 2024-04-22 RX ORDER — AMOXICILLIN 250 MG
1 CAPSULE ORAL DAILY PRN
Status: DISCONTINUED | OUTPATIENT
Start: 2024-04-22 | End: 2024-04-25 | Stop reason: HOSPADM

## 2024-04-22 RX ORDER — BISACODYL 10 MG
10 SUPPOSITORY, RECTAL RECTAL DAILY PRN
Status: DISCONTINUED | OUTPATIENT
Start: 2024-04-22 | End: 2024-04-25 | Stop reason: HOSPADM

## 2024-04-22 RX ORDER — BISACODYL 10 MG
10 SUPPOSITORY, RECTAL RECTAL DAILY PRN
Status: CANCELLED | OUTPATIENT
Start: 2024-04-22

## 2024-04-22 RX ORDER — TRAZODONE HYDROCHLORIDE 50 MG/1
50 TABLET ORAL
Status: CANCELLED | OUTPATIENT
Start: 2024-04-22

## 2024-04-22 RX ORDER — ACETAMINOPHEN 325 MG/1
650 TABLET ORAL EVERY 4 HOURS PRN
Status: CANCELLED | OUTPATIENT
Start: 2024-04-22

## 2024-04-22 RX ORDER — OLANZAPINE 10 MG/2ML
5 INJECTION, POWDER, FOR SOLUTION INTRAMUSCULAR
Status: CANCELLED | OUTPATIENT
Start: 2024-04-22

## 2024-04-22 RX ORDER — LORAZEPAM 2 MG/ML
2 INJECTION INTRAMUSCULAR EVERY 6 HOURS PRN
Status: CANCELLED | OUTPATIENT
Start: 2024-04-22

## 2024-04-22 RX ORDER — BENZTROPINE MESYLATE 1 MG/1
1 TABLET ORAL
Status: CANCELLED | OUTPATIENT
Start: 2024-04-22

## 2024-04-22 RX ORDER — DIPHENHYDRAMINE HYDROCHLORIDE 50 MG/ML
50 INJECTION INTRAMUSCULAR; INTRAVENOUS EVERY 6 HOURS PRN
Status: DISCONTINUED | OUTPATIENT
Start: 2024-04-22 | End: 2024-04-25 | Stop reason: HOSPADM

## 2024-04-22 RX ORDER — LORAZEPAM 2 MG/ML
2 INJECTION INTRAMUSCULAR EVERY 6 HOURS PRN
Status: DISCONTINUED | OUTPATIENT
Start: 2024-04-22 | End: 2024-04-25 | Stop reason: HOSPADM

## 2024-04-22 RX ORDER — HYDROXYZINE HYDROCHLORIDE 25 MG/1
25 TABLET, FILM COATED ORAL
Status: DISCONTINUED | OUTPATIENT
Start: 2024-04-22 | End: 2024-04-25 | Stop reason: HOSPADM

## 2024-04-22 RX ORDER — OLANZAPINE 10 MG/2ML
5 INJECTION, POWDER, FOR SOLUTION INTRAMUSCULAR
Status: DISCONTINUED | OUTPATIENT
Start: 2024-04-22 | End: 2024-04-25 | Stop reason: HOSPADM

## 2024-04-22 RX ORDER — BENZTROPINE MESYLATE 1 MG/1
1 TABLET ORAL
Status: DISCONTINUED | OUTPATIENT
Start: 2024-04-22 | End: 2024-04-25 | Stop reason: HOSPADM

## 2024-04-22 RX ADMIN — TRAZODONE HYDROCHLORIDE 50 MG: 50 TABLET ORAL at 22:10

## 2024-04-22 RX ADMIN — HYDROXYZINE HYDROCHLORIDE 50 MG: 50 TABLET, FILM COATED ORAL at 22:43

## 2024-04-22 NOTE — ASSESSMENT & PLAN NOTE
"Patient presented to the ED after her uncle called EMS as patient reported to him that she ingested rat poisoning in an attempt to end her own life. She reported she swallows a \"green rock\" from a rat poisoning box.   Pt now adamant that she did not actually take rat poisoning, but was trying to get the attention of her family  She now denies any further suicidal ideation or plan. She states that she is \"going through a lot\" right now as she is \"getting \". Per other notes in chart, it appears patient broke up with her boyfriend 5 months ago.    She reports feeling depressed and \"just needs someone to talk to.\" Pt also 4 months postpartum, but states she does not feel this is a stressor  Of note, pt recently admitted to inpatient psych for suicide attempt with tylenol overdose 4/. Pt later insisted she took 15 tylenol only because she had a headache. Also prior suicide attempt in 2016  Monitor on telemetry  Monitor labs for any abnormalities or signs of bleeding   Place patient on 1:1  Psychiatric consult  Given questionable ingestion of rat poison although patient now denying, will consult med tox  "

## 2024-04-22 NOTE — ED NOTES
While RN was obtaining bloodwork patient asked if RN could take time to talk to patient. Patient opened to this RN and states she did not take rat poison, she was at home by herself and wanted to talk to someone. Patient states to this nurse that she does not want to kill herself, this was the only way she can get her family's attention. Patient states 1.5 years ago her  left and she felt that her family was torn from her. Patient states her family doesn't listen to her and she needs someone to talk to. Patient states she doesn't want to go to inpatient [facility] as that did not help her once she gets home. Patient asked this RN if she would tell to the provider about her situation. Provider made aware.     Jeannine Olivas RN  04/21/24 0878

## 2024-04-22 NOTE — ED NOTES
"Patient asked to speak to this RN. Patient asked when she was able to go home. RN explained to patient that she is getting admitted for observation d/t the affects of rat poisoning. Patient raised her voice at this RN stating \"this is bull sh*t, I have kids at home, I told you I didn't take the rat poisoning.\" RN calmly explained to patient that since she has a history taking rat poisoning and she was just released for attempt of suicide we can not take any risks with her health or safety. RN again raise her voice to RN stating she does not want to go to a psychiatric facility. Patient then demanding to speak to the provider. Provider made aware.     Jeannine Olivas RN  04/21/24 2085    "

## 2024-04-22 NOTE — ASSESSMENT & PLAN NOTE
Pt fulfilling SIRS with tachycardia and leukocytosis of 19.62  She denies any infectious symptoms such as fever, chills, myalgias, cough, congestion, or urinary symptoms  Monitor off antibiotics for signs of worsening infection

## 2024-04-22 NOTE — TELEMEDICINE
TeleConsultation - Behavioral Health   Grismerlin Fernandez 24 y.o. female MRN: [id number]  Unit/Bed#: E5 -01 Encounter: [id number]        REQUIRED DOCUMENTATION:     1. This service was provided via Telemedicine.  2. [place] located at [place]  3. TeleMed provider: Ravi Chaparro MD.  4. Identify all parties in room with patient during tele consult:  pt  5.Patient was then informed that this was a Telemedicine visit and that the exam was being conducted confidentially over secure lines. My office door was closed. No one else was in the room.  Patient acknowledged consent and understanding of privacy and security of the Telemedicine visit, and gave us permission to have the assistant stay in the room in order to assist with the history and to conduct the exam.  I informed the patient that I have reviewed their record in Epic and presented the opportunity for them to ask any questions regarding the visit today.  The patient agreed to participate.       Assessment/Plan     Present on Admission:   SIRS (systemic inflammatory response syndrome) (HCC)   Adjustment disorder with anxious mood    Assessment:    24-year-old female with history of depression and anxiety and prior suicide attempts presenting after alleging she had ingested rat poison in a suicide attempt.  Unspecified mood disorder; unspecified anxiety disorder.    Treatment Plan:    This patient must be considered high risk for suicide.  Inpatient psychiatric treatment is indicated voluntarily, and if not then involuntarily, for provision of precautions, further diagnostic evaluation and treatment stabilization.  Continual observation suicide precautions are indicated.  May consider Zyprexa 5 to 10 mg p.o. or IM every 4 hours as needed agitation.  Zyprexa should not exceed 30 mg per 24 hours.    Current Medications:         Risks / Benefits of Treatment:    Risks, benefits, and possible side effects of medications explained to patient and patient  verbalizes understanding.      Other treatment modalities recommended as indicated:    Inpatient psychiatric treatment      Inpatient consult to Psychiatry  Consult performed by: Ravi Chaparro MD  Consult ordered by: Jake Walsh PA-C        Physician Requesting Consult: Nitesh Rivera MD  Principal Problem:Suicide attempt (HCC)    Reason for Consult:  Patient claims to have ingested rat poison and suicide attempt      History of Present Illness      This is a follow-up psychiatry consult to that provided on April 18, 2024.  Please see that consult for details.  In summary from that consult:  This is a follow-up psychiatry consult to that provided yesterday by Dr. Walker.  Please see that consult for details.  In summary from that consult:  Patient is a 24 y.o. female with a history of Major Depressive Disorder who was presented to emergency department on 4/16/2024 due to Tylenol toxicity. Reportedly, patient has been noncompliance with medications and follow-up care.  Patient reported that she broke up with her boyfriend about 5 months ago and she has increased anxiety almost daily basis.  She was not able to give clear symptoms of anxiety but she reported aggressive symptoms crying spells, Feeling of hopelessness, with low energy motivation.  She reported at times she thinks too much about her current stressors being difficulty controlling her thoughts and worries resulting in difficulty sleeping hollered he has been taking care of 3 months baby and  3-year-old.  She has not been working currently getting help from her mother.  She reported that she is living alone her mother sometimes help her.  She denied any suicidal ideation in the last 6 months reported she never had any in her life stated that yesterday she took extra Tylenol for her pain and she was taking 4 at times took multiple times and 6-hour and she is aware that it can cause liver toxicity but she did not that it can lead to death.  She appeared  "to minimize her symptoms.  Similar episode happened in in January 2024 in which she has elevated liver enzymes after taking 8 Tylenol.  She has a history of suicidal attempt in 2016 when she overdosed on medication. No recent aggressive behavior was reported.  No homicidal ideation was reported.  No recent manic and psychotic symptoms was reported.  No substance abuse or alcohol abuse problem was.         Psychiatric Review Of Systems:      Sleep changes: no  appetite changes: no  weight changes: no  anxiety/panic: yes  peter: no  guilty/hopeless: yes  self injurious behavior/risky behavior: no           Historical Information   Past Psychiatric History:      Psychiatric Hospitalizations: Multiple past inpatient psychiatric admissions Outpatient Treatment History: past treatment with psychiatrist/Advanced Practitioner Suicide Attempts:  Yes, one attempt reported History of self-harm: No History of self injurious behavior was reported Violence History: No History of physically aggressive  behavior was reported     Substance Abuse History:               E-Cigarette/Vaping    E-Cigarette Use Never User              Social History         Tobacco History         Smoking Status  Former Smoking Tobacco Type  Pipe        Smokeless Tobacco Use  Never                  Alcohol History         Alcohol Use Status  Not Currently Comment  \"sometimes\"                  Drug Use         Drug Use Status  Never                  Sexual Activity         Sexually Active  Yes Partners  Male                  Activities of Daily Living    Not Asked                      Additional Substance Use Detail         Questions Responses     Problems Due to Past Use of Alcohol? No     Problems Due to Past Use of Substances? No     Alcohol Use Frequency Denies use in past 12 months     Cannabis frequency Never used     Comment: Never used on 11/23/2020       Heroin Frequency Denies use in past 12 months     Cocaine frequency Never used     Comment: " "Never used on 11/23/2020       Crack Cocaine Frequency Denies use in past 12 months     Methamphetamine Frequency Denies use in past 12 months     Narcotic Frequency Denies use in past 12 months     Benzodiazepine Frequency Denies use in past 12 months     Amphetamine frequency Denies use in past 12 months     Barbituate Frequency Denies use use in past 12 months     Inhalant frequency Never used     Comment: Never used on 11/23/2020       Hallucinogen frequency Never used     Comment: Never used on 11/23/2020       Ecstasy frequency Never used     Comment: Never used on 11/23/2020       Other drug frequency Never used     Comment: Never used on 11/23/2020       Opiate frequency Denies use in past 12 months     Last reviewed by Blanca Izquierdo RN on 4/16/2024                Social History:           Social History   Social History                   Socioeconomic History    Marital status: Single       Spouse name: Not on file    Number of children: Not on file    Years of education: Not on file    Highest education level: Not on file   Occupational History    Not on file   Tobacco Use    Smoking status: Former       Types: Pipe    Smokeless tobacco: Never   Vaping Use    Vaping status: Never Used   Substance and Sexual Activity    Alcohol use: Not Currently       Comment: \"sometimes\"    Drug use: Never    Sexual activity: Yes       Partners: Male   Other Topics Concern    Not on file   Social History Narrative    Not on file      Social Determinants of Health              Financial Resource Strain: Low Risk  (1/8/2024)     Overall Financial Resource Strain (CARDIA)      Difficulty of Paying Living Expenses: Not hard at all   Food Insecurity: No Food Insecurity (1/8/2024)     Hunger Vital Sign      Worried About Running Out of Food in the Last Year: Never true      Ran Out of Food in the Last Year: Never true   Transportation Needs: No Transportation Needs (1/8/2024)     PRAPARE - Transportation      Lack of " "Transportation (Medical): No      Lack of Transportation (Non-Medical): No   Physical Activity: Not on file   Stress: Not on file   Social Connections: Not on file   Intimate Partner Violence: Not on file   Housing Stability: Not on file                  Past Medical History:     Medical History[]Expand by Default           Past Medical History:   Diagnosis Date    Depression           Medical History Pertinent Negatives                   Meds/Allergies   No Known Allergies  all current active meds have been reviewed    At that time the patient was very guarded and minimizing in response to assessment questions.  When her attending physician attempted to contact the patient's mother who was taking care of the patient's children, to obtain additional collateral information to see if an adequate safety plan could be put in place for outpatient follow-up, the patient gave her attending physician the wrong telephone #3 different times.  When she was confronted on this she told her physician \"I told you that you do not need to speak with my mother\".  The patient did not allow that communication to happen.  The patient was psychiatrically hospitalized and then discharged AGAINST MEDICAL ADVICE.  She Rosey presents after alleging she ingested rat poison as a suicide attempt.      Crisis has obtained and documented the following information:       Patient is a 24 year old female who arrived to the ED via EMS after reportedly ingesting rat poison tonight as a suicide attempt.   No crisis consult ordered as patient will now be getting admitted medically. Therefore, this writer did not get an opportunity to assess the patient for MH services. However, after speaking with both ED attending and primary RN, as well as review of the patient's EMR, the patient does have a history of suicide attempts via overdose in 2016, 2018, 2020, and now three encounters since January of 2024. She was just discharged today (4/21) from [facility] after " "her most recent overdose. Per chart review, she signed a 72-hour notice to withdraw from treatment. Patient declined offers of psychotropic medications. The patient left AMA from the medical floor in January after being admitted for an overdose then. She does not appear to have had consistent outpatient psychiatric follow up after these admissions. During most recent admission, case management was able to schedule patient for outpatient therapy services on 4/29 at 11am with Marjorie Driver.   On initial provider assessment, patient endorsed ingesting rat poison. When alone with primary RN, she states she did not actually take rat poison and only said she did in order to receive attention from family. She asked that the RN communicate this to the provider as she does not feel she needs [facility] again. This writer spoke with ED provider after seeing this in order to understand patient's current disposition and level of care. It was at this time time ED provider informed me that the patient would be admitted medically as he is not confident in the validity of patient's assertion that she did not take rat poison tonight, especially due to overdose hx and leaving [facility] earlier today. Patient raising her voice in room, insisting she does not want [facility] again, however it appears the patient may have limited insight to her mental health and the severity of the statements she made tonight along with her history.   Psychiatry to follow up with the patient from medical floor to further determine need for psychiatric level of care.           After reintroducing myself to the patient over the iPad, she said hello to me and recognition of me and then immediately added \"I want to speak with somebody else\".  I suspect the patient is in hopes of meeting with a psychiatrist who does not familiar with her history of overdoses and suicide attempts with her intent to gain discharge whether inpatient psychiatric treatment.  She has demonstrated " gravely impaired insight and judgment.    [place] suicide severity risk scale: The patient refused to participate in further questioning to complete this scale.  The patient must be considered high risk for suicide given her recent history and presentation claiming she had ingested rat poison as a suicide attempt.    Past Medical History:   Diagnosis Date    Depression        Medical Review Of Systems:    Review of Systems    Meds/Allergies     all current active meds have been reviewed  No Known Allergies    Objective     Vital signs in last 24 hours:  Temp:  [97.6 °F (36.4 °C)-98.7 °F (37.1 °C)] 97.6 °F (36.4 °C)  HR:  [] 85  Resp:  [18-20] 18  BP: (113-134)/(58-70) 128/61      Intake/Output Summary (Last 24 hours) at 4/22/2024 0927  Last data filed at 4/21/2024 2321  Gross per 24 hour   Intake 1000 ml   Output --   Net 1000 ml         Lab Results: I have personally reviewed all pertinent laboratory/tests results.         Imaging Studies: XR abdomen 1 view kub    Result Date: 4/22/2024  Narrative: XR ABDOMEN 1 VIEW KUB INDICATION: FB ingestion. COMPARISON: None FINDINGS: Nonobstructive bowel gas pattern. No evidence of pneumoperitoneum on this supine study. Upright or left lateral decubitus imaging is more sensitive to detect subtle free air in the appropriate setting. No pathologic calcification or soft tissue mass. Clear lung bases. Bones are unremarkable for the patient's age. No radiopaque foreign body is seen.     Impression: Unremarkable abdomen. Workstation performed: [id number]     EKG/Pathology/Other Studies:   Lab Results   Component Value Date    VENTRATE 108 04/21/2024    ATRIALRATE 108 04/21/2024    PRINT 130 04/21/2024    QRSDINT 82 04/21/2024    QTINT 320 04/21/2024    QTCINT 428 04/21/2024    PAXIS 42 04/21/2024    QRSAXIS 77 04/21/2024    TWAVEAXIS 4 04/21/2024        Code Status: Level 1 - Full Code  Advance Directive and Living Will:      Power of :    POLST:       Screenings:    1. Nutrition Screening  Not available on chart    2. Pain Screening  Not available on chart    3. Suicide Screening  Not available on chart    Counseling / Coordination of Care:    Total floor / unit time spent today 30 minutes. Greater than 50% of total time was spent with the patient and / or family counseling and / or coordination of care. A description of the counseling / coordination of care: Chart review, patient evaluation, coordination communication with staff, nursing and provider.

## 2024-04-22 NOTE — DISCHARGE SUMMARY
Discharge Summary - Grismerlin Fernandez, 2000, [id number]        Admission Date: 4/21/2024  Discharge Date: 4/22/2024      Discharge Diagnosis:   1.  Suicide intent  2.  Adjustment disorder with anxious mood  3.  Obesity  4.  Vitamin D deficiency    Consulting Physicians:  Dr. Chaparro, psychiatry  Dr. Ordaz, toxicology    Procedures Performed:   None    HPI: The patient is a 24-year-old woman who recently was admitted for a an acetaminophen overdose.  She did sustain some liver injury from this although this subsequently resolved.  The patient was transferred to a psychiatric facility for further treatment.  Yesterday, shortly after her release from the psych unit, she ingested rat poison.  She was brought to the hospital and was readmitted.    Hospital Course: The patient was admitted to the hospital and monitored carefully.  She was hydrated with intravenous fluid.  Toxicology was consulted.  Upon further questioning, the patient denied actually ingesting any rat poison.  She said that she told her uncle that she did only because she is wanted someone to talk to.  The patient remained stable during her hospitalization.  She was judged medically stable for transfer to a psychiatric facility.    The patient was evaluated by Dr. Chaparro of psychiatry.  He said that the patient should be considered at high risk for suicide and inpatient psychiatric care was recommended.  Initially, the patient was on enthusiastic.  However, when it was explained to her that there was no alternative and she could either go voluntarily or involuntarily, she elected to sign a 201.    Patient has a history of significant vitamin D deficiency.  Her last vitamin D level was 9.  She was started on replacement.    On the day of discharge the patient was feeling well.  Vital signs were stable.  Lungs were clear.  Cardiac exam revealed a regular rhythm.  The abdomen was soft and nontender.  There was no edema.  Neurologic evaluation  revealed no focal or lateralizing signs.    Disposition: The patient was transferred to the psychiatric facility at [facility] on April 22.  Diet and activity will be as tolerated.  She will be under the care of the physicians in [place] during her stay there.  She was asked to arrange primary care follow-up promptly at the time of her discharge.    Discharge instructions/Information to patient and family:   See after visit summary for information provided to patient and family.      Provisions for Follow-Up Care:  See after visit summary for information related to follow-up care and any pertinent home health orders.      Planned Readmission: Yes [place] psych unit    Discharge Statement   I spent 40 minutes discharging the patient. This time was spent on the day of discharge. I had direct contact with the patient on the day of discharge.     Discharge Medications:  See after visit summary for reconciled discharge medications provided to patient and family.

## 2024-04-22 NOTE — PLAN OF CARE
Problem: PAIN - ADULT  Goal: Verbalizes/displays adequate comfort level or baseline comfort level  Description: Interventions:  - Encourage patient to monitor pain and request assistance  - Assess pain using appropriate pain scale  - Administer analgesics based on type and severity of pain and evaluate response  - Implement non-pharmacological measures as appropriate and evaluate response  - Consider cultural and social influences on pain and pain management  - Notify physician/advanced practitioner if interventions unsuccessful or patient reports new pain  4/22/2024 1446 by Colleen Hughes-Behler, RN  Outcome: Adequate for Discharge  4/22/2024 1112 by Colleen Hughes-Behler, RN  Outcome: Progressing     Problem: INFECTION - ADULT  Goal: Absence or prevention of progression during hospitalization  Description: INTERVENTIONS:  - Assess and monitor for signs and symptoms of infection  - Monitor lab/diagnostic results  - Monitor all insertion sites, i.e. indwelling lines, tubes, and drains  - Monitor endotracheal if appropriate and nasal secretions for changes in amount and color  - [place] appropriate cooling/warming therapies per order  - Administer medications as ordered  - Instruct and encourage patient and family to use good hand hygiene technique  - Identify and instruct in appropriate isolation precautions for identified infection/condition  4/22/2024 1446 by Colleen Hughes-Behler, RN  Outcome: Adequate for Discharge  4/22/2024 1112 by Colleen Hughes-Behler, RN  Outcome: Progressing  Goal: Absence of fever/infection during neutropenic period  Description: INTERVENTIONS:  - Monitor WBC    4/22/2024 1446 by Colleen Hughes-Behler, RN  Outcome: Adequate for Discharge  4/22/2024 1112 by Colleen Hughes-Behler, RN  Outcome: Progressing     Problem: SAFETY ADULT  Goal: Patient will remain free of falls  Description: INTERVENTIONS:  - Educate patient/family on patient safety including physical limitations  - Instruct  patient to call for assistance with activity   - Consult OT/PT to assist with strengthening/mobility   - Keep Call bell within reach  - Keep bed low and locked with side rails adjusted as appropriate  - Keep care items and personal belongings within reach  - Initiate and maintain comfort rounds  - Make Fall Risk Sign visible to staff  - Apply yellow socks and bracelet for high fall risk patients  - Consider moving patient to room near nurses station  4/22/2024 1446 by Colleen Hughes-Behler, RN  Outcome: Adequate for Discharge  4/22/2024 1112 by Colleen Hughes-Behler, RN  Outcome: Progressing  Goal: Maintain or return to baseline ADL function  Description: INTERVENTIONS:  -  Assess patient's ability to carry out ADLs; assess patient's baseline for ADL function and identify physical deficits which impact ability to perform ADLs (bathing, care of mouth/teeth, toileting, grooming, dressing, etc.)  - Assess/evaluate cause of self-care deficits   - Assess range of motion  - Assess patient's mobility; develop plan if impaired  - Assess patient's need for assistive devices and provide as appropriate  - Encourage maximum independence but intervene and supervise when necessary  - Involve family in performance of ADLs  - Assess for home care needs following discharge   - Consider OT consult to assist with ADL evaluation and planning for discharge  - Provide patient education as appropriate  4/22/2024 1446 by Colleen Hughes-Behler, RN  Outcome: Adequate for Discharge  4/22/2024 1112 by Colleen Hughes-Behler, RN  Outcome: Progressing  Goal: Maintains/Returns to pre admission functional level  Description: INTERVENTIONS:  - Perform AM-PAC 6 Click Basic Mobility/ Daily Activity assessment daily.  - Set and communicate daily mobility goal to care team and patient/family/caregiver.   - Collaborate with rehabilitation services on mobility goals if consulted  - Out of bed for toileting  - Record patient progress and toleration of  activity level   4/22/2024 1446 by Colleen Hughes-Behler, RN  Outcome: Adequate for Discharge  4/22/2024 1112 by Colleen Hughes-Behler, RN  Outcome: Progressing     Problem: DISCHARGE PLANNING  Goal: Discharge to home or other facility with appropriate resources  Description: INTERVENTIONS:  - Identify barriers to discharge w/patient and caregiver  - Arrange for needed discharge resources and transportation as appropriate  - Identify discharge learning needs (meds, wound care, etc.)  - Arrange for interpretive services to assist at discharge as needed  - Refer to Case Management Department for coordinating discharge planning if the patient needs post-hospital services based on physician/advanced practitioner order or complex needs related to functional status, cognitive ability, or social support system  4/22/2024 1446 by Colleen Hughes-Behler, RN  Outcome: Adequate for Discharge  4/22/2024 1112 by Colleen Hughes-Behler, RN  Outcome: Progressing     Problem: Knowledge2 Deficit  Goal: Patient/family/caregiver demonstrates understanding of disease process, treatment plan, medications, and discharge instructions  Description: Complete learning assessment and assess knowledge base.  Interventions:  - Provide teaching at level of understanding  - Provide teaching via preferred learning methods  4/22/2024 1446 by Colleen Hughes-Behler, RN  Outcome: Adequate for Discharge  4/22/2024 1112 by Colleen Hughes-Behler, RN  Outcome: Progressing

## 2024-04-22 NOTE — ED PROVIDER NOTES
History  Chief Complaint   Patient presents with    Suicidal     Patient brought in by EMS. Patient was released from the hospital today after overdosing on Tylenol. Patient went home and ate rat poisoning to attempt suicide again. Patient c/o chest discomfort.      Patient is a 24-year-old female with a history of suicide attempts with most recent being about a week ago.  She was just discharged from the hospital today after a Tylenol overdose.  States that at around 8 PM she swallowed a green rock from a rat poisoning box.  After she did it she told her uncle who then called 911.  Patient has no symptoms at this time.  Patient has a very flat and blunted affect and is not forthcoming with much information.  She denies any other coingestants at this time.      History provided by:  Patient   used: No        None       Past Medical History:   Diagnosis Date    Depression        Past Surgical History:   Procedure Laterality Date    ABDOMINOPLASTY      AR  DELIVERY ONLY N/A 2020    Procedure:  SECTION ();  Surgeon: Adair Clifton MD;  Location: [facility];  Service: Obstetrics    AR  DELIVERY ONLY N/A 1/15/2024    Procedure:  SECTION ();  Surgeon: Yardlie Toussaint-Foster, DO;  Location: [facility];  Service: Obstetrics       Family History   Problem Relation Age of Onset    No Known Problems Mother     No Known Problems Father     No Known Problems Sister     No Known Problems Brother     Cancer Neg Hx     Diabetes Neg Hx      I have reviewed and agree with the history as documented.    E-Cigarette/Vaping    E-Cigarette Use Never User      E-Cigarette/Vaping Substances    Nicotine No     THC No     CBD No     Flavoring No     Other No     Unknown No      Social History     Tobacco Use    Smoking status: Former     Types: Pipe    Smokeless tobacco: Never   Vaping Use    Vaping status: Never Used   Substance Use Topics    Alcohol use: Not Currently  "    Comment: \"sometimes\"    Drug use: Never       Review of Systems   Respiratory:  Negative for cough, chest tightness and shortness of breath.    Cardiovascular:  Negative for chest pain.   Gastrointestinal:  Negative for abdominal pain, diarrhea, nausea and vomiting.   Skin:  Negative for color change, pallor, rash and wound.   Allergic/Immunologic: Negative for immunocompromised state.   Neurological:  Negative for dizziness, light-headedness and headaches.   Psychiatric/Behavioral:  Positive for suicidal ideas. Negative for confusion, hallucinations and self-injury.    All other systems reviewed and are negative.      Physical Exam  Physical Exam  Vitals and nursing note reviewed.   Constitutional:       General: She is not in acute distress.     Appearance: Normal appearance. She is well-developed. She is not ill-appearing, toxic-appearing or diaphoretic.   HENT:      Head: Normocephalic and atraumatic.      Right Ear: External ear normal.      Left Ear: External ear normal.      Nose: Nose normal. No congestion or rhinorrhea.      Mouth/Throat:      Mouth: Mucous membranes are not pale, not dry and not cyanotic.   Eyes:      General: No scleral icterus.        Right eye: No discharge.         Left eye: No discharge.      Conjunctiva/sclera: Conjunctivae normal.      Pupils: Pupils are equal, round, and reactive to light.   Neck:      Trachea: No tracheal deviation.   Cardiovascular:      Rate and Rhythm: Normal rate.      Pulses: Normal pulses.      Heart sounds: Normal heart sounds.   Pulmonary:      Effort: Pulmonary effort is normal. No tachypnea, accessory muscle usage or respiratory distress.      Breath sounds: No stridor.   Abdominal:      General: There is no distension.      Tenderness: There is no abdominal tenderness. There is no guarding or rebound.   Musculoskeletal:      Cervical back: Neck supple.   Skin:     General: Skin is warm and dry.   Neurological:      General: No focal deficit present. "      Mental Status: She is alert and oriented to person, place, and time. She is not disoriented.      Gait: Gait normal.   Psychiatric:         Attention and Perception: Attention normal. She is attentive. She does not perceive auditory or visual hallucinations.         Mood and Affect: Affect is blunt and flat.         Speech: Speech normal.         Behavior: Behavior normal.         Thought Content: Thought content is not paranoid or delusional. Thought content includes suicidal ideation. Thought content does not include homicidal ideation. Thought content does not include homicidal plan.         Cognition and Memory: Memory is not impaired.         Vital Signs  ED Triage Vitals [04/21/24 2029]   Temperature Pulse Respirations Blood Pressure SpO2   97.9 °F (36.6 °C) 102 20 134/67 96 %      Temp Source Heart Rate Source Patient Position - Orthostatic VS BP Location FiO2 (%)   Oral Monitor Lying Right arm --      Pain Score       2           Vitals:    04/21/24 2029 04/21/24 2132   BP: 134/67 125/58   Pulse: 102 98   Patient Position - Orthostatic VS: Lying Lying         Visual Acuity      ED Medications  Medications   lactated ringers bolus 1,000 mL (1,000 mL Intravenous New Bag 4/21/24 2129)       Diagnostic Studies  Results Reviewed       Procedure Component Value Units Date/Time    Comprehensive metabolic panel [[id number]]  (Abnormal) Collected: 04/21/24 2128    Lab Status: Final result Specimen: Blood from Arm, Right Updated: 04/21/24 2226     Sodium 139 mmol/L      Potassium 3.9 mmol/L      Chloride 106 mmol/L      CO2 24 mmol/L      ANION GAP 9 mmol/L      BUN 12 mg/dL      Creatinine 0.62 mg/dL      Glucose 85 mg/dL      Calcium 9.8 mg/dL      AST 12 U/L      ALT 10 U/L      Alkaline Phosphatase 69 U/L      Total Protein 7.2 g/dL      Albumin 4.1 g/dL      Total Bilirubin 0.34 mg/dL      eGFR 127 ml/min/1.73sq m     Narrative:      [employer] Kid[employer] guidelines for Chronic Kidney Disease  (CKD):     Stage 1 with normal or high GFR (GFR > 90 mL/min/1.73 square meters)    Stage 2 Mild CKD (GFR = 60-89 mL/min/1.73 square meters)    Stage 3A Moderate CKD (GFR = 45-59 mL/min/1.73 square meters)    Stage 3B Moderate CKD (GFR = 30-44 mL/min/1.73 square meters)    Stage 4 Severe CKD (GFR = 15-29 mL/min/1.73 square meters)    Stage 5 End Stage CKD (GFR <15 mL/min/1.73 square meters)  Note: GFR calculation is accurate only with a steady state creatinine    CK [[id number]]  (Normal) Collected: 04/21/24 2128    Lab Status: Final result Specimen: Blood from Arm, Right Updated: 04/21/24 2226     Total CK 40 U/L     Salicylate level [[id number]]  (Normal) Collected: 04/21/24 2128    Lab Status: Final result Specimen: Blood from Arm, Right Updated: 04/21/24 2226     Salicylate Lvl <5 mg/dL     Acetaminophen level-If concentration is detectable, please discuss with medical  on call. [[id number]]  (Abnormal) Collected: 04/21/24 2128    Lab Status: Final result Specimen: Blood from Arm, Right Updated: 04/21/24 2226     Acetaminophen Level <2 ug/mL     Magnesium [[id number]]  (Normal) Collected: 04/21/24 2128    Lab Status: Final result Specimen: Blood from Arm, Right Updated: 04/21/24 2226     Magnesium 1.9 mg/dL     Protime-INR [[id number]]  (Abnormal) Collected: 04/21/24 2128    Lab Status: Final result Specimen: Blood from Arm, Right Updated: 04/21/24 2217     Protime 14.7 seconds      INR 1.12    APTT [[id number]]  (Normal) Collected: 04/21/24 2128    Lab Status: Final result Specimen: Blood from Arm, Right Updated: 04/21/24 2217     PTT 29 seconds     Lactic acid, plasma (w/reflex if result > 2.0) [[id number]]  (Normal) Collected: 04/21/24 2128    Lab Status: Final result Specimen: Blood from Arm, Right Updated: 04/21/24 2208     LACTIC ACID 1.0 mmol/L     Narrative:      Result may be elevated if tourniquet was used during collection.    Ethanol [[id number]]  (Normal) Collected: 04/21/24 2128    Lab Status:  Final result Specimen: Blood from Arm, Right Updated: 04/21/24 2208     Ethanol Lvl <10 mg/dL     Blood gas, venous [[id number]]  (Abnormal) Collected: 04/21/24 2128    Lab Status: Final result Specimen: Blood from Arm, Right Updated: 04/21/24 2157     pH, Pee[place]e 7.409     pCO2, [place]e 34.7 mm Hg      pO2, [place]e 60.8 mm Hg      HCO3, P[place]e 21.5 mmol/L      Base Excess, Pee[place]e -2.5 mmol/L      O2 Content, P[place]e 16.0 ml/dL      O2 HGB, VENOUS 85.0 %     CBC and differential [[id number]]  (Abnormal) Collected: 04/21/24 2128    Lab Status: Final result Specimen: Blood from Arm, Right Updated: 04/21/24 2153     WBC 19.62 Thousand/uL      RBC 5.00 Million/uL      Hemoglobin 13.3 g/dL      Hematocrit 42.1 %      MCV 84 fL      MCH 26.6 pg      MCHC 31.6 g/dL      RDW 14.6 %      MPV 9.8 fL      Platelets 401 Thousands/uL      nRBC 0 /100 WBCs      Segmented % 75 %      Immature Grans % 0 %      Lymphocytes % 19 %      Monocytes % 6 %      Eosinophils Relative 0 %      Basophils Relative 0 %      Absolute Neutrophils 14.53 Thousands/µL      Absolute Immature Grans 0.08 Thousand/uL      Absolute Lymphocytes 3.68 Thousands/µL      Absolute Monocytes 1.23 Thousand/µL      Eosinophils Absolute 0.04 Thousand/µL      Basophils Absolute 0.06 Thousands/µL     Rapid drug screen, urine [[id number]]     Lab Status: No result Specimen: Urine     POCT pregnancy, urine [[id number]]     Lab Status: No result                    XR abdomen 1 view kub   ED Interpretation by Norberto Walls Jr., DO (04/21 2252)   On my interpretation, no evidence of ingested foreign body                 Procedures  CriticalCare Time    Date/Time: 4/21/2024 11:01 PM    Performed by: Norberto Walls Jr., DO  Authorized by: Norberto Walls Jr., DO    Critical care provider statement:     Critical care time (minutes):  40    Critical care time was exclusive of:  Separately billable procedures and treating other patients and teaching time    Critical care was necessary to  treat or prevent imminent or life-threatening deterioration of the following conditions:  Toxidrome    Critical care was time spent personally by me on the following activities:  Blood draw for specimens, obtaining history from patient or surrogate, development of treatment plan with patient or surrogate, discussions with consultants, evaluation of patient's response to treatment, examination of patient, interpretation of cardiac output measurements, ordering and performing treatments and interventions, ordering and review of laboratory studies, ordering and review of radiographic studies, review of old charts and re-evaluation of patient's condition    I assumed direction of critical care for this patient from another provider in my specialty: no             ED Course  ED Course as of 04/21/24 2301   Sun Apr 21, 2024   2205 CBC and differential(!)  Leukocytosis but has had this in the past.  I do not feel that it is infectious related at this time.   2243 Coma panel(!)  Negative    2243 Comprehensive metabolic panel(!)  Normal    2243 Lactic acid, plasma (w/reflex if result > 2.0)  Normal    2243 Protime-INR(!)  Not consistent with coagulopathy at this time   2243 Magnesium  Normal   2243 Blood gas, venous(!)  No significant acidemia or alkalemia   2244 CK  Normal                               SBIRT 22yo+      Flowsheet Row Most Recent Value   Initial Alcohol Screen: US AUDIT-C     1. How often do you have a drink containing alcohol? 0 Filed at: 04/21/2024 2031   2. How many drinks containing alcohol do you have on a typical day you are drinking?  0 Filed at: 04/21/2024 2031   3a. Male UNDER 65: How often do you have five or more drinks on one occasion? 0 Filed at: 04/21/2024 2031   3b. FEMALE Any Age, or MALE 65+: How often do you have 4 or more drinks on one occassion? 0 Filed at: 04/21/2024 2031   Audit-C Score 0 Filed at: 04/21/2024 2031   DARIUS: How many times in the past year have you...    Used an illegal  drug or used a prescription medication for non-medical reasons? Never Filed at: 04/21/2024 2031                      Medical Decision Making  9:15 PM  Pt appears well at this point.  Heart rate is in the 90s on my exam.  She has no complaints.  Unsure of what exact substance the patient took.  She states that she swallowed a green rock that is used for rat poisoning.  She is trying to have her uncle text or a picture of this.  In the meantime, I will obtain a broad tox workup for possible coingestants given her recent Tylenol overdose with close monitoring for possible complications.  Will also obtain a KUB to evaluate for any ingestible foreign bodies.  Patient will need to be admitted but might need medical admission first before psychiatric evaluation.    10:50 PM  Workup is overall negative.  Patient has not had any declining condition.  She is not able to produce a picture or the chemical of what she actually took.  I did not contact poison control since I do not know the exact substance that the patient took.  Because of this, I feel that she should be observed in the hospital for possible development of complications and toxicology consultation.  [place] text sent to internal medicine.    11:01 PM  Patient accepted by internal medicine.    Amount and/or Complexity of Data Reviewed  Labs: ordered. Decision-making details documented in ED Course.  Radiology: ordered and independent interpretation performed.    Risk  Decision regarding hospitalization.             Disposition  Final diagnoses:   Intentional overdose, initial encounter (HCC)   Suicide attempt (HCC)     Time reflects when diagnosis was documented in both MDM as applicable and the Disposition within this note       Time User Action Codes Description Comment    4/21/2024 10:50 PM Norberto Walls [T50.902A] Intentional overdose, initial encounter (HCC)     4/21/2024 10:50 PM Norberto Walls [T14.91XA] Suicide attempt ([facility])           ED  Disposition       ED Disposition   Admit    Condition   Stable    Date/Time   Sun Apr 21, 2024 4766    Comment   Case was discussed with SUSAN and the patient's admission status was agreed to be Admission Status: observation status to the service of Dr. Ramirez .               Follow-up Information    None         Patient's Medications    No medications on file       No discharge procedures on file.    PDMP Review       None            ED Provider  Electronically Signed by             Norberto Walls Jr., DO  04/21/24 5771

## 2024-04-22 NOTE — H&P
"[facility]  H&P  Name: Grismerlin Fernandez 24 y.o. female I MRN: [id number]  Unit/Bed#: E5 -01 I Date of Admission: 4/21/2024   Date of Service: 4/22/2024 I Hospital Day: 0      Assessment/Plan   * Suicide attempt (HCC)  Assessment & Plan  Patient presented to the ED after her uncle called EMS as patient reported to him that she ingested rat poisoning in an attempt to end her own life. She reported she swallows a \"green rock\" from a rat poisoning box.   Pt now adamant that she did not actually take rat poisoning, but was trying to get the attention of her family  She now denies any further suicidal ideation or plan. She states that she is \"going through a lot\" right now as she is \"getting \". Per other notes in chart, it appears patient broke up with her boyfriend 5 months ago.    She reports feeling depressed and \"just needs someone to talk to.\" Pt also 4 months postpartum, but states she does not feel this is a stressor  Of note, pt recently admitted to inpatient psych for suicide attempt with tylenol overdose 4/. Pt later insisted she took 15 tylenol only because she had a headache. Also prior suicide attempt in 2016  Monitor on telemetry  Monitor labs for any abnormalities or signs of bleeding   Place patient on 1:1  Psychiatric consult  Given questionable ingestion of rat poison although patient now denying, will consult med tox    SIRS (systemic inflammatory response syndrome) (Formerly [facility])  Assessment & Plan  Pt fulfilling SIRS with tachycardia and leukocytosis of 19.62  She denies any infectious symptoms such as fever, chills, myalgias, cough, congestion, or urinary symptoms  Monitor off antibiotics for signs of worsening infection     Adjustment disorder with anxious mood  Assessment & Plan  Recently admitted to inpatient psych 4/18-4/21 following ingestion of tylenol thought to be intentional overdose  She signed a 72 hour notice to withdrawal from treatment and was " "discharged home   According to discharge summary, pt declined initiation of any psych medication            VTE Pharmacologic Prophylaxis: VTE Score: 1 Low Risk (Score 0-2) - Encourage Ambulation.  Code Status: Level 1 - Full Code   Discussion with family:  Update in AM.     Anticipated Length of Stay: Patient will be admitted on an observation basis with an anticipated length of stay of less than 2 midnights secondary to suicide attempt, possible tox ingestion.    Total Time Spent on Date of Encounter in care of patient: 65 minutes This time was spent on one or more of the following: performing physical exam; counseling and coordination of care; obtaining or reviewing history; documenting in the medical record; reviewing/ordering tests, medications or procedures; communicating with other healthcare professionals and discussing with patient's family/caregivers.    Chief Complaint: \"I was looking for my family to notice me\"    History of Present Illness:  Grismerlin Fernandez is a 24 y.o. female who presents with suicide attempt. Patient was on the phone with her uncle, when she told him that she had swallowed rat poisoning, prompting him to call EMS. Pt initially reported taking a \"small green rock\" from a rat poisoning edson. She later denied this stating she just wanted to get her family's attention. She states that she wanted someone to talk to. She reports that she does not have any further suicidal ideation or plan. She states that she has had increased depressed mood since she is \"getting .\" From previous notes, it looks like she and her boyfriend broke up 5 months ago. She is also 4 months postpartum, but she states she does not feel like this is a stressor and feels that her divorce is triggering her feelings. Pt states that she would like to be set up with a therapist outpatient once eventually discharged. She reports that she is currently feeling well otherwise. She denies any recent illness, fever, " chills, chest pain, SOB, headache, abdominal pain, N/V/D, or urinary symptoms.     Review of Systems:  Review of Systems   Constitutional:  Negative for appetite change, chills, diaphoresis, fatigue and fever.   HENT:  Negative for congestion, rhinorrhea and sore throat.    Eyes:  Negative for photophobia and visual disturbance.   Respiratory:  Negative for cough, shortness of breath and wheezing.    Cardiovascular:  Negative for chest pain, palpitations and leg swelling.   Gastrointestinal:  Negative for abdominal distention, abdominal pain, blood in stool, constipation, diarrhea, nausea and vomiting.   Genitourinary:  Negative for dysuria and hematuria.   Musculoskeletal:  Negative for arthralgias, back pain, myalgias and neck stiffness.   Skin:  Negative for color change and rash.   Neurological:  Negative for dizziness, seizures, syncope, weakness, light-headedness and headaches.   Psychiatric/Behavioral:  Positive for dysphoric mood, self-injury (Pt family called EMS as patient reported taking rat poisoning to them, now denying.) and suicidal ideas (Pt now denying suicidial ideation or plan stating she was looking for her families attention when she told her uncle she took rat poisoning). Negative for agitation, behavioral problems and confusion. The patient is not nervous/anxious.    All other systems reviewed and are negative.      Past Medical and Surgical History:   Past Medical History:   Diagnosis Date    Depression        Past Surgical History:   Procedure Laterality Date    ABDOMINOPLASTY      MI  DELIVERY ONLY N/A 2020    Procedure:  SECTION ();  Surgeon: Adair Clifton MD;  Location: [facility];  Service: Obstetrics    MI  DELIVERY ONLY N/A 1/15/2024    Procedure:  SECTION ();  Surgeon: Yardlie Toussaint-Foster, DO;  Location: [facility];  Service: Obstetrics       Meds/Allergies:  Prior to Admission medications    Not on File     I have reviewed home  "medications using recent Epic encounter.    Allergies: No Known Allergies    Social History:  Marital Status: Single   Patient Pre-hospital Living Situation: Home  Patient Pre-hospital Level of Mobility: walks  Patient Pre-hospital Diet Restrictions: none  Substance Use History:   Social History     Substance and Sexual Activity   Alcohol Use Not Currently    Comment: \"sometimes\"     Social History     Tobacco Use   Smoking Status Former    Types: Pipe   Smokeless Tobacco Never     Social History     Substance and Sexual Activity   Drug Use Never       Family History:  Family History   Problem Relation Age of Onset    No Known Problems Mother     No Known Problems Father     No Known Problems Sister     No Known Problems Brother     Cancer Neg Hx     Diabetes Neg Hx        Physical Exam:     Vitals:   Blood Pressure: 113/70 (04/22/24 0114)  Pulse: 85 (04/22/24 0114)  Temperature: 98.7 °F (37.1 °C) (04/22/24 0114)  Temp Source: Oral (04/22/24 0114)  Respirations: 18 (04/22/24 0114)  Height: 5' 2\" (157.5 cm) (04/22/24 0114)  Weight - Scale: 93.4 kg (205 lb 14.6 oz) (04/22/24 0114)  SpO2: 100 % (04/21/24 2317)    Physical Exam  Vitals and nursing note reviewed.   Constitutional:       General: She is not in acute distress.     Appearance: She is well-developed. She is not ill-appearing.   HENT:      Head: Normocephalic and atraumatic.      Nose: Nose normal. No congestion.      Mouth/Throat:      Mouth: Mucous membranes are moist.      Pharynx: Oropharynx is clear.   Eyes:      Conjunctiva/sclera: Conjunctivae normal.   Cardiovascular:      Rate and Rhythm: Normal rate and regular rhythm.      Heart sounds: Normal heart sounds. No murmur heard.     No friction rub. No gallop.   Pulmonary:      Effort: Pulmonary effort is normal. No respiratory distress.      Breath sounds: Normal breath sounds. No wheezing, rhonchi or rales.   Abdominal:      General: Bowel sounds are normal. There is no distension.      Palpations: " Abdomen is soft.      Tenderness: There is no abdominal tenderness.   Musculoskeletal:      Cervical back: Neck supple.      Right lower leg: No edema.      Left lower leg: No edema.   Skin:     General: Skin is warm and dry.      Capillary Refill: Capillary refill takes less than 2 seconds.   Neurological:      General: No focal deficit present.      Mental Status: She is alert and oriented to person, place, and time. Mental status is at baseline.   Psychiatric:         Mood and Affect: Affect is blunt and flat.         Behavior: Behavior normal.          Additional Data:     Lab Results:  Results from last 7 days   Lab Units 04/21/24 2128   WBC Thousand/uL 19.62*   HEMOGLOBIN g/dL 13.3   HEMATOCRIT % 42.1   PLATELETS Thousands/uL 401*   SEGS PCT % 75   LYMPHO PCT % 19   MONO PCT % 6   EOS PCT % 0     Results from last 7 days   Lab Units 04/21/24  2128   SODIUM mmol/L 139   POTASSIUM mmol/L 3.9   CHLORIDE mmol/L 106   CO2 mmol/L 24   BUN mg/dL 12   CREATININE mg/dL 0.62   ANION GAP mmol/L 9   CALCIUM mg/dL 9.8   ALBUMIN g/dL 4.1   TOTAL BILIRUBIN mg/dL 0.34   ALK PHOS U/L 69   ALT U/L 10   AST U/L 12*   GLUCOSE RANDOM mg/dL 85     Results from last 7 days   Lab Units 04/21/24  2128   INR  1.12     Results from last 7 days   Lab Units 04/17/24  0256   POC GLUCOSE mg/dl 108     Results from last 7 days   Lab Units 04/19/24  0609   HEMOGLOBIN A1C % 5.1     Results from last 7 days   Lab Units 04/21/24  2128   LACTIC ACID mmol/L 1.0       Lines/Drains:  Invasive Devices       Peripheral Intravenous Line  Duration             Peripheral IV 04/21/24 Right Antecubital <1 day                        Imaging: Personally reviewed the following imaging: KUB  XR abdomen 1 view kub   ED Interpretation by Norberto Walls Jr., DO (04/21 2252)   On my interpretation, no evidence of ingested foreign body          EKG and Other Studies Reviewed on Admission:   EKG: Sinus Tachycardia. .    ** Please Note: This note has been  constructed using a voice recognition system. **

## 2024-04-22 NOTE — CASE MANAGEMENT
Case Management Assessment & Discharge Planning Note    Patient name Grismerlin Fernandez  Location [facility] 5 /E5 -* MRN [id number]  : 2000 Date 2024       Current Admission Date: 2024  Current Admission Diagnosis:Suicide attempt (HCC)   Patient Active Problem List    Diagnosis Date Noted    Suicide attempt (HCC) 2024    BMI 38.0-38.9,adult 2024    Bandemia 2024    Hospital discharge follow-up 2024    SIRS (systemic inflammatory response syndrome) (HCC) 2024    Transaminitis 2024    Pelvic fluid collection 2024    Abnormal CT scan, kidney 2024    Tylenol ingestion, undetermined intent, subsequent encounter 2024    COVID-19 affecting pregnancy in third trimester 2024    Patient noncompliance 2023    H/O abdominoplasty 10/17/2023    Status post repeat low transverse  section 2023    History of intrauterine growth restriction in prior pregnancy, currently pregnant, first trimester 2023    Hx of  section 2023    Obesity affecting pregnancy in second trimester 2023    Adjustment disorder with anxious mood     Medical clearance for psychiatric admission 2020      LOS (days): 0  Geometric Mean LOS (GMLOS) (days):   Days to GMLOS:     OBJECTIVE:              Current admission status: Observation  Referral Reason: Psych, Placement within 24-48 hours    Preferred Pharmacy:   [id number] - [place] - [address] [place] [address]  [address] [place] [id number]  Phone: [phone] Fax: [phone]    Primary Care Provider: No primary care provider on file.    Primary Insurance: [employer] O  Secondary Insurance:     ASSESSMENT:  Readmission Root Cause  30 Day Readmission: No    Patient Information  Mental Status: Alert  During Assessment patient was accompanied by: Not accompanied during assessment  Assessment information provided by:: Patient  Primary  Caregiver: Self  Support Systems: Self, Spouse/significant other  County of Residence: [place]  What city do you live in?: [place]  Living Arrangements: Lives w/ Spouse/significant other (lives with children)  Is patient a ?: No    Activities of Daily Living Prior to Admission  Functional Status: Independent  Completes ADLs independently?: Yes  Ambulates independently?: Yes  Does patient use assisted devices?: No  Does patient currently own DME?: No  Does patient have a history of Outpatient Therapy (PT/OT)?: No  Does the patient have a history of Short-Term Rehab?: No  Does patient have a history of HHC?: No  Does patient currently have HHC?: No    Patient Information Continued  Income Source: Employed  Does patient have prescription coverage?: Yes  Does patient receive dialysis treatments?: No  Does patient have a history of substance abuse?: No  Current Status:: 201  Does patient have a history of Mental Health Diagnosis?: Yes (Adjustment disorder with anxious mood, depression)  Is patient receiving treatment for mental health?: Yes  Has patient received inpatient treatment related to mental health in the last 2 years?: Yes (was recently discharged from [facility])    PHQ 2/9 Screening   Reviewed PHQ 2/9 Depression Screening Score?: No    Means of Transportation  Means of Transport to Appts:: Drives Self      Social Determinants of Health (SDOH)      Flowsheet Row Most Recent Value   Housing Stability    In the last 12 months, was there a time when you were not able to pay the mortgage or rent on time? N   In the last 12 months, how many places have you lived? 1   In the last 12 months, was there a time when you did not have a steady place to sleep or slept in a shelter (including now)? N   Transportation Needs    In the past 12 months, has lack of transportation kept you from medical appointments or from getting medications? no   In the past 12 months, has lack of transportation kept you from meetings, work, or  from getting things needed for daily living? No   Food Insecurity    Within the past 12 months, you worried that your food would run out before you got the money to buy more. Never true   Within the past 12 months, the food you bought just didn't last and you didn't have money to get more. Never true   Utilities    In the past 12 months has the electric, gas, oil, or water company threatened to shut off services in your home? No            DISCHARGE DETAILS:    Discharge planning discussed with:: Patient  Freedom of Choice: Yes  Comments - Freedom of Choice: Patient chose to sign a 201.  CM contacted family/caregiver?: No- see comments (refused call to family at this time.)  Were Treatment Team discharge recommendations reviewed with patient/caregiver?: Yes  Did patient/caregiver verbalize understanding of patient care needs?: N/A- going to facility  Were patient/caregiver advised of the risks associated with not following Treatment Team discharge recommendations?: Yes    Requested Home Health Care         Is the patient interested in HHC at discharge?: No    DME Referral Provided  Referral made for DME?: No    Other Referral/Resources/Interventions Provided:  Interventions: Inpatient Behavioral Health    Would you like to participate in our [facility] service program?  : No - Declined    Treatment Team Recommendation: Inpatient Behavioral Health  Discharge Destination Plan:: Inpatient Behavioral Health  Transport at Discharge : Behavioral Health Transfer, Auto with designated      Additional Comments: . Per rounds, recommendation is for patient to admit to [facility] at discharge. Patient presented to [facility] ED for self-reporting rat poison ingestion. Per conversation with RN and psychiatrist, patient now stating that this was attention-seeking behavior as patient just wanted to s/w someone and wasn't getting the attention that she wanted from her family.    Per rounds, patient is medically stable for D/C  today.    CM s/w patient bedside. Patient agreeable to 201. 201 completed with patient bedside, signature obtained from SLIM and patient. 201 and facesheet faxed to  [facility] intake who are reviewing for placement @ [facility].    Akin Pryor, LCSW, ACSW, ACM, CCM, CCTP  [place] LCSW: [id number]  [place] LSW: [id number]  04/22/24 12:16 PM

## 2024-04-22 NOTE — PLAN OF CARE
Problem: PAIN - ADULT  Goal: Verbalizes/displays adequate comfort level or baseline comfort level  Description: Interventions:  - Encourage patient to monitor pain and request assistance  - Assess pain using appropriate pain scale  - Administer analgesics based on type and severity of pain and evaluate response  - Implement non-pharmacological measures as appropriate and evaluate response  - Consider cultural and social influences on pain and pain management  - Notify physician/advanced practitioner if interventions unsuccessful or patient reports new pain  Outcome: Progressing     Problem: INFECTION - ADULT  Goal: Absence or prevention of progression during hospitalization  Description: INTERVENTIONS:  - Assess and monitor for signs and symptoms of infection  - Monitor lab/diagnostic results  - Monitor all insertion sites, i.e. indwelling lines, tubes, and drains  - Monitor endotracheal if appropriate and nasal secretions for changes in amount and color  - [place] appropriate cooling/warming therapies per order  - Administer medications as ordered  - Instruct and encourage patient and family to use good hand hygiene technique  - Identify and instruct in appropriate isolation precautions for identified infection/condition  Outcome: Progressing  Goal: Absence of fever/infection during neutropenic period  Description: INTERVENTIONS:  - Monitor WBC    Outcome: Progressing     Problem: SAFETY ADULT  Goal: Patient will remain free of falls  Description: INTERVENTIONS:  - Educate patient/family on patient safety including physical limitations  - Instruct patient to call for assistance with activity   - Consult OT/PT to assist with strengthening/mobility   - Keep Call bell within reach  - Keep bed low and locked with side rails adjusted as appropriate  - Keep care items and personal belongings within reach  - Initiate and maintain comfort rounds  - Make Fall Risk Sign visible to staff  - Apply yellow socks and bracelet  for high fall risk patients  - Consider moving patient to room near nurses station  Outcome: Progressing  Goal: Maintain or return to baseline ADL function  Description: INTERVENTIONS:  -  Assess patient's ability to carry out ADLs; assess patient's baseline for ADL function and identify physical deficits which impact ability to perform ADLs (bathing, care of mouth/teeth, toileting, grooming, dressing, etc.)  - Assess/evaluate cause of self-care deficits   - Assess range of motion  - Assess patient's mobility; develop plan if impaired  - Assess patient's need for assistive devices and provide as appropriate  - Encourage maximum independence but intervene and supervise when necessary  - Involve family in performance of ADLs  - Assess for home care needs following discharge   - Consider OT consult to assist with ADL evaluation and planning for discharge  - Provide patient education as appropriate  Outcome: Progressing  Goal: Maintains/Returns to pre admission functional level  Description: INTERVENTIONS:  - Perform AM-PAC 6 Click Basic Mobility/ Daily Activity assessment daily.  - Set and communicate daily mobility goal to care team and patient/family/caregiver.   - Collaborate with rehabilitation services on mobility goals if consulted  - Out of bed for toileting  - Record patient progress and toleration of activity level   Outcome: Progressing     Problem: DISCHARGE PLANNING  Goal: Discharge to home or other facility with appropriate resources  Description: INTERVENTIONS:  - Identify barriers to discharge w/patient and caregiver  - Arrange for needed discharge resources and transportation as appropriate  - Identify discharge learning needs (meds, wound care, etc.)  - Arrange for interpretive services to assist at discharge as needed  - Refer to Case Management Department for coordinating discharge planning if the patient needs post-hospital services based on physician/advanced practitioner order or complex needs  related to functional status, cognitive ability, or social support system  Outcome: Progressing     Problem: Knowledge Deficit  Goal: Patient/family/caregiver demonstrates understanding of disease process, treatment plan, medications, and discharge instructions  Description: Complete learning assessment and assess knowledge base.  Interventions:  - Provide teaching at level of understanding  - Provide teaching via preferred learning methods  Outcome: Progressing

## 2024-04-22 NOTE — PLAN OF CARE
Problem: PAIN - ADULT  Goal: Verbalizes/displays adequate comfort level or baseline comfort level  Description: Interventions:  - Encourage patient to monitor pain and request assistance  - Assess pain using appropriate pain scale  - Administer analgesics based on type and severity of pain and evaluate response  - Implement non-pharmacological measures as appropriate and evaluate response  - Consider cultural and social influences on pain and pain management  - Notify physician/advanced practitioner if interventions unsuccessful or patient reports new pain  Outcome: Progressing     Problem: INFECTION - ADULT  Goal: Absence or prevention of progression during hospitalization  Description: INTERVENTIONS:  - Assess and monitor for signs and symptoms of infection  - Monitor lab/diagnostic results  - Monitor all insertion sites, i.e. indwelling lines, tubes, and drains  - Monitor endotracheal if appropriate and nasal secretions for changes in amount and color  - [place] appropriate cooling/warming therapies per order  - Administer medications as ordered  - Instruct and encourage patient and family to use good hand hygiene technique  - Identify and instruct in appropriate isolation precautions for identified infection/condition  Outcome: Progressing  Goal: Absence of fever/infection during neutropenic period  Description: INTERVENTIONS:  - Monitor WBC    Outcome: Progressing     Problem: SAFETY ADULT  Goal: Patient will remain free of falls  Description: INTERVENTIONS:  - Educate patient/family on patient safety including physical limitations  - Instruct patient to call for assistance with activity   - Consult OT/PT to assist with strengthening/mobility   - Keep Call bell within reach  - Keep bed low and locked with side rails adjusted as appropriate  - Keep care items and personal belongings within reach  - Initiate and maintain comfort rounds  - Make Fall Risk Sign visible to staff  - - Apply yellow socks and  bracelet for high fall risk patients  - Consider moving patient to room near nurses station  Outcome: Progressing  Goal: Maintain or return to baseline ADL function  Description: INTERVENTIONS:  -  Assess patient's ability to carry out ADLs; assess patient's baseline for ADL function and identify physical deficits which impact ability to perform ADLs (bathing, care of mouth/teeth, toileting, grooming, dressing, etc.)  - Assess/evaluate cause of self-care deficits   - Assess range of motion  - Assess patient's mobility; develop plan if impaired  - Assess patient's need for assistive devices and provide as appropriate  - Encourage maximum independence but intervene and supervise when necessary  - Involve family in performance of ADLs  - Assess for home care needs following discharge   - Consider OT consult to assist with ADL evaluation and planning for discharge  - Provide patient education as appropriate  Outcome: Progressing  Goal: Maintains/Returns to pre admission functional level  Description: INTERVENTIONS:  - Perform AM-PAC 6 Click Basic Mobility/ Daily Activity assessment daily.  - Set and communicate daily mobility goal to care team and patient/family/caregiver.   - Collaborate with rehabilitation services on mobility goals if consulted  -- Out of bed for toileting  - Record patient progress and toleration of activity level   Outcome: Progressing     Problem: DISCHARGE PLANNING  Goal: Discharge to home or other facility with appropriate resources  Description: INTERVENTIONS:  - Identify barriers to discharge w/patient and caregiver  - Arrange for needed discharge resources and transportation as appropriate  - Identify discharge learning needs (meds, wound care, etc.)  - Arrange for interpretive services to assist at discharge as needed  - Refer to Case Management Department for coordinating discharge planning if the patient needs post-hospital services based on physician/advanced practitioner order or complex  needs related to functional status, cognitive ability, or social support system  Outcome: Progressing     Problem: Knowledge Deficit  Goal: Patient/family/caregiver demonstrates understanding of disease process, treatment plan, medications, and discharge instructions  Description: Complete learning assessment and assess knowledge base.  Interventions:  - Provide teaching at level of understanding  - Provide teaching via preferred learning methods  Outcome: Progressing

## 2024-04-22 NOTE — UTILIZATION REVIEW
Initial Clinical Review    Admission: Date/Time/Statement:   Admission Orders (From admission, onward)       Ordered        04/21/24 2301  Place in Observation  Once                          Orders Placed This Encounter   Procedures    Place in Observation     Standing Status:   Standing     Number of Occurrences:   1     Order Specific Question:   Level of Care     Answer:   Med Surg [16]     ED Arrival Information       Expected   -    Arrival   4/21/2024 20:24    Acuity   Emergent              Means of arrival   Ambulance    Escorted by   [facility] ([facility])    Service   Hospitalist    Admission type   Emergency              Arrival complaint   suicidal             Chief Complaint   Patient presents with    Suicidal     Patient brought in by EMS. Patient was released from the hospital today after overdosing on Tylenol. Patient went home and ate rat poisoning to attempt suicide again. Patient c/o chest discomfort.        Initial Presentation: 24 y.o. female returns  to  ED  via  EMS  after   just  being discharged  from hospital after a tylenol overdose.   Stated she  ingested rat poison the  evening of admission,  told  uncle and  911 called. Asymptomatic  on ED arrival.  Flat /blunted affect,  not forthcoming with information.  Met SIRS  criteria in ED with tachycardia  and  WBC  19.62.    [facility] after a tylenol  OD,  insistent she took tylenol for a  headache. Adamant  in ED that she did not  ingest rat poison.  Admit  Observation with  Suicide attempt, SIRS  and plan is  1:1  observation, pschy consult, tele, hold antibiotics  and monitor labs.    Behavioral Health telemed  High risk suicide.  [facility],  if not  voluntary then involuntary.  Needs continued  1:1  observation.        ED Triage Vitals [04/21/24 2029]   Temperature Pulse Respirations Blood Pressure SpO2   97.9 °F (36.6 °C) 102 20 134/67 96 %      Temp Source Heart Rate Source  Patient Position - Orthostatic VS BP Location FiO2 (%)   Oral Monitor Lying Right arm --      Pain Score       2          Wt Readings from Last 1 Encounters:   04/22/24 93.4 kg (205 lb 14.6 oz)     Additional Vital Signs:   97.6 °F (36.4 °C) -- 18 128/61 83 -- -- --    04/22/24 0136 -- -- -- -- -- -- None (Room air) --   04/22/24 01:14:48 98.7 °F (37.1 °C) 85 18 113/70 84 -- -- --   04/21/24 2317 -- 84 20 115/59 -- 100 % None (Room air) Lying   04/21/24 2132 -- 98 20 125/58 -- 99 % None (Room air) Lying   04/21/24 2029 97.9 °F (36.6 °C) 102 20 134/67 -- 96 % None (Room air) Lying     Pertinent Labs/Diagnostic Test Results:   XR abdomen 1 view kub   ED Interpretation by Norberto Walls Jr., DO (04/21 2252)   On my interpretation, no evidence of ingested foreign body            Results from last 7 days   Lab Units 04/22/24 0625 04/21/24 2128 04/18/24 0511 04/17/24 1919 04/16/24 2316   WBC Thousand/uL 12.79* 19.62* 11.88* 11.08* 15.81*   HEMOGLOBIN g/dL 11.5 13.3 11.3* 12.2 12.9   HEMATOCRIT % 36.5 42.1 35.6 37.3 39.8   PLATELETS Thousands/uL 331 401* 345 356 399*   TOTAL NEUT ABS Thousands/µL 7.96* 14.53* 7.11 7.71* 10.68*         Results from last 7 days   Lab Units 04/22/24 0625 04/21/24 2128 04/18/24 0511 04/17/24 1919 04/17/24  1117   SODIUM mmol/L 140 139 139 137 136   POTASSIUM mmol/L 3.9 3.9 3.8 3.7 3.0*   CHLORIDE mmol/L 108 106 109* 109* 105   CO2 mmol/L 26 24 24 22 23   ANION GAP mmol/L 6 9 6 6 8   BUN mg/dL 12 12 6 5 8   CREATININE mg/dL 0.63 0.62 0.62 0.55* 0.56*   EGFR ml/min/1.73sq m 126 127 127 132 131   CALCIUM mg/dL 9.2 9.8 8.8 9.1 9.1   MAGNESIUM mg/dL 1.9 1.9  --   --   --      Results from last 7 days   Lab Units 04/22/24  0625 04/21/24  2128 04/18/24  0511 04/17/24  1919 04/17/24  1117   AST U/L 9* 12* 14 11* 11*   ALT U/L 9 10 13 12 12   ALK PHOS U/L 55 69 55 62 61   TOTAL PROTEIN g/dL 7.0 7.2 6.3* 7.0 6.9   ALBUMIN g/dL 3.7 4.1 3.4* 3.7 3.6   TOTAL BILIRUBIN mg/dL 0.55 0.34 0.25 0.39  0.46     Results from last 7 days   Lab Units 04/22/24  0717 04/17/24  0256   POC GLUCOSE mg/dl 95 108     Results from last 7 days   Lab Units 04/22/24  0625 04/21/24 2128 04/18/24  0511 04/17/24 1919 04/17/24  1117 04/16/24  2316   GLUCOSE RANDOM mg/dL 93 85 118 115 92 105                Results from last 7 days   Lab Units 04/22/24 0625 04/21/24 2128   PH MARBIN  7.437* 7.409*   PCO2 MARBIN mm Hg 36.8* 34.7*   PO2 MARBIN mm Hg 136.2* 60.8*   HCO3 MARBIN mmol/L 24.3 21.5*   BASE EXC MARBIN mmol/L 0.3 -2.5   O2 CONTENT MARBIN ml/dL 17.0 16.0   O2 HGB, VENOUS % 94.7* 85.0*         Results from last 7 days   Lab Units 04/22/24 0625 04/21/24 2128   CK TOTAL U/L 33 40             Results from last 7 days   Lab Units 04/21/24 2128   PROTIME seconds 14.7*   INR  1.12   PTT seconds 29           Results from last 7 days   Lab Units 04/21/24 2128   LACTIC ACID mmol/L 1.0                 Results from last 7 days   Lab Units 04/22/24  0336   AMPH/METH  Negative   BARBITURATE UR  Negative   BENZODIAZEPINE UR  Negative   COCAINE UR  Negative   METHADONE URINE  Negative   OPIATE UR  Negative   PCP UR  Negative   THC UR  Negative     Results from last 7 days   Lab Units 04/21/24 2128 04/18/24  0511 04/17/24 1919 04/16/24  2316   ETHANOL LVL mg/dL <10  --   --  <10   ACETAMINOPHEN LVL ug/mL <2* <2* 2* 194*   SALICYLATE LVL mg/dL <5  --   --  <5                 ED Treatment:   Medication Administration from 04/21/2024 2024 to 04/22/2024 0113         Date/Time Order Dose Route Action Comments     04/21/2024 2321 EDT lactated ringers bolus 1,000 mL 0 mL Intravenous Stopped --     04/21/2024 2129 EDT lactated ringers bolus 1,000 mL 1,000 mL Intravenous New Bag --            Present on Admission:   SIRS (systemic inflammatory response syndrome) ([facility])   Adjustment disorder with anxious mood      Admitting Diagnosis: Suicide attempt (HCC) [T14.91XA]  Suicidal behavior [R45.89]  Intentional overdose, initial encounter ([facility]) [T50.902A]  Age/Sex:  24 y.o. female  Admission Orders:  Scheduled Medications:      Continuous IV Infusions:        PRN Meds:    1:1  observation  24 hr tele    IP CONSULT TO PSYCHIATRY  IP CONSULT TO TOXICOLOGY    Network Utilization Review Department  ATTENTION: Please call with any questions or concerns to [phone] and carefully listen to the prompts so that you are directed to the right person. All voicemails are confidential.   For Discharge needs, contact Care Management DC Support Team at [phone] opt. 2  Send all requests for admission clinical reviews, approved or denied determinations and any other requests to dedicated fax number below belonging to the [place] where the patient is receiving treatment. List of dedicated fax numbers for the Facilities:  FACILITY NAME UR FAX NUMBER   ADMISSION DENIALS (Administrative/Medical Necessity) [phone]   DISCHARGE SUPPORT TEAM (NETWORK) [phone]   PARENT CHILD HEALTH (Maternity/NICU/Pediatrics) [phone]   [facility] [phone]   [facility] [phone]   [facility] [phone]   [facility] [phone]   [facility] [phone]   [facility] [phone]   [facility] [phone]   [facility] [phone]   [facility] [phone]   [facility] [phone]   [facility] [phone]   [facility] [phone]

## 2024-04-22 NOTE — CONSULTS
INTERPROFESSIONAL (PHONE) CONSULTATION - Medical Toxicology  Grismerlin Fernandez 24 y.o. female MRN: [id number]  Unit/Bed#: E5 -01 Encounter: [id number]      Reason for Consult / Principal Problem: Intentional ingestion  Inpatient consult to Toxicology  Consult performed by: Otilia Ordaz MD  Consult ordered by: Jake Walsh PA-C        04/22/24      ASSESSMENT:  Alleged rat poison ingestion -unknown brand or type  Leukocytosis  Elevated PT/INR    RECOMMENDATIONS:    At this point in time, the patient denies taking rat poison.  There are many forms of rat poison, the most commonly and readily available ones being superwarfarins, bromethalin, cholecalciferol, and nontoxic formulations like RatX (corn meal + sodium chloride).    A single dose ingestion of any of these is unlikely to cause toxicity.  Superwarfarins would not be expected to begin to cause toxicity until 2-3 days after ingestion due to time needed to develop anticoagulant effect.  Even then, a single ingestion is unlikely to cause clinically significant anticoagulation bleeding.    This patient can be cleared from a toxicologic standpoint as long as her mentation is at baseline, VS are WNL, and patient is ambulatory.    Should be develop signs of easy bruising or easy bleeding in the next week, please check anticoagulation status as intervention may be needed at that time.    For further questions, please contact the medical  on call via [employer] between 8am and 9pm. If between 9pm and 8am, please reach out to the Poison Center at [phone].     Please see additional teaching note below:    Medical Toxicology Teaching Note  Warfarin and Related Rodenticides  [facility]    Introduction:  Coumarin derivatives are used both therapeutically and as rodenticides.  Warfarin (Coumadin) is commonly used therapeutically as an anticoagulant.  Long-acting “superwarfarins” such brodifacoum, diphacinone,  vromadiolone, etc have profound and prolonged anticoagulant effects and are therefore used as rodenticides.    Mechanism of Toxicity:  These compounds inhibit hepatic synthesis of the vitamin K-dependent coagulation factors II, VII, IX, and X.  Only the synthesis of new factors is affected, therefore the anticoagulant effect is delayed until current circulating factors have been degraded.  Peak effects are typically not seen until 2-3 days after ingestion.    Toxic Dose:  The toxic dose is highly variable.  Typically, a single small ingestion (10-20mg of warfarin) will not cause toxicity.  Large ingestions or repeated smaller ingestions can produce significant anticoagulation.  Patients with hepatic dysfunction, malnutrition, or a bleeding diathesis are at a greater risk.  Superwarfarins are extremely potent and can have prolonged effects even after a small single ingestion.  However, in one large study majority of accidental pediatric ingestions, no serious cases of anticoagulation occurred.    Clinical Presentation:  Excessive anticoagulation may result in ecchymosis, sub-conjunctival hemorrhage, bleeding gums, gastrointestinal bleeding, or intracranial bleeding.  The most immediate life-threatening complications are massive gastrointestinal bleeding and intracranial hemorrhage.    Anticoagulant effects may be apparent within 8-12 hours but are typically delayed until 48 hours with ingestion of superwarfarins.  Evidence of anticoagulation may last days, weeks, or up to months with the ingestion of superwarfarins.    Diagnosis:  Diagnosis is based on history and an elevated prothrombin time (PT) and INR.  Whenever possible the specific anticoagulant ingestion should be identified to aid in determining potential potency and duration of effect.  A normal INR at 48 hours status-post ingestion effectively rules out a clinically significant ingestion.  Other useful laboratory values include a CBC, blood type, and  cross-match.    Treatment:  If significant bleeding occurs, be prepared to treat shock with transfusions of blood and fresh frozen plasma. Immediate neurosurgical consultation should be obtained for intracranial bleeding. Take care to avoid trauma or invasive procedures which may predispose to bleeding.  Antidotes:  Vitamin K1 (phytonadione) but not vitamin K3 (menadione) restores the production of clotting factors.    DO NOT GIVE PROPHYLACTICALLY   If no clinically significant bleeding is present then for INR >9 give vitamin K 5-10 mg PO  If active hemorrhage is present then prothrombin complex concentrate, fresh frozen plasma or fresh whole blood may be necessary to rapidly correct the patient’s coagulation.  Vitamin K 10 mg IV slow infusion may be given.    Decontamination:  Activated charcoal may be used if the patient presents within 1-2 hours of ingestion and there are no coingestants which may lead to an increased risk of aspiration.    Disposition:  Accidental mild ingestions can typically be managed at home with an INR measurement at 24 and 48 hours post-ingestion.  Intentional overdoses should be admitted.  If the patient’s INR is near normal at 48 hours then no further monitoring is necessary unless prophylactic vitamin K was given. In the event that prophylactic vitamin K is given then the INR should be monitored for a minimum of 5 days after the last vitamin K administration.      Hx and PE limited by the dynamics of a phone consultation. I have not personally interviewed or evaluated the patient, but only advised based on the information provided to me. Primary provider is responsible for all clinical decisions.     Pertinent history, physical exam and clinical findings and course discussed: Grismerlin Fernandez is a 24 y.o. year old female who was admitted due to unknown rat poison ingestion.  Her past medical history is significant for previous suicidal ideation and previous overdoses including  "acetaminophen.  The patient initially reported yesterday evening in the emergency department that she ate one green rock at 8 PM, apparently a rodenticide, told her uncle, who then called 911.  On arrival to the ED, patient had normal vital signs, no complaints, and was not able to identify the rat poison.  This morning, the patient denies ever taking any rodenticide and remains asymptomatic with normal vital signs.    Review of systems and physical exam not performed by me.    Historical Information   Past Medical History:   Diagnosis Date    Depression      Past Surgical History:   Procedure Laterality Date    ABDOMINOPLASTY      IN  DELIVERY ONLY N/A 2020    Procedure:  SECTION ();  Surgeon: Adair Clifton MD;  Location: [facility];  Service: Obstetrics    IN  DELIVERY ONLY N/A 1/15/2024    Procedure:  SECTION ();  Surgeon: Yardlie Toussaint-Foster, DO;  Location: [facility];  Service: Obstetrics     Social History   Social History     Substance and Sexual Activity   Alcohol Use Not Currently    Comment: \"sometimes\"     Social History     Substance and Sexual Activity   Drug Use Never     Social History     Tobacco Use   Smoking Status Former    Types: Pipe   Smokeless Tobacco Never     Family History   Problem Relation Age of Onset    No Known Problems Mother     No Known Problems Father     No Known Problems Sister     No Known Problems Brother     Cancer Neg Hx     Diabetes Neg Hx         Prior to Admission medications    Not on File       No current facility-administered medications for this encounter.       No Known Allergies    Objective       Intake/Output Summary (Last 24 hours) at 2024 1103  Last data filed at 2024 2321  Gross per 24 hour   Intake 1000 ml   Output --   Net 1000 ml       Invasive Devices:   Peripheral IV 24 Right Antecubital (Active)   Site Assessment WDL 24 1000   Dressing Type Transparent 24 1000   Line Status " "Flushed & Clamped 04/22/24 1000   Dressing Status Clean;Dry;Intact 04/22/24 1000       Vitals   Vitals:    04/21/24 2132 04/21/24 2317 04/22/24 0114 04/22/24 0719   BP: 125/58 115/59 113/70 128/61   TempSrc:   Oral    Pulse: 98 84 85    Resp: 20 20 18 18   Patient Position - Orthostatic VS: Lying Lying     Temp:   98.7 °F (37.1 °C) 97.6 °F (36.4 °C)         EKG, Pathology, and/or Other Studies: I have personally reviewed pertinent reports.    Sinus tachycardia 108, QRS 82, Qtc 428, no YENI or STD, no terminal R, no ectopy.  Overall impression: no toxicologic finding    Lab Results: I have personally reviewed pertinent reports.      Labs:    Results from last 7 days   Lab Units 04/22/24  0625 04/21/24 2128 04/18/24  0511   WBC Thousand/uL 12.79* 19.62* 11.88*   HEMOGLOBIN g/dL 11.5 13.3 11.3*   HEMATOCRIT % 36.5 42.1 35.6   PLATELETS Thousands/uL 331 401* 345   SEGS PCT % 61 75 60   LYMPHO PCT % 29 19 31   MONO PCT % 8 6 7   EOS PCT % 1 0 1      Results from last 7 days   Lab Units 04/22/24  0625   SODIUM mmol/L 140   POTASSIUM mmol/L 3.9   CHLORIDE mmol/L 108   CO2 mmol/L 26   BUN mg/dL 12   CREATININE mg/dL 0.63   CALCIUM mg/dL 9.2   ALK PHOS U/L 55   ALT U/L 9   AST U/L 9*   MAGNESIUM mg/dL 1.9      Results from last 7 days   Lab Units 04/21/24 2128   INR  1.12   PTT seconds 29     Results from last 7 days   Lab Units 04/21/24 2128   LACTIC ACID mmol/L 1.0     No results found for: \"TROPONINI\"  Results from last 7 days   Lab Units 04/22/24 0625   PH MARBIN  7.437*   PCO2 MARBIN mm Hg 36.8*   PO2 MARBIN mm Hg 136.2*   HCO3 MARBIN mmol/L 24.3   O2 CONTENT MARBIN ml/dL 17.0   O2 HGB, VENOUS % 94.7*     Results from last 7 days   Lab Units 04/21/24 2128   ACETAMINOPHEN LVL ug/mL <2*   ETHANOL LVL mg/dL <10   SALICYLATE LVL mg/dL <5     Invalid input(s): \"EXTPREGUR\"      Imaging Studies: I have personally reviewed pertinent reports.      Counseling / Coordination of Care  Total time spent today 35 minutes. This was a phone " consultation.

## 2024-04-22 NOTE — ASSESSMENT & PLAN NOTE
Recently admitted to inpatient psych 4/18-4/21 following ingestion of tylenol thought to be intentional overdose  She signed a 72 hour notice to withdrawal from treatment and was discharged home   According to discharge summary, pt declined initiation of any psych medication

## 2024-04-22 NOTE — ED NOTES
Patient is a 24 year old female who arrived to the ED via EMS after reportedly ingesting rat poison tonight as a suicide attempt.     No crisis consult ordered as patient will now be getting admitted medically. Therefore, this writer did not get an opportunity to assess the patient for MH services. However, after speaking with both ED attending and primary RN, as well as review of the patient's EMR, the patient does have a history of suicide attempts via overdose in 2016, 2018, 2020, and now three encounters since January of 2024. She was just discharged today (4/21) from IP after her most recent overdose. Per chart review, she signed a 72-hour notice to withdraw from treatment. Patient declined offers of psychotropic medications. The patient left AMA from the medical floor in January after being admitted for an overdose then. She does not appear to have had consistent outpatient psychiatric follow up after these admissions. During most recent admission, case management was able to schedule patient for outpatient therapy services on 4/29 at 11am with Marjorie Driver.     On initial provider assessment, patient endorsed ingesting rat poison. When alone with primary RN, she states she did not actually take rat poison and only said she did in order to receive attention from family. She asked that the RN communicate this to the provider as she does not feel she needs [facility] again. This writer spoke with ED provider after seeing this in order to understand patient's current disposition and level of care. It was at this time time ED provider informed me that the patient would be admitted medically as he is not confident in the validity of patient's assertion that she did not take rat poison tonight, especially due to overdose hx and leaving [facility] earlier today. Patient raising her voice in room, insisting she does not want [facility] again, however it appears the patient may have limited insight to her mental health and the  severity of the statements she made tonight along with her history.     Psychiatry to follow up with the patient from medical floor to further determine need for psychiatric level of care.

## 2024-04-22 NOTE — CASE MANAGEMENT
Case Management Discharge Planning Note    Patient name Grismerlin Fernandez  [facility] /[facility] -* MRN [id number]  : 2000 Date 2024       Current Admission Date: 2024  Current Admission Diagnosis:Suicide attempt (HCC)   Patient Active Problem List    Diagnosis Date Noted    Suicide attempt (HCC) 2024    Class 2 obesity due to excess calories without serious comorbidity with body mass index (BMI) of 37.0 to 37.9 in adult 2024    Vitamin D deficiency 2024    SIRS (systemic inflammatory response syndrome) (HCC) 2024    Transaminitis 2024    Pelvic fluid collection 2024    Abnormal CT scan, kidney 2024    Tylenol ingestion, undetermined intent, subsequent encounter 2024    COVID-19 affecting pregnancy in third trimester 2024    Patient noncompliance 2023    H/O abdominoplasty 10/17/2023    Status post repeat low transverse  section 2023    History of intrauterine growth restriction in prior pregnancy, currently pregnant, first trimester 2023    Hx of  section 2023    Adjustment disorder with anxious mood     Medical clearance for psychiatric admission 2020      LOS (days): 0  Geometric Mean LOS (GMLOS) (days):   Days to GMLOS:     OBJECTIVE:            Current admission status: Observation   Preferred Pharmacy:   [id number] - [place] - [address]  [address] [address]  Phone: [phone] Fax: [phone]    Primary Care Provider: No primary care provider on file.    Primary Insurance: [employer]  Secondary Insurance:     DISCHARGE DETAILS:    Discharge planning discussed with:: Patient  Freedom of Choice: Yes  Comments - Freedom of Choice: Patient chose to sign a 201  CM contacted family/caregiver?: No- see comments (refused call to family at this time.)  Were Treatment Team discharge recommendations reviewed with  patient/caregiver?: Yes  Did patient/caregiver verbalize understanding of patient care needs?: N/A- going to facility  Were patient/caregiver advised of the risks associated with not following Treatment Team discharge recommendations?: Yes    Requested Home Health Care         Is the patient interested in HHC at discharge?: No    DME Referral Provided  Referral made for DME?: No    Other Referral/Resources/Interventions Provided:  Interventions: Inpatient Behavioral Health    Would you like to participate in our [facility] service program?  : No - Declined    Treatment Team Recommendation: Inpatient Behavioral Health  Discharge Destination Plan:: Inpatient Behavioral Health ([facility])  Transport at Discharge : Auto with designated , Behavioral Health Transfer ([facility])  Dispatcher Contacted: Yes  Number/Name of Dispatcher: ROUNDTRIP  Transported by (Company and Unit #): [facility]  ETA of Transport (Date): 04/22/24  ETA of Transport (Time): 1800     Additional Comments: . JT s/w patient bedside who confirmed her mother is caring for her 2 children at home.     Christina at [facility] confirmed that she will follow-up with DCP at [facility] as she has concerns patient may need assistance with PPD information and may be suffering from PPD symptoms.     Accepting Facility Name, City & State : [place], [place]  Receiving Facility/Agency Phone Number: [phone]  Facility/Agency Fax Number: [phone]  Facility Insurance Auth Number: S/W TIM SILVA ([id number]) WHO APPROVED 3 DAYS IPU. CHRISTINA CONFIRMED THAT [facility] MUST CALL UPON ARRIVAL. CHRISTINA CONFIRMED THAT [facility] WAS NOT AWARE THAT PATIENT DISCHARGED FROM [facility] ALREADY.

## 2024-04-23 PROBLEM — T39.1X4D: Status: RESOLVED | Noted: 2024-01-29 | Resolved: 2024-04-23

## 2024-04-23 PROBLEM — T50.902A INTENTIONAL OVERDOSE (HCC): Status: ACTIVE | Noted: 2024-04-23

## 2024-04-23 PROBLEM — D72.829 LEUKOCYTOSIS: Status: ACTIVE | Noted: 2024-04-23

## 2024-04-23 PROBLEM — T14.91XA SUICIDE ATTEMPT (HCC): Status: RESOLVED | Noted: 2024-04-22 | Resolved: 2024-04-23

## 2024-04-23 PROBLEM — O09.291 HISTORY OF INTRAUTERINE GROWTH RESTRICTION IN PRIOR PREGNANCY, CURRENTLY PREGNANT, FIRST TRIMESTER: Status: RESOLVED | Noted: 2023-07-12 | Resolved: 2024-04-23

## 2024-04-23 PROBLEM — Z98.891 STATUS POST REPEAT LOW TRANSVERSE CESAREAN SECTION: Status: RESOLVED | Noted: 2023-07-12 | Resolved: 2024-04-23

## 2024-04-23 PROCEDURE — 99253 IP/OBS CNSLTJ NEW/EST LOW 45: CPT

## 2024-04-23 PROCEDURE — 81001 URINALYSIS AUTO W/SCOPE: CPT

## 2024-04-23 PROCEDURE — 99222 1ST HOSP IP/OBS MODERATE 55: CPT | Performed by: PSYCHIATRY & NEUROLOGY

## 2024-04-23 RX ADMIN — HYDROXYZINE HYDROCHLORIDE 50 MG: 50 TABLET, FILM COATED ORAL at 21:30

## 2024-04-23 RX ADMIN — Medication 1000 UNITS: at 11:17

## 2024-04-23 RX ADMIN — TRAZODONE HYDROCHLORIDE 50 MG: 50 TABLET ORAL at 21:30

## 2024-04-23 NOTE — PLAN OF CARE
RN will meet with patient atleast twice per day to assess for and questions or concerns. Teach about prescribed medication and diagnosis. Pt will be taught and encourage to utilize healthy coping skills.

## 2024-04-23 NOTE — ASSESSMENT & PLAN NOTE
Prior recent Vit D level 9.3  Continue PO supplementation   Outpatient follow up with PCP for repeat labs

## 2024-04-23 NOTE — ASSESSMENT & PLAN NOTE
Patient appears to have chronic leukocytosis ranging 12-19 on chart review  Does not appear to have previously seen hematology per chart review   Patient denies any localized/infectious sx and not meeting additional SIRS currently  Given prior UA with bacteriuria/pyuria will repeat however patient denies any urinary symptoms at this time.   Denies any specific B cell symptoms  Please alert [facility] for any clinical changes or new fevers/chills  Recommend outpatient follow up with hematology on discharge, please give referral on discharge

## 2024-04-23 NOTE — H&P
Psychiatric Evaluation - Behavioral Health   Grismerlin Fernandez 24 y.o. female MRN: [id number]  Unit/Bed#: [facility] 203-02 Encounter: [id number]    Assessment/Plan   Principal Problem:    Adjustment disorder with anxious mood  Active Problems:    Hx of  section    H/O abdominoplasty    Class 2 obesity due to excess calories without serious comorbidity with body mass index (BMI) of 37.0 to 37.9 in adult    Vitamin D deficiency      Plan:   Patient is declining psychotropic medication again.  She signed a 72-hour notice to withdraw from treatment.  Presentation appears to be consistent with cluster B personality pathology and attention seeking behaviors.  Will plan to discharge the patient Thursday morning.  Admit to [facility] inpatient psychiatry.  Medical management per SLIM.  Check admission labs: CMP, CBC, TSH, RPR, UDS, Lipid Panel, A1c.  Collaborate with case management for baseline assessment and disposition planning.  Chart reviewed and case discussed with treatment team.  Start/continue the following medications:  Current Facility-Administered Medications   Medication Dose Route Frequency Provider Last Rate    acetaminophen  650 mg Oral Q6H PRN Maura Soto, PA-C      acetaminophen  650 mg Oral Q4H PRN Maura Soto, PA-C      acetaminophen  975 mg Oral Q6H PRN Maura Soto, PA-C      aluminum-magnesium hydroxide-simethicone  30 mL Oral Q4H PRN Maura Soto, PA-C      Artificial Tears  1 drop Both Eyes Q3H PRN Maura Soto, PA-C      benztropine  1 mg Intramuscular Q4H PRN Max 6/day Maura Soot, PA-C      benztropine  1 mg Oral Q4H PRN Max 6/day Maura Soto, PA-C      bisacodyl  10 mg Rectal Daily PRN Maura Soto, PA-C      Cholecalciferol  1,000 Units Oral Daily Nitesh Rivera MD      hydrOXYzine HCL  50 mg Oral Q6H PRN Max 4/day Maura Soto, ARIEL      Or    diphenhydrAMINE  50 mg Intramuscular Q6H PRN Maura Manjarrez  Stives, PA-C      hydrOXYzine HCL  100 mg Oral Q6H PRN Max 4/day Maura Manjarrez Stives, PA-C      Or    LORazepam  2 mg Intramuscular Q6H PRN Maura Manjarrez Stives, PA-C      hydrOXYzine HCL  25 mg Oral Q6H PRN Max 4/day Maura Josseline Stives, PA-C      OLANZapine  5 mg Oral Q4H PRN Max 3/day Maura Josseline Stives, PA-C      Or    OLANZapine  2.5 mg Intramuscular Q4H PRN Max 3/day Maura Josseline Stives, PA-C      OLANZapine  5 mg Oral Q3H PRN Max 3/day Maura Waltonn Stives, PA-C      Or    OLANZapine  5 mg Intramuscular Q3H PRN Max 3/day Maura Waltonn Stives, PA-C      OLANZapine  2.5 mg Oral Q4H PRN Max 6/day Maura Waltonn Stives, PA-C      polyethylene glycol  17 g Oral Daily PRN Maura Waltonn Stives, PA-C      propranolol  10 mg Oral Q8H PRN Maura Manjarrez Stives, PA-C      senna-docusate sodium  1 tablet Oral Daily PRN Maura Manjarrez Stives, PA-C      traZODone  50 mg Oral HS PRN Maura Manjarrez Stives, PA-C         Treatment options and alternatives were reviewed with the patient, who concurs with the above plan. Risks, benefits, and possible side effects of medications were explained to the patient, and she verbalizes understanding.      -----------------------------------    Chief Complaint: Reported ingestion of rat poison    History of Present Illness     On admission to Inpatient Psychiatric Unit:  Patient is a 24-year-old  but English-speaking female, recently admitted to this unit and known well by this writer, who was discharged on 4/21/2024, who presents after initially reporting that she ingested rat poison but then later denied that this actually occurred.    Symptoms prior to hospitalization include: Feeling lonely and telling her family member that she ingested rat poison to gain attention but then later denying that this ever happened.  Symptom severity is rated as mild.  Timeline is acute and occurring within the past week.  Mitigating factors are none.  Exacerbating stressors are none.    On initial  "psychiatric evaluation today, the patient is very clear that she did not actually ingest rat poison and that she made this up to get the attention of her family members.  She is unable to cite any specific stressors or reasons that she engaged in this attention seeking behavior.  She is once again upset that she is at the inpatient behavioral health unit.  She signed a 72-hour notice to withdraw from treatment.  She states that there have been no changes whatsoever in her psychiatric symptomatology, medical issues, etc., since discharging from the unit on 4/21.  She denies drug use.  She denies manic or psychotic symptoms.  She is once again this interested in psychotropic medication and politely declines.  She states that \"sometimes you just need to talk to people about what you are thinking inside\" and is clear that she did not actually ingest anything. She denies SI, intent, or plan at the time of my evaluation.     Psychiatric Review Of Systems:  Medication side effects: none  Sleep: no change  Appetite: no change  Hygiene: able to tend to instrumental and basic ADLs  Anxiety Symptoms: denies  Psychotic Symptoms: denies  Depression Symptoms: denies  Manic Symptoms: denies  PTSD Symptoms: denies  Suicidal Thoughts: denies  Homicidal Thoughts: denies    Medical Review Of Systems:   Patient denies headache or dizziness.   Patient denies chest pain or palpitations.  Patient denies difficulty breathing or wheezing.  Patient denies nausea, vomiting, or diarrhea.  Patient denies polyuria or polydipsia.  Patient denies weakness or numbness.  Pertinent positives as per HPI.    Historical Information     Psychiatric History:   Diagnoses: Adjustment D/O  Inpatient Hx: QU 4/21 discharge  Outpatient Hx: None  Medications/Trials: None    Substance Abuse History:  Social History     Substance and Sexual Activity   Alcohol Use Not Currently    Comment: Denies     Social History     Substance and Sexual Activity   Drug Use Never "       I spent time with Grismerlin in counseling and education on risk of substance abuse. I assessed motivation and encouraged her for treatment as appropriate.     Family History:   Family History   Problem Relation Age of Onset    No Known Problems Mother     No Known Problems Father     No Known Problems Sister     No Known Problems Brother     Cancer Neg Hx     Diabetes Neg Hx        Social History:  Highest education: 11th grade  Currently living: Alone  Relationships: Single  Children: 2  Occupation:     Rest of social history as per below:    Social History     Socioeconomic History    Marital status: Single     Spouse name: Not on file    Number of children: Not on file    Years of education: Not on file    Highest education level: Not on file   Occupational History    Not on file   Tobacco Use    Smoking status: Former     Types: Pipe     Passive exposure: Never    Smokeless tobacco: Never    Tobacco comments:     Occasional hookah   Vaping Use    Vaping status: Never Used   Substance and Sexual Activity    Alcohol use: Not Currently     Comment: Denies    Drug use: Never    Sexual activity: Yes     Partners: Male   Other Topics Concern    Not on file   Social History Narrative    Not on file     Social Determinants of Health     Financial Resource Strain: Low Risk  (4/19/2024)    Overall Financial Resource Strain (CARDIA)     Difficulty of Paying Living Expenses: Not hard at all   Food Insecurity: No Food Insecurity (4/22/2024)    Hunger Vital Sign     Worried About Running Out of Food in the Last Year: Never true     Ran Out of Food in the Last Year: Never true   Transportation Needs: No Transportation Needs (4/22/2024)    PRAPARE - Transportation     Lack of Transportation (Medical): No     Lack of Transportation (Non-Medical): No   Physical Activity: Not on file   Stress: Not on file   Social Connections: Not on file   Intimate Partner Violence: Not At Risk (4/19/2024)    Humiliation,  "Afraid, Rape, and Kick questionnaire     Fear of Current or Ex-Partner: No     Emotionally Abused: No     Physically Abused: No     Sexually Abused: No   Housing Stability: Low Risk  (4/22/2024)    Housing Stability Vital Sign     Unable to Pay for Housing in the Last Year: No     Number of Places Lived in the Last Year: 1     Unstable Housing in the Last Year: No       Past Medical History:   Past Medical History:   Diagnosis Date    Depression         -----------------------------------  Objective    Temp:  [99.2 °F (37.3 °C)-99.9 °F (37.7 °C)] 99.9 °F (37.7 °C)  HR:  [67-93] 67  Resp:  [16-18] 18  BP: (112-138)/(67-78) 128/67    Mental Status Evaluation:  Appearance: casually dressed, appears consistent with stated age  Motor: no psychomotor disturbances, no gait abnormalities  Behavior: cooperative, interacts with this writer appropriately  Speech: normal rate, rhythm, and volume  Mood: \"good\"  Affect: euthymic, normal range and intensity  Thought Process: organized, linear, and goal-oriented  Thought Content: denies auditory hallucinations, denies visual hallucinations, denies delusions  Risk Potential: denies suicidal ideation, plan, or intent. Denies homicidal ideation  Sensorium: Oriented to person, place, time, and situation  Cognition: cognitive ability appears intact but was not quantitatively tested  Consciousness: alert and awake  Attention: able to focus without difficulty  Insight: good  Judgement: limited      Meds/Allergies   No Known Allergies      Behavioral Health Medications: all current active meds have been reviewed as per above. Changes as per above. Risks, benefits, indications, and alternatives to treatment were discussed with patient.     Laboratory results:  I have personally reviewed all pertinent laboratory/tests results.  Recent Results (from the past 48 hour(s))   ECG 12 lead    Collection Time: 04/21/24  8:27 PM   Result Value Ref Range    Ventricular Rate 108 BPM    Atrial Rate 108 " BPM    IL Interval 130 ms    QRSD Interval 82 ms    QT Interval 320 ms    QTC Interval 428 ms    P Axis 42 degrees    QRS Axis 77 degrees    T Wave Axis 4 degrees   CBC and differential    Collection Time: 04/21/24  9:28 PM   Result Value Ref Range    WBC 19.62 (H) 4.31 - 10.16 Thousand/uL    RBC 5.00 3.81 - 5.12 Million/uL    Hemoglobin 13.3 11.5 - 15.4 g/dL    Hematocrit 42.1 34.8 - 46.1 %    MCV 84 82 - 98 fL    MCH 26.6 (L) 26.8 - 34.3 pg    MCHC 31.6 31.4 - 37.4 g/dL    RDW 14.6 11.6 - 15.1 %    MPV 9.8 8.9 - 12.7 fL    Platelets 401 (H) 149 - 390 Thousands/uL    nRBC 0 /100 WBCs    Segmented % 75 43 - 75 %    Immature Grans % 0 0 - 2 %    Lymphocytes % 19 14 - 44 %    Monocytes % 6 4 - 12 %    Eosinophils Relative 0 0 - 6 %    Basophils Relative 0 0 - 1 %    Absolute Neutrophils 14.53 (H) 1.85 - 7.62 Thousands/µL    Absolute Immature Grans 0.08 0.00 - 0.20 Thousand/uL    Absolute Lymphocytes 3.68 0.60 - 4.47 Thousands/µL    Absolute Monocytes 1.23 (H) 0.17 - 1.22 Thousand/µL    Eosinophils Absolute 0.04 0.00 - 0.61 Thousand/µL    Basophils Absolute 0.06 0.00 - 0.10 Thousands/µL   Comprehensive metabolic panel    Collection Time: 04/21/24  9:28 PM   Result Value Ref Range    Sodium 139 135 - 147 mmol/L    Potassium 3.9 3.5 - 5.3 mmol/L    Chloride 106 96 - 108 mmol/L    CO2 24 21 - 32 mmol/L    ANION GAP 9 4 - 13 mmol/L    BUN 12 5 - 25 mg/dL    Creatinine 0.62 0.60 - 1.30 mg/dL    Glucose 85 65 - 140 mg/dL    Calcium 9.8 8.4 - 10.2 mg/dL    AST 12 (L) 13 - 39 U/L    ALT 10 7 - 52 U/L    Alkaline Phosphatase 69 34 - 104 U/L    Total Protein 7.2 6.4 - 8.4 g/dL    Albumin 4.1 3.5 - 5.0 g/dL    Total Bilirubin 0.34 0.20 - 1.00 mg/dL    eGFR 127 ml/min/1.73sq m   Protime-INR    Collection Time: 04/21/24  9:28 PM   Result Value Ref Range    Protime 14.7 (H) 11.6 - 14.5 seconds    INR 1.12 0.84 - 1.19   APTT    Collection Time: 04/21/24  9:28 PM   Result Value Ref Range    PTT 29 23 - 37 seconds   Type and screen     Collection Time: 04/21/24  9:28 PM   Result Value Ref Range    ABO Grouping A     Rh Factor Positive     Antibody Screen Negative     Specimen Expiration Date 20240424    Lactic acid, plasma (w/reflex if result > 2.0)    Collection Time: 04/21/24  9:28 PM   Result Value Ref Range    LACTIC ACID 1.0 0.5 - 2.0 mmol/L   Blood gas, venous    Collection Time: 04/21/24  9:28 PM   Result Value Ref Range    pH, Peewee 7.409 (H) 7.300 - 7.400    pCO2, Peewee 34.7 (L) 42.0 - 50.0 mm Hg    pO2, Peewee 60.8 (H) 35.0 - 45.0 mm Hg    HCO3, Peewee 21.5 (L) 24 - 30 mmol/L    Base Excess, Peewee -2.5 mmol/L    O2 Content, Peewee 16.0 ml/dL    O2 HGB, VENOUS 85.0 (H) 60.0 - 80.0 %   Magnesium    Collection Time: 04/21/24  9:28 PM   Result Value Ref Range    Magnesium 1.9 1.9 - 2.7 mg/dL   CK    Collection Time: 04/21/24  9:28 PM   Result Value Ref Range    Total CK 40 26 - 192 U/L   Ethanol    Collection Time: 04/21/24  9:28 PM   Result Value Ref Range    Ethanol Lvl <10 <10 mg/dL   Salicylate level    Collection Time: 04/21/24  9:28 PM   Result Value Ref Range    Salicylate Lvl <5 3 - 20 mg/dL   Acetaminophen level-If concentration is detectable, please discuss with medical  on call.    Collection Time: 04/21/24  9:28 PM   Result Value Ref Range    Acetaminophen Level <2 (L) 10 - 20 ug/mL   Rapid drug screen, urine    Collection Time: 04/22/24  3:36 AM   Result Value Ref Range    Amph/Meth UR Negative Negative    Barbiturate Ur Negative Negative    Benzodiazepine Urine Negative Negative    Cocaine Urine Negative Negative    Methadone Urine Negative Negative    Opiate Urine Negative Negative    PCP Ur Negative Negative    THC Urine Negative Negative    Oxycodone Urine Negative Negative    Fentanyl Urine Negative Negative    HYDROCODONE URINE Negative Negative   Comprehensive metabolic panel    Collection Time: 04/22/24  6:25 AM   Result Value Ref Range    Sodium 140 135 - 147 mmol/L    Potassium 3.9 3.5 - 5.3 mmol/L    Chloride 108 96 -  108 mmol/L    CO2 26 21 - 32 mmol/L    ANION GAP 6 4 - 13 mmol/L    BUN 12 5 - 25 mg/dL    Creatinine 0.63 0.60 - 1.30 mg/dL    Glucose 93 65 - 140 mg/dL    Calcium 9.2 8.4 - 10.2 mg/dL    AST 9 (L) 13 - 39 U/L    ALT 9 7 - 52 U/L    Alkaline Phosphatase 55 34 - 104 U/L    Total Protein 7.0 6.4 - 8.4 g/dL    Albumin 3.7 3.5 - 5.0 g/dL    Total Bilirubin 0.55 0.20 - 1.00 mg/dL    eGFR 126 ml/min/1.73sq m   CBC and differential    Collection Time: 04/22/24  6:25 AM   Result Value Ref Range    WBC 12.79 (H) 4.31 - 10.16 Thousand/uL    RBC 4.37 3.81 - 5.12 Million/uL    Hemoglobin 11.5 11.5 - 15.4 g/dL    Hematocrit 36.5 34.8 - 46.1 %    MCV 84 82 - 98 fL    MCH 26.3 (L) 26.8 - 34.3 pg    MCHC 31.5 31.4 - 37.4 g/dL    RDW 14.5 11.6 - 15.1 %    MPV 9.4 8.9 - 12.7 fL    Platelets 331 149 - 390 Thousands/uL    nRBC 0 /100 WBCs    Segmented % 61 43 - 75 %    Immature Grans % 1 0 - 2 %    Lymphocytes % 29 14 - 44 %    Monocytes % 8 4 - 12 %    Eosinophils Relative 1 0 - 6 %    Basophils Relative 0 0 - 1 %    Absolute Neutrophils 7.96 (H) 1.85 - 7.62 Thousands/µL    Absolute Immature Grans 0.07 0.00 - 0.20 Thousand/uL    Absolute Lymphocytes 3.64 0.60 - 4.47 Thousands/µL    Absolute Monocytes 1.00 0.17 - 1.22 Thousand/µL    Eosinophils Absolute 0.07 0.00 - 0.61 Thousand/µL    Basophils Absolute 0.05 0.00 - 0.10 Thousands/µL   Magnesium    Collection Time: 04/22/24  6:25 AM   Result Value Ref Range    Magnesium 1.9 1.9 - 2.7 mg/dL   CK    Collection Time: 04/22/24  6:25 AM   Result Value Ref Range    Total CK 33 26 - 192 U/L   Blood gas, venous    Collection Time: 04/22/24  6:25 AM   Result Value Ref Range    pH, Peewee 7.437 (H) 7.300 - 7.400    pCO2, Peewee 36.8 (L) 42.0 - 50.0 mm Hg    pO2, Peewee 136.2 (H) 35.0 - 45.0 mm Hg    HCO3, Peewee 24.3 24 - 30 mmol/L    Base Excess, Peewee 0.3 mmol/L    O2 Content, Peewee 17.0 ml/dL    O2 HGB, VENOUS 94.7 (H) 60.0 - 80.0 %   Fingerstick Glucose (POCT)    Collection Time: 04/22/24  7:17 AM   Result  Value Ref Range    POC Glucose 95 65 - 140 mg/dl        Progress Toward Goals & Illness Status: Patient is not at goal. They are psychiatrically unstable and are not yet ready for discharge. The patient's condition currently requires active psychopharmacological medication management, interdisciplinary coordination with case management, and the utilization of adjunctive milieu and group therapy to augment psychopharmacological efficacy. The patient's risk of morbidity, and progression or decompensation of psychiatric disease, is high without this current treatment.           -----------------------------------    Risks / Benefits of Treatment:     Risks, benefits, and possible side effects of medications explained to patient. The patient verbalizes understanding and agreement for treatment.     Counseling / Coordination of Care:     Patient's presentation on admission and proposed treatment plan were discussed with the treatment team.  Diagnosis, medication changes and treatment plan were reviewed with the patient.  Recent stressors were discussed with the patient.  Events leading to admission were reviewed with the patient.  Importance of medication and treatment compliance was reviewed with the patient.          Inpatient Psychiatric Certification:     Certification: Based upon physical, mental and social evaluations, I certify that inpatient psychiatric services are medically necessary for this patient for a duration of 5-7 midnights (exact duration TBD pending patient's response to psychiatric treatment) for the diagnosis listed above.     Available alternative community resources do not meet the patient's mental health care needs.  I further attest that an established written individualized plan of care has been implemented and is outlined in the patient's medical records.        This note has been constructed using a voice recognition system. There may be translation, syntax, or grammatical errors. If you have  any questions, please contact the dictating provider.

## 2024-04-23 NOTE — NURSING NOTE
Patient is relaxing in her room on approach, coloring with crayons following dinner. Pt remains minimal in conversation, however, continues to deny symptoms. No current concerns noted. Reports having the opportunity to call her children this afternoon, however, reports she spoke with CM about wanting to face-time her kids at some point in near future.

## 2024-04-23 NOTE — NURSING NOTE
Patient resting in bed most of morning. Declined breakfast and lunch at this time, despite being prompted twice by staff. Pt reports feeling tired today and is disinterested in engaging in group therapy or getting up for meals. Pt did wake for her 1 AM medication, however, returned to bed following. She denies SI, HI, AVH, reports that she is looking forward to calling her babies today.

## 2024-04-23 NOTE — TREATMENT PLAN
TREATMENT PLAN REVIEW - Behavioral Health Grismerlin Fernandez 24 y.o. 2000 female MRN: [id number]    [facility] QU IP BEHAVIORAL HLTH Room / Bed: [facility] 203/[facility] 203-02 Encounter: [id number]          Admit Date/Time:  2024  9:42 PM    Treatment Team:   MD Nehal Bowers LPN Antonia Horne Charlotte Candy  NATALIA Armstrong, NATALIA Hernandez, NATALIA Crockett, NATALIA Tamayo    Diagnosis: Principal Problem:    Adjustment disorder with anxious mood  Active Problems:    Hx of  section    H/O abdominoplasty    Class 2 obesity due to excess calories without serious comorbidity with body mass index (BMI) of 37.0 to 37.9 in adult    Vitamin D deficiency      Patient Strengths/Assets: ability for insight    Patient Barriers/Limitations: unresourceful    Short Term Goals: decrease in suicidal thoughts    Long Term Goals: free of suicidal thoughts    Progress Towards Goals: improving gradually    Recommended Treatment: medication management, patient medication education, group therapy, milieu therapy, continued Behavioral Health psychiatric evaluation/assessment process    Treatment Frequency: daily medication monitoring, group and milieu therapy daily, monitoring through interdisciplinary rounds, monitoring through weekly patient care conferences    Expected Discharge Date:  3 days    Discharge Plan: discharge to home    Treatment Plan Created/Updated By: Kaushik Tiwari DO

## 2024-04-23 NOTE — ASSESSMENT & PLAN NOTE
4/22 labs reviewed, CBC, CMP acceptable  4/18 labs lipid panel, TSH acceptable  Vitals stable   UDS negative  EKG pending   Medically stable for continued inpatient psychiatric treatment based on available results

## 2024-04-23 NOTE — CONSULTS
[facility]  Consult  Name: Grismerlin Fernandez 24 y.o. female I MRN: [id number]  Unit/Bed#: [facility] 203-02 I Date of Admission: 4/22/2024   Date of Service: 4/23/2024 I Hospital Day: 1    Inpatient consult for Medical Clearance for  patient  Consult performed by: Keri Stevens PA-C  Consult ordered by: Maura Soto PA-C                Assessment/Plan   Medical clearance for psychiatric admission  Assessment & Plan  4/22 labs reviewed, CBC, CMP acceptable  4/18 labs lipid panel, TSH acceptable  Vitals stable   UDS negative  EKG pending   Medically stable for continued inpatient psychiatric treatment based on available results     Leukocytosis  Assessment & Plan  Patient appears to have chronic leukocytosis ranging 12-19 on chart review  Does not appear to have previously seen hematology per chart review   Patient denies any localized/infectious sx and not meeting additional SIRS currently  Given prior UA with bacteriuria/pyuria will repeat however patient denies any urinary symptoms at this time.   Denies any specific B cell symptoms  Please alert im for any clinical changes or new fevers/chills  Recommend outpatient follow up with hematology on discharge, please give referral on discharge     Intentional overdose (HCC)  Assessment & Plan  Recent admission to [facility] with discharge on 4/21 following intentional tylenol OD  Patient presented to [facility] ED on 4/22 after reporting to family intentional ingestion of rat poison   Patient later stated that she never actually ingested any rat poison or other substances and continues to report this today 4/23  Medical toxicology was consulted, patient was monitored without adverse effects  Continue psychiatric treatment      Vitamin D deficiency  Assessment & Plan  Prior recent Vit D level 9.3  Continue PO supplementation   Outpatient follow up with PCP for repeat labs                    Counseling / Coordination of Care  Time: 30 minutes.  Greater than 50% of total time spent on patient counseling and coordination of care.     Collaboration of Care: Were Recommendations Directly Discussed with Primary Treatment Team? - No      History of Present Illness:     Grismerlin Fernandez is a 24 y.o. female with PMH of vitamin D deficiency, anxiety, adjustment disorder, recent [facility] admission 4/18-4/21 following intentional tylenol overdose who is originally re-admitted to the psychiatry service as patient presented to the ED at [facility] on 4/22 with report of rat poison ingestion. Medical toxicology was consulted and patient was admitted for observation without any evidence of adverse effects. Patient later stated that she never actually ingested rat poison or other substance. Patient was discharged and transferred to inpatient U 4/22. We are consulted for medical clearance for admission to Behavioral Health Unit and treatment of underlying psychiatric illness.       Patient denies any physical symptoms today including fevers, chills, chest pain, SOB, cough, wheeze, sore throat, N/V, abdominal pain, urinary symptoms, rashes or wounds. Labs and vitals reviewed. Based on all available data patient appears medically stable to continue inpatient psychiatric treatment.      Review of Systems:     Review of Systems   Constitutional:  Positive for fatigue. Negative for chills and fever.   HENT:  Negative for congestion, rhinorrhea and sore throat.    Eyes:  Negative for visual disturbance.   Respiratory:  Negative for cough, chest tightness, shortness of breath and wheezing.    Cardiovascular:  Negative for chest pain, palpitations and leg swelling.   Gastrointestinal:  Negative for abdominal pain, constipation, diarrhea, nausea and vomiting.   Genitourinary:  Negative for difficulty urinating, dysuria, frequency and urgency.   Musculoskeletal:  Negative for arthralgias, back pain and myalgias.   Skin:  Negative for rash and wound.  "  Neurological:  Negative for dizziness, light-headedness and headaches.   Psychiatric/Behavioral:  Positive for dysphoric mood.    All other systems reviewed and are negative.        Past Medical and Surgical History:      Medical History        Past Medical History:   Diagnosis Date    Depression              Surgical History         Past Surgical History:   Procedure Laterality Date    ABDOMINOPLASTY       [place]  DELIVERY ONLY N/A 2020     Procedure:  SECTION ();  Surgeon: Adair Clifton MD;  Location: [place] ;  Service: Obstetrics    [place]  DELIVERY ONLY N/A 1/15/2024     Procedure:  SECTION ();  Surgeon: Yardlie Toussaint-Foster, DO;  Location: [place] ;  Service: Obstetrics            Meds/Allergies:     all medications and allergies reviewed     Allergies: No Known Allergies     Social History:     Marital Status: Single     Substance Use History:   Social History           Substance and Sexual Activity   Alcohol Use Not Currently     Comment: Denies      Social History           Tobacco Use   Smoking Status Former    Types: Pipe    Passive exposure: Never   Smokeless Tobacco Never   Tobacco Comments     Occasional hookah      Social History          Substance and Sexual Activity   Drug Use Never         Family History:     Family History         Family History   Problem Relation Age of Onset    No Known Problems Mother      No Known Problems Father      No Known Problems Sister      No Known Problems Brother      Cancer Neg Hx      Diabetes Neg Hx              Physical Exam:      Vitals:   Blood Pressure: 122/67 (24 1245)  Pulse: 73 (24 1245)  Temperature: 99.1 °F (37.3 °C) (24 1245)  Temp Source: Tympanic (24 1245)  Respirations: 18 (24 1245)  Height: 5' 2\" (157.5 cm) (24)  Weight - Scale: 92.1 kg (203 lb) (24)  SpO2: 100 % (24 1245)     Physical Exam  Vitals and nursing note reviewed. "   Constitutional:       General: She is not in acute distress.     Appearance: She is obese. She is not ill-appearing.   HENT:      Head: Normocephalic and atraumatic.      Nose: Nose normal.   Eyes:      General:         Right eye: No discharge.         Left eye: No discharge.      Extraocular Movements: Extraocular movements intact.      Conjunctiva/sclera: Conjunctivae normal.   Cardiovascular:      Rate and Rhythm: Normal rate and regular rhythm.      Heart sounds: Normal heart sounds. No murmur heard.  Pulmonary:      Effort: Pulmonary effort is normal. No respiratory distress.      Breath sounds: Normal breath sounds. No wheezing, rhonchi or rales.   Abdominal:      General: Bowel sounds are normal.      Palpations: Abdomen is soft.      Tenderness: There is no abdominal tenderness. There is no guarding.   Musculoskeletal:      Right lower leg: No edema.      Left lower leg: No edema.   Skin:     General: Skin is warm and dry.   Neurological:      General: No focal deficit present.      Mental Status: She is alert and oriented to person, place, and time. Mental status is at baseline.      Cranial Nerves: No cranial nerve deficit.   Psychiatric:         Mood and Affect: Mood normal.         Behavior: Behavior normal.            Additional Data:      Lab Results: I have personally reviewed pertinent reports.            Results from last 7 days   Lab Units 04/22/24  0625   WBC Thousand/uL 12.79*   HEMOGLOBIN g/dL 11.5   HEMATOCRIT % 36.5   PLATELETS Thousands/uL 331   SEGS PCT % 61   LYMPHO PCT % 29   MONO PCT % 8   EOS PCT % 1           Results from last 7 days   Lab Units 04/22/24  0625   SODIUM mmol/L 140   POTASSIUM mmol/L 3.9   CHLORIDE mmol/L 108   CO2 mmol/L 26   BUN mg/dL 12   CREATININE mg/dL 0.63   ANION GAP mmol/L 6   CALCIUM mg/dL 9.2   ALBUMIN g/dL 3.7   TOTAL BILIRUBIN mg/dL 0.55   ALK PHOS U/L 55   ALT U/L 9   AST U/L 9*   GLUCOSE RANDOM mg/dL 93           Results from last 7 days   Lab Units  04/21/24  2128   INR   1.12                Lab Results   Component Value Date/Time     HGBA1C 5.1 04/19/2024 06:09 AM            Results from last 7 days   Lab Units 04/22/24  0717 04/17/24  0256   POC GLUCOSE mg/dl 95 108         EKG, Pathology, and Other Studies Reviewed on Admission:   EKG pending      ** Please Note: This note has been constructed using a voice recognition system. **

## 2024-04-23 NOTE — NURSING NOTE
PT refused lunch was reminded that next meal time is at 1630. PT verbalized understanding and expressed pt was not hungry. Pt was encouraged  to eat ,pt was prompt four times from staff rounding unit.

## 2024-04-23 NOTE — NURSING NOTE
"Patient scored \"High Risk\" on Lifetime C-SSRS. Currently, patient is \"Low Risk,\" denies current SI, endorses feeling of safety on the unit. Dr. Darling Mahan of [facility] notified, no new orders obtained at this time. Dr. Mahan also made aware of medication reconciliation completion.    "

## 2024-04-23 NOTE — NURSING NOTE
"Grismerlin is a 23 y/o female, here on a 201 from [facility]S. Patient reported intentional O/D on rat poison. While in the ED, Grismerlin later recanted, saying that she did not actually ingest anything, reported, \"I just wanted to get attention from my family.\" Patient was recently discharged from [facility] on 4/21 for Tylenol O/D. Grismerlin is irritable throughout admission assessment, repeatedly requesting to sign a 72 hour notice. Patient stating, \"I don't need to be here.\" Patient's skin assessment reveals a healed burn on her R forearm, reports it was an accident from a few weeks ago, while cooking. Grismerlin reports that she lives with her kids and that her mother is an amazing support for her. Grismerlin denies current SI/HI/AVH. Patient denies PMH, denies abuse history and drug/alcohol use. UDS: negative. Grismerlin requested and signed a 72 hour notice, 4/22/24 at 22:00.   "

## 2024-04-23 NOTE — ASSESSMENT & PLAN NOTE
Recent admission to BHU with discharge on 4/21 following intentional tylenol OD  Patient presented to [facility] ED on 4/22 after reporting to family intentional ingestion of rat poison   Patient later stated that she never actually ingested any rat poison or other substances and continues to report this today 4/23  Medical toxicology was consulted, patient was monitored without adverse effects  Continue psychiatric treatment

## 2024-04-23 NOTE — NURSING NOTE
Bedside  - purple bra  - red shirt   - beige pj pants      Bin  - iPhone with clear case    No wallet, ID, or other valuables upon admission

## 2024-04-23 NOTE — QUICK NOTE
Received TT from provider at other campus who saw this patient Jan 2024 during admission for complication/infection following C/S. Ct at that time showed small bilateral wedge shaped hypoenhancing areas in bilateral kidneys. Provider noting that patient never followed up outpatient for abnormal thrombosis panel and had left that admission AMA. On review of chart, thrombosis panel 1/30 is with multiple abnormalities likely due to patient receiving multiple doses of lovenox prior to thrombosis panel being drawn. Per chart review patient has no proven hx of clots. Discussed with heme onc no need to repeat at this time.

## 2024-04-23 NOTE — PROGRESS NOTES
[facility]  Progress Note  Name: Grismerlin Fernandez I  MRN: [id number]  Unit/Bed#: [facility] 203-02 I Date of Admission: 4/22/2024   Date of Service: 4/23/2024 I Hospital Day: 1    Assessment/Plan   Medical clearance for psychiatric admission  Assessment & Plan  4/22 labs reviewed, CBC, CMP acceptable  4/18 labs lipid panel, TSH acceptable  Vitals stable   UDS negative  EKG pending   Medically stable for continued inpatient psychiatric treatment based on available results    Leukocytosis  Assessment & Plan  Patient appears to have chronic leukocytosis ranging 12-19 on chart review  Does not appear to have previously seen hematology per chart review   Patient denies any localized/infectious sx and not meeting additional SIRS currently  Given prior UA with bacteriuria/pyuria will repeat however patient denies any urinary symptoms at this time.   Denies any specific B cell symptoms  Please alert slim for any clinical changes or new fevers/chills  Recommend outpatient follow up with hematology on discharge, please give referral on discharge    Intentional overdose (HCC)  Assessment & Plan  Recent admission to [facility] with discharge on 4/21 following intentional tylenol OD  Patient presented to [facility] ED on 4/22 after reporting to family intentional ingestion of rat poison   Patient later stated that she never actually ingested any rat poison or other substances and continues to report this today 4/23  Medical toxicology was consulted, patient was monitored without adverse effects  Continue psychiatric treatment     Vitamin D deficiency  Assessment & Plan  Prior recent Vit D level 9.3  Continue PO supplementation   Outpatient follow up with PCP for repeat labs             Counseling / Coordination of Care Time: 30 minutes.  Greater than 50% of total time spent on patient counseling and coordination of care.    Collaboration of Care: Were Recommendations Directly Discussed with Primary  Treatment Team? - No     History of Present Illness:    Grismerlin Fernandez is a 24 y.o. female with PMH of vitamin D deficiency, anxiety, adjustment disorder, recent U admission 4/18-4/21 following intentional tylenol overdose who is originally re-admitted to the psychiatry service as patient presented to the ED at [facility] on 4/22 with report of rat poison ingestion. Medical toxicology was consulted and patient was admitted for observation without any evidence of adverse effects. Patient later stated that she never actually ingested rat poison or other substance. Patient was discharged and transferred to inpatient U 4/22. We are consulted for medical clearance for admission to Behavioral Health Unit and treatment of underlying psychiatric illness.      Patient denies any physical symptoms today including fevers, chills, chest pain, SOB, cough, wheeze, sore throat, N/V, abdominal pain, urinary symptoms, rashes or wounds. Labs and vitals reviewed. Based on all available data patient appears medically stable to continue inpatient psychiatric treatment.     Review of Systems:    Review of Systems   Constitutional:  Positive for fatigue. Negative for chills and fever.   HENT:  Negative for congestion, rhinorrhea and sore throat.    Eyes:  Negative for visual disturbance.   Respiratory:  Negative for cough, chest tightness, shortness of breath and wheezing.    Cardiovascular:  Negative for chest pain, palpitations and leg swelling.   Gastrointestinal:  Negative for abdominal pain, constipation, diarrhea, nausea and vomiting.   Genitourinary:  Negative for difficulty urinating, dysuria, frequency and urgency.   Musculoskeletal:  Negative for arthralgias, back pain and myalgias.   Skin:  Negative for rash and wound.   Neurological:  Negative for dizziness, light-headedness and headaches.   Psychiatric/Behavioral:  Positive for dysphoric mood.    All other systems reviewed and are negative.       Past Medical and  "Surgical History:     Past Medical History:   Diagnosis Date    Depression        Past Surgical History:   Procedure Laterality Date    ABDOMINOPLASTY      [place]  DELIVERY ONLY N/A 2020    Procedure:  SECTION ();  Surgeon: Adair Clifton MD;  Location: [facility];  Service: Obstetrics    [place]  DELIVERY ONLY N/A 1/15/2024    Procedure:  SECTION ();  Surgeon: Yardlie Toussaint-Foster, DO;  Location: [facility];  Service: Obstetrics       Meds/Allergies:    all medications and allergies reviewed    Allergies: No Known Allergies    Social History:     Marital Status: Single    Substance Use History:   Social History     Substance and Sexual Activity   Alcohol Use Not Currently    Comment: Denies     Social History     Tobacco Use   Smoking Status Former    Types: Pipe    Passive exposure: Never   Smokeless Tobacco Never   Tobacco Comments    Occasional hookah     Social History     Substance and Sexual Activity   Drug Use Never       Family History:    Family History   Problem Relation Age of Onset    No Known Problems Mother     No Known Problems Father     No Known Problems Sister     No Known Problems Brother     Cancer Neg Hx     Diabetes Neg Hx        Physical Exam:     Vitals:   Blood Pressure: 122/67 (24 1245)  Pulse: 73 (24 1245)  Temperature: 99.1 °F (37.3 °C) (24 1245)  Temp Source: Tympanic (24 1245)  Respirations: 18 (24 1245)  Height: 5' 2\" (157.5 cm) (24)  Weight - Scale: 92.1 kg (203 lb) (24)  SpO2: 100 % (24 1245)    Physical Exam  Vitals and nursing note reviewed.   Constitutional:       General: She is not in acute distress.     Appearance: She is obese. She is not ill-appearing.   HENT:      Head: Normocephalic and atraumatic.      Nose: Nose normal.   Eyes:      General:         Right eye: No discharge.         Left eye: No discharge.      Extraocular Movements: Extraocular movements intact.      " Conjunctiva/sclera: Conjunctivae normal.   Cardiovascular:      Rate and Rhythm: Normal rate and regular rhythm.      Heart sounds: Normal heart sounds. No murmur heard.  Pulmonary:      Effort: Pulmonary effort is normal. No respiratory distress.      Breath sounds: Normal breath sounds. No wheezing, rhonchi or rales.   Abdominal:      General: Bowel sounds are normal.      Palpations: Abdomen is soft.      Tenderness: There is no abdominal tenderness. There is no guarding.   Musculoskeletal:      Right lower leg: No edema.      Left lower leg: No edema.   Skin:     General: Skin is warm and dry.   Neurological:      General: No focal deficit present.      Mental Status: She is alert and oriented to person, place, and time. Mental status is at baseline.      Cranial Nerves: No cranial nerve deficit.   Psychiatric:         Mood and Affect: Mood normal.         Behavior: Behavior normal.          Additional Data:     Lab Results: I have personally reviewed pertinent reports.      Results from last 7 days   Lab Units 04/22/24  0625   WBC Thousand/uL 12.79*   HEMOGLOBIN g/dL 11.5   HEMATOCRIT % 36.5   PLATELETS Thousands/uL 331   SEGS PCT % 61   LYMPHO PCT % 29   MONO PCT % 8   EOS PCT % 1     Results from last 7 days   Lab Units 04/22/24  0625   SODIUM mmol/L 140   POTASSIUM mmol/L 3.9   CHLORIDE mmol/L 108   CO2 mmol/L 26   BUN mg/dL 12   CREATININE mg/dL 0.63   ANION GAP mmol/L 6   CALCIUM mg/dL 9.2   ALBUMIN g/dL 3.7   TOTAL BILIRUBIN mg/dL 0.55   ALK PHOS U/L 55   ALT U/L 9   AST U/L 9*   GLUCOSE RANDOM mg/dL 93     Results from last 7 days   Lab Units 04/21/24  2128   INR  1.12         Lab Results   Component Value Date/Time    HGBA1C 5.1 04/19/2024 06:09 AM     Results from last 7 days   Lab Units 04/22/24  0717 04/17/24  0256   POC GLUCOSE mg/dl 95 108       EKG, Pathology, and Other Studies Reviewed on Admission:   EKG pending     ** Please Note: This note has been constructed using a voice recognition system.  **

## 2024-04-24 LAB
AMORPH URATE CRY URNS QL MICRO: ABNORMAL
BACTERIA UR QL AUTO: ABNORMAL /HPF
BILIRUB UR QL STRIP: NEGATIVE
CLARITY UR: ABNORMAL
COLOR UR: YELLOW
GLUCOSE UR STRIP-MCNC: NEGATIVE MG/DL
HGB UR QL STRIP.AUTO: ABNORMAL
KETONES UR STRIP-MCNC: NEGATIVE MG/DL
LEUKOCYTE ESTERASE UR QL STRIP: ABNORMAL
NITRITE UR QL STRIP: NEGATIVE
NON-SQ EPI CELLS URNS QL MICRO: ABNORMAL /HPF
PH UR STRIP.AUTO: 6 [PH]
PROT UR STRIP-MCNC: ABNORMAL MG/DL
RBC #/AREA URNS AUTO: ABNORMAL /HPF
SP GR UR STRIP.AUTO: 1.02 (ref 1–1.03)
UROBILINOGEN UR STRIP-ACNC: 2 MG/DL
WBC #/AREA URNS AUTO: ABNORMAL /HPF

## 2024-04-24 PROCEDURE — 99232 SBSQ HOSP IP/OBS MODERATE 35: CPT | Performed by: PSYCHIATRY & NEUROLOGY

## 2024-04-24 RX ADMIN — HYDROXYZINE HYDROCHLORIDE 100 MG: 50 TABLET, FILM COATED ORAL at 21:07

## 2024-04-24 RX ADMIN — Medication 1000 UNITS: at 09:05

## 2024-04-24 RX ADMIN — TRAZODONE HYDROCHLORIDE 50 MG: 50 TABLET ORAL at 21:07

## 2024-04-24 NOTE — DISCHARGE INSTR - APPOINTMENTS
You will be discharged to your mom's home at [facility] [place] [address]   You confirmed that your cell phone number is [phone]        Camilla, or Caridad, our Behavioral Health Nurse Navigators, will be calling you after your discharge, on the phone number that you provided.  They will be available as an additional support, if needed.   If you wish to speak with one of them, you may contact Camilla at [phone] or Caridad at [phone].

## 2024-04-24 NOTE — NURSING NOTE
Patient relaxing in room, social with roommate. Requesting to speak with CM about face-timing her children. RN notified CM of pt request. Pt denies SI, HI, AVH and reports no anxiety symptoms today. Reports feeling tired, despite sleeping overnight.

## 2024-04-24 NOTE — NURSING NOTE
Pt bright, social, pleasant and talkative with staff and peers. Pt is at the desk frequently with requests for her cell phone, cooperative with redirection. Denies SI, HI and hallucinations.

## 2024-04-24 NOTE — NURSING NOTE
Pt continues to be defiant with rules and expectations on unit. PT was prompted at 1955 that phones will be going off for group at 2000. This writer prompted pt again to please wrap up phone call at 2000. Pt continued to ignore this writer  ,when prompted at 2015. At 2028 this writer had to set boundaries that it is not fair to  pt's peers.  That pt will not get off the phone on time and others do not have acces to the phone. That this writer will give pt one more chance to wrap up on call or phone will be turned off on pt as phones are not to be on during  groups.

## 2024-04-24 NOTE — PROGRESS NOTES
Diagnosis of Adjustment disorder with anxious mood reviewed.   Short term goals for decrease in suicidal thoughts discussed.   All present parties in agreement and treatment plan signed.    04/24/24 9188   Team Meeting   Meeting Type Tx Team Meeting   Team Members Present   Team Members Present Physician;Nurse;   Physician Team Member Dr. Tiwari   Nursing Team Member Bon   Care Management Team Member Belkis   Patient/Family Present   Patient Present No  (pt declined to meet with treatment team)   Patient's Family Present No

## 2024-04-24 NOTE — NURSING NOTE
Grismerlin was given Atarax 50 mg PO for moderate anxiety and Trazodone 50 mg PO for insomnia. Both medications were effective.

## 2024-04-24 NOTE — PLAN OF CARE
Problem: Depression  Goal: Verbalize thoughts and feelings  Description: Interventions:  - Assess and re-assess patient's level of risk   - Engage patient in 1:1 interactions, daily, for a minimum of 15 minutes   - Encourage patient to express feelings, fears, frustrations, hopes   Outcome: Progressing  Goal: Refrain from harming self  Description: Interventions:  - Monitor patient closely, per order   - Supervise medication ingestion, monitor effects and side effects   Outcome: Progressing  Goal: Refrain from self-neglect  Outcome: Progressing  Goal: Attend and participate in unit activities, including therapeutic, recreational, and educational groups  Description: Interventions:  - Provide therapeutic and educational activities daily, encourage attendance and participation, and document same in the medical record   Outcome: Progressing  Goal: Complete daily ADLs, including personal hygiene independently, as able  Description: Interventions:  - Observe, teach, and assist patient with ADLS  -  Monitor and promote a balance of rest/activity, with adequate nutrition and elimination   Outcome: Progressing

## 2024-04-24 NOTE — DISCHARGE INSTR - OTHER ORDERS
[facility] CRISIS INFORMATION   Warmline is a confidential 7 days/week telephone support service manned by trained mental health consumers.  Warmline operates daily but is not able to accept calls between 2AM-6AM.   Warmline provides support, a listening ear and can provide information about available services. Warmline specializes in the concerns of mental health consumers, their families and friends.  However, we are also here for anyone who has a mental health concern, is confused about or just doesn't know anything about mental health or where to get information. To reach [facility], call 24 hours a day [phone]   Text CONNECT to 909524 from anywhere in the [place], anytime, about any type of crisis.  A live, trained Crisis Counselor receives the text and lets you know that they are here to listen.  The volunteer Crisis Counselor will help you move from a hot moment to a cool moment.  [facility] Crisis Intervention - licensed telephone and mobile crisis services that provide mental health assessments to all age groups regardless of income or insurance.  Crisis Intervention operates 24-hour/7 days a week. [facility] Crisis Intervention assists consumer who are experiencing a mental health emergency and lack the resources to assist themselves.  Immediate intervention for suicidal and depressed individuals with home visits/outreach being top priority. Crisis can be contacted at [phone].   The National [place] on Mental Illness (MANUEL) offers various education & support groups for you & your family.  For more information visit their website at   http://www.manuel-lv.org/.  Dial 2-1-1 to get connected/get help.  Free, confidential information & referral available 24/7: Aging Services, Child & Youth Services, Counseling, Education/Training, Food/Shelter/Clothing, Health Services, Parenting, Substance Abuse, Support Groups, Volunteer Opportunities, & much more.  Phone: 2-1-1 or [phone], Web:  www.oj903xmlf.org, Email: 211@[facility].org    [employer] is an organization authorized by [place] and [facility] of Mental Health to provide consumer satisfaction team services of monitoring and reporting satisfaction within the mental health system.   24/7 Toll Free Peerline 9-(436)-[phone]     For more information, contact:   Office [phone]  Fax [phone]  [employer] is a team of individuals who are either consumers of mental health services or family members of consumers.  Through regularly scheduled visits and telephone communications, they compile information regarding each individual's level of satisfaction and report their findings to service providers, counties and their appointed representatives so that services may be adapted to better fit an individual's needs, providing advocacy and culturally competent services.        If you are interested in Attending [facility] Program, Please call them at [phone] to discuss the program.     [facility] PROGRAM  Phone Number: [phone]  In Person Location: [address] [place] [address]  Also available Virtual via Microsoft Teams  *Tentative Schedule  8 am - 9 am   Arrival Paperwork   9 am - 9:30 am Morning Assessment Group   9:30 am - 10:30 am Psychotherapy Group   10:30 am - 10:45 am Self-Care Break   10:45 am - 11:45 am Education Group   11:45 am -12:30 pm Lunch   12:30 pm - 1:30 pm Music Therapy   1:30 pm - 2 pm Goal Setting   2 pm - 4 pm Case Management Meeting; Family Meetings     Please note on your first day you are asked to arrive early to complete paperwork. We ask on a daily basis please be prompt for the day and groups.  attempt to touch base with you during self-care and lunch breaks versus group times. Family meetings and additional case management needs can be addressed between 2 pm and 4 pm. Group  sessions may vary in time depending on staff and programming needs.

## 2024-04-24 NOTE — PROGRESS NOTES
Progress Note - Behavioral Health   Grismerlin Fernandez 24 y.o. female MRN: [id number]  Unit/Bed#: [facility] 203-02 Encounter: [id number]    Assessment:  Principal Problem:    Adjustment disorder with anxious mood  Active Problems:    Medical clearance for psychiatric admission    Class 2 obesity due to excess calories without serious comorbidity with body mass index (BMI) of 37.0 to 37.9 in adult    Vitamin D deficiency    Leukocytosis    Intentional overdose (HCC)      Plan:  --Discharge tomorrow as per 72 hr notice  --Continue with psychiatric hospitalization  --Continue with individual, group, and milieu therapy  --Continue the following medications:  Current Facility-Administered Medications   Medication Dose Route Frequency    acetaminophen (TYLENOL) tablet 650 mg  650 mg Oral Q6H PRN    acetaminophen (TYLENOL) tablet 650 mg  650 mg Oral Q4H PRN    acetaminophen (TYLENOL) tablet 975 mg  975 mg Oral Q6H PRN    aluminum-magnesium hydroxide-simethicone (MAALOX) oral suspension 30 mL  30 mL Oral Q4H PRN    Artificial Tears ophthalmic solution 1 drop  1 drop Both Eyes Q3H PRN    benztropine (COGENTIN) injection 1 mg  1 mg Intramuscular Q4H PRN Max 6/day    benztropine (COGENTIN) tablet 1 mg  1 mg Oral Q4H PRN Max 6/day    bisacodyl (DULCOLAX) rectal suppository 10 mg  10 mg Rectal Daily PRN    Cholecalciferol (VITAMIN D3) tablet 1,000 Units  1,000 Units Oral Daily    hydrOXYzine HCL (ATARAX) tablet 50 mg  50 mg Oral Q6H PRN Max 4/day    Or    diphenhydrAMINE (BENADRYL) injection 50 mg  50 mg Intramuscular Q6H PRN    hydrOXYzine HCL (ATARAX) tablet 100 mg  100 mg Oral Q6H PRN Max 4/day    Or    LORazepam (ATIVAN) injection 2 mg  2 mg Intramuscular Q6H PRN    hydrOXYzine HCL (ATARAX) tablet 25 mg  25 mg Oral Q6H PRN Max 4/day    OLANZapine (ZyPREXA) tablet 5 mg  5 mg Oral Q4H PRN Max 3/day    Or    OLANZapine (ZyPREXA) IM injection 2.5 mg  2.5 mg Intramuscular Q4H PRN Max 3/day    OLANZapine (ZyPREXA) tablet 5 mg  5 mg  Oral Q3H PRN Max 3/day    Or    OLANZapine (ZyPREXA) IM injection 5 mg  5 mg Intramuscular Q3H PRN Max 3/day    OLANZapine (ZyPREXA) tablet 2.5 mg  2.5 mg Oral Q4H PRN Max 6/day    polyethylene glycol (MIRALAX) packet 17 g  17 g Oral Daily PRN    propranolol (INDERAL) tablet 10 mg  10 mg Oral Q8H PRN    senna-docusate sodium (SENOKOT S) 8.6-50 mg per tablet 1 tablet  1 tablet Oral Daily PRN    traZODone (DESYREL) tablet 50 mg  50 mg Oral HS PRN       Subjective: Patient was seen for continuation of care. Chart was reviewed and discussed with treatment team.     No acute behavioral events over the past 24 hours. Today, patient was seen and examined at bedside for continuation of care.     Patient continues to deny all symptoms.  She is adamant that she is not suicidal nor depressed.  She continues to acknowledge that her behaviors prior to this hospitalization and prior to the last hospitalization represent attention seeking behaviors and she needs to work on utilizing coping skills to avoid such behaviors in the future.  She is future oriented and able to articulate a safety plan for her safe return to the community.    Patient denied adverse effects to their psychiatric medication regimen. Patient denied other new or worsening psychiatric symptoms/complaints at this time. Discussed the importance of continuing to take medications as prescribed, as well as the importance of continuing to attend groups on the unit.     Psychiatric Review of Systems:  Medication adverse effects: none  Sleep: unchanged  Appetite: unchanged  Behavior over the past 24 hours: as per above    Vitals:  Vitals:    04/24/24 0719   BP: 136/84   Pulse: 78   Resp: 18   Temp: 98.5 °F (36.9 °C)   SpO2:        Laboratory results:    I have personally reviewed all pertinent laboratory/tests results  No results found for this or any previous visit (from the past 48 hour(s)).     Current Medications:  Current medications as per above. All medications  "have been reviewed.   Risks, benefits, alternatives, and possible side effects of patient's psychiatric medications were discussed with patient.     Mental Status Evaluation:  Appearance: casually dressed, appears consistent with stated age  Motor: no psychomotor disturbances, no gait abnormalities  Behavior: cooperative, interacts with this writer appropriately  Speech: normal rate, rhythm, and volume  Mood: \"good\"  Affect: euthymic, normal range and intensity  Thought Process: organized, linear, and goal-oriented  Thought Content: denies auditory hallucinations, denies visual hallucinations, denies delusions  Risk Potential: denies suicidal ideation, plan, or intent. Denies homicidal ideation  Sensorium: Oriented to person, place, time, and situation  Cognition: cognitive ability appears intact but was not quantitatively tested  Consciousness: alert and awake  Attention: able to focus without difficulty  Insight: improved  Judgement: improved      Progress Toward Goals & Illness Status: Patient is not at goal. They are not yet ready for discharge. The patient's condition currently requires active psychopharmacological medication management, interdisciplinary coordination with case management, and the utilization of adjunctive milieu and group therapy to augment psychopharmacological efficacy. The patient's risk of morbidity, and progression or decompensation of psychiatric disease, is higher without this current treatment.       This note has been constructed using a voice recognition system. There may be translation, syntax, or grammatical errors. If you have any questions, please contact the dictating provider.    "

## 2024-04-24 NOTE — CASE MANAGEMENT
"INTAKE     Readmit score:  Red 29   Confirmed Address    [address] [facility] [address]    County: [place]      Resides in the home with/can return?:    Pt lives with two children (ages 3 y.o and 3 months) Pt can return.     Pt reported her mom is caring for her children at this time.       Confirmed Phone Number: [phone]    Commitment Status/Admitted from: [address] [place] [address]   Presenting C/O:             ED Note   \"  Suicidal        Patient brought in by EMS. Patient was released from the hospital today after overdosing on Tylenol. Patient went home and ate rat poisoning to attempt suicide again. Patient c/o chest discomfort.       Patient is a 24-year-old female with a history of suicide attempts with most recent being about a week ago.  She was just discharged from the hospital today after a Tylenol overdose.  States that at around 8 PM she swallowed a green rock from a rat poisoning box.  After she did it she told her uncle who then called 911.  Patient has no symptoms at this time.  Patient has a very flat and blunted affect and is not forthcoming with much information.  She denies any other coingestants at this time.\"     Pt then told RN in ED:  \"While RN was obtaining bloodwork patient asked if RN could take time to talk to patient. Patient opened to this RN and states she did not take rat poison, she was at home by herself and wanted to talk to someone. Patient states to this nurse that she does not want to kill herself, this was the only way she can get her family's attention. Patient states 1.5 years ago her  left and she felt that her family was torn from her. Patient states her family doesn't listen to her and she needs someone to talk to. Patient states she doesn't want to go to inpatient [facility] as that did not help her once she gets home. Patient asked this RN if she would tell to the provider about her situation. Provider made aware.\"        Psychiatrist:    Pt is not taking " "medications   Therapist:    During last admission: 4/18/24-4/21/24 pt was referred to [facility] for Therapy.      CM scheduled pt at [facility]   Pt scheduled on 4/29 at 11am with Marjorie Driver at [facility]     Pt provided with [employer] phone numbers and Warm Line numbers to call at time of dc on 4/21/24, CM will review the numbers again and encourage her to call if she struggles again in the future.       ACT/ICM/CPS/WRT/SC: N/A   PCP:    N/A   Work/Income:       Pt reported she works Full-Time at a [employer]. Pt reported she was on maternity leave for her child and was to return to work on Monday 4/22/24.          Legal/  Probation/[place] Ofc:    Denies   Access to Firearms:    Denies   Referrals Needed: TBD    Transport at Discharge:    TBD     Pt signed 72 hour notice upon admission on 4/22/24   Pt will DC on Thursday 4/25/24      IMM:   [facility] Text OSVALDO:    Emergency Contact:     Umesh Brewer () [phone]    ROIs obtained:       [facility]    Insurance:     [place] [employer]    Audit:        PAWSS:  BAT:  UDS: Negative                          This patient is a 30 day readmission and was most recently discharged on:  4/21/2024     The previous discharge plan was:  -Return home, pt lives alone with 2 children (3 mo, 3 years) - Pt's mom caring for children.  -Pt was not taking medications so she did not need Psychiatrist.   -Pt was open to Therapy Referral and scheduled with:  [facility] - Marjorie Driver on Monday April 29, 2024 at 11:00 AM - [facility].       The  Readmission Risk score is:  Red 29     The identified triggers/events leading up to this admission include:  U admission note:  \"201 from [facility] M/S. Patient reported intentional O/D on rat poison. While in the ED, Grismerlin later recanted, saying that she did not actually ingest anything, reported, \"I just wanted to get attention from my family.\" " "Patient was recently discharged from [facility] on 4/21 for Tylenol O/D. Grismerlin is irritable throughout admission assessment, repeatedly requesting to sign a 72 hour notice. Patient stating, \"I don't need to be here.\" Patient's skin assessment reveals a healed burn on her R forearm, reports it was an accident from a few weeks ago, while cooking. Grismerlin reports that she lives with her kids and that her mother is an amazing support for her. Grismerlin denies current SI/HI/AVH. Patient denies PMH, denies abuse history and drug/alcohol use. UDS: negative. Grismerlin requested and signed a 72 hour notice, 4/22/24 at 22:00. \"     Initial Plans for this admission (and who will be involved in treatment and discharge planning) include:  Pt signed 72 hour notice upon admission on 4/22/24 @ 2200  Pt will dc on Thursday 4/25/24   Pt has [facility] app[facility]     Pt provided with [employer] Crisis phone numbers and Warm Line numbers to call at time of dc on 4/21/24, CM will review the numbers again and encourage her to call if she struggles again in the future.     Pt provided with [employer] [facility] information if she decides that she would like to attend after dc.       "

## 2024-04-25 VITALS
BODY MASS INDEX: 37.36 KG/M2 | OXYGEN SATURATION: 99 % | RESPIRATION RATE: 16 BRPM | SYSTOLIC BLOOD PRESSURE: 110 MMHG | HEIGHT: 62 IN | WEIGHT: 203 LBS | TEMPERATURE: 98.9 F | HEART RATE: 75 BPM | DIASTOLIC BLOOD PRESSURE: 66 MMHG

## 2024-04-25 PROCEDURE — 99239 HOSP IP/OBS DSCHRG MGMT >30: CPT | Performed by: PSYCHIATRY & NEUROLOGY

## 2024-04-25 NOTE — CASE MANAGEMENT
CM met with pt to check in. Pt in bed sleeping but reported feeling ready to dc. Pt has therapy appointment with [facility] on Monday 4/29. CM provided pt with crisis numbers and warm line numbers to call after dc if she feels she needs it.     Pt reported she needs a ride and would like to go to her mom's home at:   [address] [place] [address]     CM will schedule transport for 10am.

## 2024-04-25 NOTE — PLAN OF CARE
Problem: DISCHARGE PLANNING  Goal: Discharge to home or other facility with appropriate resources  Description: INTERVENTIONS:  - Identify barriers to discharge w/patient and caregiver  - Arrange for needed discharge resources and transportation as appropriate  - Identify discharge learning needs (meds, wound care, etc.)  - Arrange for interpretive services to assist at discharge as needed  - Refer to Case Management Department for coordinating discharge planning if the patient needs post-hospital services based on physician/advanced practitioner order or complex needs related to functional status, cognitive ability, or social support system  Outcome: Adequate for Discharge     Problem: Depression  Goal: Verbalize thoughts and feelings  Description: Interventions:  - Assess and re-assess patient's level of risk   - Engage patient in 1:1 interactions, daily, for a minimum of 15 minutes   - Encourage patient to express feelings, fears, frustrations, hopes   Outcome: Adequate for Discharge  Goal: Refrain from harming self  Description: Interventions:  - Monitor patient closely, per order   - Supervise medication ingestion, monitor effects and side effects   Outcome: Adequate for Discharge  Goal: Refrain from self-neglect  Outcome: Adequate for Discharge  Goal: Attend and participate in unit activities, including therapeutic, recreational, and educational groups  Description: Interventions:  - Provide therapeutic and educational activities daily, encourage attendance and participation, and document same in the medical record   Outcome: Adequate for Discharge  Goal: Complete daily ADLs, including personal hygiene independently, as able  Description: Interventions:  - Observe, teach, and assist patient with ADLS  -  Monitor and promote a balance of rest/activity, with adequate nutrition and elimination   Outcome: Adequate for Discharge

## 2024-04-25 NOTE — CASE MANAGEMENT
CM scheduled pt transport via [employer] to her mom's home today for 10am to [address] [place] [address]

## 2024-04-25 NOTE — NURSING NOTE
Pt expressed readiness for discharge. AVS reviewed with pt. Denies any question or concerns. Pt discharge from unit via [employer].

## 2024-04-25 NOTE — BH TRANSITION RECORD
Contact Information: If you have any questions, concerns, pended studies, tests and/or procedures, or emergencies regarding your inpatient behavioral health visit. Please contact [place] behavioral [facility] [phone] and ask to speak to a , nurse or physician. A contact is available 24 hours/ 7 days a week at this number.     Summary of Procedures Performed During your Stay:  Below is a list of major procedures performed during your hospital stay and a summary of results:  - No major procedures performed.    Pending Studies (From admission, onward)      None          Please follow up on the above pending studies with your PCP and/or referring provider.

## 2024-04-25 NOTE — PLAN OF CARE
Problem: DISCHARGE PLANNING  Goal: Discharge to home or other facility with appropriate resources  Description: INTERVENTIONS:  - Identify barriers to discharge w/patient and caregiver  - Arrange for needed discharge resources and transportation as appropriate  - Identify discharge learning needs (meds, wound care, etc.)  - Arrange for interpretive services to assist at discharge as needed  - Refer to Case Management Department for coordinating discharge planning if the patient needs post-hospital services based on physician/advanced practitioner order or complex needs related to functional status, cognitive ability, or social support system  Outcome: Progressing     Problem: Depression  Goal: Verbalize thoughts and feelings  Description: Interventions:  - Assess and re-assess patient's level of risk   - Engage patient in 1:1 interactions, daily, for a minimum of 15 minutes   - Encourage patient to express feelings, fears, frustrations, hopes   Outcome: Progressing  Goal: Refrain from harming self  Description: Interventions:  - Monitor patient closely, per order   - Supervise medication ingestion, monitor effects and side effects   Outcome: Progressing  Goal: Refrain from self-neglect  Outcome: Progressing  Goal: Attend and participate in unit activities, including therapeutic, recreational, and educational groups  Description: Interventions:  - Provide therapeutic and educational activities daily, encourage attendance and participation, and document same in the medical record   Outcome: Progressing  Goal: Complete daily ADLs, including personal hygiene independently, as able  Description: Interventions:  - Observe, teach, and assist patient with ADLS  -  Monitor and promote a balance of rest/activity, with adequate nutrition and elimination   Outcome: Progressing

## 2024-04-25 NOTE — NURSING NOTE
Grismerlin requested and was given PRN Trazodone 50 mg for sleep, as well as Atarax 100 mg for anxiety, Weiss = 25. Patient initially observed to be pacing in halls. Upon follow-up for re-assessment, patient is observed to be resting in bed, eyes closed, respirations unlabored.

## 2024-04-25 NOTE — DISCHARGE SUMMARY
"  Discharge Summary - Behavioral Health   Grismerlin Fernandez 24 y.o. female MRN: [id number]  Unit/Bed#: -02 Encounter: [id number]     Admission Date: 4/22/2024         Discharge Date: 04/25/24    Attending Psychiatrist: Kaushik Tiwari DO    Reason for Admission/HPI (as per admission documentation):     On admission to Inpatient Psychiatric Unit:  Patient is a 24-year-old  but English-speaking female, recently admitted to this unit and known well by this writer, who was discharged on 4/21/2024, who presents after initially reporting that she ingested rat poison but then later denied that this actually occurred.     Symptoms prior to hospitalization include: Feeling lonely and telling her family member that she ingested rat poison to gain attention but then later denying that this ever happened.  Symptom severity is rated as mild.  Timeline is acute and occurring within the past week.  Mitigating factors are none.  Exacerbating stressors are none.     On initial psychiatric evaluation today, the patient is very clear that she did not actually ingest rat poison and that she made this up to get the attention of her family members.  She is unable to cite any specific stressors or reasons that she engaged in this attention seeking behavior.  She is once again upset that she is at the inpatient behavioral health unit.  She signed a 72-hour notice to withdraw from treatment.  She states that there have been no changes whatsoever in her psychiatric symptomatology, medical issues, etc., since discharging from the unit on 4/21.  She denies drug use.  She denies manic or psychotic symptoms.  She is once again this interested in psychotropic medication and politely declines.  She states that \"sometimes you just need to talk to people about what you are thinking inside\" and is clear that she did not actually ingest anything. She denies SI, intent, or plan at the time of my evaluation.       Past Medical " History:   Diagnosis Date    Depression      Past Surgical History:   Procedure Laterality Date    ABDOMINOPLASTY      [place]  DELIVERY ONLY N/A 2020    Procedure:  SECTION ();  Surgeon: Adair Clifton MD;  Location: [facility];  Service: Obstetrics    [place]  DELIVERY ONLY N/A 1/15/2024    Procedure:  SECTION ();  Surgeon: Yardlie Toussaint-Foster, DO;  Location: [facility];  Service: Obstetrics       Medications:    Current Facility-Administered Medications   Medication Dose Route Frequency Provider Last Rate    acetaminophen  650 mg Oral Q6H PRN Maura Josseline Stives, PA-C      acetaminophen  650 mg Oral Q4H PRN Maura Josseline Stives, PA-C      acetaminophen  975 mg Oral Q6H PRN Maura Josseline Stives, PA-C      aluminum-magnesium hydroxide-simethicone  30 mL Oral Q4H PRN Maura Manjarrez Stives, PA-C      Artificial Tears  1 drop Both Eyes Q3H PRN Maura Manjarrez Stives, PA-C      benztropine  1 mg Intramuscular Q4H PRN Max 6/day Maura Josseline Stives, PA-C      benztropine  1 mg Oral Q4H PRN Max 6/day Maura Josseline Stives, PA-C      bisacodyl  10 mg Rectal Daily PRN Maura Manjarrez Stives, PA-C      Cholecalciferol  1,000 Units Oral Daily Nitesh Rivera MD      hydrOXYzine HCL  50 mg Oral Q6H PRN Max 4/day Maura Josseline Stives, PA-C      Or    diphenhydrAMINE  50 mg Intramuscular Q6H PRN Maura Manjarrez Stives, PA-C      hydrOXYzine HCL  100 mg Oral Q6H PRN Max 4/day Maura Josseline Stives, PA-C      Or    LORazepam  2 mg Intramuscular Q6H PRN Maura Josseline Stives, PA-C      hydrOXYzine HCL  25 mg Oral Q6H PRN Max 4/day Maura Josseline Stives, PA-C      OLANZapine  5 mg Oral Q4H PRN Max 3/day Maura Josseline Stives, PA-C      Or    OLANZapine  2.5 mg Intramuscular Q4H PRN Max 3/day Maura Josseline Stives, PA-C      OLANZapine  5 mg Oral Q3H PRN Max 3/day Maura Josseline Stives, PA-C      Or    OLANZapine  5 mg Intramuscular Q3H PRN Max 3/day Maura Soto PA-C      OLANZapine  2.5 mg Oral Q4H PRN Max  6/day Maura Soto, ARIEL      polyethylene glycol  17 g Oral Daily PRN Maura Waltonmary ann Soto, ARIEL      propranolol  10 mg Oral Q8H PRN Maura Josseline Stoo PA-C      senna-docusate sodium  1 tablet Oral Daily PRN Maura Waltonmary ann Soto, ARIEL      traZODone  50 mg Oral HS PRN Maura Josseline Soto PA-C         Allergies:     No Known Allergies    Objective     Vital signs in last 24 hours:    Temp:  [98.5 °F (36.9 °C)] 98.5 °F (36.9 °C)  HR:  [93] 93  Resp:  [18] 18  BP: (105)/(79) 105/79    No intake or output data in the 24 hours ending 04/25/24 0726    Hospital Course:     Grismerlin was admitted to the inpatient psychiatric unit on 4/22/2024. During their hospitalization, Grismerlin was encouraged to attend groups and interact appropriately and positively with others in the milieu.     Patient was offered psychotropic medications but declined, consistent with her recent inpatient stay as well.  Patient was observed in the therapeutic milieu after she had immediately signed a 72-hour notice to withdraw from treatment.  During her hospitalization, she was able to refrain from any concerning behaviors and consistently and vehemently denied all suicidal thoughts.  She denied access to lethal means. Her parasuicidal gestures were consistent with maladaptive cluster B personality pathology, which was also evident during her previous hospital stay.     At the time of discharge, there were no criteria present through which Grismerlin could be kept involuntarily for further psychiatric hospitalization. Patient was able to articulate a safety plan upon discharge home, including going to any ED if their symptoms return or worsen, or calling 911. We discussed mitigating factors against suicidal behavior, and the patient was able to articulate these mitigating factors which outweighed any potential exacerbating stressors. Patient explicitly denied suicidal ideation, plan, or intent. They explicitly stated they felt safe to  "return to the community.     An outpatient discharge/follow up plan was discussed and coordinated between Grismerlin, this writer, and case management team.    Specific discharge disposition: Home.    Mental Status at Time of Discharge:     Appearance: casually dressed, appears consistent with stated age  Motor: no psychomotor disturbances, no gait abnormalities  Behavior: cooperative, interacts with this writer appropriately  Speech: normal rate, rhythm, and volume  Mood: \"good\"  Affect: euthymic, normal range and intensity  Thought Process: organized, linear, and goal-oriented  Thought Content: denies auditory or visual hallucinations  Perception: denies delusions or other perceptual disturbances  Risk Potential: denies suicidal ideation, plan, or intent. Denies homicidal ideation  Sensorium: Oriented to person, place, time, and situation  Cognition: cognitive ability appears intact but was not quantitatively tested  Consciousness: alert and awake  Attention: able to focus without difficulty  Insight: improved  Judgement: improved       Suicide/Homicide Risk Assessment:    Risk of Harm to Self:   Patient denied suicidal thoughts, intent, plan, or acts of furtherance at the time of discharge.    Risk of Harm to Others:  Patient denied homicidal thoughts or intent at the time of discharge      Admission Diagnosis:    Principal Problem:    Adjustment disorder with anxious mood  Active Problems:    Medical clearance for psychiatric admission    Class 2 obesity due to excess calories without serious comorbidity with body mass index (BMI) of 37.0 to 37.9 in adult    Vitamin D deficiency    Leukocytosis    Intentional overdose (HCC)      Discharge Diagnosis:     Principal Problem:    Adjustment disorder with anxious mood  Active Problems:    Medical clearance for psychiatric admission    Class 2 obesity due to excess calories without serious comorbidity with body mass index (BMI) of 37.0 to 37.9 in adult    Vitamin D " deficiency    Leukocytosis    Intentional overdose (HCC)  Resolved Problems:    * No resolved hospital problems. *      Laboratory Results: I have personally reviewed all pertinent lab results.    Recent Results (from the past 48 hour(s))   UA w Reflex to Microscopic w Reflex to Culture    Collection Time: 04/23/24  5:04 PM    Specimen: Urine, Clean Catch   Result Value Ref Range    Color, UA Yellow     Clarity, UA Cloudy     Specific Gravity, UA 1.025 1.005 - 1.030    pH, UA 6.0 4.5, 5.0, 5.5, 6.0, 6.5, 7.0, 7.5, 8.0    Leukocytes, UA Small (A) Negative    Nitrite, UA Negative Negative    Protein, UA Trace (A) Negative mg/dl    Glucose, UA Negative Negative mg/dl    Ketones, UA Negative Negative mg/dl    Urobilinogen, UA 2.0 (A) <2.0 mg/dl mg/dl    Bilirubin, UA Negative Negative    Occult Blood, UA Large (A) Negative   Urine Microscopic    Collection Time: 04/23/24  5:04 PM   Result Value Ref Range    RBC, UA 20-30 (A) None Seen, 0-1, 1-2, 2-4, 0-5 /hpf    WBC, UA 4-10 (A) None Seen, 0-1, 1-2, 0-5, 2-4 /hpf    Epithelial Cells Occasional None Seen, Occasional /hpf    Bacteria, UA Occasional None Seen, Occasional /hpf    Amorphous Crystals, UA Innumerable         Discharge Medications:    See after visit summary for all reconciled discharge medications provided to patient and family.      There are no discharge medications for this patient.     There are no discharge medications for this patient.     There are no discharge medications for this patient.     There are no discharge medications for this patient.       Discharge instructions/Information to patient and family:     See after visit summary for information provided to patient and family.      Provisions for Follow-Up Care:    See after visit summary for information related to follow-up care and any pertinent home health orders.      Discharge Statement:    I spent 35 minutes discharging the patient. This time was spent on the day of discharge. I had direct  contact with the patient on the day of discharge.     Additional documentation is required if more than 30 minutes were spent on discharge:    I had face-to-face contact with the patient and discussed discharge treatment plan  I reviewed the importance of compliance with medications and outpatient treatment after discharge with the patient.  I discussed discharge and treatment plan with the patient, nursing, and case management/social work.  I discussed the medication regimen and possible side effects of the medications with the patient prior to discharge. At the time of discharge they were tolerating psychiatric medications.  I discussed outpatient follow up with the patient as per treatment plan / aftercare summary.  I reviewed elements of the aftercare plan with the patient. I discussed that if symptoms worsen or reoccur, that the patient can return to the emergency room or dial 911 in an emergency.  I reviewed pertinent mitigating vs exacerbating stressors, as well as other risk factors, as they relate to potential suicidal behavior.  I reviewed the patient's hospital course and current psychiatric disease status. As of today, they are now at goal. They are psychiatrically stable for discharge home. The combination of active psychopharmacological medication management, interdisciplinary coordination with case management, and adjunctive milieu and group therapy to augment psychopharmacological efficacy appears to have been successful. The patient's risk of morbidity, and progression or decompensation of psychiatric disease, is lower today as compared to the day of admission.      Kaushik Tiwari DO 04/25/24

## 2024-04-25 NOTE — NURSING NOTE
Pt reports feeling safe to return home. Expressed not wanting to be inpatient in the future. Denies SI/HI or hallucination. [place] and utilized coping skills. Denies any question or concern.

## 2024-06-18 PROBLEM — R65.10 SIRS (SYSTEMIC INFLAMMATORY RESPONSE SYNDROME) (HCC): Status: RESOLVED | Noted: 2024-01-29 | Resolved: 2024-06-18

## 2024-09-03 ENCOUNTER — HOSPITAL ENCOUNTER (EMERGENCY)
Facility: HOSPITAL | Age: 24
Discharge: HOME | End: 2024-09-03
Attending: EMERGENCY MEDICINE
Payer: MEDICARE

## 2024-09-03 ENCOUNTER — APPOINTMENT (EMERGENCY)
Dept: CT IMAGING | Facility: HOSPITAL | Age: 24
End: 2024-09-03
Payer: MEDICARE

## 2024-09-03 VITALS
OXYGEN SATURATION: 97 % | WEIGHT: 211.2 LBS | HEART RATE: 118 BPM | SYSTOLIC BLOOD PRESSURE: 127 MMHG | TEMPERATURE: 98.2 F | DIASTOLIC BLOOD PRESSURE: 74 MMHG | BODY MASS INDEX: 38.63 KG/M2 | RESPIRATION RATE: 16 BRPM

## 2024-09-03 DIAGNOSIS — S05.11XA CONTUSION OF RIGHT ORBITAL TISSUES, INITIAL ENCOUNTER: ICD-10-CM

## 2024-09-03 DIAGNOSIS — S02.2XXA NASAL FRACTURE: ICD-10-CM

## 2024-09-03 DIAGNOSIS — J01.90 ACUTE SINUSITIS: ICD-10-CM
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
## 2024-09-03 PROCEDURE — 99284 EMERGENCY DEPT VISIT MOD MDM: CPT

## 2024-09-03 PROCEDURE — 99284 EMERGENCY DEPT VISIT MOD MDM: CPT | Performed by: EMERGENCY MEDICINE

## 2024-09-03 PROCEDURE — 70486 CT MAXILLOFACIAL W/O DYE: CPT

## 2024-09-03 RX ORDER — NAPROXEN 500 MG/1
500 TABLET ORAL ONCE
Status: COMPLETED | OUTPATIENT
Start: 2024-09-03 | End: 2024-09-03

## 2024-09-03 RX ORDER — NAPROXEN 500 MG/1
500 TABLET ORAL 2 TIMES DAILY WITH MEALS
Qty: 20 TABLET | Refills: 0 | Status: SHIPPED | OUTPATIENT
Start: 2024-09-03 | End: 2024-09-13

## 2024-09-03 RX ADMIN — NAPROXEN 500 MG: 500 TABLET ORAL at 11:37
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
## 2024-09-10 ENCOUNTER — OFFICE VISIT (OUTPATIENT)
Dept: BARIATRICS | Facility: CLINIC | Age: 24
End: 2024-09-10
Payer: MEDICARE

## 2024-09-10 VITALS
TEMPERATURE: 97.5 F | HEIGHT: 61 IN | HEART RATE: 83 BPM | SYSTOLIC BLOOD PRESSURE: 119 MMHG | DIASTOLIC BLOOD PRESSURE: 73 MMHG | RESPIRATION RATE: 16 BRPM | BODY MASS INDEX: 39.46 KG/M2 | WEIGHT: 209 LBS

## 2024-09-10 DIAGNOSIS — E66.09 CLASS 2 OBESITY DUE TO EXCESS CALORIES WITHOUT SERIOUS COMORBIDITY WITH BODY MASS INDEX (BMI) OF 39.0 TO 39.9 IN ADULT: ICD-10-CM

## 2024-09-10 DIAGNOSIS — E66.9 OBESITY (BMI 30-39.9): ICD-10-CM

## 2024-09-10 PROCEDURE — 99243 OFF/OP CNSLTJ NEW/EST LOW 30: CPT | Performed by: PHYSICIAN ASSISTANT

## 2024-09-10 RX ORDER — TIRZEPATIDE 2.5 MG/.5ML
2.5 INJECTION, SOLUTION SUBCUTANEOUS WEEKLY
Qty: 2 ML | Refills: 0 | Status: SHIPPED | OUTPATIENT
Start: 2024-09-10 | End: 2024-10-08

## 2024-09-10 NOTE — ASSESSMENT & PLAN NOTE
-Discussed options of HealthyCORE-Intensive Lifestyle Intervention Program, Very Low Calorie Diet-VLCD, and Conservative Program and the role of weight loss medications.Explained the importance of making lifestyle changes if utilizing medication to aid in weight loss. Declined RD visit  -Initial weight loss goal of 5-10% weight loss for improved health  -Screening labs and records reviewed from prior 4/19/24 A1c , cmp wnl, low HDL cholesterol  - STOP BANG-1/8    -Patient is interested in pursuing conservative plan  -Calorie goals (1200), sample menu (2343-3036), portion size guidelines, and food logging reviewed with the patient.       Goals:  -Food log (ie.) [email or web address].com,Pull,BevyUp-  - To drink at least 64oz of water daily.No sugary beverages. Stop soda  -to avoid meal skipping. Recommend a protein in the AM    To start on zepbound. They have tried more than 6 months of lifestyle modifications including diet and activity changes and has had insignificant weight loss of less than 1 lb a week. Patient denies personal and family history of MCT and MEN2 tumors. Patient denies personal history of pancreatitis. Side effects discussed but not limited to diarrhea, bloating, constipation, GI upset, heartburn, increased heart rate, headache, low blood sugar, fatigue and dizziness. Titration and medication administration discussed.  Medication agreement signed. Contraception nexplanon    Initial Weight:209  Goal Weight:150

## 2024-09-10 NOTE — PROGRESS NOTES
Assessment/Plan:    Class 2 obesity due to excess calories without serious comorbidity with body mass index (BMI) of 39.0 to 39.9 in adult  -Discussed options of HealthyCORE-Intensive Lifestyle Intervention Program, Very Low Calorie Diet-VLCD, and Conservative Program and the role of weight loss medications.Explained the importance of making lifestyle changes if utilizing medication to aid in weight loss. Declined RD visit  -Initial weight loss goal of 5-10% weight loss for improved health  -Screening labs and records reviewed from prior 4/19/24 A1c , cmp wnl, low HDL cholesterol  - STOP BANG-1/8    -Patient is interested in pursuing conservative plan  -Calorie goals (1200), sample menu (5045-1317), portion size guidelines, and food logging reviewed with the patient.       Goals:  -Food log (ie.) www.[employer].com,Loladex,[employer].com-  - To drink at least 64oz of water daily.No sugary beverages. Stop soda  -to avoid meal skipping. Recommend a protein in the AM    To start on zepbound. They have tried more than 6 months of lifestyle modifications including diet and activity changes and has had insignificant weight loss of less than 1 lb a week. Patient denies personal and family history of MCT and MEN2 tumors. Patient denies personal history of pancreatitis. Side effects discussed but not limited to diarrhea, bloating, constipation, GI upset, heartburn, increased heart rate, headache, low blood sugar, fatigue and dizziness. Titration and medication administration discussed.  Medication agreement signed. Contraception nexplanon    Initial Weight:209  Goal Weight:150        Return in about 6 months (around 3/10/2025) for 2/4 month nurse visit. .        Diagnoses and all orders for this visit:    Obesity (BMI 30-39.9)  -     Ambulatory Referral to Weight Management  -     tirzepatide (Zepbound) 2.5 mg/0.5 mL auto-injector; Inject 0.5 mL (2.5 mg total) under the skin once a week for 28 days    Class 2 obesity  due to excess calories without serious comorbidity with body mass index (BMI) of 39.0 to 39.9 in adult  -     tirzepatide (Zepbound) 2.5 mg/0.5 mL auto-injector; Inject 0.5 mL (2.5 mg total) under the skin once a week for 28 days          Subjective:   Chief Complaint   Patient presents with    Consult     MWM- Consult; GW 150lb; waist:33.5in-stop bang 1/8       Patient ID: Grismerlin Fernandez  is a 24 y.o. female with excess weight/obesity here to pursue weight management.    Past Medical History:   Diagnosis Date    Depression     SIRS (systemic inflammatory response syndrome) ([facility]) 01/29/2024       HPI: Here for MWM consult  She has had a problem with weight for years. She was seeing Dr. Corcoran office before.  She was traking phentermine and nother pill.  She sometimes boredom. Had a baby in Jan and has had difficulty losing weight since    Obesity/Excess Weight:  Severity:  class II  Onset:  years    Modifiers: Diet and Exercise, Physician Supervised Weight Loss Program, and Prescription Weight Loss Medications  Contributing factors: Poor Food Choices, Insufficient Physical Activity, and boredom eating    Hydration:watter ? amount, coffee 1 , soda unsure how much but can be more than 2 cans a day ( has stopped for 3 days)  Alcohol: nothing regular  Exercise:nothing regular  Occupation:warehouse-sitting  Dining out/takeout: about 1 times    Diet Recall:  B: skips  L:rice , meat, beans  D (530PM): sometimes eats but smaller meal  S: sometimes snadwich  The following portions of the patient's history were reviewed and updated as appropriate: She  has a past medical history of Depression and SIRS (systemic inflammatory response syndrome) ([facility]) (01/29/2024).  She   Patient Active Problem List    Diagnosis Date Noted    Leukocytosis 04/23/2024    Intentional overdose ([facility]) 04/23/2024    Class 2 obesity due to excess calories without serious comorbidity with body mass index (BMI) of 39.0 to 39.9 in adult 04/22/2024     Vitamin D deficiency 2024    Transaminitis 2024    Pelvic fluid collection 2024    Abnormal CT scan, kidney 2024    COVID-19 affecting pregnancy in third trimester 2024    Patient noncompliance 2023    H/O abdominoplasty 10/17/2023    Hx of  section 2023    Adjustment disorder with anxious mood     Medical clearance for psychiatric admission 2020     She  has a past surgical history that includes pr  delivery only (N/A, 2020); Abdominoplasty (); and pr  delivery only (N/A, 1/15/2024).  Her family history includes No Known Problems in her brother, father, mother, and sister.  She  reports that she has quit smoking. Her smoking use included pipe. She has never been exposed to tobacco smoke. She has never used smokeless tobacco. She reports that she does not currently use alcohol. She reports that she does not use drugs.  Current Outpatient Medications   Medication Sig Dispense Refill    naproxen (NAPROSYN) 500 mg tablet Take 1 tablet (500 mg total) by mouth 2 (two) times a day with meals for 10 days (Patient taking differently: Take 500 mg by mouth if needed) 20 tablet 0    tirzepatide (Zepbound) 2.5 mg/0.5 mL auto-injector Inject 0.5 mL (2.5 mg total) under the skin once a week for 28 days 2 mL 0     No current facility-administered medications for this visit.     Current Outpatient Medications on File Prior to Visit   Medication Sig    naproxen (NAPROSYN) 500 mg tablet Take 1 tablet (500 mg total) by mouth 2 (two) times a day with meals for 10 days (Patient taking differently: Take 500 mg by mouth if needed)     No current facility-administered medications on file prior to visit.     She has No Known Allergies..    Review of Systems   Constitutional:  Positive for fatigue. Negative for fever.   Respiratory:  Negative for shortness of breath.    Cardiovascular:  Negative for chest pain and palpitations.   Gastrointestinal:  Negative  "for abdominal pain, constipation, diarrhea and vomiting.   Genitourinary:  Negative for difficulty urinating.   Musculoskeletal:  Negative for arthralgias and back pain.   Skin:  Negative for rash.   Neurological:  Negative for headaches.   Psychiatric/Behavioral:  Negative for dysphoric mood. The patient is not nervous/anxious.        Objective:    /73 (BP Location: Left arm, Patient Position: Sitting)   Pulse 83   Temp 97.5 °F (36.4 °C) (Tympanic)   Resp 16   Ht 5' 1\" (1.549 m)   Wt 94.8 kg (209 lb)   LMP 08/13/2024 (Approximate)   BMI 39.49 kg/m²     Physical Exam  Vitals and nursing note reviewed.   Constitutional:       General: She is not in acute distress.     Appearance: She is well-developed. She is obese.   HENT:      Head: Normocephalic and atraumatic.   Eyes:      Conjunctiva/sclera: Conjunctivae normal.   Neck:      Thyroid: No thyromegaly.   Pulmonary:      Effort: Pulmonary effort is normal. No respiratory distress.   Skin:     Findings: No rash (visible).   Neurological:      Mental Status: She is alert and oriented to person, place, and time.   Psychiatric:         Mood and Affect: Mood normal.         Behavior: Behavior normal.         "

## 2024-09-12 ENCOUNTER — TELEPHONE (OUTPATIENT)
Age: 24
End: 2024-09-12

## 2024-09-12 NOTE — TELEPHONE ENCOUNTER
Prior auth normally takes 7-21 business days.She was seen 9/10. I get to it as soon as I can. Thank You

## 2024-09-13 ENCOUNTER — TELEPHONE (OUTPATIENT)
Dept: BARIATRICS | Facility: CLINIC | Age: 24
End: 2024-09-13

## 2024-09-13 NOTE — TELEPHONE ENCOUNTER
PA for Zepbound 2.5 mg  APPROVED     Date(s) approved 9/13/2024-3/13/2025    Case #    Patient advised by          []MyChart Message  [x]Phone call   []LMOM  []L/M to call office as no active Communication consent on file  []Unable to leave detailed message as VM not approved on Communication consent       Pharmacy advised by    [x]Fax  []Phone call    Approval letter scanned into Media Yes

## 2024-09-13 NOTE — TELEPHONE ENCOUNTER
PA for Zepbound 2.5 mg SUBMITTED     via    [x]CMM-KEY: [id number]  [][employer]-Case ID #    []Faxed to plan   []Other website    []Phone call Case ID #      Office notes sent, clinical questions answered. Awaiting determination    Turnaround time for your insurance to make a decision on your Prior Authorization can take 7-21 business days.

## 2024-09-27 DIAGNOSIS — E66.812 CLASS 2 OBESITY DUE TO EXCESS CALORIES WITHOUT SERIOUS COMORBIDITY WITH BODY MASS INDEX (BMI) OF 39.0 TO 39.9 IN ADULT: ICD-10-CM

## 2024-09-27 DIAGNOSIS — E66.09 CLASS 2 OBESITY DUE TO EXCESS CALORIES WITHOUT SERIOUS COMORBIDITY WITH BODY MASS INDEX (BMI) OF 39.0 TO 39.9 IN ADULT: ICD-10-CM

## 2024-09-27 DIAGNOSIS — E66.9 OBESITY (BMI 30-39.9): ICD-10-CM

## 2024-09-27 NOTE — TELEPHONE ENCOUNTER
Reason for call:   [x] Refill   [] Prior Auth  [] Other:     Office:   [] PCP/Provider -   [x] Specialty/Provider - Bariatrics    Medication: tirzepatide (Zepbound) 2.5 mg/0.5 mL auto-injector Inject 0.5 mL (2.5 mg total) under the skin once a week for 28 days       [place] [facility] [place] [address] [place] [address],      Does the patient have enough for 3 days?   [x] Yes   [] No - Send as HP to POD

## 2024-09-28 DIAGNOSIS — E66.9 OBESITY (BMI 30-39.9): Primary | ICD-10-CM

## 2024-09-28 RX ORDER — TIRZEPATIDE 2.5 MG/.5ML
2.5 INJECTION, SOLUTION SUBCUTANEOUS WEEKLY
Qty: 2 ML | Refills: 0 | OUTPATIENT
Start: 2024-09-28 | End: 2024-10-26

## 2024-09-28 RX ORDER — TIRZEPATIDE 5 MG/.5ML
5 INJECTION, SOLUTION SUBCUTANEOUS WEEKLY
Qty: 2 ML | Refills: 1 | Status: SHIPPED | OUTPATIENT
Start: 2024-09-28 | End: 2024-11-23

## 2024-10-14 ENCOUNTER — TELEPHONE (OUTPATIENT)
Age: 24
End: 2024-10-14

## 2024-10-14 NOTE — TELEPHONE ENCOUNTER
Patient called in to request a new Zepbound rx be sent to [facility] in [place].

## 2024-10-15 ENCOUNTER — TELEPHONE (OUTPATIENT)
Dept: BARIATRICS | Facility: CLINIC | Age: 24
End: 2024-10-15

## 2024-10-15 NOTE — TELEPHONE ENCOUNTER
PA for Zepbound 5 mg SUBMITTED     via    [x]CMM-KEY: [id number]  []Surescripts-Case ID #    []Availity-Auth ID #  NDC #    []Faxed to plan   []Other website    []Phone call Case ID #      Office notes sent, clinical questions answered. Awaiting determination    Turnaround time for your insurance to make a decision on your Prior Authorization can take 7-21 business days.

## 2024-10-15 NOTE — TELEPHONE ENCOUNTER
Patient was called and informed that medication was sent to [facility] and that a prior auth will be started. Patient informed that someone from the office will call her to inform her of her medication status.

## 2024-10-16 NOTE — TELEPHONE ENCOUNTER
PA for Zepbound 5 mg  APPROVED     Date(s) approved 9/13/2024-3/13/2025    Case #    Patient advised by          []MyChart Message  [x]Phone call   []LMOM  []L/M to call office as no active Communication consent on file  []Unable to leave detailed message as VM not approved on Communication consent       Pharmacy advised by    []Fax  [x]Phone call    Approval letter scanned into Media Yes

## 2024-11-05 ENCOUNTER — TELEPHONE (OUTPATIENT)
Age: 24
End: 2024-11-05

## 2024-11-05 NOTE — TELEPHONE ENCOUNTER
Pt called in to request refill for Zepbound. Pt is currently taking 5 mg dose and took the last one today. Pt states is doing good with current dose, no side effects.

## 2024-11-12 ENCOUNTER — OFFICE VISIT (OUTPATIENT)
Dept: BARIATRICS | Facility: CLINIC | Age: 24
End: 2024-11-12
Payer: MEDICARE

## 2024-11-12 VITALS
RESPIRATION RATE: 17 BRPM | BODY MASS INDEX: 38.21 KG/M2 | SYSTOLIC BLOOD PRESSURE: 118 MMHG | WEIGHT: 202.4 LBS | TEMPERATURE: 97.1 F | HEART RATE: 72 BPM | DIASTOLIC BLOOD PRESSURE: 72 MMHG | HEIGHT: 61 IN

## 2024-11-12 DIAGNOSIS — E55.9 VITAMIN D DEFICIENCY: ICD-10-CM

## 2024-11-12 DIAGNOSIS — E66.9 OBESITY (BMI 30-39.9): Primary | ICD-10-CM

## 2024-11-12 PROCEDURE — 99214 OFFICE O/P EST MOD 30 MIN: CPT | Performed by: PHYSICIAN ASSISTANT

## 2024-11-12 RX ORDER — TIRZEPATIDE 7.5 MG/.5ML
7.5 INJECTION, SOLUTION SUBCUTANEOUS WEEKLY
Qty: 2 ML | Refills: 0 | Status: SHIPPED | OUTPATIENT
Start: 2024-11-12 | End: 2024-11-15 | Stop reason: SDUPTHER

## 2024-11-12 RX ORDER — TIRZEPATIDE 10 MG/.5ML
10 INJECTION, SOLUTION SUBCUTANEOUS WEEKLY
Qty: 2 ML | Refills: 0 | Status: SHIPPED | OUTPATIENT
Start: 2024-12-10 | End: 2025-01-07

## 2024-11-12 NOTE — PROGRESS NOTES
Assessment & Plan  Obesity (BMI 30-39.9)  She has lost 7 pounds since starting Treatment with Zepbound  Discussed continuing current dose, but she prefers to increase dose at this time.   Will increase Zepbound to 7.5mg weekly x 1 month, then 10mg weekly  Recommend Referral to dietician, but she declines.   Discussed Importance of adequate protein intake and to avoid skipping breakfast  She was given list of protein snacks/shakes to try for breakfast  Recommend Stop drinking regular soda completely -- Suggested alternatives of diet soda or flavored seltzers if she prefers to continue to consume carbonated drinks.   Discussed importance of regular exercise working up to 150 mins per week  including full body strength training 2x per week.   I sent Strength training videos via [employer]-- for now she will start to exercise during her days off of work.   Vitamin D deficiency  Start Vitamin D 5,000 un its daily        Return in about 3 months (around 2/12/2025) for Karla or Patrice.       Subjective:   Chief Complaint   Patient presents with    Follow-up     MWM 2M F/u; Waist 33in       Patient ID: Grismerlin Fernandez  is a 24 y.o. female with excess weight/obesity here for Weight Management Follow up Visit    HPI: Here for Medical Weight Management Follow Up Visit    Obesity/Excess Weight:  Current BMI: Body mass index is 38.24 kg/m².   Initial Weight: 209 on 9/10/2024 prior to starting Zepbound  Last visit Weight: 209  Current Weight: 202  7 lb weight loss since starting Zepbound  Goal Weight: 150 lbs.     Current Weight Management Plan:   Current Medication: Zepbound  Medication Side effects:  has noticed gas, but tolerable   Food Logging: No  Calorie Intake:  No, but eating less  Given 1200 calorie meal plan at last visit on 9/10/2024    Food Recall:  Breakfast: Skips  AM Snack: No  Lunch: Rice, chicken, cabbage (smaller portion than before)  PM Snack: no  Dinner: Salad with chicken  Bedtime Snack:  "No    Hydration: Water.   Regular  Soda twice per week.   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   Sleep: Sleeps well.       Wt Readings from Last 20 Encounters:   24 91.8 kg (202 lb 6.4 oz)   09/10/24 94.8 kg (209 lb)   24 95.8 kg (211 lb 3.2 oz)   24 93.4 kg (205 lb 14.6 oz)   24 93.9 kg (207 lb 0.2 oz)   24 97.3 kg (214 lb 9.6 oz)   24 95.9 kg (211 lb 6.7 oz)   24 105 kg (232 lb)   24 106 kg (232 lb 9.6 oz)   23 105 kg (231 lb 9.6 oz)   23 104 kg (229 lb 9.6 oz)   23 104 kg (228 lb 9.6 oz)   10/17/23 100 kg (220 lb 9.6 oz)   23 96.8 kg (213 lb 6.4 oz)   23 92.5 kg (204 lb)   23 92.3 kg (203 lb 7.8 oz)   23 91 kg (200 lb 9.6 oz)   23 92.7 kg (204 lb 6.4 oz)   23 91.2 kg (201 lb)   23 91.4 kg (201 lb 8 oz)        Review of Systems    Past Medical History:   Diagnosis Date    Depression     SIRS (systemic inflammatory response syndrome) ([facility]) 2024       Past Surgical History:   Procedure Laterality Date    ABDOMINOPLASTY      ID  DELIVERY ONLY N/A 2020    Procedure:  SECTION ();  Surgeon: Adair Clifton MD;  Location: [facility];  Service: Obstetrics    ID  DELIVERY ONLY N/A 1/15/2024    Procedure:  SECTION ();  Surgeon: Yardlie Toussaint-Foster, DO;  Location: [facility] ;  Service: Obstetrics        No Known Allergies     Objective:    /72 (BP Location: Left arm, Patient Position: Sitting)   Pulse 72   Temp (!) 97.1 °F (36.2 °C) (Tympanic)   Resp 17   Ht 5' 1\" (1.549 m)   Wt 91.8 kg (202 lb 6.4 oz)   BMI 38.24 kg/m²     Physical Exam:  Constitutional:  she  appears well-developed and well-nourished. No distress.   HENT:   Head: Normocephalic and atraumatic.   Neck: Normal range of motion. "   Pulmonary/Chest: Effort normal.   Musculoskeletal: Normal range of motion.   Neurological: she  is alert and oriented to person, place, and time.   Skin: she  is not diaphoretic.   Psychiatric: she  has a normal mood and affect. her  behavior is normal.        LABS:    Lab Results   Component Value Date    HGBA1C 5.1 04/19/2024      Lab Results   Component Value Date    SODIUM 140 04/22/2024    K 3.9 04/22/2024     04/22/2024    CO2 26 04/22/2024    AGAP 6 04/22/2024    BUN 12 04/22/2024    CREATININE 0.63 04/22/2024    GLUC 93 04/22/2024    CALCIUM 9.2 04/22/2024    AST 9 (L) 04/22/2024    ALT 9 04/22/2024    ALKPHOS 55 04/22/2024    TP 7.0 04/22/2024    TBILI 0.55 04/22/2024    EGFR 126 04/22/2024 04/19/2024   Lab Results   Component Value Date    MNZ6JPTQMVPF 1.077 04/19/2024    TSH 1.23 01/27/2022

## 2024-11-14 ENCOUNTER — TELEPHONE (OUTPATIENT)
Age: 24
End: 2024-11-14

## 2024-11-14 NOTE — TELEPHONE ENCOUNTER
Pt called in to make a change of pharmacy for Zepbound 7.5 mg. Please, send script to [facility], [address], [place] [address].

## 2024-11-15 DIAGNOSIS — E66.9 OBESITY (BMI 30-39.9): ICD-10-CM

## 2024-11-15 RX ORDER — TIRZEPATIDE 7.5 MG/.5ML
7.5 INJECTION, SOLUTION SUBCUTANEOUS WEEKLY
Qty: 2 ML | Refills: 0 | Status: SHIPPED | OUTPATIENT
Start: 2024-11-15

## 2024-12-09 DIAGNOSIS — E66.9 OBESITY (BMI 30-39.9): ICD-10-CM

## 2024-12-10 RX ORDER — TIRZEPATIDE 7.5 MG/.5ML
7.5 INJECTION, SOLUTION SUBCUTANEOUS WEEKLY
Qty: 2 ML | Refills: 0 | Status: SHIPPED | OUTPATIENT
Start: 2024-12-10

## 2025-01-10 NOTE — PLAN OF CARE
Problem: PAIN - ADULT  Goal: Verbalizes/displays adequate comfort level or baseline comfort level  Description: Interventions:  - Encourage patient to monitor pain and request assistance  - Assess pain using appropriate pain scale  - Administer analgesics based on type and severity of pain and evaluate response  - Implement non-pharmacological measures as appropriate and evaluate response  - Consider cultural and social influences on pain and pain management  - Notify physician/advanced practitioner if interventions unsuccessful or patient reports new pain  Outcome: Progressing     Problem: METABOLIC, FLUID AND ELECTROLYTES - ADULT  Goal: Electrolytes maintained within normal limits  Description: INTERVENTIONS:  - Monitor labs and assess patient for signs and symptoms of electrolyte imbalances  - Administer electrolyte replacement as ordered  - Monitor response to electrolyte replacements, including repeat lab results as appropriate  - Instruct patient on fluid and nutrition as appropriate  Outcome: Progressing  Goal: Fluid balance maintained  Description: INTERVENTIONS:  - Monitor labs   - Monitor I/O and WT  - Instruct patient on fluid and nutrition as appropriate  - Assess for signs & symptoms of volume excess or deficit  Outcome: Progressing  Goal: Glucose maintained within target range  Description: INTERVENTIONS:  - Monitor Blood Glucose as ordered  - Assess for signs and symptoms of hyperglycemia and hypoglycemia  - Administer ordered medications to maintain glucose within target range  - Assess nutritional intake and initiate nutrition service referral as needed  Outcome: Progressing      160.02

## 2025-01-15 ENCOUNTER — TELEPHONE (OUTPATIENT)
Dept: BARIATRICS | Facility: CLINIC | Age: 25
End: 2025-01-15

## 2025-01-28 DIAGNOSIS — E66.9 OBESITY (BMI 30-39.9): ICD-10-CM

## 2025-01-29 DIAGNOSIS — E66.9 OBESITY (BMI 30-39.9): Primary | ICD-10-CM

## 2025-01-29 RX ORDER — TIRZEPATIDE 7.5 MG/.5ML
7.5 INJECTION, SOLUTION SUBCUTANEOUS WEEKLY
Qty: 2 ML | Refills: 0 | OUTPATIENT
Start: 2025-01-29

## 2025-01-29 RX ORDER — TIRZEPATIDE 10 MG/.5ML
10 INJECTION, SOLUTION SUBCUTANEOUS WEEKLY
Qty: 2 ML | Refills: 3 | Status: SHIPPED | OUTPATIENT
Start: 2025-01-29

## 2025-02-24 DIAGNOSIS — E66.09 CLASS 2 OBESITY DUE TO EXCESS CALORIES WITHOUT SERIOUS COMORBIDITY WITH BODY MASS INDEX (BMI) OF 39.0 TO 39.9 IN ADULT: Primary | ICD-10-CM

## 2025-02-24 DIAGNOSIS — E66.812 CLASS 2 OBESITY DUE TO EXCESS CALORIES WITHOUT SERIOUS COMORBIDITY WITH BODY MASS INDEX (BMI) OF 39.0 TO 39.9 IN ADULT: Primary | ICD-10-CM

## 2025-02-25 RX ORDER — ALCOHOL ANTISEPTIC PADS
PADS, MEDICATED (EA) TOPICAL
Qty: 100 EACH | Refills: 0 | Status: SHIPPED | OUTPATIENT
Start: 2025-02-25

## 2025-03-04 ENCOUNTER — OFFICE VISIT (OUTPATIENT)
Dept: BARIATRICS | Facility: CLINIC | Age: 25
End: 2025-03-04
Payer: MEDICARE

## 2025-03-04 VITALS
DIASTOLIC BLOOD PRESSURE: 78 MMHG | WEIGHT: 197 LBS | BODY MASS INDEX: 37.19 KG/M2 | TEMPERATURE: 98.2 F | HEIGHT: 61 IN | RESPIRATION RATE: 16 BRPM | HEART RATE: 73 BPM | SYSTOLIC BLOOD PRESSURE: 116 MMHG

## 2025-03-04 DIAGNOSIS — E66.812 CLASS 2 OBESITY DUE TO EXCESS CALORIES WITHOUT SERIOUS COMORBIDITY WITH BODY MASS INDEX (BMI) OF 39.0 TO 39.9 IN ADULT: Primary | ICD-10-CM

## 2025-03-04 DIAGNOSIS — E66.09 CLASS 2 OBESITY DUE TO EXCESS CALORIES WITHOUT SERIOUS COMORBIDITY WITH BODY MASS INDEX (BMI) OF 37.0 TO 37.9 IN ADULT: ICD-10-CM

## 2025-03-04 DIAGNOSIS — E66.09 CLASS 2 OBESITY DUE TO EXCESS CALORIES WITHOUT SERIOUS COMORBIDITY WITH BODY MASS INDEX (BMI) OF 39.0 TO 39.9 IN ADULT: Primary | ICD-10-CM

## 2025-03-04 DIAGNOSIS — E66.812 CLASS 2 OBESITY DUE TO EXCESS CALORIES WITHOUT SERIOUS COMORBIDITY WITH BODY MASS INDEX (BMI) OF 37.0 TO 37.9 IN ADULT: ICD-10-CM

## 2025-03-04 PROCEDURE — 99214 OFFICE O/P EST MOD 30 MIN: CPT | Performed by: PHYSICIAN ASSISTANT

## 2025-03-04 RX ORDER — TIRZEPATIDE 12.5 MG/.5ML
12.5 INJECTION, SOLUTION SUBCUTANEOUS WEEKLY
Qty: 2 ML | Refills: 1 | Status: SHIPPED | OUTPATIENT
Start: 2025-03-04 | End: 2025-04-29

## 2025-03-04 NOTE — ASSESSMENT & PLAN NOTE
-Patient is \pursuing conservative plan  -Initial weight loss goal of 5-10% weight loss for improved health  -Screening labs and records reviewed from prior 4/19/24 A1c , cmp wnl, low HDL cholesterol  - STOP BANG-1/8    Goals:  - To drink at least 64oz of water daily.No sugary beverages. Stop soda (improving now)  -to avoid meal skipping. Recommend a protein in the AM. Discussed protein shake as an option  -recommend to get on a regular eating/sleeping schedule    On zepbound 10mg .  Start weight 209. 5.5 % weight loss. To continue and increase to 12.5mg .  Discussed the need to make more changes and to make sure to eat in the AM.  .  Medication agreement signed. Contraception nexplanon    Initial Weight:209  Last visit weight 202  Current Weight:197  Change: -12lb  Goal Weight:150

## 2025-03-04 NOTE — PROGRESS NOTES
Assessment/Plan:    Class 2 obesity due to excess calories without serious comorbidity with body mass index (BMI) of 37.0 to 37.9 in adult  -Patient is \pursuing conservative plan  -Initial weight loss goal of 5-10% weight loss for improved health  -Screening labs and records reviewed from prior 4/19/24 A1c , cmp wnl, low HDL cholesterol  - STOP BANG-1/8    Goals:  - To drink at least 64oz of water daily.No sugary beverages. Stop soda (improving now)  -to avoid meal skipping. Recommend a protein in the AM. Discussed protein shake as an option  -recommend to get on a regular eating/sleeping schedule    On zepbound 10mg .  Start weight 209. 5.5 % weight loss. To continue and increase to 12.5mg .  Discussed the need to make more changes and to make sure to eat in the AM.  .  Medication agreement signed. Contraception nexplanon    Initial Weight:209  Last visit weight 202  Current Weight:197  Change: -12lb  Goal Weight:150        Return in about 4 months (around 7/17/2025).       Diagnoses and all orders for this visit:    Class 2 obesity due to excess calories without serious comorbidity with body mass index (BMI) of 39.0 to 39.9 in adult  -     tirzepatide (Zepbound) 12.5 mg/0.5 mL auto-injector; Inject 0.5 mL (12.5 mg total) under the skin once a week    Class 2 obesity due to excess calories without serious comorbidity with body mass index (BMI) of 37.0 to 37.9 in adult          Subjective:   Chief Complaint   Patient presents with    Follow-up     MWM F/u; Waist 32in        Patient ID: Grismerlin Fernandez  is a 25 y.o. female with excess weight/obesity here to pursue weight managment.  Patient is pursuing Conservative Program.     HPI  On zepbound 10mg.  She does feel it is helping at times *(the first 48 hours after injection) but also notes other days appetite is more and still having sweet cravings. Denies side effects.  Currently not working but plans to go back to [employer] next month. Not on a regular diet or  eating schedule so sometimes after the injection eating only 1 meal (rice, bean, meat) and others eating more.    Wt Readings from Last 10 Encounters:   26 89.4 kg (197 lb)   24 91.8 kg (202 lb 6.4 oz)   09/10/24 94.8 kg (209 lb)   24 95.8 kg (211 lb 3.2 oz)   24 93.4 kg (205 lb 14.6 oz)   24 93.9 kg (207 lb 0.2 oz)   24 97.3 kg (214 lb 9.6 oz)   24 95.9 kg (211 lb 6.7 oz)   24 105 kg (232 lb)   24 106 kg (232 lb 9.6 oz)       Food logging:  Increased appetite/cravings:sugar  Exercise:none  Hydration:water , soda      The following portions of the patient's history were reviewed and updated as appropriate: She  has a past medical history of Depression and SIRS (systemic inflammatory response syndrome) (HCC) (2024).  She   Patient Active Problem List    Diagnosis Date Noted    Leukocytosis 2024    Intentional overdose (HCC) 2024    Class 2 obesity due to excess calories without serious comorbidity with body mass index (BMI) of 37.0 to 37.9 in adult 2024    Vitamin D deficiency 2024    Transaminitis 2024    Pelvic fluid collection 2024    Abnormal CT scan, kidney 2024    COVID-19 affecting pregnancy in third trimester 2024    Patient noncompliance 2023    H/O abdominoplasty 10/17/2023    Hx of  section 2023    Adjustment disorder with anxious mood     Medical clearance for psychiatric admission 2020     She  has a past surgical history that includes pr  delivery only (N/A, 2020); Abdominoplasty (); and pr  delivery only (N/A, 1/15/2024).  Her family history includes No Known Problems in her brother, father, mother, and sister.  She  reports that she has quit smoking. Her smoking use included pipe. She has never been exposed to tobacco smoke. She has never used smokeless tobacco. She reports that she does not currently use alcohol. She reports that she does not  "use drugs.  Current Outpatient Medications   Medication Sig Dispense Refill    Alcohol Swabs (Global Alcohol Prep Ease) 70 % PADS USE AS DIRECTED PRIOR TO ZEPBOUND ADMINISTRATION 100 each 0    Cholecalciferol (Vitamin D3) 125 MCG (5000 UT) CAPS 1 cap daily 90 capsule 3    tirzepatide (Zepbound) 10 mg/0.5 mL auto-injector Inject 0.5 mL (10 mg total) under the skin once a week 2 mL 3    tirzepatide (Zepbound) 12.5 mg/0.5 mL auto-injector Inject 0.5 mL (12.5 mg total) under the skin once a week 2 mL 1    naproxen (NAPROSYN) 500 mg tablet Take 1 tablet (500 mg total) by mouth 2 (two) times a day with meals for 10 days (Patient not taking: Reported on 3/4/2025) 20 tablet 0     No current facility-administered medications for this visit.     Current Outpatient Medications on File Prior to Visit   Medication Sig    Alcohol Swabs (Global Alcohol Prep Ease) 70 % PADS USE AS DIRECTED PRIOR TO ZEPBOUND ADMINISTRATION    Cholecalciferol (Vitamin D3) 125 MCG (5000 UT) CAPS 1 cap daily    tirzepatide (Zepbound) 10 mg/0.5 mL auto-injector Inject 0.5 mL (10 mg total) under the skin once a week    naproxen (NAPROSYN) 500 mg tablet Take 1 tablet (500 mg total) by mouth 2 (two) times a day with meals for 10 days (Patient not taking: Reported on 3/4/2025)     No current facility-administered medications on file prior to visit.     She has no known allergies..    Review of Systems   Constitutional:  Negative for fever.   Respiratory:  Negative for shortness of breath.    Cardiovascular:  Negative for chest pain and palpitations.   Gastrointestinal:  Negative for abdominal pain, constipation, diarrhea and vomiting.   Genitourinary:  Negative for difficulty urinating.   Skin:  Negative for rash.   Neurological:  Negative for headaches.       Objective:    /78 (BP Location: Left arm, Patient Position: Sitting)   Pulse 73   Temp 98.2 °F (36.8 °C) (Tympanic)   Resp 16   Ht 5' 1\" (1.549 m)   Wt 89.4 kg (197 lb)   BMI 37.22 kg/m² "      Physical Exam  Vitals and nursing note reviewed.   Constitutional:       General: She is not in acute distress.     Appearance: She is well-developed. She is obese.   HENT:      Head: Normocephalic and atraumatic.   Eyes:      Conjunctiva/sclera: Conjunctivae normal.   Neck:      Thyroid: No thyromegaly.   Pulmonary:      Effort: Pulmonary effort is normal. No respiratory distress.   Skin:     Findings: No rash (visible).   Neurological:      Mental Status: She is alert and oriented to person, place, and time.   Psychiatric:         Mood and Affect: Mood normal.         Behavior: Behavior normal.

## 2025-03-05 ENCOUNTER — TELEPHONE (OUTPATIENT)
Dept: BARIATRICS | Facility: CLINIC | Age: 25
End: 2025-03-05

## 2025-03-05 NOTE — TELEPHONE ENCOUNTER
PA for Zepbound 12.5mg SUBMITTED to [employer]    via    [x]CMM-KEY: [id number]  []Surescripts-Case ID #   []Availity-Auth ID # NDC #   []Faxed to pl  []Other website   []Phone call Case ID #     []PA sent as URGENT    All office notes, labs and other pertaining documents and studies sent. Clinical questions answered. Awaiting determination from insurance company.     Turnaround time for your insurance to make a decision on your Prior Authorization can take 7-21 business days.

## 2025-03-06 NOTE — TELEPHONE ENCOUNTER
PA for Zepbound 12.5mg APPROVED     Date(s) approved 3/5/2025 - 9/5/2025    Case #    Patient advised by          []MyChart Message  []Phone call   []LMOM  []L/M to call office as no active Communication consent on file  []Unable to leave detailed message as VM not approved on Communication consent       Pharmacy advised by    [x]Fax  []Phone call  []Secure Chat    Specialty Pharmacy    []     Approval letter scanned into Media Yes

## 2025-04-17 ENCOUNTER — HOSPITAL ENCOUNTER (EMERGENCY)
Facility: HOSPITAL | Age: 25
Discharge: HOME | End: 2025-04-17
Attending: EMERGENCY MEDICINE
Payer: MEDICARE

## 2025-04-17 VITALS
RESPIRATION RATE: 14 BRPM | SYSTOLIC BLOOD PRESSURE: 117 MMHG | WEIGHT: 198.41 LBS | TEMPERATURE: 98.2 F | HEART RATE: 91 BPM | BODY MASS INDEX: 37.49 KG/M2 | OXYGEN SATURATION: 98 % | DIASTOLIC BLOOD PRESSURE: 56 MMHG

## 2025-04-17 DIAGNOSIS — M54.50 LOW BACK PAIN: Primary | ICD-10-CM

## 2025-04-17 LAB
BACTERIA UR QL AUTO: ABNORMAL /HPF
BILIRUB UR QL STRIP: NEGATIVE
CLARITY UR: CLEAR
COLOR UR: YELLOW
EXT PREGNANCY TEST URINE: NEGATIVE
EXT. CONTROL: NORMAL
GLUCOSE UR STRIP-MCNC: NEGATIVE MG/DL
HGB UR QL STRIP.AUTO: ABNORMAL
KETONES UR STRIP-MCNC: ABNORMAL MG/DL
LEUKOCYTE ESTERASE UR QL STRIP: NEGATIVE
MUCOUS THREADS UR QL AUTO: ABNORMAL
NITRITE UR QL STRIP: NEGATIVE
NON-SQ EPI CELLS URNS QL MICRO: ABNORMAL /HPF
PH UR STRIP.AUTO: 5.5 [PH] (ref 4.5–8)
PROT UR STRIP-MCNC: NEGATIVE MG/DL
RBC #/AREA URNS AUTO: ABNORMAL /HPF
SP GR UR STRIP.AUTO: >=1.03 (ref 1–1.03)
UROBILINOGEN UR QL STRIP.AUTO: 0.2 E.U./DL
WBC #/AREA URNS AUTO: ABNORMAL /HPF

## 2025-04-17 PROCEDURE — 81001 URINALYSIS AUTO W/SCOPE: CPT

## 2025-04-17 PROCEDURE — 81025 URINE PREGNANCY TEST: CPT | Performed by: EMERGENCY MEDICINE

## 2025-04-17 PROCEDURE — 99283 EMERGENCY DEPT VISIT LOW MDM: CPT

## 2025-04-17 PROCEDURE — 99284 EMERGENCY DEPT VISIT MOD MDM: CPT | Performed by: EMERGENCY MEDICINE

## 2025-04-17 RX ORDER — NAPROXEN 500 MG/1
500 TABLET ORAL 2 TIMES DAILY WITH MEALS
Qty: 20 TABLET | Refills: 0 | Status: SHIPPED | OUTPATIENT
Start: 2025-04-17

## 2025-04-17 RX ORDER — KETOROLAC TROMETHAMINE 30 MG/ML
15 INJECTION, SOLUTION INTRAMUSCULAR; INTRAVENOUS ONCE
Status: DISCONTINUED | OUTPATIENT
Start: 2025-04-17 | End: 2025-04-17 | Stop reason: HOSPADM

## 2025-04-17 NOTE — ED PROVIDER NOTES
Time reflects when diagnosis was documented in both MDM as applicable and the Disposition within this note       Time User Action Codes Description Comment    4/17/2025 12:05 PM Cherelle Bautista Add [M54.50] Low back pain           ED Disposition       ED Disposition   Discharge    Condition   Stable    Date/Time   Thu Apr 17, 2025 12:06 PM    Comment   Grismerlin Fernandez discharge to home/self care.                   Assessment & Plan       Medical Decision Making  25-year-old female presenting with low back pain associated with menses that resolved while in ER.    Initial considerations were pain related to menses, UTI, ectopic pregnancy, back strain, radiculopathy, SI joint dysfunction, degenerative disc disease.  Doubt fracture, caude equine, epidural abscess, tumor/mass.    No mechanism to suggest fracture or subluxation, no point tenderness to suggest osteomyelitis, other infection, bone lesion or tumor. No evidence for cauda equina syndrome. No indication for imaging. No radicular pain or focal neurologic finding to suggest disk injury or nerve impingement. Nothing in history or exam to suggest renal, urinary, or intra-abdominal/pelvic etiology for pain.  Urine preg was negative.  UA not concerning for UTI.    Will provide pt with analgesia.    Amount and/or Complexity of Data Reviewed  Labs: ordered. Decision-making details documented in ED Course.    Risk  Prescription drug management.        ED Course as of 04/17/25 1347   Thu Apr 17, 2025   1030 Temperature: 98.2 °F (36.8 °C)  afebrile   1059 PREGNANCY TEST URINE: Negative  Negative   1205 Pt states she no longer wants pain meds because her pain has resolved, but would like a work note for today and tomorrow.       Medications   ketorolac (TORADOL) injection 15 mg (15 mg Intramuscular Not Given 4/17/25 1134)       ED Risk Strat Scores                    No data recorded                            History of Present Illness       Chief Complaint  "  Patient presents with    Back Pain     Patient reports lower back pain since yesterday. Not related to any injury but patient states she thinks it is related menstrual pain.       Past Medical History:   Diagnosis Date    Depression     SIRS (systemic inflammatory response syndrome) (HCC) 2024      Past Surgical History:   Procedure Laterality Date    ABDOMINOPLASTY      [place]  DELIVERY ONLY N/A 2020    Procedure:  SECTION ();  Surgeon: Adair Clifton MD;  Location: [facility];  Service: Obstetrics    [place]  DELIVERY ONLY N/A 1/15/2024    Procedure:  SECTION ();  Surgeon: Yardlie Toussaint-Foster, DO;  Location: [facility];  Service: Obstetrics      Family History   Problem Relation Age of Onset    No Known Problems Mother     No Known Problems Father     No Known Problems Sister     No Known Problems Brother     Cancer Neg Hx     Diabetes Neg Hx       Social History     Tobacco Use    Smoking status: Former     Types: Pipe     Passive exposure: Never    Smokeless tobacco: Never    Tobacco comments:     Occasional hookah   Vaping Use    Vaping status: Some Days    Substances: Flavoring   Substance Use Topics    Alcohol use: Not Currently     Comment: Denies    Drug use: Never      E-Cigarette/Vaping    E-Cigarette Use Current Some Day User       E-Cigarette/Vaping Substances    Nicotine No     THC No     CBD No     Flavoring Yes     Other No     Unknown No       I have reviewed and agree with the history as documented.     25 y.o. F s/p  x2, abdominoplasty p/w low back pain x 2 days. Across low back, achy, happens when she gets her period.  Didn't take anything for it.  States she's come to the ER in the past for it \"and they give me something that makes the pain go away.\"  Denies F/C, abd pain, N/V/D, urinary complaints, bowel/bladder dysfunction, focal deficits.      History provided by:  Patient   used: No        Review of Systems "   Constitutional:  Negative for chills and fever.   Gastrointestinal:  Negative for abdominal pain, constipation, diarrhea, nausea and vomiting.   Genitourinary:  Negative for difficulty urinating, dysuria, frequency and hematuria.   Musculoskeletal:  Positive for back pain.   Neurological:  Negative for weakness and numbness.           Objective       ED Triage Vitals   Temperature Pulse Blood Pressure Respirations SpO2 Patient Position - Orthostatic VS   04/17/25 1029 04/17/25 1032 04/17/25 1032 04/17/25 1032 04/17/25 1032 --   98.2 °F (36.8 °C) 91 117/56 14 98 %       Temp src Heart Rate Source BP Location FiO2 (%) Pain Score    -- 04/17/25 1032 -- -- 04/17/25 1032     Monitor   5      Vitals      Date and Time Temp Pulse SpO2 Resp BP Pain Score FACES Pain Rating User   04/17/25 1032 -- 91 98 % 14 117/56 5 -- JS   04/17/25 1029 98.2 °F (36.8 °C) -- -- -- -- -- -- JS            Physical Exam  Vitals and nursing note reviewed.   Constitutional:       General: She is not in acute distress.     Appearance: She is well-developed. She is not ill-appearing, toxic-appearing or diaphoretic.   Neck:      Trachea: Phonation normal.   Cardiovascular:      Rate and Rhythm: Normal rate and regular rhythm.      Heart sounds: Normal heart sounds. No murmur heard.     No friction rub.   Pulmonary:      Effort: Pulmonary effort is normal. No accessory muscle usage, respiratory distress or retractions.      Breath sounds: Normal breath sounds. No wheezing, rhonchi or rales.   Abdominal:      General: There is no distension.      Palpations: Abdomen is soft. Abdomen is not rigid. There is no mass.      Tenderness: There is no abdominal tenderness. There is no guarding or rebound.   Musculoskeletal:         General: No tenderness or deformity. Normal range of motion.      Cervical back: Normal range of motion. No swelling, spasms, tenderness or bony tenderness.      Thoracic back: No deformity, spasms, tenderness or bony tenderness.       Lumbar back: No deformity, spasms, tenderness or bony tenderness.   Skin:     General: Skin is warm and dry.      Coloration: Skin is not pale.      Findings: No erythema or rash.   Neurological:      Mental Status: She is alert.      GCS: GCS eye subscore is 4. GCS verbal subscore is 5. GCS motor subscore is 6.      Sensory: No sensory deficit.      Motor: Motor function is intact.      Gait: Gait normal.      Comments:           Results Reviewed       Procedure Component Value Units Date/Time    Urine Microscopic [[id number]]  (Abnormal) Collected: 25    Lab Status: Final result Specimen: Urine, Clean Catch Updated: 25 1125     RBC, UA 2-4 /hpf      WBC, UA 1-2 /hpf      Epithelial Cells Occasional /hpf      Bacteria, UA Occasional /hpf      MUCUS THREADS Innumerable    POCT pregnancy, urine [[id number]]  (Normal) Collected: 25    Lab Status: Final result Updated: 25     EXT Preg Test, Ur Negative     Control Valid    Urine Macroscopic, POC [[id number]]  (Abnormal) Collected: 25    Lab Status: Final result Specimen: Urine Updated: 25     Color, UA Yellow     Clarity, UA Clear     pH, UA 5.5     Leukocytes, UA Negative     Nitrite, UA Negative     Protein, UA Negative mg/dl      Glucose, UA Negative mg/dl      Ketones, UA 15 (1+) mg/dl      Urobilinogen, UA 0.2 E.U./dl      Bilirubin, UA Negative     Occult Blood, UA Moderate     Specific Gravity, UA >=1.030    Narrative:      CLINITEK RESULT            No orders to display       Procedures    ED Medication and Procedure Management   Prior to Admission Medications   Prescriptions Last Dose Informant Patient Reported? Taking?   Alcohol Swabs (Global Alcohol Prep Ease) 70 % PADS   No No   Sig: USE AS DIRECTED PRIOR TO ZEPBOUND ADMINISTRATION   Cholecalciferol (Vitamin D3) 125 MCG (5000 UT) CAPS   No No   Si cap daily   naproxen (NAPROSYN) 500 mg tablet   No No   Sig: Take 1 tablet (500 mg total) by  mouth 2 (two) times a day with meals for 10 days   Patient not taking: Reported on 3/4/2025   tirzepatide (Zepbound) 10 mg/0.5 mL auto-injector   No No   Sig: Inject 0.5 mL (10 mg total) under the skin once a week   tirzepatide (Zepbound) 12.5 mg/0.5 mL auto-injector   No No   Sig: Inject 0.5 mL (12.5 mg total) under the skin once a week      Facility-Administered Medications: None     Discharge Medication List as of 4/17/2025 12:09 PM        CONTINUE these medications which have CHANGED    Details   naproxen (NAPROSYN) 500 mg tablet Take 1 tablet (500 mg total) by mouth 2 (two) times a day with meals, Starting Thu 4/17/2025, Normal           CONTINUE these medications which have NOT CHANGED    Details   Alcohol Swabs (Global Alcohol Prep Ease) 70 % PADS USE AS DIRECTED PRIOR TO ZEPBOUND ADMINISTRATION, Normal      Cholecalciferol (Vitamin D3) 125 MCG (5000 UT) CAPS 1 cap daily, Normal      tirzepatide (Zepbound) 10 mg/0.5 mL auto-injector Inject 0.5 mL (10 mg total) under the skin once a week, Starting Wed 1/29/2025, Normal      tirzepatide (Zepbound) 12.5 mg/0.5 mL auto-injector Inject 0.5 mL (12.5 mg total) under the skin once a week, Starting Tue 4/8/2025, Until Tue 6/3/2025, Normal           No discharge procedures on file.  ED SEPSIS DOCUMENTATION   Time reflects when diagnosis was documented in both MDM as applicable and the Disposition within this note       Time User Action Codes Description Comment    4/17/2025 12:05 PM Cherelle Bautista Add [M54.50] Low back pain                  Cherelle Bautista,   04/17/25 1345

## 2025-04-17 NOTE — Clinical Note
Grismerlin Fernandez was seen and treated in our emergency department on 4/17/2025.                Diagnosis:     Grismerlin  may return to work on return date.    She may return on this date: 04/19/2025         If you have any questions or concerns, please don't hesitate to call.      Cherelle Bautista, DO    ______________________________           _______________          _______________  Hospital Representative                              Date                                Time

## 2025-04-27 ENCOUNTER — HOSPITAL ENCOUNTER (EMERGENCY)
Facility: HOSPITAL | Age: 25
Discharge: HOME | End: 2025-04-27
Attending: EMERGENCY MEDICINE
Payer: MEDICARE

## 2025-04-27 VITALS
HEART RATE: 98 BPM | SYSTOLIC BLOOD PRESSURE: 145 MMHG | TEMPERATURE: 98.7 F | DIASTOLIC BLOOD PRESSURE: 75 MMHG | WEIGHT: 195.11 LBS | BODY MASS INDEX: 36.87 KG/M2 | RESPIRATION RATE: 18 BRPM | OXYGEN SATURATION: 98 %

## 2025-04-27 DIAGNOSIS — S31.119A: Primary | ICD-10-CM

## 2025-04-27 PROCEDURE — 99282 EMERGENCY DEPT VISIT SF MDM: CPT

## 2025-04-28 NOTE — ED PROVIDER NOTES
Pt Name: Grismerlin Fernandez  MRN: [id number]  Birthdate 2000  Age/Sex: 25 y.o. female  Date of evaluation: 4/27/2025  PCP: No primary care provider on file.        FINAL IMPRESSION    Final diagnoses:   Laceration of anterior abdominal wall, initial encounter         DISPOSITION/PLAN      Time reflects when diagnosis was documented in both MDM as applicable and the Disposition within this note       Time User Action Codes Description Comment    4/27/2025  9:24 PM Roselyn Mac Add [S31.119A] Laceration of anterior abdominal wall, initial encounter           ED Disposition       ED Disposition   Discharge    Condition   Stable    Date/Time   Sun Apr 27, 2025  9:24 PM    Comment   Grismerlin Fernandez discharge to home/self care.                   Follow-up Information       Follow up With Specialties Details Why Contact Info Additional Information    [facility] [place], [place] Emergency Department Emergency Medicine Go to  As needed, If symptoms worsen [address] [place] [id number]  [phone] [facility] [place] [place] Emergency Department, [address] [place] [address], [phone]              PATIENT REFERRED TO:    [facility] [place] [place] Emergency Department  [address] [place] [id number]  [phone]  Go to   As needed, If symptoms worsen      DISCHARGE MEDICATIONS:    Discharge Medication List as of 4/27/2025  9:25 PM        CONTINUE these medications which have NOT CHANGED    Details   Alcohol Swabs (Global Alcohol Prep Ease) 70 % PADS USE AS DIRECTED PRIOR TO ZEPBOUND ADMINISTRATION, Normal      Cholecalciferol (Vitamin D3) 125 MCG (5000 UT) CAPS 1 cap daily, Normal      naproxen (NAPROSYN) 500 mg tablet Take 1 tablet (500 mg total) by mouth 2 (two) times a day with meals, Starting Thu 4/17/2025, Normal      tirzepatide (Zepbound) 10 mg/0.5 mL auto-injector Inject 0.5 mL (10 mg total) under the skin once a week, Starting Wed 1/29/2025, Normal       tirzepatide (Zepbound) 12.5 mg/0.5 mL auto-injector Inject 0.5 mL (12.5 mg total) under the skin once a week, Starting 2025, Until Tue 6/3/2025, Normal             No discharge procedures on file.          CHIEF COMPLAINT    Chief Complaint   Patient presents with    Laceration     Report cut abd on barbwire x3 lacerations no active bleeding         HPI    Grismerlin presents to the Emergency Department complaining of wounds to abdomen.  There was barbed wine around a washing machine that she tried to lean in and use.  She refuses sutures and tetanus booster.       HPI      Past Medical and Surgical History    Past Medical History:   Diagnosis Date    Depression     SIRS (systemic inflammatory response syndrome) (HCC) 2024       Past Surgical History:   Procedure Laterality Date    ABDOMINOPLASTY      ID  DELIVERY ONLY N/A 2020    Procedure:  SECTION ();  Surgeon: Adair Clifton MD;  Location: [facility];  Service: Obstetrics    ID  DELIVERY ONLY N/A 1/15/2024    Procedure:  SECTION ();  Surgeon: Yardlie Toussaint-Foster, DO;  Location: [facility];  Service: Obstetrics       Family History   Problem Relation Age of Onset    No Known Problems Mother     No Known Problems Father     No Known Problems Sister     No Known Problems Brother     Cancer Neg Hx     Diabetes Neg Hx        Social History     Tobacco Use    Smoking status: Former     Types: Pipe     Passive exposure: Never    Smokeless tobacco: Never    Tobacco comments:     Occasional hookah   Vaping Use    Vaping status: Some Days    Substances: Flavoring   Substance Use Topics    Alcohol use: Not Currently     Comment: Denies    Drug use: Never         .    Allergies    No Known Allergies    Home Medications    Prior to Admission medications    Medication Sig Start Date End Date Taking? Authorizing Provider   Alcohol Swabs (Global Alcohol Prep Ease) 70 % PADS USE AS DIRECTED PRIOR TO ZEPBOUND  ADMINISTRATION 2/25/25   Karla Blum PA-C   Cholecalciferol (Vitamin D3) 125 MCG (5000 UT) CAPS 1 cap daily 11/12/24   Karla Blum PA-C   naproxen (NAPROSYN) 500 mg tablet Take 1 tablet (500 mg total) by mouth 2 (two) times a day with meals for 10 days  Patient not taking: Reported on 3/4/2025 9/3/24 11/12/24  Alex SantiDO   naproxen (NAPROSYN) 500 mg tablet Take 1 tablet (500 mg total) by mouth 2 (two) times a day with meals 4/17/25   Cherelle Bautista DO   tirzepatide (Zepbound) 10 mg/0.5 mL auto-injector Inject 0.5 mL (10 mg total) under the skin once a week 1/29/25   Karla Blum PA-C   tirzepatide (Zepbound) 12.5 mg/0.5 mL auto-injector Inject 0.5 mL (12.5 mg total) under the skin once a week 4/8/25 6/3/25  Patrice Anthony PA-C           Review of Systems    Review of Systems   Constitutional:  Negative for chills and fever.   HENT:  Negative for ear pain and sore throat.    Eyes:  Negative for pain and visual disturbance.   Respiratory:  Negative for cough and shortness of breath.    Cardiovascular:  Negative for chest pain and palpitations.   Gastrointestinal:  Negative for abdominal pain and vomiting.   Genitourinary:  Negative for dysuria and hematuria.   Musculoskeletal:  Negative for arthralgias and back pain.   Skin:  Negative for color change and rash.   Neurological:  Negative for seizures and syncope.   All other systems reviewed and are negative.        Physical Exam      ED Triage Vitals [04/27/25 2059]   Temperature Pulse Respirations Blood Pressure SpO2   98.7 °F (37.1 °C) 98 18 145/75 98 %      Temp Source Heart Rate Source Patient Position - Orthostatic VS BP Location FiO2 (%)   Oral Monitor Sitting Right arm --      Pain Score       No Pain               Physical Exam  Vitals and nursing note reviewed.   Constitutional:       General: She is not in acute distress.     Appearance: She is well-developed.   HENT:      Head: Normocephalic and atraumatic.   Eyes:       Conjunctiva/sclera: Conjunctivae normal.   Cardiovascular:      Rate and Rhythm: Normal rate and regular rhythm.      Heart sounds: No murmur heard.  Pulmonary:      Effort: Pulmonary effort is normal. No respiratory distress.      Breath sounds: Normal breath sounds.   Abdominal:      Palpations: Abdomen is soft.      Tenderness: There is no abdominal tenderness.          Comments: There are 4 linear lacerations on her anterior abdominal wall.     Musculoskeletal:         General: No swelling.      Cervical back: Neck supple.   Skin:     General: Skin is warm and dry.      Capillary Refill: Capillary refill takes less than 2 seconds.   Neurological:      Mental Status: She is alert.   Psychiatric:         Mood and Affect: Mood normal.                Assessment and Plan    Grismerlin Fernandez is a 25 y.o. female who presents with lacerations to there abdominal area. She refused to to have sutures placed.  She requested glue and steri-strips only.  She understood that this is not appropriate closure for these wounds but wanted it anyway.     [facility]      Diagnostic Results        Labs:  Radiology:    No orders to display       All radiology studies independently viewed by me and interpreted by the radiologist.    Procedure    Procedures      ED Course of Care and Re-Assessments      Medications - No data to display               DO Roselyn Tsang DO  04/28/25 1922

## 2025-05-27 ENCOUNTER — OFFICE VISIT (OUTPATIENT)
Dept: FAMILY MEDICINE CLINIC | Facility: CLINIC | Age: 25
End: 2025-05-27

## 2025-05-27 ENCOUNTER — TELEPHONE (OUTPATIENT)
Dept: FAMILY MEDICINE CLINIC | Facility: CLINIC | Age: 25
End: 2025-05-27

## 2025-05-27 VITALS
TEMPERATURE: 98.2 F | OXYGEN SATURATION: 98 % | BODY MASS INDEX: 35.12 KG/M2 | HEART RATE: 79 BPM | WEIGHT: 186 LBS | RESPIRATION RATE: 18 BRPM | DIASTOLIC BLOOD PRESSURE: 76 MMHG | SYSTOLIC BLOOD PRESSURE: 128 MMHG | HEIGHT: 61 IN

## 2025-05-27 DIAGNOSIS — Z11.3 SCREENING FOR STDS (SEXUALLY TRANSMITTED DISEASES): ICD-10-CM

## 2025-05-27 DIAGNOSIS — Z00.00 ANNUAL PHYSICAL EXAM: Primary | ICD-10-CM

## 2025-05-27 DIAGNOSIS — Z02.89 ENCOUNTER FOR COMPLETION OF FORM WITH PATIENT: ICD-10-CM

## 2025-05-27 DIAGNOSIS — Z13.1 SCREENING FOR DIABETES MELLITUS: ICD-10-CM

## 2025-05-27 DIAGNOSIS — Z11.4 SCREENING FOR HIV (HUMAN IMMUNODEFICIENCY VIRUS): ICD-10-CM

## 2025-05-27 PROCEDURE — 99213 OFFICE O/P EST LOW 20 MIN: CPT

## 2025-05-27 PROCEDURE — 99385 PREV VISIT NEW AGE 18-39: CPT

## 2025-05-27 NOTE — TELEPHONE ENCOUNTER
[employer] form received on 5/37/2025  to be completed by PCP. Copy made and placed in PCP folder.    Forms to be delivered to PCP mailbox at assigned time.

## 2025-05-27 NOTE — PROGRESS NOTES
Adult Annual Physical  Name: Grismerlin Fernandez      : 2000      MRN: [id number]  Encounter Provider: PER Man  Encounter Date: 2025   Encounter department: [facility]    :  Assessment & Plan  Annual physical exam    Orders:  •  Glucose, fasting; Future  •  HIV 1/2 AB/AG w Reflex [facility] for 2 yr old and above; Future  •  Lipid Panel with Direct LDL reflex; Future  •  Chlamydia/GC amplified DNA by PCR; Future  •  Hemoglobin A1C; Future  •  Comprehensive metabolic panel; Future  •  Basic metabolic panel; Future  •  TSH, 3rd generation with Free T4 reflex; Future    Screening for diabetes mellitus    Orders:  •  Glucose, fasting; Future    Screening for HIV (human immunodeficiency virus)    Orders:  •  HIV 1/2 AB/AG w Reflex [facility] for 2 yr old and above; Future    Screening for STDs (sexually transmitted diseases)    Orders:  •  Chlamydia/GC amplified DNA by PCR; Future    Encounter for completion of form with patient  - Needs FMLA completed, informed Pt to give form to the  for scan, and will notifies once it is completed                   Preventive Screenings:  - Diabetes Screening: risks/benefits discussed  - Cholesterol Screening: risks/benefits discussed   - Chlamydia Screening: risks/benefits discussed and orders placed   - Hepatitis C screening: screening up-to-date   - HIV screening: screening up-to-date   - Cervical cancer screening: screening up-to-date   - Colon cancer screening: screening not indicated   - Lung cancer screening: screening not indicated     Immunizations:  - Immunizations due: HPV (Gardasil 9) and Hepatitis A  - Risks/benefits immunizations discussed    - The patient declines recommended vaccines currently despite my recommendations      Counseling/Anticipatory Guidance:  - Alcohol: discussed moderation in alcohol intake and recommendations for healthy alcohol use.   - Sexual health: discussed sexually transmitted  "diseases, partner selection, use of condoms, avoidance of unintended pregnancy, and contraceptive alternatives.   - Diet: discussed recommendations for a healthy/well-balanced diet.   - Exercise: the importance of regular exercise/physical activity was discussed. Recommend exercise 3-5 times per week for at least 30 minutes.       Depression Screening and Follow-up Plan: Patient was screened for depression during today's encounter. They screened negative with a PHQ-2 score of 0.        History of Present Illness     Adult Annual Physical:  Patient presents for annual physical. Patient is a 25 y.o. female with PMH of Obesity is seen today in office for care establishment.Only concern today is FMLA form completion. Pt reports that she missed work from 5/7-5/14/25 due to illness/coughing. Pt reports sx has resolved. She reports that the form should have been completed in 10 days which ended yesterday.    .     Diet and Physical Activity:  - Diet/Nutrition: no special diet.  - Exercise: no formal exercise.    Depression Screening:  - PHQ-2 Score: 0    General Health:  - Sleep: sleeps well.  - Hearing: normal hearing right ear.  - Vision: no vision problems.  - Dental: regular dental visits.    /GYN Health:  - Follows with GYN: no.   - Last menstrual cycle: 5/12/2025.   - History of STDs: no  - Contraception: injectable contraception.      Review of Systems   Constitutional: Negative.    HENT: Negative.     Eyes: Negative.    Respiratory: Negative.     Cardiovascular: Negative.    Gastrointestinal: Negative.    Genitourinary: Negative.    Musculoskeletal: Negative.    Skin: Negative.    Allergic/Immunologic: Negative.    Neurological: Negative.    Hematological: Negative.    Psychiatric/Behavioral: Negative.           Objective   /76 (BP Location: Left arm, Patient Position: Sitting, Cuff Size: Large)   Pulse 79   Temp 98.2 °F (36.8 °C) (Temporal)   Resp 18   Ht 5' 1\" (1.549 m)   Wt 84.4 kg (186 lb)   LMP " 04/13/2025   SpO2 98%   BMI 35.14 kg/m²     Physical Exam  Vitals reviewed.   Constitutional:       General: She is not in acute distress.     Appearance: Normal appearance. She is obese. She is not ill-appearing.   HENT:      Head: Normocephalic and atraumatic.      Right Ear: Tympanic membrane normal.      Left Ear: Tympanic membrane normal.      Nose: No congestion or rhinorrhea.      Mouth/Throat:      Pharynx: No oropharyngeal exudate.     Eyes:      Extraocular Movements: Extraocular movements intact.      Conjunctiva/sclera: Conjunctivae normal.      Pupils: Pupils are equal, round, and reactive to light.       Cardiovascular:      Rate and Rhythm: Normal rate and regular rhythm.      Pulses: Normal pulses.      Heart sounds: Normal heart sounds.   Pulmonary:      Effort: Pulmonary effort is normal. No respiratory distress.   Abdominal:      General: There is no distension.     Musculoskeletal:         General: No swelling. Normal range of motion.      Cervical back: Normal range of motion. No rigidity.     Skin:     General: Skin is warm.     Neurological:      General: No focal deficit present.      Mental Status: She is alert and oriented to person, place, and time. Mental status is at baseline.     Psychiatric:         Mood and Affect: Mood normal.         Behavior: Behavior normal.         Thought Content: Thought content normal.         Judgment: Judgment normal.

## 2025-05-27 NOTE — PATIENT INSTRUCTIONS
"Patient Education     Routine physical for adults   The Basics   Written by the doctors and editors at [facility]   What is a physical? -- A physical is a routine visit, or \"check-up,\" with your doctor. You might also hear it called a \"wellness visit\" or \"preventive visit.\"  During each visit, the doctor will:   Ask about your physical and mental health   Ask about your habits, behaviors, and lifestyle   Do an exam   Give you vaccines if needed   Talk to you about any medicines you take   Give advice about your health   Answer your questions  Getting regular check-ups is an important part of taking care of your health. It can help your doctor find and treat any problems you have. But it's also important for preventing health problems.  A routine physical is different from a \"sick visit.\" A sick visit is when you see a doctor because of a health concern or problem. Since physicals are scheduled ahead of time, you can think about what you want to ask the doctor.  How often should I get a physical? -- It depends on your age and health. In general, for people age 21 years and older:   If you are younger than 50 years, you might be able to get a physical every 3 years.   If you are 50 years or older, your doctor might recommend a physical every year.  If you have an ongoing health condition, like diabetes or high blood pressure, your doctor will probably want to see you more often.  What happens during a physical? -- In general, each visit will include:   Physical exam - The doctor or nurse will check your height, weight, heart rate, and blood pressure. They will also look at your eyes and ears. They will ask about how you are feeling and whether you have any symptoms that bother you.   Medicines - It's a good idea to bring a list of all the medicines you take to each doctor visit. Your doctor will talk to you about your medicines and answer any questions. Tell them if you are having any side effects that bother you. You " "should also tell them if you are having trouble paying for any of your medicines.   Habits and behaviors - This includes:   Your diet   Your exercise habits   Whether you smoke, drink alcohol, or use drugs   Whether you are sexually active   Whether you feel safe at home  Your doctor will talk to you about things you can do to improve your health and lower your risk of health problems. They will also offer help and support. For example, if you want to quit smoking, they can give you advice and might prescribe medicines. If you want to improve your diet or get more physical activity, they can help you with this, too.   Lab tests, if needed - The tests you get will depend on your age and situation. For example, your doctor might want to check your:   Cholesterol   Blood sugar   Iron level   Vaccines - The recommended vaccines will depend on your age, health, and what vaccines you already had. Vaccines are very important because they can prevent certain serious or deadly infections.   Discussion of screening - \"Screening\" means checking for diseases or other health problems before they cause symptoms. Your doctor can recommend screening based on your age, risk, and preferences. This might include tests to check for:   Cancer, such as breast, prostate, cervical, ovarian, colorectal, prostate, lung, or skin cancer   Sexually transmitted infections, such as chlamydia and gonorrhea   Mental health conditions like depression and anxiety  Your doctor will talk to you about the different types of screening tests. They can help you decide which screenings to have. They can also explain what the results might mean.   Answering questions - The physical is a good time to ask the doctor or nurse questions about your health. If needed, they can refer you to other doctors or specialists, too.  Adults older than 65 years often need other care, too. As you get older, your doctor will talk to you about:   How to prevent falling at " home   Hearing or vision tests   Memory testing   How to take your medicines safely   Making sure that you have the help and support you need at home  All topics are updated as new evidence becomes available and our peer review process is complete.  This topic retrieved from [employer] on: May 02, 2024.  Topic 938874 Version 1.0  Release: 32.4.3 - C32.122  © 2024 UpToDate, Inc. and/or its affiliates. All rights reserved.  Consumer Information Use and Disclaimer   Disclaimer: This generalized information is a limited summary of diagnosis, treatment, and/or medication information. It is not meant to be comprehensive and should be used as a tool to help the user understand and/or assess potential diagnostic and treatment options. It does NOT include all information about conditions, treatments, medications, side effects, or risks that may apply to a specific patient. It is not intended to be medical advice or a substitute for the medical advice, diagnosis, or treatment of a health care provider based on the health care provider's examination and assessment of a patient's specific and unique circumstances. Patients must speak with a health care provider for complete information about their health, medical questions, and treatment options, including any risks or benefits regarding use of medications. This information does not endorse any treatments or medications as safe, effective, or approved for treating a specific patient. UpToDate, Inc. and its affiliates disclaim any warranty or liability relating to this information or the use thereof.The use of this information is governed by the Terms of Use, available at https://www.woltersEllacoya Networksuwer.com/en/know/clinical-effectiveness-terms. 2024© UpToDate, Inc. and its affiliates and/or licensors. All rights reserved.  Copyright   © 2024 UpToDate, Inc. and/or its affiliates. All rights reserved.    Patient Education     Ejercicio y movimiento   Conceptos Básicos   Redactado por los  médicos y editores de [employer]Date   ¿Cuáles son los beneficios del movimiento? --  el cuerpo tiene muchos beneficios. Puede:   Quemar calorías, lo cual ayuda a controlar el peso   Ayudar a controlar los niveles de azúcar en ehsan en los diabéticos   Bajar la presión arterial, en especial en las personas con presión arterial sharon   Disminuir el estrés y ayudar con la depresión y la ansiedad   Mantener los huesos damian, para que no se debiliten y se rompan fácilmente   Disminuir las posibilidades de morir de enfermedades cardíacas  Incluso añadir pequeñas cantidades de actividad física a fernandez rutina diaria puede mejorar fernandez ya.  ¿Cuáles son los principales tipos de ejercicio? -- Existen orlin tipos principales de ejercicio:   Ejercicio aeróbico - El ejercicio aeróbico aumenta la frecuencia cardíaca. Algunos ejemplos de ejercicio aeróbico son caminar, correr, bailar, montar en bicicleta o nadar.   Fortalecimiento muscular - El fortalecimiento muscular ayuda a fortalecer los músculos. Puede hacer fernanda tipo de ejercicio con pesas, bandas de ejercicio o máquinas de pesas, También puede realizar fernanda tipo de ejercicio usando fernandez propio peso corporal, por ejemplo al hacer flexiones de brazos, o levantando objetos hogareños nancy jarras de agua.   Estiramiento - Los ejercicios de estiramiento permiten que los músculos y las articulaciones se muevan más fácilmente.  Es importante incluir los orlin tipos de ejercicio en el programa de ejercicios, para que el organismo, los músculos y las articulaciones puedan mantenerse en el mejor estado posible.  ¿Arlene consultar a mi médico o enfermero antes de comenzar a hacer ejercicio? -- Si nunca hizo ejercicio o no lo ha hecho en mucho tiempo, consulte a fernandez médico o enfermero antes de comenzar un programa de ejercicios muy activo.  Si tiene enfermedades cardíacas o factores de riesgo de enfermedades cardíacas (nancy por ejemplo presión arterial sharon o diabetes), es posible que el médico  o enfermero recomiende que realice tiffany prueba de ejercicios antes de iniciar un programa de ejercicios.  Al comenzar un programa de ejercicios, hágalo gradualmente. Por ejemplo, sarai los ejercicios a un ritmo lento o solo nani algunos minutos. Con el tiempo, podrá hacer los ejercicios más rápido o nani periodos más largos.  ¿Qué cecy poner en práctica al hacer ejercicio? -- Cada vez que sarai ejercicio, debe hacer lo siguiente:   Entrar en calor - [place] ayuda a evitar que se lesione los músculos al hacer ejercicio. Para entrar en calor, sarai un ejercicio aeróbico suave (por ejemplo, caminar lentamente) o ejercicios de estiramiento nani 5 a 10 minutos.   Hacer ejercicio - Debe tratar de combinar ejercicio aeróbico, ejercicio de fortalecimiento muscular y estiramiento. Nani el ejercicio aeróbico puede, por ejemplo, caminar rápido, nadar, correr o usar tiffany máquina de ejercicios. Otras actividades, nancy bailar o jugar al tenis, también son formas de ejercicio aeróbico. También debe tomarse el tiempo de estirar todas las articulaciones, incluidos el jamila, los hombros, la espalda, las caderas y las rodillas. Al menos dos veces por semana puede hacer ejercicios de fortalecimiento muscular nancy parte de fernandez rutina.   Enfriar los músculos - Enfriar los músculos ayuda a evitar los mareos después del ejercicio y a prevenir calambres. Para enfriar los músculos puede estirarse o hacer un ejercicio aeróbico suave nani emmanuel minutos.  Algunas personas van al gimnasio o asisten a clases grupales de ejercicio. Sin embargo, puede hacer ejercicio incluso sin nada de eso. Hasta es posible hacer algunos ejercicios en espacios reducidos. También puede probar con videos en línea o aplicaciones para teléfonos inteligentes y isaac ideas sobre distintos tipos de ejercicio.  ¿Con qué frecuencia cecy hacer ejercicio? -- Los médicos recomiendan hacer ejercicio al menos 30 minutos por día, emmanuel o más días por semana.  Si no puede  hacer ejercicio nani 30 minutos consecutivos, trate de hacer ejercicio nani 10 minutos, orlin o cuatro veces al día. Incluso hacer ejercicio nani períodos más breves es angel para usted, especialmente si implica pasar menos tiempo sentado.  ¿Cuándo cecy llamar al médico o enfermero? -- Si tiene alguno de los siguientes síntomas al hacer ejercicio, interrumpa la actividad y llame a fernandez médico o enfermero de inmediato:   Dolor o presión en el pecho, los brazos, la garganta, la mandíbula o la espalda   Náuseas o vómitos   Sensación de palpitaciones o de que el corazón late muy rápido   Sensación de mareo o desmayo  ¿Qué hago si no tengo tiempo para hacer ejercicio? -- Muchas personas llevan tiffany jignesh muy ocupada y cipriano vez crean que no tienen tiempo para hacer ejercicio, destiny es importante tratar de encontrar el tiempo, incluso si está cansado o trabaja mucho. El ejercicio puede aumentar fernandez nivel de energía, lo que puede hacer que se sienta mejor y hasta podría ayudarlo a hacer más cosas.  Incluso si le resulta difícil dedicar mucho tiempo al ejercicio, puede mejorar fernandez ya si mueve fernandez cuerpo un poco más. Hay muchas maneras de ser más activo. Por ejemplo, puede:   Usar las escaleras en lugar del ascensor.   Estacionar en un lugar que esté más lejos de la odin.   Elegir un trayecto más maria del carmen al caminar de un lugar a otro.  Pasar mucho tiempo sin moverse (por ejemplo, mirando televisión o trabajando en la computadora) es aquilino para la ya. Trate de levantarse y moverse cada vez que pueda. Incluso sesiones de movimiento breves, nancy amber caminatas cortas, hacer tareas hogareñas u ocuparse del jardín, pueden ayudar a mejorar la ya. Buscar actividades que disfrute, o hacerlas con otras personas, puede ayudar a que incorpore más movimiento a fernandez jignesh diaria.  ¿Qué más cecy poner en práctica al hacer ejercicio? -- Para hacer ejercicio de manera alba y evitar problemas, es importante:   Beber líquido nani el  ejercicio y después de kylee (destiny evite las bebidas que tienen mucha cafeína o azúcar).   Evitar hacer ejercicio al aire obed si hace mucho calor o mucho frío.   Usar capas de ropa, para poder quitarse prendas si tiene mucho calor.   Usar un calzado que ajuste chandan y sea un buen soporte para los pies.   Tener conciencia del entorno si hace ejercicio al aire obed.  Todos los artículos se actualizan a medida que se descubre nueva evidencia y culmina nuestro proceso de evaluación por homólogos   Kylee artículo se recuperó de UpToDate el: May 18, 2024.  Artículo 63126 Versión [id number].es-419.1  Release: 32.4.3 - C32.137  © 2024 UpToDate, Inc. Todos los derechos reservados.  Exención de responsabilidad y uso de la información del consumidor   Descargo de responsabilidad: esta información generalizada es un resumen limitado de información sobre el diagnóstico, el tratamiento y/o los medicamentos. No pretende ser exhaustiva y se debe utilizar nancy herramienta para ayudar al usuario a comprender y/o evaluar las posibles opciones de diagnóstico y tratamiento. No incluye toda la información sobre afecciones, tratamientos, medicamentos, efectos secundarios o riesgos puedan ser aplicables a un paciente específico. No tiene el propósito de servir nancy recomendación médica ni de sustituir la recomendación médica, el diagnóstico o el tratamiento de un profesional de atención médica que se base en el examen y la evaluación de kylee profesional de la ya respecto a las circunstancias específicas y únicas del paciente. Los pacientes deben hablar con un profesional de atención médica para obtener información completa sobre fernandez ya, cuestiones médicas y opciones de tratamiento, incluidos los riesgos o los beneficios relacionados con el uso de medicamentos. Esta información no certifica que los tratamientos o medicamentos criselda seguros, eficaces o estén aprobados para tratar a un paciente específico. UpToDate, Inc. y [employer]iceo afiliados  renuncian a cualquier garantía o responsabilidad relacionada con esta información o el uso de la misma.El uso de esta información está sujeto a las Condiciones de uso, disponibles en https://www.[employer].com/en/know/clinical-effectiveness-terms. 2024© [employer] y eliceo afiliados y/o licenciantes. Todos los derechos reservados.  Copyright   © 2024 [employer] Todos los derechos reservados.

## 2025-05-29 NOTE — TELEPHONE ENCOUNTER
Pt called again in regards to forms needing to be completed by tomorrow. Patient advised provider has up to 10 business days to complete forms. Patient requested an appointment with another provider to complete [place] paperwork. Appt scheduled for today 5/29.

## 2025-05-30 NOTE — TELEPHONE ENCOUNTER
Patient informed form is ready to . Per pcp form was given to Mary Ellen to be faxed. Patient stated patient will come later today to  forms.

## 2025-06-14 DIAGNOSIS — E66.09 CLASS 2 OBESITY DUE TO EXCESS CALORIES WITHOUT SERIOUS COMORBIDITY WITH BODY MASS INDEX (BMI) OF 39.0 TO 39.9 IN ADULT: ICD-10-CM

## 2025-06-14 DIAGNOSIS — E66.812 CLASS 2 OBESITY DUE TO EXCESS CALORIES WITHOUT SERIOUS COMORBIDITY WITH BODY MASS INDEX (BMI) OF 39.0 TO 39.9 IN ADULT: ICD-10-CM

## 2025-06-19 ENCOUNTER — HOSPITAL ENCOUNTER (INPATIENT)
Facility: HOSPITAL | Age: 25
LOS: 1 days | DRG: 817 | End: 2025-06-20
Attending: EMERGENCY MEDICINE | Admitting: INTERNAL MEDICINE
Payer: MEDICARE

## 2025-06-19 DIAGNOSIS — E87.6 HYPOKALEMIA: ICD-10-CM

## 2025-06-19 DIAGNOSIS — T39.1X1A ACETAMINOPHEN OVERDOSE: ICD-10-CM

## 2025-06-19 DIAGNOSIS — F43.22 ADJUSTMENT DISORDER WITH ANXIOUS MOOD: ICD-10-CM

## 2025-06-19 DIAGNOSIS — T50.902A INTENTIONAL OVERDOSE, INITIAL ENCOUNTER (HCC): Primary | ICD-10-CM

## 2025-06-19 PROBLEM — T39.1X2A ACETAMINOPHEN OVERDOSE, INTENTIONAL SELF-HARM, INITIAL ENCOUNTER (HCC): Status: ACTIVE | Noted: 2025-06-19

## 2025-06-19 LAB
ALBUMIN SERPL BCG-MCNC: 2.8 G/DL (ref 3.5–5)
ALP SERPL-CCNC: 43 U/L (ref 34–104)
ALT SERPL W P-5'-P-CCNC: 17 U/L (ref 7–52)
AMPHETAMINES SERPL QL SCN: NEGATIVE
ANION GAP SERPL CALCULATED.3IONS-SCNC: 6 MMOL/L (ref 4–13)
APAP SERPL-MCNC: 6 UG/ML (ref 10–20)
APAP SERPL-MCNC: 76 UG/ML (ref 10–20)
AST SERPL W P-5'-P-CCNC: 15 U/L (ref 13–39)
ATRIAL RATE: 79 BPM
BARBITURATES UR QL: NEGATIVE
BASOPHILS # BLD AUTO: 0.03 THOUSANDS/ÂΜL (ref 0–0.1)
BASOPHILS # BLD AUTO: 0.05 THOUSANDS/ÂΜL (ref 0–0.1)
BASOPHILS NFR BLD AUTO: 0 % (ref 0–1)
BASOPHILS NFR BLD AUTO: 0 % (ref 0–1)
BENZODIAZ UR QL: NEGATIVE
BILIRUB SERPL-MCNC: 0.33 MG/DL (ref 0.2–1)
BUN SERPL-MCNC: 12 MG/DL (ref 5–25)
CALCIUM ALBUM COR SERPL-MCNC: 7.6 MG/DL (ref 8.3–10.1)
CALCIUM SERPL-MCNC: 6.6 MG/DL (ref 8.4–10.2)
CHLORIDE SERPL-SCNC: 116 MMOL/L (ref 96–108)
CO2 SERPL-SCNC: 20 MMOL/L (ref 21–32)
COCAINE UR QL: NEGATIVE
CREAT SERPL-MCNC: 0.48 MG/DL (ref 0.6–1.3)
EOSINOPHIL # BLD AUTO: 0.03 THOUSAND/ÂΜL (ref 0–0.61)
EOSINOPHIL # BLD AUTO: 0.07 THOUSAND/ÂΜL (ref 0–0.61)
EOSINOPHIL NFR BLD AUTO: 0 % (ref 0–6)
EOSINOPHIL NFR BLD AUTO: 1 % (ref 0–6)
ERYTHROCYTE [DISTWIDTH] IN BLOOD BY AUTOMATED COUNT: 13.4 % (ref 11.6–15.1)
ERYTHROCYTE [DISTWIDTH] IN BLOOD BY AUTOMATED COUNT: 13.7 % (ref 11.6–15.1)
EXT PREGNANCY TEST URINE: NEGATIVE
EXT. CONTROL: NORMAL
FENTANYL UR QL SCN: NEGATIVE
GFR SERPL CREATININE-BSD FRML MDRD: 137 ML/MIN/1.73SQ M
GLUCOSE SERPL-MCNC: 86 MG/DL (ref 65–140)
HCT VFR BLD AUTO: 36.7 % (ref 34.8–46.1)
HCT VFR BLD AUTO: 39.8 % (ref 34.8–46.1)
HGB BLD-MCNC: 11.8 G/DL (ref 11.5–15.4)
HGB BLD-MCNC: 12.3 G/DL (ref 11.5–15.4)
HYDROCODONE UR QL SCN: NEGATIVE
IMM GRANULOCYTES # BLD AUTO: 0.04 THOUSAND/UL (ref 0–0.2)
IMM GRANULOCYTES # BLD AUTO: 0.07 THOUSAND/UL (ref 0–0.2)
IMM GRANULOCYTES NFR BLD AUTO: 0 % (ref 0–2)
IMM GRANULOCYTES NFR BLD AUTO: 1 % (ref 0–2)
INR PPP: 1.28 (ref 0.85–1.19)
INR PPP: 1.29 (ref 0.85–1.19)
LYMPHOCYTES # BLD AUTO: 2.53 THOUSANDS/ÂΜL (ref 0.6–4.47)
LYMPHOCYTES # BLD AUTO: 2.62 THOUSANDS/ÂΜL (ref 0.6–4.47)
LYMPHOCYTES NFR BLD AUTO: 19 % (ref 14–44)
LYMPHOCYTES NFR BLD AUTO: 24 % (ref 14–44)
MCH RBC QN AUTO: 26.2 PG (ref 26.8–34.3)
MCH RBC QN AUTO: 26.9 PG (ref 26.8–34.3)
MCHC RBC AUTO-ENTMCNC: 30.9 G/DL (ref 31.4–37.4)
MCHC RBC AUTO-ENTMCNC: 32.2 G/DL (ref 31.4–37.4)
MCV RBC AUTO: 84 FL (ref 82–98)
MCV RBC AUTO: 85 FL (ref 82–98)
METHADONE UR QL: NEGATIVE
MONOCYTES # BLD AUTO: 0.72 THOUSAND/ÂΜL (ref 0.17–1.22)
MONOCYTES # BLD AUTO: 0.88 THOUSAND/ÂΜL (ref 0.17–1.22)
MONOCYTES NFR BLD AUTO: 7 % (ref 4–12)
MONOCYTES NFR BLD AUTO: 7 % (ref 4–12)
NEUTROPHILS # BLD AUTO: 7.69 THOUSANDS/ÂΜL (ref 1.85–7.62)
NEUTROPHILS # BLD AUTO: 9.6 THOUSANDS/ÂΜL (ref 1.85–7.62)
NEUTS SEG NFR BLD AUTO: 69 % (ref 43–75)
NEUTS SEG NFR BLD AUTO: 72 % (ref 43–75)
NRBC BLD AUTO-RTO: 0 /100 WBCS
NRBC BLD AUTO-RTO: 0 /100 WBCS
OPIATES UR QL SCN: NEGATIVE
OXYCODONE+OXYMORPHONE UR QL SCN: NEGATIVE
P AXIS: 41 DEGREES
PCP UR QL: NEGATIVE
PLATELET # BLD AUTO: 360 THOUSANDS/UL (ref 149–390)
PLATELET # BLD AUTO: 395 THOUSANDS/UL (ref 149–390)
PMV BLD AUTO: 9.5 FL (ref 8.9–12.7)
PMV BLD AUTO: 9.9 FL (ref 8.9–12.7)
POTASSIUM SERPL-SCNC: 2.7 MMOL/L (ref 3.5–5.3)
PR INTERVAL: 122 MS
PROT SERPL-MCNC: 4.9 G/DL (ref 6.4–8.4)
PROTHROMBIN TIME: 16.3 SECONDS (ref 12.3–15)
PROTHROMBIN TIME: 16.4 SECONDS (ref 12.3–15)
QRS AXIS: 57 DEGREES
QRSD INTERVAL: 94 MS
QT INTERVAL: 370 MS
QTC INTERVAL: 425 MS
RBC # BLD AUTO: 4.38 MILLION/UL (ref 3.81–5.12)
RBC # BLD AUTO: 4.69 MILLION/UL (ref 3.81–5.12)
SALICYLATES SERPL-MCNC: <5 MG/DL (ref 5–20)
SODIUM SERPL-SCNC: 142 MMOL/L (ref 135–147)
T WAVE AXIS: 7 DEGREES
THC UR QL: NEGATIVE
TSH SERPL DL<=0.05 MIU/L-ACNC: 0.93 UIU/ML (ref 0.45–4.5)
VENTRICULAR RATE: 79 BPM
WBC # BLD AUTO: 11.13 THOUSAND/UL (ref 4.31–10.16)
WBC # BLD AUTO: 13.2 THOUSAND/UL (ref 4.31–10.16)

## 2025-06-19 PROCEDURE — 80053 COMPREHEN METABOLIC PANEL: CPT | Performed by: EMERGENCY MEDICINE

## 2025-06-19 PROCEDURE — 99285 EMERGENCY DEPT VISIT HI MDM: CPT

## 2025-06-19 PROCEDURE — 93010 ELECTROCARDIOGRAM REPORT: CPT | Performed by: INTERNAL MEDICINE

## 2025-06-19 PROCEDURE — 96368 THER/DIAG CONCURRENT INF: CPT

## 2025-06-19 PROCEDURE — 93005 ELECTROCARDIOGRAM TRACING: CPT

## 2025-06-19 PROCEDURE — 99255 IP/OBS CONSLTJ NEW/EST HI 80: CPT | Performed by: EMERGENCY MEDICINE

## 2025-06-19 PROCEDURE — 85025 COMPLETE CBC W/AUTO DIFF WBC: CPT | Performed by: EMERGENCY MEDICINE

## 2025-06-19 PROCEDURE — 80307 DRUG TEST PRSMV CHEM ANLYZR: CPT | Performed by: EMERGENCY MEDICINE

## 2025-06-19 PROCEDURE — 80143 DRUG ASSAY ACETAMINOPHEN: CPT | Performed by: EMERGENCY MEDICINE

## 2025-06-19 PROCEDURE — 96365 THER/PROPH/DIAG IV INF INIT: CPT

## 2025-06-19 PROCEDURE — 99223 1ST HOSP IP/OBS HIGH 75: CPT | Performed by: INTERNAL MEDICINE

## 2025-06-19 PROCEDURE — 85610 PROTHROMBIN TIME: CPT | Performed by: INTERNAL MEDICINE

## 2025-06-19 PROCEDURE — 85610 PROTHROMBIN TIME: CPT | Performed by: EMERGENCY MEDICINE

## 2025-06-19 PROCEDURE — 96375 TX/PRO/DX INJ NEW DRUG ADDON: CPT

## 2025-06-19 PROCEDURE — 99285 EMERGENCY DEPT VISIT HI MDM: CPT | Performed by: EMERGENCY MEDICINE

## 2025-06-19 PROCEDURE — 84443 ASSAY THYROID STIM HORMONE: CPT | Performed by: INTERNAL MEDICINE

## 2025-06-19 PROCEDURE — 80179 DRUG ASSAY SALICYLATE: CPT | Performed by: EMERGENCY MEDICINE

## 2025-06-19 PROCEDURE — 85025 COMPLETE CBC W/AUTO DIFF WBC: CPT | Performed by: INTERNAL MEDICINE

## 2025-06-19 PROCEDURE — 80143 DRUG ASSAY ACETAMINOPHEN: CPT | Performed by: INTERNAL MEDICINE

## 2025-06-19 PROCEDURE — 81025 URINE PREGNANCY TEST: CPT | Performed by: EMERGENCY MEDICINE

## 2025-06-19 RX ORDER — ONDANSETRON 2 MG/ML
4 INJECTION INTRAMUSCULAR; INTRAVENOUS ONCE
Status: COMPLETED | OUTPATIENT
Start: 2025-06-19 | End: 2025-06-19

## 2025-06-19 RX ORDER — POTASSIUM CHLORIDE 14.9 MG/ML
20 INJECTION INTRAVENOUS
Status: COMPLETED | OUTPATIENT
Start: 2025-06-19 | End: 2025-06-19

## 2025-06-19 RX ORDER — DOCUSATE SODIUM 100 MG/1
100 CAPSULE, LIQUID FILLED ORAL 2 TIMES DAILY
Status: DISCONTINUED | OUTPATIENT
Start: 2025-06-19 | End: 2025-06-20 | Stop reason: HOSPADM

## 2025-06-19 RX ORDER — ONDANSETRON 2 MG/ML
4 INJECTION INTRAMUSCULAR; INTRAVENOUS EVERY 6 HOURS PRN
Status: DISCONTINUED | OUTPATIENT
Start: 2025-06-19 | End: 2025-06-20 | Stop reason: HOSPADM

## 2025-06-19 RX ADMIN — ACETYLCYSTEINE 11940 MG: 6 INJECTION, SOLUTION INTRAVENOUS at 09:57

## 2025-06-19 RX ADMIN — POTASSIUM CHLORIDE 20 MEQ: 14.9 INJECTION, SOLUTION INTRAVENOUS at 14:17

## 2025-06-19 RX ADMIN — POTASSIUM CHLORIDE 20 MEQ: 14.9 INJECTION, SOLUTION INTRAVENOUS at 10:29

## 2025-06-19 RX ADMIN — ONDANSETRON 4 MG: 2 INJECTION INTRAMUSCULAR; INTRAVENOUS at 11:25

## 2025-06-19 RX ADMIN — SODIUM CHLORIDE 500 ML: 0.9 INJECTION, SOLUTION INTRAVENOUS at 10:28

## 2025-06-19 RX ADMIN — POTASSIUM CHLORIDE 20 MEQ: 14.9 INJECTION, SOLUTION INTRAVENOUS at 12:24

## 2025-06-19 RX ADMIN — ONDANSETRON 4 MG: 2 INJECTION INTRAMUSCULAR; INTRAVENOUS at 09:31

## 2025-06-19 RX ADMIN — ACETYLCYSTEINE 3980 MG: 6 INJECTION, SOLUTION INTRAVENOUS at 11:25

## 2025-06-19 RX ADMIN — ACETYLCYSTEINE 7960 MG: 6 INJECTION, SOLUTION INTRAVENOUS at 15:44

## 2025-06-19 RX ADMIN — DOCUSATE SODIUM 100 MG: 100 CAPSULE, LIQUID FILLED ORAL at 14:17

## 2025-06-19 NOTE — H&P
"H&P - Hospitalist   Name: Grismerlin Fernandez 25 y.o. female I MRN: [id number]  Unit/Bed#: 7T [facility] 704-01 I Date of Admission: 6/19/2025   Date of Service: 6/19/2025 I Hospital Day: 0     Assessment & Plan  Acetaminophen overdose, intentional self-harm, initial encounter (HCC)  Patient presents with intentional overdose on acetaminophen; took approximately 24 tablets overnight  has history of doing the same in the past; this morning she felt remorseful and reported to the ED for evaluation  In the ED labs stable, potassium 2.7 and she received supplemental potassium  Toxicology consulted and recommended initiating NAC protocol as well as monitoring INR, CMP, and acetaminophen levels every 12 hours  NAC protocol as follows:  \"Intravenous (Acetadote)   Loading dose- 150mg/kg infused over 15-60 minutes   Maintenance Infusion #1- 50mg/kg (12.5mg/kg/hr) over 4 hours   Maintenance Infusion #2 -100mg/kg (6.25 mg/kg/hr) until treatment endpoint   Treatment Endpoint: 20 hours or more   NAC should be continued for the full course.   NAC can be stopped when APAP is undetectable, AST/ALT have peaked and are downtrending, and patient appears clinically well. Consultation with a medical  /poison center is recommended before changes in the duration of therapy are made.\"    Patient signed 201 while in the ED, accepting of psychiatric placement once medically stable  201 obliges this patient to stay in house; continue one-to-one  Should she decide to leave AMA, patient should be reminded she is on a 201; if need be, escalate to 302 as needed  Behavioral health following along, transition to inpatient psych once medically stable in 24 to 48 hours  Adjustment disorder with anxious mood  Patient with history of suicide attempt; currently here for self-harm attempt with Tylenol ingestion  Consult to behavioral health; would benefit from inpatient psychiatric placement once medically stable  Currently on 201; await " behavioral health recommendations      VTE Pharmacologic Prophylaxis: VTE Score: 1 Low Risk (Score 0-2) - Encourage Ambulation.  Code Status: Level 1 - Full Code   Discussion with family: Care plan discussed with patient who voiced understanding and agrees with recommendations.      Anticipated Length of Stay: Patient will be admitted on an inpatient basis with an anticipated length of stay of greater than 2 midnights secondary to suicide attempt.    History of Present Illness   Chief Complaint: Suicide attempt/Tylenol overdose    Grismerlin Fernandez is a 25 y.o. female with a PMH of postpartum depression and suicidal ideation who presents with complaints of Tylenol overdose.  Patient reports overnight becoming overwhelmed with anxiety and ingesting approximately 25 500 mg Tylenol tablets.  This morning, after feeling remorseful about what she had done, tried to induce vomiting but was unsuccessful and subsequently presented to the [facility] ED.  Toxicology consulted and started patient on NAC.  Patient signed 201 in the ED with crisis it is agreeable to inpatient psychiatric rehab.  She denies any acute complaints other than anxiety; appears comfortable and states that she has no fever/chills, nausea/vomiting, diarrhea/constipation, abdominal pain, chest pain.    Review of Systems   Constitutional:  Negative for chills and fever.   HENT:  Negative for ear pain and sore throat.    Eyes:  Negative for pain and visual disturbance.   Respiratory:  Negative for cough and shortness of breath.    Cardiovascular:  Negative for chest pain and palpitations.   Gastrointestinal:  Negative for abdominal pain and vomiting.   Genitourinary:  Negative for dysuria and hematuria.   Musculoskeletal:  Negative for arthralgias and back pain.   Skin:  Negative for color change and rash.   Neurological:  Negative for seizures and syncope.   Psychiatric/Behavioral:  Positive for dysphoric mood, self-injury and suicidal  ideas. The patient is nervous/anxious.    All other systems reviewed and are negative.      Historical Information   Past Medical History[1]  Past Surgical History[2]  Social History[3]  E-Cigarette/Vaping    E-Cigarette Use Never User      E-Cigarette/Vaping Substances    Nicotine No     THC No     CBD No     Flavoring Yes     Other No     Unknown No      Family History[4]  Social History:  Marital Status: Single   Occupation:   Patient Pre-hospital Living Situation: Home  Patient Pre-hospital Level of Mobility: walks  Patient Pre-hospital Diet Restrictions: None    Meds/Allergies   I have reviewed home medications using recent Epic encounter.  Prior to Admission medications    Medication Sig Start Date End Date Taking? Authorizing Provider   Alcohol Swabs (AUM Alcohol Prep Pads) 70 % PADS USE AS DIRECTED PRIOR TO ZEPBOUND ADMINISTRATION  Patient not taking: Reported on 6/19/2025 6/16/25   Karla Blum PA-C   Cholecalciferol (Vitamin D3) 125 MCG (5000 UT) CAPS 1 cap daily  Patient not taking: Reported on 6/19/2025 11/12/24   Karla Blum PA-C   naproxen (NAPROSYN) 500 mg tablet Take 1 tablet (500 mg total) by mouth 2 (two) times a day with meals for 10 days  Patient not taking: Reported on 3/4/2025 9/3/24 11/12/24  Alex Hancock DO   naproxen (NAPROSYN) 500 mg tablet Take 1 tablet (500 mg total) by mouth 2 (two) times a day with meals  Patient not taking: Reported on 6/19/2025 4/17/25   Cherelle Bautista DO   tirzepatide (Zepbound) 10 mg/0.5 mL auto-injector Inject 0.5 mL (10 mg total) under the skin once a week  Patient not taking: Reported on 6/19/2025 1/29/25   Karla Blum PA-C     No Known Allergies    Objective :  Temp:  [97.5 °F (36.4 °C)-98.3 °F (36.8 °C)] 97.5 °F (36.4 °C)  HR:  [75-83] 81  BP: (111-162)/(85-91) 150/90  Resp:  [16-18] 18  SpO2:  [98 %-100 %] 99 %  O2 Device: None (Room air)    Physical Exam  Vitals and nursing note reviewed.   Constitutional:       General: She is not in  acute distress.     Appearance: She is well-developed.   HENT:      Head: Normocephalic and atraumatic.     Eyes:      Conjunctiva/sclera: Conjunctivae normal.       Cardiovascular:      Rate and Rhythm: Normal rate.   Pulmonary:      Effort: Pulmonary effort is normal. No respiratory distress.   Abdominal:      Palpations: Abdomen is soft.      Tenderness: There is no abdominal tenderness.     Musculoskeletal:         General: No swelling.      Cervical back: Neck supple.     Skin:     General: Skin is warm and dry.     Neurological:      Mental Status: She is alert and oriented to person, place, and time.     Psychiatric:         Mood and Affect: Mood normal.            Lines/Drains:            Lab Results: I have reviewed the following results:  Results from last 7 days   Lab Units 25  0921   WBC Thousand/uL 11.13*   HEMOGLOBIN g/dL 12.3   HEMATOCRIT % 39.8   PLATELETS Thousands/uL 395*   SEGS PCT % 69   LYMPHO PCT % 24   MONO PCT % 7   EOS PCT % 0     Results from last 7 days   Lab Units 25  0921   SODIUM mmol/L 142   POTASSIUM mmol/L 2.7*   CHLORIDE mmol/L 116*   CO2 mmol/L 20*   BUN mg/dL 12   CREATININE mg/dL 0.48*   ANION GAP mmol/L 6   CALCIUM mg/dL 6.6*   ALBUMIN g/dL 2.8*   TOTAL BILIRUBIN mg/dL 0.33   ALK PHOS U/L 43   ALT U/L 17   AST U/L 15   GLUCOSE RANDOM mg/dL 86     Results from last 7 days   Lab Units 25  1047   INR  1.29*         Lab Results   Component Value Date    HGBA1C 5.1 2024                 Administrative Statements       ** Please Note: This note has been constructed using a voice recognition system. **         [1]   Past Medical History:  Diagnosis Date    Depression     SIRS (systemic inflammatory response syndrome) (HCC) 2024   [2]   Past Surgical History:  Procedure Laterality Date    ABDOMINOPLASTY      [facility]  DELIVERY ONLY N/A 2020    Procedure:  SECTION ();  Surgeon: Adair Clifton MD;  Location: [facility];  Service:  Obstetrics    [place]  DELIVERY ONLY N/A 1/15/2024    Procedure:  SECTION ();  Surgeon: Yardlie Toussaint-Foster, DO;  Location: [facility];  Service: Obstetrics   [3]   Social History  Tobacco Use    Smoking status: Some Days     Types: Pipe     Passive exposure: Never    Smokeless tobacco: Never    Tobacco comments:     Occasional hookah   Vaping Use    Vaping status: Never Used   Substance and Sexual Activity    Alcohol use: Not Currently     Comment: Denies    Drug use: Never    Sexual activity: Yes     Partners: Male   [4]   Family History  Problem Relation Name Age of Onset    No Known Problems Mother      No Known Problems Father      No Known Problems Sister      No Known Problems Brother      Cancer Neg Hx      Diabetes Neg Hx

## 2025-06-19 NOTE — ED NOTES
Pt requesting to leave - states she wants to continue her care outside of here.  in to speak to pt.     Tania Bryant RN  06/19/25 1015

## 2025-06-19 NOTE — ED PROVIDER NOTES
Time reflects when diagnosis was documented in both MDM as applicable and the Disposition within this note       Time User Action Codes Description Comment    6/19/2025  9:29 AM Betty Zhou Spike [T50.902A] Intentional overdose, initial encounter (HCC)     6/19/2025 10:17 AM Betty Zhou Add [T39.1X1A] Acetaminophen overdose     6/19/2025 10:17 AM Betty Zhou Add [E87.6] Hypokalemia           ED Disposition       ED Disposition   Admit    Condition   Stable    Date/Time   Thu Jun 19, 2025 11:14 AM    Comment   Case was discussed with Dr. Covington and the patient's admission status was agreed to be Admission Status: inpatient status to the service of Dr. Covington .             Assessment & Plan       Medical Decision Making  25-year-old female presenting after intentional overdose of 10 - 12 g of acetaminophen 9.5 hours ago as a suicide attempt, now with nausea and upper abdominal discomfort.  Vital signs reviewed, within normal limits.  Workup initiated.  Given history, N-acetylcysteine loading dose started.  Labs reveal hypokalemia of 2.7, acidosis with bicarb of 20, intact renal function, normal liver function.  EKG to my review is as documented, no ischemic changes, no interval abnormalities.  Acetaminophen level is 76.  Case discussed with toxicology and toxicology evaluated patient via virtual consult, they recommend admission medically for further treatment of acetaminophen overdose, with CMP, acetaminophen, and INR levels every 12 hours.  Subsequently, patient will require psychiatric admission.    Problems Addressed:  Acetaminophen overdose: acute illness or injury  Hypokalemia: acute illness or injury  Intentional overdose, initial encounter (HCC): acute illness or injury    Amount and/or Complexity of Data Reviewed  External Data Reviewed: labs, ECG and notes.  Labs: ordered. Decision-making details documented in ED Course.  ECG/medicine tests: ordered and independent interpretation performed.  Decision-making details documented in ED Course.    Risk  Prescription drug management.  Decision regarding hospitalization.        ED Course as of 06/19/25 1114   Thu Jun 19, 202519, 2025 0931 NAC loading dose ordered. Reaching out to Dr. Ordaz with Toxicology for further advice.   1007 ACETAMINOPHEN LEVEL(!): 76       Medications   sodium chloride 0.9 % bolus 500 mL (500 mL Intravenous New Bag 6/19/25 1028)   potassium chloride 20 mEq IVPB (premix) (20 mEq Intravenous New Bag 6/19/25 1029)   acetylcysteine (ACETADOTE) 3,980 mg in dextrose 5 % 500 mL IVPB (has no administration in time range)   acetylcysteine (ACETADOTE) 7,960 mg in dextrose 5 % 1,000 mL IVPB (has no administration in time range)   acetylcysteine (ACETADOTE) 11,940 mg in dextrose 5 % 200 mL IVPB (11,940 mg Intravenous New Bag 6/19/25 0957)   ondansetron (ZOFRAN) injection 4 mg (4 mg Intravenous Given 6/19/25 0931)       ED Risk Strat Scores                    No data recorded        SBIRT 22yo+      Flowsheet Row Most Recent Value   Initial Alcohol Screen: US AUDIT-C     1. How often do you have a drink containing alcohol? 0 Filed at: 06/19/2025 0914   2. How many drinks containing alcohol do you have on a typical day you are drinking?  0 Filed at: 06/19/2025 0914   3b. FEMALE Any Age, or MALE 65+: How often do you have 4 or more drinks on one occassion? 0 Filed at: 06/19/2025 0914   Audit-C Score 0 Filed at: 06/19/2025 0914   DARIUS: How many times in the past year have you...    Used an illegal drug or used a prescription medication for non-medical reasons? Never Filed at: 06/19/2025 0914                            History of Present Illness       Chief Complaint   Patient presents with    Overdose - Intentional     Pt states she took 20 500mg tylenol at 12am to harm herself. Reports around 8am she rejected it and made herself vomit. Reports she is having some mid abd pain with nausea. Denies having a therapist or psychiatrist. Denies hearing or seeing  things. Denies HI. Reports she wanted to OD but never did it - pt was admitted then.        Past Medical History[1]   Past Surgical History[2]   Family History[3]   Social History[4]   E-Cigarette/Vaping    E-Cigarette Use Never User       E-Cigarette/Vaping Substances    Nicotine No     THC No     CBD No     Flavoring Yes     Other No     Unknown No       I have reviewed and agree with the history as documented.     25-year-old female with past medical history of depression presenting with intentional overdose of acetaminophen after taking 24 tablets of 500 mg acetaminophen at midnight, 9.5 hours ago.  This morning, patient was remorseful and threw up between 6 and 7:00 in the morning but no pills came up.  At present, she has upper abdominal discomfort and nausea.  Patient presents along with her 1-year-old son.  She denies taking anything else and overdose, she does not drink or use tobacco products.  She does feel depressed and has thoughts of suicide.            Review of Systems   Constitutional:  Negative for fever.   Gastrointestinal:  Positive for abdominal pain and nausea.   All other systems reviewed and are negative.          Objective       ED Triage Vitals [06/19/25 0916]   Temperature Pulse Blood Pressure Respirations SpO2 Patient Position - Orthostatic VS   98.3 °F (36.8 °C) 83 121/89 18 98 % Sitting      Temp Source Heart Rate Source BP Location FiO2 (%) Pain Score    Oral Monitor Right arm -- --      Vitals      Date and Time Temp Pulse SpO2 Resp BP Pain Score FACES Pain Rating User   06/19/25 1027 -- 75 100 % 16 111/85 -- -- HM   06/19/25 0916 98.3 °F (36.8 °C) 83 98 % 18 121/89 -- --             Physical Exam    Results Reviewed       Procedure Component Value Units Date/Time    Protime-INR [[id number]] Collected: 06/19/25 1047    Lab Status: In process Specimen: Blood from Arm, Left Updated: 06/19/25 1053    Rapid drug screen, urine [[id number]]  (Normal) Collected: 06/19/25 0949    Lab Status:  Final result Specimen: Urine, Clean Catch Updated: 06/19/25 1021     Amph/Meth UR Negative     Barbiturate Ur Negative     Benzodiazepine Urine Negative     Cocaine Urine Negative     Methadone Urine Negative     Opiate Urine Negative     PCP Ur Negative     THC Urine Negative     Oxycodone Urine Negative     Fentanyl Urine Negative     HYDROCODONE URINE Negative    Narrative:      FOR MEDICAL PURPOSES ONLY.   IF CONFIRMATION NEEDED PLEASE CONTACT THE LAB WITHIN 5 DAYS.    Drug Screen Cutoff Levels:  AMPHETAMINE/METHAMPHETAMINES  1000 ng/mL  BARBITURATES     200 ng/mL  BENZODIAZEPINES     200 ng/mL  COCAINE      300 ng/mL  METHADONE      300 ng/mL  OPIATES      300 ng/mL  PHENCYCLIDINE     25 ng/mL  THC       50 ng/mL  OXYCODONE      100 ng/mL  FENTANYL      5 ng/mL  HYDROCODONE     300 ng/mL    Comprehensive metabolic panel [[id number]]  (Abnormal) Collected: 06/19/25 0921    Lab Status: Final result Specimen: Blood from Arm, Left Updated: 06/19/25 1005     Sodium 142 mmol/L      Potassium 2.7 mmol/L      Chloride 116 mmol/L      CO2 20 mmol/L      ANION GAP 6 mmol/L      BUN 12 mg/dL      Creatinine 0.48 mg/dL      Glucose 86 mg/dL      Calcium 6.6 mg/dL      Corrected Calcium 7.6 mg/dL      AST 15 U/L      ALT 17 U/L      Alkaline Phosphatase 43 U/L      Total Protein 4.9 g/dL      Albumin 2.8 g/dL      Total Bilirubin 0.33 mg/dL      eGFR 137 ml/min/1.73sq m     Narrative:      [employer] guidelines for Chronic Kidney Disease (CKD):     Stage 1 with normal or high GFR (GFR > 90 mL/min/1.73 square meters)    Stage 2 Mild CKD (GFR = 60-89 mL/min/1.73 square meters)    Stage 3A Moderate CKD (GFR = 45-59 mL/min/1.73 square meters)    Stage 3B Moderate CKD (GFR = 30-44 mL/min/1.73 square meters)    Stage 4 Severe CKD (GFR = 15-29 mL/min/1.73 square meters)    Stage 5 End Stage CKD (GFR <15 mL/min/1.73 square meters)  Note: GFR calculation is accurate only with a steady state creatinine     "Salicylate level [[id number]]  (Abnormal) Collected: 06/19/25 0921    Lab Status: Final result Specimen: Blood from Arm, Left Updated: 06/19/25 1005     Salicylate Lvl <5 mg/dL     Acetaminophen level-\"If concentration is detectable, please discuss with medical  on call.\" [[id number]]  (Abnormal) Collected: 06/19/25 0921    Lab Status: Final result Specimen: Blood from Arm, Left Updated: 06/19/25 1005     Acetaminophen Level 76 ug/mL     POCT pregnancy, urine [[id number]]  (Normal) Collected: 06/19/25 0947    Lab Status: Final result Updated: 06/19/25 0948     EXT Preg Test, Ur Negative     Control Valid    CBC and differential [[id number]]  (Abnormal) Collected: 06/19/25 0921    Lab Status: Final result Specimen: Blood from Arm, Left Updated: 06/19/25 0936     WBC 11.13 Thousand/uL      RBC 4.69 Million/uL      Hemoglobin 12.3 g/dL      Hematocrit 39.8 %      MCV 85 fL      MCH 26.2 pg      MCHC 30.9 g/dL      RDW 13.7 %      MPV 9.9 fL      Platelets 395 Thousands/uL      nRBC 0 /100 WBCs      Segmented % 69 %      Immature Grans % 0 %      Lymphocytes % 24 %      Monocytes % 7 %      Eosinophils Relative 0 %      Basophils Relative 0 %      Absolute Neutrophils 7.69 Thousands/µL      Absolute Immature Grans 0.04 Thousand/uL      Absolute Lymphocytes 2.62 Thousands/µL      Absolute Monocytes 0.72 Thousand/µL      Eosinophils Absolute 0.03 Thousand/µL      Basophils Absolute 0.03 Thousands/µL             No orders to display       ECG 12 Lead Documentation Only    Date/Time: 6/19/2025 9:36 AM    Performed by: Betty Zhou MD  Authorized by: Betty Zhou MD    Comments:      Normal sinus rhythm, ventricular rate 79, , QRS 98, QTc 419, normal axis, no ST/T wave changes to suggest ischemia, no STEMI.    Critical Care Time Statement: Upon my evaluation, this patient had a high probability of imminent or life-threatening deterioration due to acetaminophen overdose and suicidal ideation, which required " my direct attention, intervention, and personal management.  I spent a total of 32 minutes directly providing critical care services, including interpretation of complex medical databases, evaluating for the presence of life-threatening injuries or illnesses, management of organ system failure(s) , complex medical decision making (to support/prevent further life-threatening deterioration)., and interpretation of hemodynamic data. This time is exclusive of procedures, teaching, treating other patients, family meetings, and any prior time recorded by providers other than myself.        ED Medication and Procedure Management   Prior to Admission Medications   Prescriptions Last Dose Informant Patient Reported? Taking?   Alcohol Swabs ([facility] Alcohol Prep Pads) 70 % PADS Not Taking  No No   Sig: USE AS DIRECTED PRIOR TO ZEPBOUND ADMINISTRATION   Patient not taking: Reported on 2025   Cholecalciferol (Vitamin D3) 125 MCG (5000 UT) CAPS Not Taking  No No   Si cap daily   Patient not taking: Reported on 2025   naproxen (NAPROSYN) 500 mg tablet   No No   Sig: Take 1 tablet (500 mg total) by mouth 2 (two) times a day with meals for 10 days   Patient not taking: Reported on 3/4/2025   naproxen (NAPROSYN) 500 mg tablet Not Taking  No No   Sig: Take 1 tablet (500 mg total) by mouth 2 (two) times a day with meals   Patient not taking: Reported on 2025   tirzepatide (Zepbound) 10 mg/0.5 mL auto-injector Not Taking  No No   Sig: Inject 0.5 mL (10 mg total) under the skin once a week   Patient not taking: Reported on 2025      Facility-Administered Medications: None     Patient's Medications   Discharge Prescriptions    No medications on file     No discharge procedures on file.  ED SEPSIS DOCUMENTATION   Time reflects when diagnosis was documented in both MDM as applicable and the Disposition within this note       Time User Action Codes Description Comment    2025  9:29 AM Betty Zhou Add [T50.902A]  Intentional overdose, initial encounter ([facility])     2025 10:17 AM Betty Zhou [T39.1X1A] Acetaminophen overdose     2025 10:17 AM Betty Zhou [E87.6] Hypokalemia                    Betty Zhou MD  25 1044         [1]   Past Medical History:  Diagnosis Date    Depression     SIRS (systemic inflammatory response syndrome) ([facility]) 2024   [2]   Past Surgical History:  Procedure Laterality Date    ABDOMINOPLASTY      [place]  DELIVERY ONLY N/A 2020    Procedure:  SECTION ();  Surgeon: Adair Clifton MD;  Location: [facility];  Service: Obstetrics    [place]  DELIVERY ONLY N/A 1/15/2024    Procedure:  SECTION ();  Surgeon: Yardlie Toussaint-Foster, DO;  Location: [facility];  Service: Obstetrics   [3]   Family History  Problem Relation Name Age of Onset    No Known Problems Mother      No Known Problems Father      No Known Problems Sister      No Known Problems Brother      Cancer Neg Hx      Diabetes Neg Hx     [4]   Social History  Tobacco Use    Smoking status: Some Days     Types: Pipe     Passive exposure: Never    Smokeless tobacco: Never    Tobacco comments:     Occasional hookah   Vaping Use    Vaping status: Never Used   Substance Use Topics    Alcohol use: Not Currently     Comment: Denies    Drug use: Never        Betty Zhou MD  25 1114

## 2025-06-19 NOTE — ED NOTES
The patient is a 26 y/o F who arrived after an intentional overdose of 10-12 g of acetaminophen. Stated she took the overdose around midnight with the intent of killing herself. She did not inform anyone of her plan or actions. She arrived to the ED at 0906. Patient is not a reliable historian. She has a history of suicide attempts via overdose of Tylenol, Wellbutrin and other medications. She has a history of reportedly ingesting rat poison. She consistently minimizes or denies her actions were a suicide attempt. She presents as detached from her actions and the consequences. She has no current provider. The patient will be admitted medically. A 201 was completed with the patient, the original of which will accompany her chart to the medical floor. She stated she is familiar with the terms of such an agreement. Should she renege, there are strong grounds for a 302.

## 2025-06-19 NOTE — ASSESSMENT & PLAN NOTE
Patient took approximately 24x 500 mg tablets of Tylenol 9.5 hours prior to presentation (0000 on 06/19)  She admits that this was an intentional overdose in order to harm herself  She forced herself to vomit but did not notice any tablets come up  She is currently experiencing nausea and abdominal pain in her right upper quadrant, epigastric, and left upper quadrant regions  She denied taking any other substances    Plan:  Loading dose of NAC given. Continue maintenance doses  Monitor CMP, INR, and acetaminophen levels every 12 hours

## 2025-06-19 NOTE — CONSULTS
Consultation - Medical Toxicology   Name: Grismerlin Fernandez 25 y.o. female I MRN: [id number]  Unit/Bed#: ED 09 I Date of Admission: 6/19/2025   Date of Service: 6/19/2025 I Hospital Day: 0   Inpatient consult to Toxicology  Consult performed by: Jj Gupta DO  Consult ordered by: Betty Zhou MD        Physician Requesting Evaluation: Betty Zhou MD   Reason for Evaluation / Principal Problem: Acetaminophen overdose    Assessment & Plan  Acetaminophen overdose, intentional self-harm, initial encounter ([facility])  Patient took approximately 24x 500 mg tablets of Tylenol 9.5 hours prior to presentation (0000 on 06/19)  She admits that this was an intentional overdose in order to harm herself  She forced herself to vomit but did not notice any tablets come up  She is currently experiencing nausea and abdominal pain in her right upper quadrant, epigastric, and left upper quadrant regions  She denied taking any other substances    Plan:  Loading dose of NAC given. Continue maintenance doses  Monitor CMP, INR, and acetaminophen levels every 12 hours  Medications reviewed. Continue current medications at prescribed doses.    Please see additional teaching note below (if available):  Medical Toxicology Teaching Note  [facility]  Acetaminophen Toxicity  Last revised October 2017     Acetaminophen (Tylenol) is a nonopiod analgesic and antipyretic medication found in many over-the-counter and prescription products such as Tylenol PM, Norco, Percocet, Nyquil, Vicks Formula 44-D. The recommended maximum daily dose of acetaminophen for adults is 3g/day, and 75-90mg/kg/day for children. Alcoholics may safely take Tylenol in therapeutic doses, but they may be at increased risk for hepatotoxicity in overdose.     Mechanism of Toxicity: Acetaminophen is primarily metabolized by the liver. In therapeutic doses, about 90% of acetaminophen is conjugated to nontoxic metabolites (glucoronides and  sulfates). A small portion (<5%) is conjugated by cytochrome P450 enzyme, subunit CYP2E1, to a toxic metabolite, N-acetyl-p-benzoquinoneimine (NAPQI). This metabolite is further conjugated by glutathione, to nontoxic metabolites eliminated by the kidneys.   Liver Injury:  In toxic doses, the usual metabolic pathways are overwhelmed; acetaminophen is shunted to the cytochrome P450 pathway, creating NAPQI. Glutathione stores are depleted and NAPQI is produced. Cellular injury and hepatic necrosis may occur as NAPQI accumulates.   Renal Injury:  Cytochrome P450 activity in the kidneys is thought to cause direct renal damage. Renal insufficiency may also develop during fulminant hepatic failure due to hepatorenal syndrome. Renal toxicity is usually associated with liver injury.   Pharmacokinetics:  Acetaminophen is rapidly absorbed. Peak levels occur within  minutes with normal doses. Delayed absorption may occur with sustained release products or with co-ingestions that slow the GI tract (opiods, anticholinergics). The elimination half-life is 1-3 hours after therapeutic doses and may extend to 12 hours after overdose.   Toxic Dose:  Toxicity in adults may occur with acute ingestions of 7g, and 200mg/kg in children. Hepatic injury following chronic ingestions may occur at any dose above the daily recommended dose.     Clinical Presentation:    Acute Ingestion: Within 8 hrs of an acute ingestion, there are usually few symptoms. Between 8-30 hours after a toxic, acute ingestion, a transaminitis will develop. Nausea, vomiting, and right upper quadrant pain may occur. Within 12-36 hours, worsening AST/ALT develops with elevated bilirubin and INR. The most severe cases will develop fulminant liver failure with hepatic encephalopathy and acidosis, usually within 3-7 days post overdose. The patient should be evaluated for a liver transplantation.   Repeated Supra-therapeutic Ingestion: Due to a sub-acute course,  patients may present anywhere along a spectrum - normal LFTS to asymptomatic elevation of enzymes to hepatic failure.     Diagnosis   Acute Ingestion (Time of Ingestion Known): After an acute ingestion at a known time, obtain a 4-hour post-ingestion serum acetaminophen level and plot the level on the Rumack-Godwin’s nomogram (see below). This nomogram is used to predict the likelihood of hepatic toxicity based on the level of acetaminophen between 4 and 24 hours post-ingestion. The nomogram CANNOT be used if the time of ingestion is unknown.   The dotted line (Rumack-Godwin line), marking a 4-hour level at 200 mcg/ml, is the original line developed from the study above which hepatic toxicity will probably occur. The solid line (Treatment Line), marking a 4-hour level at 150ug/ml. is the treatment line accepted as the standard of care in the [place] and is 25% lower as a safety margin. If the patient’s serum APAP level falls above the treatment line, start treatment with N-acetylcysteine (NAC). (see Treatment below)           Acute Ingestion (Time of Ingestion Unknown) or Repeated Supra-therapeutic Ingestion An acetaminophen level CANNOT be plotted on the Rumack-Godwin’s nomogram. Draw an APAP level and AST/ALT at time of presentation. Anyone with an APAP level> 10mcg/ml OR elevated AST/ALT should start NAC. (see Treatment below)     TREATMENT   Emergency and Supportive Care: Treat nausea and vomiting to protect airway and support safe administration of charcoal and NAC, when indicated (see below). Provide standard supportive care for liver and renal failure. Contact liver transplant team if fulminant hepatic failure occurs.   Decontamination:  Administer activated charcoal within 2 hours of ingestion (consider later if extended release preparations). Use antiemetics for nausea. Activated charcoal does bind to NAC, but the effect is not thought to be clinically significant. Gastric emptying is not recommended.    Specific Drugs and Antidotes.   Acute Ingestion Treat with NAC if the APAP level falls above the Treatment Line on the nomogram. The maximal benefit occurs if given within 8 hours of acute ingestion. Therefore, it is recommended to empirically start NAC before a level is obtained if there is a reasonable concern of a toxic ingestion presenting close to 8 hours or beyond. In late presenters (>8hrs), start NAC and treat for a full course or longer if LFTS remain abnormal. Treatment maybe stopped when AST/ALT peak and then downtrend, with an INR <2 and patient is clinically well. If abnormal labs persist, continue NAC and call Toxicology. There are two routes of administration for NAC, oral and IV. The treatment protocols are described below.   Acute Ingestion (Time of Ingestion Unknown) or Repeated Supra-therapeutic Ingestion   The nomogram CANNOT be used to estimate the risk of hepatotoxicity. At presentation, check a serum APAP level and AST/ALT. If the APAP level is above 10 mcg/ml or the AST/ALT are elevated, start NAC treatment for 12 hours. If abnormalities persist, continue NAC treatment and call toxicology. If the APAP level is undetectable and AST and ALT are downtrending at the end of 12 hours, treatment may be stopped.     Intravenous (Acetadote)   Loading dose- 150mg/kg infused over 15-60 minutes   Maintenance Infusion #1- 50mg/kg (12.5mg/kg/hr) over 4 hours   Maintenance Infusion #2 -100mg/kg (6.25 mg/kg/hr) until treatment endpoint   Treatment Endpoint: 20 hours or more   NAC should be continued for the full course.   NAC can be stopped when APAP is undetectable, AST/ALT have peaked and are downtrending, and patient appears clinically well. Consultation with a medical  /poison center is recommended before changes in the duration of therapy are made.     Acetaminophen Toxicity Do’s and Don’ts   Acute Ingestions   DO give charcoal for decontamination within 2 hours of ingestion if the patient  can adequately protect their airway.   DO start NAC empirically, i.e. without an APAP level, if the ingestion is likely a large overdose presenting at 8 hours or more after ingestion.   DO contact the Liver Transplant Team early if liver failure is developing.   DO NOT get a level before 4hrs post-ingestion if the time of ingestion is certain in an acute overdose.   DO NOT stop NAC therapy until full course is finished or truncated therapy is recommended by the Poison Center.   Repeated Supra-Therapeutic Ingestions (RSI)   DO ask patients with pain complaints (toothaches, back pain, cancer) about the amount of acetaminophen they use.   DO NOT use the Rumack-Payton nomogram to determine if the APAP level is toxic.   DO NOT stop NAC therapy until full course is finished or truncated therapy is recommended by the Poison Center.   NAC Protocols   DO stop IV NAC if an anaphylactoid reaction occurs (rare). Treat the reaction appropriately and call the Poison Center for recommendations on continued NAC therapy.   DO give charcoal with oral NAC when charcoal is indicated.   References   Ehsan PINEDA Acetaminophen. In Ehsan PINEDA, Marianna TRIANA, Regina MALIK et al eds. Medical Toxicology 3rd edition. [place]: [employer], 2004: pp.723-252.   Tha FAGAN Acetaminophen. In Tha FAGAN      For further questions, please contact the medical  on call via SecureChat between 8am and 9pm. If between 9pm and 8am, please reach out to the Poison Center at [phone].     History of Present Illness   Grismerlin Fernandez is a 25 y.o. year old female who presents status post intentional acetaminophen overdose.  Patient stated that she took approximately 24x 500 mg tablets of Tylenol at 0000 on 06/19.  She did admit that this was an intentional overdose with the goal of harming herself. Patient initially did not have symptoms but currently is experiencing nausea and abdominal pain in her right upper quadrant,  epigastric, and left upper quadrant regions.  Patient did state that she try to force herself to throw up after having remorse about her overdose but no pills came up and her throw up.  She then brought herself to the hospital.  She denied taking any other substances.    Review of Systems   Constitutional: Negative.    HENT: Negative.     Eyes: Negative.    Respiratory: Negative.     Cardiovascular: Negative.    Gastrointestinal:  Positive for abdominal pain, nausea and vomiting. Negative for abdominal distention, constipation and diarrhea.   Endocrine: Negative.    Genitourinary: Negative.    Musculoskeletal: Negative.    Skin: Negative.    Neurological: Negative.    Hematological: Negative.    Psychiatric/Behavioral: Negative.         Historical Information   Medical History Review: I have reviewed the patient's PMH, PSH, Social History, Family History, Meds, and Allergies   Social History[1]  Family History[2]    Meds/Allergies   Prior to Admission medications    Medication Sig Start Date End Date Taking? Authorizing Provider   Alcohol Swabs (AUM Alcohol Prep Pads) 70 % PADS USE AS DIRECTED PRIOR TO ZEPBOUND ADMINISTRATION  Patient not taking: Reported on 6/19/2025 6/16/25   Karla Blum PA-C   Cholecalciferol (Vitamin D3) 125 MCG (5000 UT) CAPS 1 cap daily  Patient not taking: Reported on 6/19/2025 11/12/24   Karla Blum PA-C   naproxen (NAPROSYN) 500 mg tablet Take 1 tablet (500 mg total) by mouth 2 (two) times a day with meals for 10 days  Patient not taking: Reported on 3/4/2025 9/3/24 11/12/24  Alex Hancock DO   naproxen (NAPROSYN) 500 mg tablet Take 1 tablet (500 mg total) by mouth 2 (two) times a day with meals  Patient not taking: Reported on 6/19/2025 4/17/25   Cherelle Bautista DO   tirzepatide (Zepbound) 10 mg/0.5 mL auto-injector Inject 0.5 mL (10 mg total) under the skin once a week  Patient not taking: Reported on 6/19/2025 1/29/25   Karla Blum PA-C   Current Medications[3]    Allergies[4]    Objective :  Temp:  [98.3 °F (36.8 °C)] 98.3 °F (36.8 °C)  HR:  [83] 83  BP: (121)/(89) 121/89  Resp:  [18] 18  SpO2:  [98 %] 98 %  O2 Device: None (Room air)    No intake or output data in the 24 hours ending 06/19/25 1008    Physical Exam  Constitutional:       Appearance: Normal appearance. She is obese.   HENT:      Head: Normocephalic and atraumatic.      Nose: Nose normal.     Eyes:      Extraocular Movements: Extraocular movements intact.      Conjunctiva/sclera: Conjunctivae normal.      Pupils: Pupils are equal, round, and reactive to light.     Pulmonary:      Effort: Pulmonary effort is normal.   Abdominal:      General: There is no distension.      Tenderness: There is no abdominal tenderness. There is no guarding.     Neurological:      General: No focal deficit present.      Mental Status: She is alert and oriented to person, place, and time.      Cranial Nerves: No cranial nerve deficit.      Sensory: No sensory deficit.      Motor: No weakness.           Lab Results: I have reviewed the following results:  Results from last 7 days   Lab Units 06/19/25  0921   WBC Thousand/uL 11.13*   HEMOGLOBIN g/dL 12.3   HEMATOCRIT % 39.8   PLATELETS Thousands/uL 395*   SEGS PCT % 69   LYMPHO PCT % 24   MONO PCT % 7   EOS PCT % 0      Results from last 7 days   Lab Units 06/19/25  0921   POTASSIUM mmol/L 2.7*   CHLORIDE mmol/L 116*   CO2 mmol/L 20*   BUN mg/dL 12   CREATININE mg/dL 0.48*   CALCIUM mg/dL 6.6*   ALBUMIN g/dL 2.8*   ALK PHOS U/L 43   ALT U/L 17   AST U/L 15                       Results from last 7 days   Lab Units 06/19/25  0921   ACETAMINOPHEN LVL ug/mL 76*   SALICYLATE LVL mg/dL <5*     Imaging Results Review: No pertinent imaging studies reviewed.  Other Study Results Review: No additional pertinent studies reviewed.    Administrative Statements   I have spent a total time of 15 minutes in caring for this patient on the day of the visit/encounter including Diagnostic results,  Prognosis, Risks and benefits of tx options, Instructions for management, Patient and family education, Importance of tx compliance, and Risk factor reductions.       [1]   Social History  Tobacco Use    Smoking status: Some Days     Types: Pipe     Passive exposure: Never    Smokeless tobacco: Never    Tobacco comments:     Occasional hookah   Vaping Use    Vaping status: Never Used   Substance and Sexual Activity    Alcohol use: Not Currently     Comment: Denies    Drug use: Never    Sexual activity: Yes     Partners: Male   [2]   Family History  Problem Relation Name Age of Onset    No Known Problems Mother      No Known Problems Father      No Known Problems Sister      No Known Problems Brother      Cancer Neg Hx      Diabetes Neg Hx     [3]   Current Facility-Administered Medications:     acetylcysteine (ACETADOTE) 11,940 mg in dextrose 5 % 200 mL IVPB, 150 mg/kg, Intravenous, Once, Betty Zhou MD, Last Rate: 259.7 mL/hr at 06/19/25 0957, 11,940 mg at 06/19/25 0957    Current Outpatient Medications:     Alcohol Swabs (AUM Alcohol Prep Pads) 70 % PADS, USE AS DIRECTED PRIOR TO ZEPBOUND ADMINISTRATION (Patient not taking: Reported on 6/19/2025), Disp: 100 each, Rfl: 0    Cholecalciferol (Vitamin D3) 125 MCG (5000 UT) CAPS, 1 cap daily (Patient not taking: Reported on 6/19/2025), Disp: 90 capsule, Rfl: 3    naproxen (NAPROSYN) 500 mg tablet, Take 1 tablet (500 mg total) by mouth 2 (two) times a day with meals for 10 days (Patient not taking: Reported on 3/4/2025), Disp: 20 tablet, Rfl: 0    naproxen (NAPROSYN) 500 mg tablet, Take 1 tablet (500 mg total) by mouth 2 (two) times a day with meals (Patient not taking: Reported on 6/19/2025), Disp: 20 tablet, Rfl: 0    tirzepatide (Zepbound) 10 mg/0.5 mL auto-injector, Inject 0.5 mL (10 mg total) under the skin once a week (Patient not taking: Reported on 6/19/2025), Disp: 2 mL, Rfl: 3  [4] No Known Allergies

## 2025-06-19 NOTE — ASSESSMENT & PLAN NOTE
"Patient presents with intentional overdose on acetaminophen; took approximately 24 tablets overnight  has history of doing the same in the past; this morning she felt remorseful and reported to the ED for evaluation  In the ED labs stable, potassium 2.7 and she received supplemental potassium  Toxicology consulted and recommended initiating NAC protocol as well as monitoring INR, CMP, and acetaminophen levels every 12 hours  NAC protocol as follows:  \"Intravenous (Acetadote)   Loading dose- 150mg/kg infused over 15-60 minutes   Maintenance Infusion #1- 50mg/kg (12.5mg/kg/hr) over 4 hours   Maintenance Infusion #2 -100mg/kg (6.25 mg/kg/hr) until treatment endpoint   Treatment Endpoint: 20 hours or more   NAC should be continued for the full course.   NAC can be stopped when APAP is undetectable, AST/ALT have peaked and are downtrending, and patient appears clinically well. Consultation with a medical  /poison center is recommended before changes in the duration of therapy are made.\"    Patient signed 201 while in the ED, accepting of psychiatric placement once medically stable  201 obliges this patient to stay in house; continue one-to-one  Should she decide to leave AMA, patient should be reminded she is on a 201; if need be, escalate to 302 as needed  Behavioral health following along, transition to inpatient psych once medically stable in 24 to 48 hours  "

## 2025-06-19 NOTE — ASSESSMENT & PLAN NOTE
>>ASSESSMENT AND PLAN FOR ADJUSTMENT DISORDER WITH ANXIOUS MOOD WRITTEN ON 6/19/2025  4:53 PM BY SOFY BRAVO MD    Patient with history of suicide attempt; currently here for self-harm attempt with Tylenol ingestion  Consult to behavioral health; would benefit from inpatient psychiatric placement once medically stable  Currently on 201; await behavioral health recommendations

## 2025-06-19 NOTE — ASSESSMENT & PLAN NOTE
Patient with history of suicide attempt; currently here for self-harm attempt with Tylenol ingestion  Consult to behavioral health; would benefit from inpatient psychiatric placement once medically stable  Currently on 201; await behavioral health recommendations

## 2025-06-20 ENCOUNTER — HOSPITAL ENCOUNTER (INPATIENT)
Facility: HOSPITAL | Age: 25
LOS: 10 days | Discharge: HOME | DRG: 751 | End: 2025-06-30
Attending: PSYCHIATRY & NEUROLOGY | Admitting: PSYCHIATRY & NEUROLOGY
Payer: COMMERCIAL

## 2025-06-20 VITALS
SYSTOLIC BLOOD PRESSURE: 133 MMHG | OXYGEN SATURATION: 98 % | HEART RATE: 85 BPM | DIASTOLIC BLOOD PRESSURE: 78 MMHG | RESPIRATION RATE: 18 BRPM | TEMPERATURE: 97.5 F | WEIGHT: 175.27 LBS | HEIGHT: 62 IN | BODY MASS INDEX: 32.25 KG/M2

## 2025-06-20 DIAGNOSIS — T39.1X1A ACETAMINOPHEN OVERDOSE: ICD-10-CM

## 2025-06-20 DIAGNOSIS — F33.2 MAJOR DEPRESSIVE DISORDER, RECURRENT, SEVERE WITHOUT PSYCHOTIC FEATURES (HCC): Primary | Chronic | ICD-10-CM

## 2025-06-20 DIAGNOSIS — E55.9 VITAMIN D DEFICIENCY: ICD-10-CM

## 2025-06-20 DIAGNOSIS — T50.902A INTENTIONAL OVERDOSE, INITIAL ENCOUNTER (HCC): ICD-10-CM

## 2025-06-20 DIAGNOSIS — Z00.8 MEDICAL CLEARANCE FOR PSYCHIATRIC ADMISSION: ICD-10-CM

## 2025-06-20 PROBLEM — E87.6 HYPOKALEMIA: Status: ACTIVE | Noted: 2025-06-20

## 2025-06-20 LAB
ALBUMIN SERPL BCG-MCNC: 3.3 G/DL (ref 3.5–5)
ALP SERPL-CCNC: 46 U/L (ref 34–104)
ALT SERPL W P-5'-P-CCNC: 30 U/L (ref 7–52)
ANION GAP SERPL CALCULATED.3IONS-SCNC: 7 MMOL/L (ref 4–13)
AST SERPL W P-5'-P-CCNC: 29 U/L (ref 13–39)
BASOPHILS # BLD AUTO: 0.06 THOUSANDS/ÂΜL (ref 0–0.1)
BASOPHILS NFR BLD AUTO: 1 % (ref 0–1)
BILIRUB SERPL-MCNC: 0.25 MG/DL (ref 0.2–1)
BUN SERPL-MCNC: 10 MG/DL (ref 5–25)
CALCIUM ALBUM COR SERPL-MCNC: 9.3 MG/DL (ref 8.3–10.1)
CALCIUM SERPL-MCNC: 8.7 MG/DL (ref 8.4–10.2)
CHLORIDE SERPL-SCNC: 108 MMOL/L (ref 96–108)
CO2 SERPL-SCNC: 23 MMOL/L (ref 21–32)
CREAT SERPL-MCNC: 0.53 MG/DL (ref 0.6–1.3)
EOSINOPHIL # BLD AUTO: 0.13 THOUSAND/ÂΜL (ref 0–0.61)
EOSINOPHIL NFR BLD AUTO: 1 % (ref 0–6)
ERYTHROCYTE [DISTWIDTH] IN BLOOD BY AUTOMATED COUNT: 13.7 % (ref 11.6–15.1)
GFR SERPL CREATININE-BSD FRML MDRD: 133 ML/MIN/1.73SQ M
GLUCOSE SERPL-MCNC: 90 MG/DL (ref 65–140)
HCT VFR BLD AUTO: 33.6 % (ref 34.8–46.1)
HGB BLD-MCNC: 10.6 G/DL (ref 11.5–15.4)
IMM GRANULOCYTES # BLD AUTO: 0.04 THOUSAND/UL (ref 0–0.2)
IMM GRANULOCYTES NFR BLD AUTO: 0 % (ref 0–2)
LYMPHOCYTES # BLD AUTO: 3.45 THOUSANDS/ÂΜL (ref 0.6–4.47)
LYMPHOCYTES NFR BLD AUTO: 31 % (ref 14–44)
MAGNESIUM SERPL-MCNC: 2.2 MG/DL (ref 1.9–2.7)
MCH RBC QN AUTO: 26.4 PG (ref 26.8–34.3)
MCHC RBC AUTO-ENTMCNC: 31.5 G/DL (ref 31.4–37.4)
MCV RBC AUTO: 84 FL (ref 82–98)
MONOCYTES # BLD AUTO: 1.13 THOUSAND/ÂΜL (ref 0.17–1.22)
MONOCYTES NFR BLD AUTO: 10 % (ref 4–12)
NEUTROPHILS # BLD AUTO: 6.39 THOUSANDS/ÂΜL (ref 1.85–7.62)
NEUTS SEG NFR BLD AUTO: 57 % (ref 43–75)
NRBC BLD AUTO-RTO: 0 /100 WBCS
PHOSPHATE SERPL-MCNC: 3.7 MG/DL (ref 2.7–4.5)
PLATELET # BLD AUTO: 345 THOUSANDS/UL (ref 149–390)
PMV BLD AUTO: 10.1 FL (ref 8.9–12.7)
POTASSIUM SERPL-SCNC: 4.2 MMOL/L (ref 3.5–5.3)
PROT SERPL-MCNC: 6.1 G/DL (ref 6.4–8.4)
RBC # BLD AUTO: 4.02 MILLION/UL (ref 3.81–5.12)
SODIUM SERPL-SCNC: 138 MMOL/L (ref 135–147)
WBC # BLD AUTO: 11.2 THOUSAND/UL (ref 4.31–10.16)

## 2025-06-20 PROCEDURE — 80053 COMPREHEN METABOLIC PANEL: CPT | Performed by: INTERNAL MEDICINE

## 2025-06-20 PROCEDURE — 99255 IP/OBS CONSLTJ NEW/EST HI 80: CPT | Performed by: PSYCHIATRY & NEUROLOGY

## 2025-06-20 PROCEDURE — 85025 COMPLETE CBC W/AUTO DIFF WBC: CPT | Performed by: INTERNAL MEDICINE

## 2025-06-20 PROCEDURE — 99239 HOSP IP/OBS DSCHRG MGMT >30: CPT | Performed by: INTERNAL MEDICINE

## 2025-06-20 PROCEDURE — NC001 PR NO CHARGE: Performed by: EMERGENCY MEDICINE

## 2025-06-20 PROCEDURE — 83735 ASSAY OF MAGNESIUM: CPT | Performed by: INTERNAL MEDICINE

## 2025-06-20 PROCEDURE — 84100 ASSAY OF PHOSPHORUS: CPT | Performed by: INTERNAL MEDICINE

## 2025-06-20 RX ORDER — LORAZEPAM 2 MG/ML
1 INJECTION INTRAMUSCULAR
Status: DISCONTINUED | OUTPATIENT
Start: 2025-06-20 | End: 2025-06-30 | Stop reason: HOSPADM

## 2025-06-20 RX ORDER — AMOXICILLIN 250 MG
1 CAPSULE ORAL DAILY PRN
Status: DISCONTINUED | OUTPATIENT
Start: 2025-06-20 | End: 2025-06-30 | Stop reason: HOSPADM

## 2025-06-20 RX ORDER — RISPERIDONE 1 MG/1
1 TABLET ORAL
Status: CANCELLED | OUTPATIENT
Start: 2025-06-20

## 2025-06-20 RX ORDER — ALPRAZOLAM 0.5 MG
1 TABLET ORAL ONCE
Status: COMPLETED | OUTPATIENT
Start: 2025-06-20 | End: 2025-06-20

## 2025-06-20 RX ORDER — RISPERIDONE 0.5 MG/1
0.25 TABLET ORAL
Status: CANCELLED | OUTPATIENT
Start: 2025-06-20

## 2025-06-20 RX ORDER — HALOPERIDOL 5 MG/ML
5 INJECTION INTRAMUSCULAR
Status: CANCELLED | OUTPATIENT
Start: 2025-06-20

## 2025-06-20 RX ORDER — RISPERIDONE 0.5 MG/1
0.5 TABLET ORAL
Status: CANCELLED | OUTPATIENT
Start: 2025-06-20

## 2025-06-20 RX ORDER — BISACODYL 10 MG
10 SUPPOSITORY, RECTAL RECTAL DAILY PRN
Status: CANCELLED | OUTPATIENT
Start: 2025-06-20

## 2025-06-20 RX ORDER — HYDROXYZINE HYDROCHLORIDE 25 MG/1
25 TABLET, FILM COATED ORAL
Status: CANCELLED | OUTPATIENT
Start: 2025-06-20

## 2025-06-20 RX ORDER — RISPERIDONE 0.5 MG/1
0.25 TABLET ORAL
Status: DISCONTINUED | OUTPATIENT
Start: 2025-06-20 | End: 2025-06-30 | Stop reason: HOSPADM

## 2025-06-20 RX ORDER — LORAZEPAM 2 MG/ML
1 INJECTION INTRAMUSCULAR
Status: CANCELLED | OUTPATIENT
Start: 2025-06-20

## 2025-06-20 RX ORDER — IBUPROFEN 600 MG/1
600 TABLET, FILM COATED ORAL EVERY 6 HOURS PRN
Status: DISCONTINUED | OUTPATIENT
Start: 2025-06-20 | End: 2025-06-30 | Stop reason: HOSPADM

## 2025-06-20 RX ORDER — DIPHENHYDRAMINE HYDROCHLORIDE 50 MG/ML
50 INJECTION, SOLUTION INTRAMUSCULAR; INTRAVENOUS EVERY 6 HOURS PRN
Status: CANCELLED | OUTPATIENT
Start: 2025-06-20

## 2025-06-20 RX ORDER — DIPHENHYDRAMINE HYDROCHLORIDE 50 MG/ML
50 INJECTION, SOLUTION INTRAMUSCULAR; INTRAVENOUS EVERY 6 HOURS PRN
Status: DISCONTINUED | OUTPATIENT
Start: 2025-06-20 | End: 2025-06-30 | Stop reason: HOSPADM

## 2025-06-20 RX ORDER — LORAZEPAM 2 MG/ML
2 INJECTION INTRAMUSCULAR EVERY 6 HOURS PRN
Status: DISCONTINUED | OUTPATIENT
Start: 2025-06-20 | End: 2025-06-30 | Stop reason: HOSPADM

## 2025-06-20 RX ORDER — TRAZODONE HYDROCHLORIDE 50 MG/1
50 TABLET ORAL
Status: DISCONTINUED | OUTPATIENT
Start: 2025-06-20 | End: 2025-06-23

## 2025-06-20 RX ORDER — ONDANSETRON 4 MG/1
4 TABLET, ORALLY DISINTEGRATING ORAL EVERY 6 HOURS PRN
Status: CANCELLED | OUTPATIENT
Start: 2025-06-20

## 2025-06-20 RX ORDER — ONDANSETRON 4 MG/1
4 TABLET, ORALLY DISINTEGRATING ORAL EVERY 6 HOURS PRN
Status: DISCONTINUED | OUTPATIENT
Start: 2025-06-20 | End: 2025-06-30 | Stop reason: HOSPADM

## 2025-06-20 RX ORDER — BENZTROPINE MESYLATE 1 MG/ML
0.5 INJECTION, SOLUTION INTRAMUSCULAR; INTRAVENOUS
Status: CANCELLED | OUTPATIENT
Start: 2025-06-20

## 2025-06-20 RX ORDER — HALOPERIDOL 5 MG/ML
2.5 INJECTION INTRAMUSCULAR
Status: CANCELLED | OUTPATIENT
Start: 2025-06-20

## 2025-06-20 RX ORDER — IBUPROFEN 400 MG/1
400 TABLET, FILM COATED ORAL EVERY 4 HOURS PRN
Status: DISCONTINUED | OUTPATIENT
Start: 2025-06-20 | End: 2025-06-30 | Stop reason: HOSPADM

## 2025-06-20 RX ORDER — RISPERIDONE 0.5 MG/1
0.5 TABLET ORAL
Status: DISCONTINUED | OUTPATIENT
Start: 2025-06-20 | End: 2025-06-30 | Stop reason: HOSPADM

## 2025-06-20 RX ORDER — DOCUSATE SODIUM 100 MG/1
100 CAPSULE, LIQUID FILLED ORAL 2 TIMES DAILY
Status: DISCONTINUED | OUTPATIENT
Start: 2025-06-20 | End: 2025-06-29

## 2025-06-20 RX ORDER — LORAZEPAM 2 MG/ML
2 INJECTION INTRAMUSCULAR
Status: CANCELLED | OUTPATIENT
Start: 2025-06-20

## 2025-06-20 RX ORDER — PROPRANOLOL HYDROCHLORIDE 10 MG/1
10 TABLET ORAL EVERY 8 HOURS PRN
Status: DISCONTINUED | OUTPATIENT
Start: 2025-06-20 | End: 2025-06-30 | Stop reason: HOSPADM

## 2025-06-20 RX ORDER — BENZTROPINE MESYLATE 1 MG/ML
0.5 INJECTION, SOLUTION INTRAMUSCULAR; INTRAVENOUS
Status: DISCONTINUED | OUTPATIENT
Start: 2025-06-20 | End: 2025-06-30 | Stop reason: HOSPADM

## 2025-06-20 RX ORDER — IBUPROFEN 600 MG/1
600 TABLET, FILM COATED ORAL EVERY 6 HOURS PRN
Status: CANCELLED | OUTPATIENT
Start: 2025-06-20

## 2025-06-20 RX ORDER — BENZTROPINE MESYLATE 1 MG/ML
1 INJECTION, SOLUTION INTRAMUSCULAR; INTRAVENOUS
Status: DISCONTINUED | OUTPATIENT
Start: 2025-06-20 | End: 2025-06-30 | Stop reason: HOSPADM

## 2025-06-20 RX ORDER — HALOPERIDOL 5 MG/ML
5 INJECTION INTRAMUSCULAR
Status: DISCONTINUED | OUTPATIENT
Start: 2025-06-20 | End: 2025-06-30 | Stop reason: HOSPADM

## 2025-06-20 RX ORDER — HYDROXYZINE HYDROCHLORIDE 50 MG/1
100 TABLET, FILM COATED ORAL
Status: DISCONTINUED | OUTPATIENT
Start: 2025-06-20 | End: 2025-06-30 | Stop reason: HOSPADM

## 2025-06-20 RX ORDER — HYDROXYZINE HYDROCHLORIDE 50 MG/1
50 TABLET, FILM COATED ORAL
Status: DISCONTINUED | OUTPATIENT
Start: 2025-06-20 | End: 2025-06-30 | Stop reason: HOSPADM

## 2025-06-20 RX ORDER — MAGNESIUM HYDROXIDE/ALUMINUM HYDROXICE/SIMETHICONE 120; 1200; 1200 MG/30ML; MG/30ML; MG/30ML
30 SUSPENSION ORAL EVERY 4 HOURS PRN
Status: DISCONTINUED | OUTPATIENT
Start: 2025-06-20 | End: 2025-06-30 | Stop reason: HOSPADM

## 2025-06-20 RX ORDER — POLYETHYLENE GLYCOL 3350 17 G/17G
17 POWDER, FOR SOLUTION ORAL DAILY PRN
Status: CANCELLED | OUTPATIENT
Start: 2025-06-20

## 2025-06-20 RX ORDER — LORAZEPAM 2 MG/ML
2 INJECTION INTRAMUSCULAR EVERY 6 HOURS PRN
Status: CANCELLED | OUTPATIENT
Start: 2025-06-20

## 2025-06-20 RX ORDER — BISACODYL 10 MG
10 SUPPOSITORY, RECTAL RECTAL DAILY PRN
Status: DISCONTINUED | OUTPATIENT
Start: 2025-06-20 | End: 2025-06-22

## 2025-06-20 RX ORDER — HYDROXYZINE HYDROCHLORIDE 50 MG/1
100 TABLET, FILM COATED ORAL
Status: CANCELLED | OUTPATIENT
Start: 2025-06-20

## 2025-06-20 RX ORDER — HYDROXYZINE HYDROCHLORIDE 25 MG/1
25 TABLET, FILM COATED ORAL
Status: DISCONTINUED | OUTPATIENT
Start: 2025-06-20 | End: 2025-06-30 | Stop reason: HOSPADM

## 2025-06-20 RX ORDER — IBUPROFEN 400 MG/1
800 TABLET, FILM COATED ORAL EVERY 8 HOURS PRN
Status: DISCONTINUED | OUTPATIENT
Start: 2025-06-20 | End: 2025-06-30 | Stop reason: HOSPADM

## 2025-06-20 RX ORDER — BENZTROPINE MESYLATE 1 MG/1
1 TABLET ORAL
Status: CANCELLED | OUTPATIENT
Start: 2025-06-20

## 2025-06-20 RX ORDER — MAGNESIUM HYDROXIDE/ALUMINUM HYDROXICE/SIMETHICONE 120; 1200; 1200 MG/30ML; MG/30ML; MG/30ML
30 SUSPENSION ORAL EVERY 4 HOURS PRN
Status: CANCELLED | OUTPATIENT
Start: 2025-06-20

## 2025-06-20 RX ORDER — IBUPROFEN 400 MG/1
400 TABLET, FILM COATED ORAL EVERY 4 HOURS PRN
Status: CANCELLED | OUTPATIENT
Start: 2025-06-20

## 2025-06-20 RX ORDER — RISPERIDONE 1 MG/1
1 TABLET ORAL
Status: DISCONTINUED | OUTPATIENT
Start: 2025-06-20 | End: 2025-06-30 | Stop reason: HOSPADM

## 2025-06-20 RX ORDER — DOCUSATE SODIUM 100 MG/1
100 CAPSULE, LIQUID FILLED ORAL 2 TIMES DAILY
Status: CANCELLED | OUTPATIENT
Start: 2025-06-20

## 2025-06-20 RX ORDER — HYDROXYZINE HYDROCHLORIDE 50 MG/1
50 TABLET, FILM COATED ORAL
Status: CANCELLED | OUTPATIENT
Start: 2025-06-20

## 2025-06-20 RX ORDER — LORAZEPAM 2 MG/ML
2 INJECTION INTRAMUSCULAR
Status: DISCONTINUED | OUTPATIENT
Start: 2025-06-20 | End: 2025-06-30 | Stop reason: HOSPADM

## 2025-06-20 RX ORDER — BENZTROPINE MESYLATE 1 MG/ML
1 INJECTION, SOLUTION INTRAMUSCULAR; INTRAVENOUS
Status: CANCELLED | OUTPATIENT
Start: 2025-06-20

## 2025-06-20 RX ORDER — BENZTROPINE MESYLATE 1 MG/1
1 TABLET ORAL
Status: DISCONTINUED | OUTPATIENT
Start: 2025-06-20 | End: 2025-06-30 | Stop reason: HOSPADM

## 2025-06-20 RX ORDER — HALOPERIDOL 5 MG/ML
2.5 INJECTION INTRAMUSCULAR
Status: DISCONTINUED | OUTPATIENT
Start: 2025-06-20 | End: 2025-06-30 | Stop reason: HOSPADM

## 2025-06-20 RX ORDER — POLYETHYLENE GLYCOL 3350 17 G/17G
17 POWDER, FOR SOLUTION ORAL DAILY PRN
Status: DISCONTINUED | OUTPATIENT
Start: 2025-06-20 | End: 2025-06-22

## 2025-06-20 RX ORDER — AMOXICILLIN 250 MG
1 CAPSULE ORAL DAILY PRN
Status: CANCELLED | OUTPATIENT
Start: 2025-06-20

## 2025-06-20 RX ORDER — TRAZODONE HYDROCHLORIDE 50 MG/1
50 TABLET ORAL
Status: CANCELLED | OUTPATIENT
Start: 2025-06-20

## 2025-06-20 RX ORDER — PROPRANOLOL HYDROCHLORIDE 10 MG/1
10 TABLET ORAL EVERY 8 HOURS PRN
Status: CANCELLED | OUTPATIENT
Start: 2025-06-20

## 2025-06-20 RX ORDER — IBUPROFEN 400 MG/1
800 TABLET, FILM COATED ORAL EVERY 8 HOURS PRN
Status: CANCELLED | OUTPATIENT
Start: 2025-06-20

## 2025-06-20 RX ADMIN — ALPRAZOLAM 1 MG: 0.5 TABLET ORAL at 00:17

## 2025-06-20 RX ADMIN — DOCUSATE SODIUM 100 MG: 100 CAPSULE, LIQUID FILLED ORAL at 17:41

## 2025-06-20 RX ADMIN — TRAZODONE HYDROCHLORIDE 50 MG: 50 TABLET ORAL at 22:11

## 2025-06-20 RX ADMIN — HYDROXYZINE HYDROCHLORIDE 50 MG: 50 TABLET ORAL at 17:41

## 2025-06-20 NOTE — DISCHARGE SUMMARY
"Discharge Summary - Hospitalist   Name: Grismerlin Fernandez 25 y.o. female I MRN: [id number]  Unit/Bed#: 7T [facility] 704-01 I Date of Admission: 6/19/2025   Date of Service: 6/20/2025 I Hospital Day: 1     Assessment & Plan  Acetaminophen overdose, intentional self-harm, initial encounter (HCC)  Patient presents with intentional overdose on acetaminophen; took approximately 24 tablets overnight  has history of doing the same in the past; this morning she felt remorseful and reported to the ED for evaluation  In the ED labs stable, potassium 2.7 and she received supplemental potassium  Toxicology consulted and recommended initiating NAC protocol as well as monitoring INR, CMP, and acetaminophen levels every 12 hours  NAC protocol as follows:  \"Intravenous (Acetadote)   Loading dose- 150mg/kg infused over 15-60 minutes   Maintenance Infusion #1- 50mg/kg (12.5mg/kg/hr) over 4 hours   Maintenance Infusion #2 -100mg/kg (6.25 mg/kg/hr) until treatment endpoint   Treatment Endpoint: 20 hours or more   NAC should be continued for the full course.   NAC can be stopped when APAP is undetectable, AST/ALT have peaked and are downtrending, and patient appears clinically well. Consultation with a medical  /poison center is recommended before changes in the duration of therapy are made.\"    Patient signed 201 while in the ED, accepting of psychiatric placement once medically stable  201 obliges this patient to stay in house; continue one-to-one  Should she decide to leave AMA, patient should be reminded she is on a 201; if need be, escalate to 302 as needed  Behavioral health following along, transition to inpatient psych once medically stable in 24 to 48 hours    6/20-update: Labs and vital stable, potassium within normal limits; acetaminophen levels-6; cleared for discharge to psych by toxicology.  Medically stable for transfer to inpatient psychiatric unit now.  Adjustment disorder with anxious mood  Patient with " "history of suicide attempt; currently here for self-harm attempt with Tylenol ingestion  Consult to behavioral health; would benefit from inpatient psychiatric placement once medically stable  Currently on 201; await behavioral health recommendations  Hypokalemia  Patient presented with potassium of 2.7; was placed on telemetry and potassium repleted  Monitor on daily labs; replete as needed     Medical Problems       Resolved Problems  Date Reviewed: 3/4/2025   None       Discharging Physician / Practitioner: Savage Covington MD  PCP: PER Man  Admission Date:   Admission Orders (From admission, onward)       Ordered        06/19/25 1113  INPATIENT ADMISSION  Once                          Discharge Date: 06/20/25    Next Steps for Physician/AP Assuming Care:  Patient requiring 201/302; would benefit from psychiatric treatment for repeat suicide attempt  Monitor potassium labs in the next few days  Adjust psych meds as needed    Test Results Pending at Discharge (will require follow up):  CBC/CMP    Medication Changes for Discharge & Rationale:     See after visit summary for reconciled discharge medications provided to patient and/or family.     Consultations During Hospital Stay:  Toxicology  Behavioral health    Procedures Performed:   CBC/CMP  Treatment for acetaminophen overdose    Significant Findings / Test Results:   Suicidal ideation with attempt    Incidental Findings:   Hypokalemia      Hospital Course:   As per HPI:    \"Grismerlin Fernandez is a 25 y.o. female with a PMH of postpartum depression and suicidal ideation who presents with complaints of Tylenol overdose.  Patient reports overnight becoming overwhelmed with anxiety and ingesting approximately 25 500 mg Tylenol tablets.  This morning, after feeling remorseful about what she had done, tried to induce vomiting but was unsuccessful and subsequently presented to the [facility] ED.  Toxicology consulted and started patient on " "NAC.  Patient signed 201 in the ED with crisis it is agreeable to inpatient psychiatric rehab.  She denies any acute complaints other than anxiety; appears comfortable and states that she has no fever/chills, nausea/vomiting, diarrhea/constipation, abdominal pain, chest pain.\"    Discharge Day Visit / Exam:   * Please refer to separate progress note for these details *    Discussion with Family: Care plan discussed with patient who voiced understanding and agrees with recommendations.      Discharge instructions/Information to patient and family:   See after visit summary for information provided to patient and family.      Provisions for Follow-Up Care:  See after visit summary for information related to follow-up care and any pertinent home health orders.      Mobility at time of Discharge:   Basic Mobility Inpatient Raw Score: 24  JH-HLM Goal: 8: Walk 250 feet or more  JH-HLM Achieved: 6: Walk 10 steps or more  HLM Goal NOT achieved. Continue to encourage mobility in post discharge setting.     Disposition:   Inpatient Psychiatry at [facility]    Planned Readmission: Yes, at [facility] psychiatric unit    Administrative Statements   Discharge Statement:  I have spent a total time of 45 minutes in caring for this patient on the day of the visit/encounter. .    **Please Note: This note may have been constructed using a voice recognition system**  "

## 2025-06-20 NOTE — PLAN OF CARE
Problem: SELF HARM/SUICIDALITY  Goal: Will have no self-injury during hospital stay  Description: INTERVENTIONS:  - Q 15 MINUTES: Routine safety checks  - Q WAKING SHIFT & PRN: Assess risk to determine if routine checks are adequate to maintain patient safety  - Encourage patient to participate actively in care by formulating a plan to combat response to suicidal ideation, identify supports and resources  6/20/2025 1918 by Megan Jaquez RN  Outcome: Not Progressing  6/20/2025 1918 by Megan Jaquez RN  Outcome: Not Progressing     Problem: ANXIETY  Goal: By discharge: Patient will verbalize 2 strategies to deal with anxiety  Description: Interventions:  - Identify any obvious source/trigger to anxiety  - Staff will assist patient in applying identified coping technique/skills  - Encourage attendance of scheduled groups and activities  6/20/2025 1918 by Megan Jaquez RN  Outcome: Not Progressing  6/20/2025 1918 by Megan Jaquez RN  Outcome: Not Progressing     Problem: INVOLUNTARY ADMIT  Goal: Will cooperate with staff recommendations and doctor's orders and will demonstrate appropriate behavior  Description: INTERVENTIONS:  - Treat underlying conditions and offer medication as ordered  - Educate regarding involuntary admission procedures and rules  - Utilize positive consistent limit setting strategies to support patient and staff safety  6/20/2025 1918 by Megan Jaquez RN  Outcome: Not Progressing  6/20/2025 1918 by Megan Jaquez RN  Outcome: Not Progressing     Problem: Ineffective Coping  Goal: Cooperates with admission process  Description: Interventions:   - Complete admission process  Outcome: Completed  Goal: Participates in unit activities  Description: Interventions:  - Provide therapeutic environment   - Provide required programming   - Redirect inappropriate behaviors   6/20/2025 1918 by Megan Jaquez RN  Outcome: Not Progressing  6/20/2025 1918 by Megan Jaquez RN  Outcome: Not  Progressing

## 2025-06-20 NOTE — ASSESSMENT & PLAN NOTE
"Patient presents with intentional overdose on acetaminophen; took approximately 24 tablets overnight  has history of doing the same in the past; this morning she felt remorseful and reported to the ED for evaluation  In the ED labs stable, potassium 2.7 and she received supplemental potassium  Toxicology consulted and recommended initiating NAC protocol as well as monitoring INR, CMP, and acetaminophen levels every 12 hours  NAC protocol as follows:  \"Intravenous (Acetadote)   Loading dose- 150mg/kg infused over 15-60 minutes   Maintenance Infusion #1- 50mg/kg (12.5mg/kg/hr) over 4 hours   Maintenance Infusion #2 -100mg/kg (6.25 mg/kg/hr) until treatment endpoint   Treatment Endpoint: 20 hours or more   NAC should be continued for the full course.   NAC can be stopped when APAP is undetectable, AST/ALT have peaked and are downtrending, and patient appears clinically well. Consultation with a medical  /poison center is recommended before changes in the duration of therapy are made.\"    Patient signed 201 while in the ED, accepting of psychiatric placement once medically stable  201 obliges this patient to stay in house; continue one-to-one  Should she decide to leave AMA, patient should be reminded she is on a 201; if need be, escalate to 302 as needed  Behavioral health following along, transition to inpatient psych once medically stable in 24 to 48 hours    6/20-update: Labs and vital stable, potassium within normal limits; acetaminophen levels-6; cleared for discharge to psych by toxicology.  Medically stable for transfer to inpatient psychiatric unit now.  "

## 2025-06-20 NOTE — ED NOTES
Insurance Authorization for admission:   Phone call placed to Juan Henriquez   Phone number: [phone]   Spoke to Yolanda WINKLER     5 days approved.  6/20-6/24  Level of care:inpatient mental health .  Review on 6/24 Tues    Authorization # **.    To be given on admission     Eligibility Verification System checked - ([phone]).  Online system / automated system indicates: Juan Henriquez  ID # [id number]

## 2025-06-20 NOTE — NURSING NOTE
Patient given discharge instructions and verbalized understanding. Belongings given to patient. Patient discharged to 3[facility].

## 2025-06-20 NOTE — PLAN OF CARE
Problem: SAFETY ADULT  Goal: Patient will remain free of falls  Description: INTERVENTIONS:  - Educate patient/family on patient safety including physical limitations  - Instruct patient to call for assistance with activity   - Consider consulting OT/PT to assist with strengthening/mobility based on AM PAC & JH-HLM score  - Consult OT/PT to assist with strengthening/mobility   - Keep Call bell within reach  - Keep bed low and locked with side rails adjusted as appropriate  - Keep care items and personal belongings within reach  - Initiate and maintain comfort rounds  - Make Fall Risk Sign visible to staff  - Offer Toileting every  Hours, in advance of need  - Initiate/Maintain alarm  - Obtain necessary fall risk management equipment:   - Apply yellow socks and bracelet for high fall risk patients  - Consider moving patient to room near nurses station  Outcome: Progressing  Goal: Maintain or return to baseline ADL function  Description: INTERVENTIONS:  -  Assess patient's ability to carry out ADLs; assess patient's baseline for ADL function and identify physical deficits which impact ability to perform ADLs (bathing, care of mouth/teeth, toileting, grooming, dressing, etc.)  - Assess/evaluate cause of self-care deficits   - Assess range of motion  - Assess patient's mobility; develop plan if impaired  - Assess patient's need for assistive devices and provide as appropriate  - Encourage maximum independence but intervene and supervise when necessary  - Involve family in performance of ADLs  - Assess for home care needs following discharge   - Consider OT consult to assist with ADL evaluation and planning for discharge  - Provide patient education as appropriate  - Monitor functional capacity and physical performance, use of AM PAC & JH-HLM   - Monitor gait, balance and fatigue with ambulation    Outcome: Progressing  Goal: Maintains/Returns to pre admission functional level  Description: INTERVENTIONS:  - Perform  AM-PAC 6 Click Basic Mobility/ Daily Activity assessment daily.  - Set and communicate daily mobility goal to care team and patient/family/caregiver.   - Collaborate with rehabilitation services on mobility goals if consulted  - Perform Range of Motion  times a day.  - Reposition patient every  hours.  - Dangle patient  times a day  - Stand patient  times a day  - Ambulate patient  times a day  - Out of bed to chair times a day   - Out of bed for meals  times a day  - Out of bed for toileting  - Record patient progress and toleration of activity level   Outcome: Progressing     Problem: DISCHARGE PLANNING  Goal: Discharge to home or other facility with appropriate resources  Description: INTERVENTIONS:  - Identify barriers to discharge w/patient and caregiver  - Arrange for needed discharge resources and transportation as appropriate  - Identify discharge learning needs (meds, wound care, etc.)  - Arrange for interpretive services to assist at discharge as needed  - Refer to Case Management Department for coordinating discharge planning if the patient needs post-hospital services based on physician/advanced practitioner order or complex needs related to functional status, cognitive ability, or social support system  Outcome: Progressing     Problem: Knowledge Deficit  Goal: Patient/family/caregiver demonstrates understanding of disease process, treatment plan, medications, and discharge instructions  Description: Complete learning assessment and assess knowledge base.  Interventions:  - Provide teaching at level of understanding  - Provide teaching via preferred learning methods  Outcome: Progressing     Problem: Depression - IP adult  Goal: Effects of depression will be minimized  Description: INTERVENTIONS:  - Assess impact of patient's symptoms on level of functioning, self-care needs and offer support as indicated  - Assess patient/family knowledge of depression, impact on illness and need for teaching  -  Provide emotional support, presence and reassurance  - Assess for possible suicidal thoughts, ideation or self-harm. If patient expresses suicidal thoughts or statements do not leave alone, notify physician/AP immediately, initiate Suicide Precautions, and determine need for continual observation.  - Initiate consults and referrals as appropriate (a mental health professional, Spiritual Care)  - Administer medication as ordered  Outcome: Progressing     Problem: METABOLIC, FLUID AND ELECTROLYTES - ADULT  Goal: Electrolytes maintained within normal limits  Description: INTERVENTIONS:  - Monitor labs and assess patient for signs and symptoms of electrolyte imbalances  - Administer electrolyte replacement as ordered  - Monitor response to electrolyte replacements, including repeat lab results as appropriate  - Instruct patient on fluid and nutrition as appropriate  Outcome: Progressing

## 2025-06-20 NOTE — ED NOTES
Patient is accepted at  3B  Patient is accepted by Dr. Arevalo  Patient may go to the floor at 1600          Nurse report is to be called to [phone] prior to patient transfer.

## 2025-06-20 NOTE — PLAN OF CARE
Problem: PAIN - ADULT  Goal: Verbalizes/displays adequate comfort level or baseline comfort level  Description: Interventions:  - Encourage patient to monitor pain and request assistance  - Assess pain using appropriate pain scale  - Administer analgesics as ordered based on type and severity of pain and evaluate response  - Implement non-pharmacological measures as appropriate and evaluate response  - Consider cultural and social influences on pain and pain management  - Notify physician/advanced practitioner if interventions unsuccessful or patient reports new pain  - Educate patient/family on pain management process including their role and importance of  reporting pain   - Provide non-pharmacologic/complimentary pain relief interventions  6/20/2025 1915 by Megan Jaquez RN  Outcome: Adequate for Discharge  6/20/2025 1914 by Megan Jaquez RN  Outcome: Adequate for Discharge     Problem: INFECTION - ADULT  Goal: Absence or prevention of progression during hospitalization  Description: INTERVENTIONS:  - Assess and monitor for signs and symptoms of infection  - Monitor lab/diagnostic results  - Monitor all insertion sites, i.e. indwelling lines, tubes, and drains  - Monitor endotracheal if appropriate and nasal secretions for changes in amount and color  - [place] appropriate cooling/warming therapies per order  - Administer medications as ordered  - Instruct and encourage patient and family to use good hand hygiene technique  - Identify and instruct in appropriate isolation precautions for identified infection/condition  6/20/2025 1915 by Megan Jaquez RN  Outcome: Adequate for Discharge  6/20/2025 1914 by Megan Jaquez RN  Outcome: Adequate for Discharge  Goal: Absence of fever/infection during neutropenic period  Description: INTERVENTIONS:  - Monitor WBC  - Perform strict hand hygiene  - Limit to healthy visitors only  - No plants, dried, fresh or silk flowers with rm in patient room  6/20/2025  1915 by Megan Jaquez RN  Outcome: Adequate for Discharge  6/20/2025 1914 by Megan Jaquez RN  Outcome: Adequate for Discharge     Problem: SAFETY ADULT  Goal: Patient will remain free of falls  Description: INTERVENTIONS:  - Educate patient/family on patient safety including physical limitations  - Instruct patient to call for assistance with activity   - Consider consulting OT/PT to assist with strengthening/mobility based on AM PAC & JH-HLM score  - Consult OT/PT to assist with strengthening/mobility   - Keep Call bell within reach  - Keep bed low and locked with side rails adjusted as appropriate  - Keep care items and personal belongings within reach  - Initiate and maintain comfort rounds  - Make Fall Risk Sign visible to staff  - Offer Toileting every  Hours, in advance of need  - Initiate/Maintain alarm  - Obtain necessary fall risk management equipment:   - Apply yellow socks and bracelet for high fall risk patients  - Consider moving patient to room near nurses station  6/20/2025 1915 by Megan Jaquez RN  Outcome: Adequate for Discharge  6/20/2025 1914 by Megan Jaquez RN  Outcome: Adequate for Discharge  Goal: Maintain or return to baseline ADL function  Description: INTERVENTIONS:  -  Assess patient's ability to carry out ADLs; assess patient's baseline for ADL function and identify physical deficits which impact ability to perform ADLs (bathing, care of mouth/teeth, toileting, grooming, dressing, etc.)  - Assess/evaluate cause of self-care deficits   - Assess range of motion  - Assess patient's mobility; develop plan if impaired  - Assess patient's need for assistive devices and provide as appropriate  - Encourage maximum independence but intervene and supervise when necessary  - Involve family in performance of ADLs  - Assess for home care needs following discharge   - Consider OT consult to assist with ADL evaluation and planning for discharge  - Provide patient education as appropriate  -  Monitor functional capacity and physical performance, use of AM PAC & -HLM   - Monitor gait, balance and fatigue with ambulation    6/20/2025 1915 by Megan Jaquez RN  Outcome: Adequate for Discharge  6/20/2025 1914 by Megan Jaquez RN  Outcome: Adequate for Discharge  Goal: Maintains/Returns to pre admission functional level  Description: INTERVENTIONS:  - Perform AM-PAC 6 Click Basic Mobility/ Daily Activity assessment daily.  - Set and communicate daily mobility goal to care team and patient/family/caregiver.   - Collaborate with rehabilitation services on mobility goals if consulted  - Perform Range of Motion  times a day.  - Reposition patient every  hours.  - Dangle patient  times a day  - Stand patient  times a day  - Ambulate patient  times a day  - Out of bed to chair  times a day   - Out of bed for meals  times a day  - Out of bed for toileting  - Record patient progress and toleration of activity level   6/20/2025 1915 by Megan Jaquez RN  Outcome: Adequate for Discharge  6/20/2025 1914 by Megan Jaquez RN  Outcome: Adequate for Discharge     Problem: DISCHARGE PLANNING  Goal: Discharge to home or other facility with appropriate resources  Description: INTERVENTIONS:  - Identify barriers to discharge w/patient and caregiver  - Arrange for needed discharge resources and transportation as appropriate  - Identify discharge learning needs (meds, wound care, etc.)  - Arrange for interpretive services to assist at discharge as needed  - Refer to Case Management Department for coordinating discharge planning if the patient needs post-hospital services based on physician/advanced practitioner order or complex needs related to functional status, cognitive ability, or social support system  6/20/2025 1915 by Megan Jaquez RN  Outcome: Adequate for Discharge  6/20/2025 1914 by Megan Jaquez RN  Outcome: Adequate for Discharge     Problem: Knowledge Deficit  Goal: Patient/family/caregiver  demonstrates understanding of disease process, treatment plan, medications, and discharge instructions  Description: Complete learning assessment and assess knowledge base.  Interventions:  - Provide teaching at level of understanding  - Provide teaching via preferred learning methods  6/20/2025 1915 by Megan Jaquez RN  Outcome: Adequate for Discharge  6/20/2025 1914 by Megan Jaquez RN  Outcome: Adequate for Discharge

## 2025-06-20 NOTE — ED NOTES
2 dr. Foy was completed by Dr. Brar and and Dr. Covington.  Patient was originally a 201 due to intentional tylenol overdose.  Per Dr. Covington, petitioning , she took approximately 20 tabs of tylenol in an attempt to take her life around midnight on 6/19.  She was seen in the ED approx 9 hrs. Later.  Per Dr. Brar, patient is a 25 yr old female with a hx of significant suicide attempts and non compliance with outpt treatment.  She presented for a completed suicide attempt with a tylenol overdose.  Patient has poor insight to the severity of the attempt.  Poor judgement and patient response, states that she will instantly sign a 72 hr upon admission to the psychiatric unit    302 compleeted and faxed to [facility] Crisis office.  Confirmed with Eula at crisis that it is received.    302 forwarded to intake

## 2025-06-20 NOTE — PROGRESS NOTES
Progress Note - Medical Toxicology   Name: Grismerlin Fernandez 25 y.o. female I MRN: [id number]  Unit/Bed#: 7T [facility] 704-01 I Date of Admission: 6/19/2025   Date of Service: 6/20/2025 I Hospital Day: 1    Assessment & Plan  Acetaminophen overdose, intentional self-harm, initial encounter ([facility])  Patient took approximately 24x 500 mg tablets of Tylenol 9.5 hours prior to presentation (0000 on 06/19)  She admits that this was an intentional overdose in order to harm herself  She forced herself to vomit but did not notice any tablets come up  She is currently experiencing nausea and abdominal pain in her right upper quadrant, epigastric, and left upper quadrant regions  She denied taking any other substances    Plan:  Patient is cleared from a toxicology standpoint and the toxicology service will sign off for now  Adjustment disorder with anxious mood    Hypokalemia      Reason for current consult: Acetaminophen overdose     Subjective   Patient today is denying all symptoms.  She no longer is experiencing abdominal pain.  She denied nausea or vomiting.  She signed a 201 and is in agreement to go to inpatient psych.    Objective :  Temp:  [97.3 °F (36.3 °C)-97.7 °F (36.5 °C)] 97.3 °F (36.3 °C)  HR:  [65-88] 88  BP: (102-162)/(62-91) 102/67  Resp:  [18] 18  SpO2:  [98 %-99 %] 98 %  O2 Device: None (Room air)      Intake/Output Summary (Last 24 hours) at 6/20/2025 1038  Last data filed at 6/19/2025 1129  Gross per 24 hour   Intake 200 ml   Output --   Net 200 ml       Physical Exam  Constitutional:       Appearance: Normal appearance.     Cardiovascular:      Rate and Rhythm: Normal rate and regular rhythm.      Pulses: Normal pulses.      Heart sounds: Normal heart sounds.   Pulmonary:      Effort: Pulmonary effort is normal.      Breath sounds: Normal breath sounds.   Abdominal:      General: There is no distension.      Tenderness: There is no abdominal tenderness. There is no guarding.     Neurological:      General:  "No focal deficit present.      Mental Status: She is alert and oriented to person, place, and time.      Motor: No tremor.      Coordination: Finger-Nose-Finger Test normal.           Lab Results: I have reviewed the following results:LFTs:   .     06/20/25  0547   AST 29   ALT 30   ALB 3.3*   TBILI 0.25   ALKPHOS 46    , Medication Drug Levels:       Invalid input(s): \"CARBAMAZEPINE\", \"OXCARBAZEPINE\"    Imaging Results Review: No pertinent imaging studies reviewed.  Other Study Results Review: No additional pertinent studies reviewed.    Administrative Statements   I have spent a total time of 15 minutes in caring for this patient on the day of the visit/encounter including Diagnostic results, Prognosis, Risks and benefits of tx options, Instructions for management, and Patient and family education.  "

## 2025-06-20 NOTE — ASSESSMENT & PLAN NOTE
"Grismerlin Fernandez is a 26 YO female with pmhx of previous suicide attempts admitted after ingesting 24 pills of acetaminophen following an instance of online bullying. She notes that she was depressed at the time but denies any current symptoms of depression. She reports that it was impulsive, whereby when she gets upset or \"feels a certain type of way\" she engages in impulsive, and potentially life threatening behaviors such as ingesting tylenol. She denies current suicidal ideation and homicidal ideation. She denies PTSD symptoms, hallucinations, manic symptoms, panic symptoms, or an eating disorder. Her past medical history is significant for two past suicide attempts, for which she was hospitalized and for which she regrets.    Initially patient agreed to sign a 201 for voluntarily treatment but discloses to this time repeatedly that she will sign a 72 as soon as she arrives to psychiatric floor.   Although the patient is amenable to psychiatric medication in the outpatient setting, she did not attend her last scheduled session and on interview, she lacked insight as to the severity of the tylenol ingestion. As a result of her lack of insight and three suicide attempts overall, the patient presents a danger to herself if discharged and requires further psychiatric help in the inpatient setting. Therefore, we will be initiating a 302 involuntary hold for her to receive further treatment.  Discussed with primary care team  At this time patient is not amenable to psychiatric medications, will defer to psychiatry team on inpatient unit  "

## 2025-06-20 NOTE — PLAN OF CARE
Problem: PAIN - ADULT  Goal: Verbalizes/displays adequate comfort level or baseline comfort level  Description: Interventions:  - Encourage patient to monitor pain and request assistance  - Assess pain using appropriate pain scale  - Administer analgesics as ordered based on type and severity of pain and evaluate response  - Implement non-pharmacological measures as appropriate and evaluate response  - Consider cultural and social influences on pain and pain management  - Notify physician/advanced practitioner if interventions unsuccessful or patient reports new pain  - Educate patient/family on pain management process including their role and importance of  reporting pain   - Provide non-pharmacologic/complimentary pain relief interventions  Outcome: Adequate for Discharge     Problem: INFECTION - ADULT  Goal: Absence or prevention of progression during hospitalization  Description: INTERVENTIONS:  - Assess and monitor for signs and symptoms of infection  - Monitor lab/diagnostic results  - Monitor all insertion sites, i.e. indwelling lines, tubes, and drains  - Monitor endotracheal if appropriate and nasal secretions for changes in amount and color  - [place] appropriate cooling/warming therapies per order  - Administer medications as ordered  - Instruct and encourage patient and family to use good hand hygiene technique  - Identify and instruct in appropriate isolation precautions for identified infection/condition  Outcome: Adequate for Discharge  Goal: Absence of fever/infection during neutropenic period  Description: INTERVENTIONS:  - Monitor WBC  - Perform strict hand hygiene  - Limit to healthy visitors only  - No plants, dried, fresh or silk flowers with rm in patient room  Outcome: Adequate for Discharge     Problem: SAFETY ADULT  Goal: Patient will remain free of falls  Description: INTERVENTIONS:  - Educate patient/family on patient safety including physical limitations  - Instruct patient to call  for assistance with activity   - Consider consulting OT/PT to assist with strengthening/mobility based on AM PAC & JH-HLM score  - Consult OT/PT to assist with strengthening/mobility   - Keep Call bell within reach  - Keep bed low and locked with side rails adjusted as appropriate  - Keep care items and personal belongings within reach  - Initiate and maintain comfort rounds  - Make Fall Risk Sign visible to staff  - Offer Toileting every  Hours, in advance of need  - Initiate/Maintain alarm  - Obtain necessary fall risk management equipment:   - Apply yellow socks and bracelet for high fall risk patients  - Consider moving patient to room near nurses station  Outcome: Adequate for Discharge  Goal: Maintain or return to baseline ADL function  Description: INTERVENTIONS:  -  Assess patient's ability to carry out ADLs; assess patient's baseline for ADL function and identify physical deficits which impact ability to perform ADLs (bathing, care of mouth/teeth, toileting, grooming, dressing, etc.)  - Assess/evaluate cause of self-care deficits   - Assess range of motion  - Assess patient's mobility; develop plan if impaired  - Assess patient's need for assistive devices and provide as appropriate  - Encourage maximum independence but intervene and supervise when necessary  - Involve family in performance of ADLs  - Assess for home care needs following discharge   - Consider OT consult to assist with ADL evaluation and planning for discharge  - Provide patient education as appropriate  - Monitor functional capacity and physical performance, use of AM PAC & JH-HLM   - Monitor gait, balance and fatigue with ambulation    Outcome: Adequate for Discharge  Goal: Maintains/Returns to pre admission functional level  Description: INTERVENTIONS:  - Perform AM-PAC 6 Click Basic Mobility/ Daily Activity assessment daily.  - Set and communicate daily mobility goal to care team and patient/family/caregiver.   - Collaborate with  rehabilitation services on mobility goals if consulted  - Perform Range of Motion  times a day.  - Reposition patient every  hours.  - Dangle patient  times a day  - Stand patient  times a day  - Ambulate patient  times a day  - Out of bed to chair  times a day   - Out of bed for meals  times a day  - Out of bed for toileting  - Record patient progress and toleration of activity level   Outcome: Adequate for Discharge     Problem: DISCHARGE PLANNING  Goal: Discharge to home or other facility with appropriate resources  Description: INTERVENTIONS:  - Identify barriers to discharge w/patient and caregiver  - Arrange for needed discharge resources and transportation as appropriate  - Identify discharge learning needs (meds, wound care, etc.)  - Arrange for interpretive services to assist at discharge as needed  - Refer to Case Management Department for coordinating discharge planning if the patient needs post-hospital services based on physician/advanced practitioner order or complex needs related to functional status, cognitive ability, or social support system  Outcome: Adequate for Discharge     Problem: Knowledge Deficit  Goal: Patient/family/caregiver demonstrates understanding of disease process, treatment plan, medications, and discharge instructions  Description: Complete learning assessment and assess knowledge base.  Interventions:  - Provide teaching at level of understanding  - Provide teaching via preferred learning methods  Outcome: Adequate for Discharge

## 2025-06-20 NOTE — ASSESSMENT & PLAN NOTE
>>ASSESSMENT AND PLAN FOR ADJUSTMENT DISORDER WITH ANXIOUS MOOD WRITTEN ON 6/20/2025 11:24 AM BY SOFY BRAVO MD    Patient with history of suicide attempt; currently here for self-harm attempt with Tylenol ingestion  Consult to behavioral health; would benefit from inpatient psychiatric placement once medically stable  Currently on 201; await behavioral health recommendations

## 2025-06-20 NOTE — ASSESSMENT & PLAN NOTE
Patient took approximately 24x 500 mg tablets of Tylenol 9.5 hours prior to presentation (0000 on 06/19)  She admits that this was an intentional overdose in order to harm herself  She forced herself to vomit but did not notice any tablets come up  She is currently experiencing nausea and abdominal pain in her right upper quadrant, epigastric, and left upper quadrant regions  She denied taking any other substances    Plan:  Patient is cleared from a toxicology standpoint and the toxicology service will sign off for now

## 2025-06-20 NOTE — NURSING NOTE
"Patient is a 25 year-old female admitted from [facility] on a 302 commitment status. When asked why they are here pt states \"because I took some pills\" and does not report any events leading up to them taking the pills.    Per 7T report pt overdosed on 24 tylenol 500 mg tablets and 9 hours later presented to the ED. Pt reports overdose (OD) was intentional and they forced themself to vomit. After the OD pt complained of nausea and abdominal pain. Pt has a history of overdose attempts. Pt was pleasant on 7T.    Upon admit to 3p pt was calm and cooperative with the admission process.  Alert and oriented x4. NKA. Denies drug or alcohol use, UDS negative. Denies tobacco use, trouble sleeping, or changes in appetite. Reports some depression and anxiety related to their situation.  Denies SI, HI, or hallucinations of any kind. Denies abuse hx. Lives at home with 2 children. CSSRS lifetime high, current low.     Used their phone to retrieve numbers. Skin checked revealed surgical scars to abdomen from procedure years ago, otherwise unremarkable. Unit rules and expectations reviewed. Declined unit tour, wanted to lay down. Requested shower supplies. Q-15 minute checks initiated. Staff availability reinforced.     "

## 2025-06-20 NOTE — ASSESSMENT & PLAN NOTE
Patient presented with potassium of 2.7; was placed on telemetry and potassium repleted  Monitor on daily labs; replete as needed

## 2025-06-20 NOTE — PLAN OF CARE
Problem: SAFETY ADULT  Goal: Patient will remain free of falls  Description: INTERVENTIONS:  - Educate patient/family on patient safety including physical limitations  - Instruct patient to call for assistance with activity   - Consider consulting OT/PT to assist with strengthening/mobility based on AM PAC & JH-HLM score  - Consult OT/PT to assist with strengthening/mobility   - Keep Call bell within reach  - Keep bed low and locked with side rails adjusted as appropriate  - Keep care items and personal belongings within reach  - Initiate and maintain comfort rounds  - Make Fall Risk Sign visible to staff  - Offer Toileting every  Hours, in advance of need  - Initiate/Maintain alarm  - Obtain necessary fall risk management equipment:   - Apply yellow socks and bracelet for high fall risk patients  - Consider moving patient to room near nurses station  6/20/2025 0647 by Melia Garcia RN  Outcome: Progressing  6/20/2025 0249 by Melia Garcia RN  Outcome: Progressing  Goal: Maintain or return to baseline ADL function  Description: INTERVENTIONS:  -  Assess patient's ability to carry out ADLs; assess patient's baseline for ADL function and identify physical deficits which impact ability to perform ADLs (bathing, care of mouth/teeth, toileting, grooming, dressing, etc.)  - Assess/evaluate cause of self-care deficits   - Assess range of motion  - Assess patient's mobility; develop plan if impaired  - Assess patient's need for assistive devices and provide as appropriate  - Encourage maximum independence but intervene and supervise when necessary  - Involve family in performance of ADLs  - Assess for home care needs following discharge   - Consider OT consult to assist with ADL evaluation and planning for discharge  - Provide patient education as appropriate  - Monitor functional capacity and physical performance, use of AM PAC & JH-HLM   - Monitor gait, balance and fatigue with ambulation    6/20/2025 0647 by Melia  NATALIA Garcia  Outcome: Progressing  6/20/2025 0249 by Melia Garcia RN  Outcome: Progressing  Goal: Maintains/Returns to pre admission functional level  Description: INTERVENTIONS:  - Perform AM-PAC 6 Click Basic Mobility/ Daily Activity assessment daily.  - Set and communicate daily mobility goal to care team and patient/family/caregiver.   - Collaborate with rehabilitation services on mobility goals if consulted  - Perform Range of Motion  times a day.  - Reposition patient every  hours.  - Dangle patient  times a day  - Stand patient  times a day  - Ambulate patient  times a day  - Out of bed to chair times a day   - Out of bed for meals  times a day  - Out of bed for toileting  - Record patient progress and toleration of activity level   6/20/2025 0647 by Melia Garcia RN  Outcome: Progressing  6/20/2025 0249 by Melia Garcia RN  Outcome: Progressing     Problem: DISCHARGE PLANNING  Goal: Discharge to home or other facility with appropriate resources  Description: INTERVENTIONS:  - Identify barriers to discharge w/patient and caregiver  - Arrange for needed discharge resources and transportation as appropriate  - Identify discharge learning needs (meds, wound care, etc.)  - Arrange for interpretive services to assist at discharge as needed  - Refer to Case Management Department for coordinating discharge planning if the patient needs post-hospital services based on physician/advanced practitioner order or complex needs related to functional status, cognitive ability, or social support system  6/20/2025 0647 by Melia Garcia RN  Outcome: Progressing  6/20/2025 0249 by Melia Garcia RN  Outcome: Progressing     Problem: Knowledge Deficit  Goal: Patient/family/caregiver demonstrates understanding of disease process, treatment plan, medications, and discharge instructions  Description: Complete learning assessment and assess knowledge base.  Interventions:  - Provide teaching at level of understanding  - Provide  teaching via preferred learning methods  6/20/2025 0647 by Melia Garcia RN  Outcome: Progressing  6/20/2025 0249 by Melia Garcia RN  Outcome: Progressing     Problem: Depression - IP adult  Goal: Effects of depression will be minimized  Description: INTERVENTIONS:  - Assess impact of patient's symptoms on level of functioning, self-care needs and offer support as indicated  - Assess patient/family knowledge of depression, impact on illness and need for teaching  - Provide emotional support, presence and reassurance  - Assess for possible suicidal thoughts, ideation or self-harm. If patient expresses suicidal thoughts or statements do not leave alone, notify physician/AP immediately, initiate Suicide Precautions, and determine need for continual observation.  - Initiate consults and referrals as appropriate (a mental health professional, [facility])  - Administer medication as ordered  6/20/2025 0647 by Melia Garcia RN  Outcome: Progressing  6/20/2025 0249 by Melia Garcia RN  Outcome: Progressing     Problem: METABOLIC, FLUID AND ELECTROLYTES - ADULT  Goal: Electrolytes maintained within normal limits  Description: INTERVENTIONS:  - Monitor labs and assess patient for signs and symptoms of electrolyte imbalances  - Administer electrolyte replacement as ordered  - Monitor response to electrolyte replacements, including repeat lab results as appropriate  - Instruct patient on fluid and nutrition as appropriate  6/20/2025 0647 by Melia Garcia RN  Outcome: Progressing  6/20/2025 0249 by Melia Garcia RN  Outcome: Progressing

## 2025-06-20 NOTE — NURSING NOTE
Pt reports not currently on any active prescriptive medications, no medications to verify, med rec complete. Covering [facility] provider  (Dr. Mahan) made aware via [employer] secure chat.

## 2025-06-20 NOTE — UTILIZATION REVIEW
Initial Clinical Review    Admission: Date/Time/Statement:   Admission Orders (From admission, onward)       Ordered        06/19/25 1113  INPATIENT ADMISSION  Once                          Orders Placed This Encounter   Procedures    INPATIENT ADMISSION     Standing Status:   Standing     Number of Occurrences:   1     Level of Care:   Med Surg [16]     Estimated length of stay:   More than 2 Midnights     Certification:   I certify that inpatient services are medically necessary for this patient for a duration of greater than two midnights. See H&P and MD Progress Notes for additional information about the patient's course of treatment.     ED Arrival Information       Expected   -    Arrival   6/19/2025 09:06    Acuity   Emergent              Means of arrival   Walk-In    Escorted by   [facility] ([facility])    Service   Hospitalist    Admission type   Emergency              Arrival complaint   medication overdose             Chief Complaint   Patient presents with    Overdose - Intentional     Pt states she took 20 500mg tylenol at 12am to harm herself. Reports around 8am she rejected it and made herself vomit. Reports she is having some mid abd pain with nausea. Denies having a therapist or psychiatrist. Denies hearing or seeing things. Denies HI. Reports she wanted to OD but never did it - pt was admitted then.        Initial Presentation: 25 y.o. female presented to the ED via EMS as inpatient admission for acetaminophen overdose.  PMH of postpartum depression and suicidal ideation who presents with complaints of Tylenol overdose.  Patient reports overnight becoming overwhelmed with anxiety and ingesting approximately 25 500 mg Tylenol tablets.  This morning, after feeling remorseful about what she had done, tried to induce vomiting but was unsuccessful and subsequently presented to the [facility] ED.  Toxicology consulted and started patient on NAC.  Patient signed 201 in the ED with  "crisis it is agreeable to inpatient psychiatric rehab. , potassium 2.7 and she received supplemental potassium  On exam c/o abdominal pain (+ ) N/V  Positive for dysphoric mood, self-injury and suicidal ideas. The patient is nervous/anxious   Plan NAC protocol continuous 1:1 observation   NAC protocol as follows:  \"Intravenous (Acetadote)   Loading dose- 150mg/kg infused over 15-60 minutes   Maintenance Infusion #1- 50mg/kg (12.5mg/kg/hr) over 4 hours   Maintenance Infusion #2 -100mg/kg (6.25 mg/kg/hr) until treatment endpoint   Treatment Endpoint: 20 hours or more   NAC should be continued for the full course.   NAC can be stopped when APAP is undetectable, AST/ALT have peaked and are downtrending, and patient appears clinically well. Consultation with a medical  /poison center is recommended before changes in the duration of therapy are made.\"  Toxicology consult:  Assessment:  26 yo f s/p acetaminophen overdose  9.5 hours after ingestion, level 76  Reports abdominal pain, nausea, vomiting  No specific findings on physical exam     Plan:  - Start three bag protocol of NAC  - q12 CMP, INR, and APAP levels      Anticipated Length of Stay/Certification Statement: Patient will be admitted on an inpatient basis with an anticipated length of stay of greater than 2 midnights secondary to suicide attempt.     Date: 06-20-25   Day 2: Patient today is denying all symptoms.  She no longer is experiencing abdominal pain.  She denied nausea or vomiting.  She signed a 201 and is in agreement to go to inpatient psych. Labs and vital stable, potassium within normal limits; acetaminophen levels-6; cleared for discharge to psych by toxicology. Medically stable for transfer to inpatient psychiatric unit now.         consult:  Grismerlin Fernandez is a 26 YO female with pmhx of previous suicide attempts admitted after ingesting 24 pills of acetaminophen following an instance of online bullying. She notes that she was " "depressed at the time but denies any current symptoms of depression. She reports that it was impulsive, whereby when she gets upset or \"feels a certain type of way\" she engages in impulsive, and potentially life threatening behaviors such as ingesting tylenol. She denies current suicidal ideation and homicidal ideation. She denies PTSD symptoms, hallucinations, manic symptoms, panic symptoms, or an eating disorder. Her past medical history is significant for two past suicide attempts, for which she was hospitalized and for which she regrets.     Initially patient agreed to sign a 201 for voluntarily treatment but discloses to this time repeatedly that she will sign a 72 as soon as she arrives to psychiatric floor.   Although the patient is amenable to psychiatric medication in the outpatient setting, she did not attend her last scheduled session and on interview, she lacked insight as to the severity of the tylenol ingestion. As a result of her lack of insight and three suicide attempts overall, the patient presents a danger to herself if discharged and requires further psychiatric help in the inpatient setting. Therefore, we will be initiating a 302 involuntary hold for her to receive further treatment.  Discussed with primary care team  At this time patient is not amenable to psychiatric medications, will defer to psychiatry team on inpatient unit        ED Treatment-Medication Administration from 06/19/2025 0906 to 06/19/2025 1145         Date/Time Order Dose Route Action     06/19/2025 0957 acetylcysteine (ACETADOTE) 11,940 mg in dextrose 5 % 200 mL IVPB 11,940 mg Intravenous New Bag     06/19/2025 0931 ondansetron (ZOFRAN) injection 4 mg 4 mg Intravenous Given     06/19/2025 1028 sodium chloride 0.9 % bolus 500 mL 500 mL Intravenous New Bag     06/19/2025 1029 potassium chloride 20 mEq IVPB (premix) 20 mEq Intravenous New Bag     06/19/2025 1125 acetylcysteine (ACETADOTE) 3,980 mg in dextrose 5 % 500 mL IVPB " 3,980 mg Intravenous New Bag     06/19/2025 1125 ondansetron (ZOFRAN) injection 4 mg 4 mg Intravenous Given            Scheduled Medications:  docusate sodium, 100 mg, Oral, BID  nicotine, 1 patch, Transdermal, Daily      Continuous IV Infusions:     PRN Meds:  ondansetron, 4 mg, Intravenous, Q6H PRN      ED Triage Vitals   Temperature Pulse Respirations Blood Pressure SpO2 Pain Score   06/19/25 0916 06/19/25 0916 06/19/25 0916 06/19/25 0916 06/19/25 0916 06/19/25 1156   98.3 °F (36.8 °C) 83 18 121/89 98 % No Pain     Weight (last 2 days)       Date/Time Weight    06/19/25 1156 79.5 (175.27)    06/19/25 0916 79.6 (175.4)            Vital Signs (last 3 days)       Date/Time Temp Pulse Resp BP MAP (mmHg) SpO2 O2 Device Patient Position - Orthostatic VS Sarah Coma Scale Score Pain    06/20/25 1100 97.2 °F (36.2 °C) 79 18 110/70 -- 98 % None (Room air) Lying -- --    06/20/25 0718 97.3 °F (36.3 °C) 88 18 102/67 -- 98 % None (Room air) Lying -- --    06/19/25 2341 97.5 °F (36.4 °C) 65 18 119/62 89 99 % None (Room air) Lying -- --    06/19/25 1930 -- -- -- -- -- -- -- -- 15 No Pain    06/19/25 1900 97.7 °F (36.5 °C) 86 18 120/71 79 98 % Nasal cannula Lying -- --    06/19/25 1424 97.5 °F (36.4 °C) 81 18 150/90 -- 99 % None (Room air) Lying -- --    06/19/25 1156 97.6 °F (36.4 °C) 81 18 162/91 104 99 % None (Room air) Lying -- No Pain    06/19/25 1027 -- 75 16 111/85 -- 100 % None (Room air) Lying -- --    06/19/25 0937 -- -- -- -- -- -- -- -- 15 --    06/19/25 0916 98.3 °F (36.8 °C) 83 18 121/89 -- 98 % None (Room air) Sitting -- --              Pertinent Labs/Diagnostic Test Results:   Radiology:  No orders to display     Cardiology:  ECG 12 lead   Final Result by Argentina Reaves MD (06/19 0708)   Normal sinus rhythm   Normal ECG   When compared with ECG of 21-Apr-2024 20:27,   Nonspecific T wave abnormality no longer evident in Anterolateral leads   Confirmed by Argentina Reaves ([id number]) on 6/19/2025 4:18:28 PM         GI:  No orders to display           Results from last 7 days   Lab Units 06/20/25  0547 06/19/25 2111 06/19/25  0921   WBC Thousand/uL 11.20* 13.20* 11.13*   HEMOGLOBIN g/dL 10.6* 11.8 12.3   HEMATOCRIT % 33.6* 36.7 39.8   PLATELETS Thousands/uL 345 360 395*   TOTAL NEUT ABS Thousands/µL 6.39 9.60* 7.69*         Results from last 7 days   Lab Units 06/20/25  0547 06/19/25  0921   SODIUM mmol/L 138 142   POTASSIUM mmol/L 4.2 2.7*   CHLORIDE mmol/L 108 116*   CO2 mmol/L 23 20*   ANION GAP mmol/L 7 6   BUN mg/dL 10 12   CREATININE mg/dL 0.53* 0.48*   EGFR ml/min/1.73sq m 133 137   CALCIUM mg/dL 8.7 6.6*   MAGNESIUM mg/dL 2.2  --    PHOSPHORUS mg/dL 3.7  --      Results from last 7 days   Lab Units 06/20/25  0547 06/19/25  0921   AST U/L 29 15   ALT U/L 30 17   ALK PHOS U/L 46 43   TOTAL PROTEIN g/dL 6.1* 4.9*   ALBUMIN g/dL 3.3* 2.8*   TOTAL BILIRUBIN mg/dL 0.25 0.33         Results from last 7 days   Lab Units 06/20/25  0547 06/19/25  0921   GLUCOSE RANDOM mg/dL 90 86     Results from last 7 days   Lab Units 06/19/25 2111 06/19/25  1047   PROTIME seconds 16.3* 16.4*   INR  1.28* 1.29*     Results from last 7 days   Lab Units 06/19/25  0921   TSH 3RD GENERATON uIU/mL 0.933       Results from last 7 days   Lab Units 06/19/25  0949   AMPH/METH  Negative   BARBITURATE UR  Negative   BENZODIAZEPINE UR  Negative   COCAINE UR  Negative   METHADONE URINE  Negative   OPIATE UR  Negative   PCP UR  Negative   THC UR  Negative     Results from last 7 days   Lab Units 06/19/25 2111 06/19/25  0921   ACETAMINOPHEN LVL ug/mL 6* 76*   SALICYLATE LVL mg/dL  --  <5*         Past Medical History[1]  Present on Admission:   Acetaminophen overdose, intentional self-harm, initial encounter ([facility])   Adjustment disorder with anxious mood   Hypokalemia      Admitting Diagnosis: Hypokalemia [E87.6]  Acetaminophen overdose [T39.1X1A]  Overdose, intentional self-harm, initial encounter ([facility]) [T50.072A]  Intentional overdose, initial  encounter ([facility]) [T50.902A]  Age/Sex: 25 y.o. female    Network Utilization Review Department  ATTENTION: Please call with any questions or concerns to [phone] and carefully listen to the prompts so that you are directed to the right person. All voicemails are confidential.   For Discharge needs, contact Care Management DC Support Team at [phone] opt. 2  Send all requests for admission clinical reviews, approved or denied determinations and any other requests to dedicated fax number below belonging to the [facility] where the patient is receiving treatment. List of dedicated fax numbers for the Facilities:  FACILITY NAME UR FAX NUMBER   ADMISSION DENIALS (Administrative/Medical Necessity) [phone]   DISCHARGE SUPPORT TEAM (NETWORK) [phone]   PARENT CHILD HEALTH (Maternity/NICU/Pediatrics) [phone]   [facility] [phone]   [facility] [phone]   [facility] [phone]   [facility] [phone]   [facility] [phone]   [facility] [phone]   [facility] [phone]   [facility] [phone]   [facility] [phone]   [facility] [phone]   [facility] [phone]   [facility] [phone]              [1]   Past Medical History:  Diagnosis Date    Depression     SIRS (systemic inflammatory response syndrome) (HCC) 01/29/2024

## 2025-06-20 NOTE — CONSULTS
"Consultation - Behavioral Health   Name: Grismerlin Fernandez 25 y.o. female I MRN: [id number]  Unit/Bed#: 7T [facility] 704-01 I Date of Admission: 6/19/2025   Date of Service: 6/20/2025 I Hospital Day: 1   Inpatient consult to Psychiatry  Consult performed by: Kade Brar DO  Consult ordered by: Savage Covington MD        Physician Requesting Evaluation: Savage Covington MD   Reason for Evaluation / Principal Problem: Acetaminophen overdose    Assessment & Plan  Acetaminophen overdose, intentional self-harm, initial encounter ([facility])  Grismerlin Fernandez is a 26 YO female with pmhx of previous suicide attempts admitted after ingesting 24 pills of acetaminophen following an instance of online bullying. She notes that she was depressed at the time but denies any current symptoms of depression. She reports that it was impulsive, whereby when she gets upset or \"feels a certain type of way\" she engages in impulsive, and potentially life threatening behaviors such as ingesting tylenol. She denies current suicidal ideation and homicidal ideation. She denies PTSD symptoms, hallucinations, manic symptoms, panic symptoms, or an eating disorder. Her past medical history is significant for two past suicide attempts, for which she was hospitalized and for which she regrets.    Initially patient agreed to sign a 201 for voluntarily treatment but discloses to this time repeatedly that she will sign a 72 as soon as she arrives to psychiatric floor.   Although the patient is amenable to psychiatric medication in the outpatient setting, she did not attend her last scheduled session and on interview, she lacked insight as to the severity of the tylenol ingestion. As a result of her lack of insight and three suicide attempts overall, the patient presents a danger to herself if discharged and requires further psychiatric help in the inpatient setting. Therefore, we will be initiating a 302 involuntary hold for her to receive further " "treatment.  Discussed with primary care team  At this time patient is not amenable to psychiatric medications, will defer to psychiatry team on inpatient unit  Adjustment disorder with anxious mood  Refer to primary problem assessment and plan  I have discussed the above management plan in detail with the primary service.     Current Medications:    Current Facility-Administered Medications:     docusate sodium (COLACE) capsule 100 mg, Oral, BID    nicotine (NICODERM CQ) 7 mg/24hr TD 24 hr patch 1 patch, Transdermal, Daily    Current Facility-Administered Medications:     ondansetron (ZOFRAN) injection 4 mg, Intravenous, Q6H PRN    Risks/Benefits of Treatment:     Risks, benefits, and possible side effects of medications explained to patient. Patient has limited understanding of risks and benefits of treatment at this time, but agrees to take medications as prescribed.    Treatment Planning:     All current active medications have been reviewed.  Continue to monitor response to treatment and assess for potential side effects of medications.  Collaboration with medical service for medical comorbidities as indicated.  Continue close observation for safety monitoring.  Estimated Discharge Day:  6 days (6/26/2025)  Legal Status on Admission: On 302 commitment for up to 5 days.    Psychiatric Evaluation    Chief Complaint: \"I took some pills\"    History of Present Illness   Grismerlin Fernandez is a 26 YO female with history of previous suicide attempt, anxiety, postpartum depression who was admitted due to an overdose of acetaminophen. Prior to taking the pills, she endorsed feeling depressed after an episode of online bullying by members of the online community that were unknown before.    Grismerlin is a mother of two kids, ages 4 and 1 year old and for income, she works at a [employer], for which she is worried about losing job if she is admitted to the hospital.    Past medical history is significant for postpartum " "depression, which she notes after her second pregnancy where she reports having thoughts of killing herself. She notes that she has been admitted to the inpatient psychiatric unit before for 3 days, after being hospitalized due to a prior acetaminophen overdose. At that hospital admission, she declined any psychiatric medications. Prior to this episode, she ingested rat poison several years prior. She regrets both suicide attempts and made no arrangements had executed her suicide, including writing letters or giving things away. Grismerlin did not describe any other medical illnesses, but noted that she has been taking Zepbound for almost one year, for which she attributes her weight loss to.    On review of symptoms, the patient endorses weight loss, which she attributes to the Zepbound. She also notes that she is impulsive, whereby when she feels \"a certain type of way\" she makes rash decisions. She denies other symptoms of peter. Grismerlin notes that she is often anxious and nonspecifically will worry about the future. She denies any changes to her sleep, energy, interest, appetite, and concentration. She acknowledged a suicide attempt yesterday but denies suicidal ideation at present. She denies any homicidal ideation. She denies any eating disorder. She denies symptoms of PTSD. She denies any auditory or visual hallucinations. She denies any panic symptoms. She denies any history of trauma or abuse.    Grismerlin has never taken psychiatric medications. She denies tobacco use, ethanol use, or other substances. She ingests Hookah for recreational purposes. She does not have allergies. She denies any head trauma or seizure. She denies having firearms at home or having access to any guns.    On exam, Grismerlin acknowledges her suicide attempt, but lacked insight into the severity of her overdose. Though she expressed initial interest in outpatient psychiatric treatment, she described that she would leave the " psychiatric unit if she was admitted inpatient. As a result of her previous history of overdoses and her lack of insight into the current hospitalization, for her safety, we are committing her to an involuntary psychiatric hold.       Psychiatric Review Of Systems:    Pertinent items are noted in HPI; all others negative    Historical Information     Past Psychiatric History:     Past Inpatient Psychiatric Treatment:   One past inpatient psychiatric admission  Past Outpatient Psychiatric Treatment:    Patient attended therapy but was noncompliant with outpatient psychiatric treatment.  Past Suicide Attempts: yes  Past Violent Behavior: no  Past Psychiatric Medication Trials: none    Substance Abuse History:    Tobacco, Alcohol and Drug Use History     Tobacco Use    Smoking status: Some Days     Types: Pipe     Passive exposure: Never    Smokeless tobacco: Never    Tobacco comments:     Occasional hookah   Vaping Use    Vaping status: Never Used   Substance Use Topics    Alcohol use: Not Currently     Comment: Denies    Drug use: Never      Additional Substance Use Detail       Questions Responses    Problems Due to Past Use of Alcohol? No    Problems Due to Past Use of Substances? No    Substance Use Assessment Denies substance use within the past 12 months    Alcohol Use Frequency Denies use in past 12 months    Cannabis frequency Never used    Comment: Never used on 11/23/2020     Heroin Frequency Denies use in past 12 months    Cocaine frequency Never used    Comment: Never used on 11/23/2020     Crack Cocaine Frequency Denies use in past 12 months    Methamphetamine Frequency Denies use in past 12 months    Narcotic Frequency Denies use in past 12 months    Benzodiazepine Frequency Denies use in past 12 months    Amphetamine frequency Denies use in past 12 months    Barbituate Frequency Denies use use in past 12 months    Inhalant frequency Never used    Comment: Never used on 11/23/2020     Hallucinogen  frequency Never used    Comment: Never used on 2020     Ecstasy frequency Never used    Comment: Never used on 2020     Other drug frequency Never used    Comment: Never used on 2020     Opiate frequency Denies use in past 12 months    Last reviewed by Zoey Urena on 2025           I have assessed this patient for substance use within the past 12 months    Alcohol use: denies current use  Recreational drug use:   Cocaine: denies current use  Heroin: denies current use  Cannabis: denies current use  Other drugs:   denies use  Smoking history: denies use        Social History:    Marital History: single  Children: 2 children, ages 1 and 4  Living Arrangement: lives alone with children  Occupational History: currently employed  Functioning Relationships: limited support system  Access to firearms: no firearms    Traumatic History:     Abuse:none  Other Traumatic Events: none    Past Medical History      History of Seizures: no  History of Head injury with loss of consciousness: no    Past Medical History[1]  Past Surgical History[2]  Meds/Allergies      No Known Allergies     Current Facility-Administered Medications:     docusate sodium (COLACE) capsule 100 mg, BID    nicotine (NICODERM CQ) 7 mg/24hr TD 24 hr patch 1 patch, Daily    ondansetron (ZOFRAN) injection 4 mg, Q6H PRN  Prior to Admission Medications   Prescriptions Last Dose Informant Patient Reported? Taking?   Alcohol Swabs ([facility] Alcohol Prep Pads) 70 % PADS Not Taking  No No   Sig: USE AS DIRECTED PRIOR TO ZEPBOUND ADMINISTRATION   Patient not taking: Reported on 2025   Cholecalciferol (Vitamin D3) 125 MCG (5000 UT) CAPS Not Taking  No No   Si cap daily   Patient not taking: Reported on 2025   naproxen (NAPROSYN) 500 mg tablet   No No   Sig: Take 1 tablet (500 mg total) by mouth 2 (two) times a day with meals for 10 days   Patient not taking: Reported on 3/4/2025   naproxen (NAPROSYN) 500 mg tablet Not Taking   No No   Sig: Take 1 tablet (500 mg total) by mouth 2 (two) times a day with meals   Patient not taking: Reported on 6/19/2025   tirzepatide (Zepbound) 10 mg/0.5 mL auto-injector Not Taking  No No   Sig: Inject 0.5 mL (10 mg total) under the skin once a week   Patient not taking: Reported on 6/19/2025      Facility-Administered Medications: None       Objective :  Temp:  [97.2 °F (36.2 °C)-97.7 °F (36.5 °C)] 97.2 °F (36.2 °C)  HR:  [65-88] 79  BP: (102-162)/(62-91) 110/70  Resp:  [18] 18  SpO2:  [98 %-99 %] 98 %  O2 Device: None (Room air)    Mental Status Evaluation:    Appearance:  age appropriate, dressed appropriately, fair hygiene   Behavior:  Cooperative but becomes agitated   Speech:  normal rate, soft   Mood:  normal   Affect:  reactive, constrictive   Language: naming objects, repeating phrases   Thought Process:  organized, goal directed   Thought Content:  no overt delusions, normal   Perceptual Disturbances: none   Risk Potential: Suicidal Ideation - denies at present  Homicidal Ideations - denies at present  Potential for Aggression - Not at present   Sensorium:  oriented to person and place   Memory:  recent and remote memory grossly intact   Consciousness:  drowsy   Attention/Concentration: attention span and concentration are age appropriate   Intellect: within normal limits   Fund of Knowledge: awareness of current events: yes  past history: yes   Insight:  poor   Judgment: poor   Muscle Strength:  Muscle Tone: normal  normal   Gait/Station: in bed   Motor Activity: no abnormal movements         Lab Results: I have reviewed the following results:  Most Recent Labs:   Lab Results   Component Value Date    WBC 11.20 (H) 06/20/2025    RBC 4.02 06/20/2025    HGB 10.6 (L) 06/20/2025    HCT 33.6 (L) 06/20/2025     06/20/2025    RDW 13.7 06/20/2025    NEUTROABS 6.39 06/20/2025    SODIUM 138 06/20/2025    K 4.2 06/20/2025     06/20/2025    CO2 23 06/20/2025    BUN 10 06/20/2025    CREATININE  "0.53 (L) 2025    GLUC 90 2025    CALCIUM 8.7 2025    AST 29 2025    ALT 30 2025    ALKPHOS 46 2025    TP 6.1 (L) 2025    ALB 3.3 (L) 2025    TBILI 0.25 2025    CHOLESTEROL 101 2024    HDL 38 (L) 2024    TRIG 50 2024    LDLCALC 53 2024    NONHDLC 63 2024    AMMONIA 61 2024    [id number] 0.933 2025    PREGUR negative 2022    PREGSERUM Negative 2024    HCGQUANT 84,702 (H) 2023    SYPHILISAB Non-reactive 2024    HGBA1C 5.1 2024     2024       Imaging Results Review: No pertinent imaging studies reviewed.  Other Study Results Review: No additional pertinent studies reviewed.    Code Status: Level 1 - Full Code  Advance Directive and Living Will: <no information>  Next of Kin: No emergency contact information on file.    Administrative Statements     Counseling / Coordination of Care:   I have spent a total time of 25 minutes in caring for this patient on the day of the visit/encounter including:    Inpatient Psychiatric Certification:  Estimated length of stay: 5 midnights   Based upon physical, mental and social evaluations, I certify that inpatient psychiatric services are medically necessary for this patient for a duration of 5 midnights for the treatment of Acetaminophen overdose, intentional self-harm, initial encounter (HCC)    Portions of the record may have been created with voice recognition software. Occasional wrong word or \"sound a like\" substitutions may have occurred due to the inherent limitations of voice recognition software. Read the chart carefully and recognize, using context, where substitutions have occurred.    Kade Brar DO 25       [1]   Past Medical History:  Diagnosis Date    Depression     SIRS (systemic inflammatory response syndrome) (HCC) 2024   [2]   Past Surgical History:  Procedure Laterality Date    ABDOMINOPLASTY      AL  " DELIVERY ONLY N/A 2020    Procedure:  SECTION ();  Surgeon: Adair Clifton MD;  Location: [facility];  Service: Obstetrics    ND  DELIVERY ONLY N/A 1/15/2024    Procedure:  SECTION ();  Surgeon: Yardlie Toussaint-Foster, DO;  [facility];  Service: Obstetrics

## 2025-06-20 NOTE — PLAN OF CARE
Problem: METABOLIC, FLUID AND ELECTROLYTES - ADULT  Goal: Electrolytes maintained within normal limits  Description: INTERVENTIONS:  - Monitor labs and assess patient for signs and symptoms of electrolyte imbalances  - Administer electrolyte replacement as ordered  - Monitor response to electrolyte replacements, including repeat lab results as appropriate  - Instruct patient on fluid and nutrition as appropriate  Outcome: Progressing     Problem: SAFETY ADULT  Goal: Patient will remain free of falls  Description: INTERVENTIONS:  - Educate patient/family on patient safety including physical limitations  - Instruct patient to call for assistance with activity   - Consider consulting OT/PT to assist with strengthening/mobility based on AM PAC & JH-HLM score  - Consult OT/PT to assist with strengthening/mobility   - Keep Call bell within reach  - Keep bed low and locked with side rails adjusted as appropriate  - Keep care items and personal belongings within reach  - Initiate and maintain comfort rounds  - Make Fall Risk Sign visible to staff  - Offer Toileting every  Hours, in advance of need  - Initiate/Maintain alarm  - Obtain necessary fall risk management equipment:   - Apply yellow socks and bracelet for high fall risk patients  - Consider moving patient to room near nurses station  Outcome: Progressing     Problem: DISCHARGE PLANNING  Goal: Discharge to home or other facility with appropriate resources  Description: INTERVENTIONS:  - Identify barriers to discharge w/patient and caregiver  - Arrange for needed discharge resources and transportation as appropriate  - Identify discharge learning needs (meds, wound care, etc.)  - Arrange for interpretive services to assist at discharge as needed  - Refer to Case Management Department for coordinating discharge planning if the patient needs post-hospital services based on physician/advanced practitioner order or complex needs related to functional status, cognitive  ability, or social support system  Outcome: Progressing

## 2025-06-20 NOTE — ASSESSMENT & PLAN NOTE
>>ASSESSMENT AND PLAN FOR ADJUSTMENT DISORDER WITH ANXIOUS MOOD WRITTEN ON 6/20/2025 11:40 AM BY ALBARO ESCOTO, DO    Refer to primary problem assessment and plan

## 2025-06-20 NOTE — ED NOTES
Notified patient of her admission this after noon to 3B.  She was cooperative  Asked about her children, she has 2 boys ages 1 and 4.  She said that they are currently with her mother, but her mother is upset with her   She does not live with her S.Umesh MURPHY

## 2025-06-21 PROBLEM — F60.89 CLUSTER B PERSONALITY DISORDER (HCC): Status: ACTIVE | Noted: 2025-06-21

## 2025-06-21 PROCEDURE — 99254 IP/OBS CNSLTJ NEW/EST MOD 60: CPT

## 2025-06-21 PROCEDURE — 99223 1ST HOSP IP/OBS HIGH 75: CPT | Performed by: PSYCHIATRY & NEUROLOGY

## 2025-06-21 RX ORDER — ESCITALOPRAM OXALATE 5 MG/1
2.5 TABLET ORAL DAILY
Status: DISCONTINUED | OUTPATIENT
Start: 2025-06-22 | End: 2025-06-23

## 2025-06-21 RX ADMIN — TRAZODONE HYDROCHLORIDE 50 MG: 50 TABLET ORAL at 21:14

## 2025-06-21 RX ADMIN — HYDROXYZINE HYDROCHLORIDE 100 MG: 50 TABLET ORAL at 23:30

## 2025-06-21 RX ADMIN — CHOLECALCIFEROL TAB 25 MCG (1000 UNIT) 1000 UNITS: 25 TAB at 10:28

## 2025-06-21 RX ADMIN — DOCUSATE SODIUM 100 MG: 100 CAPSULE, LIQUID FILLED ORAL at 10:28

## 2025-06-21 NOTE — NURSING NOTE
Slept in later in the morning, expressed feeling tired. Calm, flat and depressed affect. Denies depression, anxiety, SI, HI, and hallucinations of any kind.    Declined scheduled nicotine patch, complied with the rest of their scheduled medications.    Encouraged hygiene and to attend groups.    Staff availability reinforced.

## 2025-06-21 NOTE — NURSING NOTE
Pt complained of anxiety related to their current situation and requested PRN medication. Weiss Anxiety scale score 18. Patient's eyes appear red and watery. Received PRN atarax 50 mg PO for moderate anxiety at 1741.    Upon reassessment one hour later at 1841 patient observed laying in bed comfortably with eyes closed, no further complaints.    Medication effective.

## 2025-06-21 NOTE — PLAN OF CARE
Problem: SELF HARM/SUICIDALITY  Goal: Will have no self-injury during hospital stay  Description: INTERVENTIONS:  - Q 15 MINUTES: Routine safety checks  - Q WAKING SHIFT & PRN: Assess risk to determine if routine checks are adequate to maintain patient safety  - Encourage patient to participate actively in care by formulating a plan to combat response to suicidal ideation, identify supports and resources  Outcome: Progressing     Problem: ANXIETY  Goal: By discharge: Patient will verbalize 2 strategies to deal with anxiety  Description: Interventions:  - Identify any obvious source/trigger to anxiety  - Staff will assist patient in applying identified coping technique/skills  - Encourage attendance of scheduled groups and activities  Outcome: Not Progressing     Problem: INVOLUNTARY ADMIT  Goal: Will cooperate with staff recommendations and doctor's orders and will demonstrate appropriate behavior  Description: INTERVENTIONS:  - Treat underlying conditions and offer medication as ordered  - Educate regarding involuntary admission procedures and rules  - Utilize positive consistent limit setting strategies to support patient and staff safety  Outcome: Progressing     Problem: Ineffective Coping  Goal: Participates in unit activities  Description: Interventions:  - Provide therapeutic environment   - Provide required programming   - Redirect inappropriate behaviors   Outcome: Not Progressing

## 2025-06-21 NOTE — CONSULTS
Inpatient Consultation - [facility]    Patient Information: Grismerlin Fernandez 25 y.o. female MRN: [id number]  Unit/Bed#: [facility] 381-01 Encounter: [id number]  PCP: PER Man  Date of Admission:  6/20/2025  Date of Consultation: 06/21/25  Requesting Physician: Cheryle Arevalo MD    Reason For Consultation:   Medical Clearance     Assessment/Plan:    Medical clearance for psychiatric admission  Assessment & Plan  Medically cleared.      Vitamin D deficiency  Assessment & Plan      Continue home medication: 1K units daily         VTE Prophylaxis:  Ambulation     Recommendations for Discharge:  Per primary team     Counseling / Coordination of Care Time: 30 minutes.  Greater than 50% of total time spent on patient counseling and coordination of care.    Collaboration of Care: Were Recommendations Directly Discussed with Primary Treatment Team? - Yes     Patient Information Sharing: Not shared     History of Present Illness:    Grismerlin Fernandez is a 25 y.o. female who is originally admitted to the [facility] service for tylenol overdose ingestion on 6/20/2025 due to failed suicide attempt, subsequently admitted on 302.    We are consulted for medical clearance.     Review of Systems:    Review of Systems   Constitutional:  Negative for chills, fatigue and fever.   HENT:  Negative for sore throat.    Eyes:  Negative for visual disturbance.   Respiratory:  Negative for cough and shortness of breath.    Cardiovascular:  Negative for chest pain, palpitations and leg swelling.   Gastrointestinal:  Negative for abdominal pain, diarrhea, nausea and vomiting.   Genitourinary: Negative.    Skin:  Negative for rash.   Neurological:  Negative for dizziness, seizures, weakness and headaches.       Past Medical and Surgical History:   Past Medical History[1]  Past Surgical History[2]  Meds/Allergies:  Allergies: Allergies[3]  Prior to Admission Medications   Prescriptions Last Dose Informant Patient  Reported? Taking?   Alcohol Swabs (AUM Alcohol Prep Pads) 70 % PADS Not Taking  No No   Sig: USE AS DIRECTED PRIOR TO ZEPBOUND ADMINISTRATION   Patient not taking: Reported on 2025   Cholecalciferol (Vitamin D3) 125 MCG (5000 UT) CAPS Not Taking  No No   Si cap daily   Patient not taking: Reported on 2025   naproxen (NAPROSYN) 500 mg tablet   No No   Sig: Take 1 tablet (500 mg total) by mouth 2 (two) times a day with meals for 10 days   Patient not taking: Reported on 3/4/2025   naproxen (NAPROSYN) 500 mg tablet Not Taking  No No   Sig: Take 1 tablet (500 mg total) by mouth 2 (two) times a day with meals   Patient not taking: Reported on 2025   tirzepatide (Zepbound) 10 mg/0.5 mL auto-injector Not Taking  No No   Sig: Inject 0.5 mL (10 mg total) under the skin once a week   Patient not taking: Reported on 2025      Facility-Administered Medications: None     Social History:     Social History     Socioeconomic History    Marital status: Single     Spouse name: Not on file    Number of children: Not on file    Years of education: Not on file    Highest education level: Not on file   Occupational History    Not on file   Tobacco Use    Smoking status: Some Days     Types: Pipe     Passive exposure: Never    Smokeless tobacco: Never    Tobacco comments:     Occasional hookah   Vaping Use    Vaping status: Never Used   Substance and Sexual Activity    Alcohol use: Not Currently     Comment: Denies    Drug use: Never    Sexual activity: Yes     Partners: Male   Other Topics Concern    Not on file   Social History Narrative    Not on file     Social Drivers of Health     Financial Resource Strain: Low Risk  (2025)    Overall Financial Resource Strain (CARDIA)     Difficulty of Paying Living Expenses: Not very hard   Food Insecurity: No Food Insecurity (2025)    Nursing - Inadequate Food Risk Classification     Worried About Running Out of Food in the Last Year: Never true     Ran Out of  "Food in the Last Year: Never true     Ran Out of Food in the Last Year: Never true   Transportation Needs: No Transportation Needs (6/20/2025)    Nursing - Transportation Risk Classification     Lack of Transportation: Not on file     Lack of Transportation: No   Physical Activity: Not on file   Stress: Not on file   Social Connections: Not on file   Intimate Partner Violence: Unknown (6/20/2025)    Nursing IPS     Feels Physically and Emotionally Safe: Not on file     Physically Hurt by Someone: Not on file     Humiliated or Emotionally Abused by Someone: Not on file     Physically Hurt by Someone: No     Hurt or Threatened by Someone: No   Housing Stability: Unknown (6/20/2025)    Nursing: Inadequate Housing Risk Classification     Has Housing: Not on file     Worried About Losing Housing: Not on file     Unable to Get Utilities: Not on file     Unable to Pay for Housing in the Last Year: No     Has Housing: No       Family History:  Family History[4]    Physical Exam:     Vitals:   Blood Pressure: 98/54 (06/20/25 2031)  Pulse: 70 (06/20/25 2031)  Temperature: 98.2 °F (36.8 °C) (06/20/25 2031)  Temp Source: Temporal (06/20/25 2031)  Respirations: 16 (06/20/25 1651)  Height: 5' 2\" (157.5 cm) (06/20/25 1651)  Weight - Scale: 85.7 kg (189 lb) (06/20/25 1651)  SpO2: 98 % (06/20/25 2031)    Physical Exam  Vitals and nursing note reviewed.   Constitutional:       General: She is not in acute distress.     Appearance: Normal appearance. She is normal weight. She is not ill-appearing or toxic-appearing.   HENT:      Head: Normocephalic and atraumatic.      Right Ear: External ear normal.      Left Ear: External ear normal.      Nose: Nose normal.     Eyes:      General: No scleral icterus.        Right eye: No discharge.         Left eye: No discharge.      Extraocular Movements: Extraocular movements intact.      Conjunctiva/sclera: Conjunctivae normal.       Cardiovascular:      Rate and Rhythm: Normal rate and regular " rhythm.      Pulses: Normal pulses.      Heart sounds: Normal heart sounds. No murmur heard.  Pulmonary:      Effort: Pulmonary effort is normal. No respiratory distress.      Breath sounds: Normal breath sounds. No wheezing or rales.   Chest:      Chest wall: No tenderness.   Abdominal:      General: There is no distension.      Palpations: Abdomen is soft.      Tenderness: There is no abdominal tenderness. There is no guarding or rebound.     Musculoskeletal:      Right lower leg: No edema.      Left lower leg: No edema.     Skin:     Capillary Refill: Capillary refill takes less than 2 seconds.     Neurological:      General: No focal deficit present.      Mental Status: She is alert.         Additional Data:     Lab Results:     Results from last 7 days   Lab Units 25  0547   WBC Thousand/uL 11.20*   HEMOGLOBIN g/dL 10.6*   HEMATOCRIT % 33.6*   PLATELETS Thousands/uL 345   SEGS PCT % 57   LYMPHO PCT % 31   MONO PCT % 10   EOS PCT % 1     Results from last 7 days   Lab Units 25  0547   POTASSIUM mmol/L 4.2   CHLORIDE mmol/L 108   CO2 mmol/L 23   BUN mg/dL 10   CREATININE mg/dL 0.53*   CALCIUM mg/dL 8.7   ALK PHOS U/L 46   ALT U/L 30   AST U/L 29     Results from last 7 days   Lab Units 25  2111   INR  1.28*       Imaging:     No results found.    EKG, Pathology, and Other Studies Reviewed on Admission:   EKG  Result Date: 25  Impression:  NSR    ** Please Note: This note has been constructed using a voice recognition system. **    Helena Vasquez MD  25  10:45 AM         [1]   Past Medical History:  Diagnosis Date    Depression     SIRS (systemic inflammatory response syndrome) (HCC) 2024   [2]   Past Surgical History:  Procedure Laterality Date    ABDOMINOPLASTY      [place] N/A 2020    Procedure:  SECTION ();  Surgeon: Adair Clifton MD;  Location: [facility];  Service: Obstetrics    [place] N/A 1/15/2024    Procedure:   SECTION ();  Surgeon: Yardlie Toussaint-Foster, DO;  Location: [facility];  Service: Obstetrics   [3] No Known Allergies  [4]   Family History  Problem Relation Name Age of Onset    No Known Problems Mother      No Known Problems Father      No Known Problems Sister      No Known Problems Brother      Cancer Neg Hx      Diabetes Neg Hx

## 2025-06-21 NOTE — QUICK NOTE
Pt requested for labs to be done at a later time during morning shift. Pt educated on the importance of labs being done in the morning before meals.

## 2025-06-21 NOTE — NURSING NOTE
Pt complained of trouble falling asleep and requested medication. Received PRN trazodone 50 mg PO for insomnia at 2211.

## 2025-06-21 NOTE — NURSING NOTE
Attempt made prior to lunch to obtain labs. Flash made but no blood flow. Covering medical provider (Judith) made aware via [employer] secure chat. Pt provided fluids.    Attempt made again at 1500 to obtain labs was unsuccessful. Pt prefers for next attempt to be made tomorrow morning. Dr. Wong made aware via [employer] secure chat and requested to retime labs to tomorrow morning.

## 2025-06-22 PROBLEM — F60.89 CLUSTER B PERSONALITY DISORDER (HCC): Chronic | Status: ACTIVE | Noted: 2025-06-21

## 2025-06-22 LAB
ALBUMIN SERPL BCG-MCNC: 4 G/DL (ref 3.5–5)
ALP SERPL-CCNC: 63 U/L (ref 34–104)
ALT SERPL W P-5'-P-CCNC: 82 U/L (ref 7–52)
ANION GAP SERPL CALCULATED.3IONS-SCNC: 8 MMOL/L (ref 4–13)
AST SERPL W P-5'-P-CCNC: 27 U/L (ref 13–39)
BASOPHILS # BLD AUTO: 0.04 THOUSANDS/ÂΜL (ref 0–0.1)
BASOPHILS NFR BLD AUTO: 0 % (ref 0–1)
BILIRUB SERPL-MCNC: 0.25 MG/DL (ref 0.2–1)
BUN SERPL-MCNC: 10 MG/DL (ref 5–25)
CALCIUM SERPL-MCNC: 9.4 MG/DL (ref 8.4–10.2)
CHLORIDE SERPL-SCNC: 104 MMOL/L (ref 96–108)
CO2 SERPL-SCNC: 25 MMOL/L (ref 21–32)
CREAT SERPL-MCNC: 0.57 MG/DL (ref 0.6–1.3)
EOSINOPHIL # BLD AUTO: 0.13 THOUSAND/ÂΜL (ref 0–0.61)
EOSINOPHIL NFR BLD AUTO: 1 % (ref 0–6)
ERYTHROCYTE [DISTWIDTH] IN BLOOD BY AUTOMATED COUNT: 14.1 % (ref 11.6–15.1)
GFR SERPL CREATININE-BSD FRML MDRD: 130 ML/MIN/1.73SQ M
GLUCOSE SERPL-MCNC: 93 MG/DL (ref 65–140)
HCT VFR BLD AUTO: 39.3 % (ref 34.8–46.1)
HGB BLD-MCNC: 12.3 G/DL (ref 11.5–15.4)
IMM GRANULOCYTES # BLD AUTO: 0.05 THOUSAND/UL (ref 0–0.2)
IMM GRANULOCYTES NFR BLD AUTO: 0 % (ref 0–2)
LYMPHOCYTES # BLD AUTO: 3.24 THOUSANDS/ÂΜL (ref 0.6–4.47)
LYMPHOCYTES NFR BLD AUTO: 25 % (ref 14–44)
MAGNESIUM SERPL-MCNC: 2.2 MG/DL (ref 1.9–2.7)
MCH RBC QN AUTO: 26.6 PG (ref 26.8–34.3)
MCHC RBC AUTO-ENTMCNC: 31.3 G/DL (ref 31.4–37.4)
MCV RBC AUTO: 85 FL (ref 82–98)
MONOCYTES # BLD AUTO: 0.86 THOUSAND/ÂΜL (ref 0.17–1.22)
MONOCYTES NFR BLD AUTO: 7 % (ref 4–12)
NEUTROPHILS # BLD AUTO: 8.68 THOUSANDS/ÂΜL (ref 1.85–7.62)
NEUTS SEG NFR BLD AUTO: 67 % (ref 43–75)
NRBC BLD AUTO-RTO: 0 /100 WBCS
PHOSPHATE SERPL-MCNC: 3.8 MG/DL (ref 2.7–4.5)
PLATELET # BLD AUTO: 379 THOUSANDS/UL (ref 149–390)
PMV BLD AUTO: 9.9 FL (ref 8.9–12.7)
POTASSIUM SERPL-SCNC: 3.9 MMOL/L (ref 3.5–5.3)
PROT SERPL-MCNC: 7.5 G/DL (ref 6.4–8.4)
RBC # BLD AUTO: 4.63 MILLION/UL (ref 3.81–5.12)
SODIUM SERPL-SCNC: 137 MMOL/L (ref 135–147)
WBC # BLD AUTO: 13 THOUSAND/UL (ref 4.31–10.16)

## 2025-06-22 PROCEDURE — 99232 SBSQ HOSP IP/OBS MODERATE 35: CPT | Performed by: PSYCHIATRY & NEUROLOGY

## 2025-06-22 PROCEDURE — 85025 COMPLETE CBC W/AUTO DIFF WBC: CPT

## 2025-06-22 PROCEDURE — 84100 ASSAY OF PHOSPHORUS: CPT

## 2025-06-22 PROCEDURE — 83735 ASSAY OF MAGNESIUM: CPT

## 2025-06-22 PROCEDURE — 80053 COMPREHEN METABOLIC PANEL: CPT

## 2025-06-22 RX ORDER — POLYETHYLENE GLYCOL 3350 17 G/17G
17 POWDER, FOR SOLUTION ORAL DAILY PRN
Status: DISCONTINUED | OUTPATIENT
Start: 2025-06-22 | End: 2025-06-30 | Stop reason: HOSPADM

## 2025-06-22 RX ORDER — BISACODYL 10 MG
10 SUPPOSITORY, RECTAL RECTAL DAILY PRN
Status: DISCONTINUED | OUTPATIENT
Start: 2025-06-22 | End: 2025-06-30 | Stop reason: HOSPADM

## 2025-06-22 RX ADMIN — ESCITALOPRAM OXALATE 2.5 MG: 5 TABLET, FILM COATED ORAL at 09:49

## 2025-06-22 RX ADMIN — TRAZODONE HYDROCHLORIDE 50 MG: 50 TABLET ORAL at 21:16

## 2025-06-22 RX ADMIN — HYDROXYZINE HYDROCHLORIDE 50 MG: 50 TABLET ORAL at 21:16

## 2025-06-22 RX ADMIN — DOCUSATE SODIUM 100 MG: 100 CAPSULE, LIQUID FILLED ORAL at 09:49

## 2025-06-22 RX ADMIN — CHOLECALCIFEROL TAB 25 MCG (1000 UNIT) 1000 UNITS: 25 TAB at 09:49

## 2025-06-22 NOTE — ASSESSMENT & PLAN NOTE
Grismerlin Fernandez is a 25 y.o. female with a history of depression who was admitted to the inpatient psychiatric unit on an involuntary 302 commitment basis due to suicide attempt by overdose on Tylenol. Patient states that she was being bullied online prior to presentation and denies any psychiatric symptoms outside of anxiety and irritability. Strong history of labile, callous, manipulative behavior. Suspect cluster B personality disorder contributing to current presentation.     Agreeable to starting Lexapro 2.5 mg daily.

## 2025-06-22 NOTE — NURSING NOTE
Pt declined to take scheduled colace. Pt aware of medication use but verbalized that they did not want it.
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
## 2025-06-22 NOTE — PROGRESS NOTES
Progress Note - Behavioral Health   Name: Grismerlin Fernandez 25 y.o. female I MRN: [id number]  Unit/Bed#: U 381-01 I Date of Admission: 6/20/2025   Date of Service: 6/22/2025 I Hospital Day: 2    Assessment & Plan  Unspecified mood (affective) disorder (HCC)  Grismerlin Fernandez is a 25 y.o. female with a history of depression who was admitted to the inpatient psychiatric unit on an involuntary 302 commitment basis due to suicide attempt by overdose on Tylenol. Patient states that she was being bullied online prior to presentation and denies any psychiatric symptoms outside of anxiety and irritability. Strong history of labile, callous, manipulative behavior. Suspect cluster B personality disorder contributing to current presentation.     Continue Lexapro 2.5 mg daily.   Cluster B personality disorder (HCC)  ASPD vs BPD, with ASPD or antisocial traits being more likely.  Recommend clear limit setting while inpatient.  Medical clearance for psychiatric admission    Vitamin D deficiency      Current Medications:    Current Facility-Administered Medications:     Cholecalciferol (VITAMIN D3) tablet 1,000 Units, Oral, Daily    docusate sodium (COLACE) capsule 100 mg, Oral, BID    escitalopram (LEXAPRO) tablet 2.5 mg, Oral, Daily    nicotine (NICODERM CQ) 7 mg/24hr TD 24 hr patch 1 patch, Transdermal, Daily      Risks/Benefits of Treatment:     Risks, benefits, and possible side effects of medications explained to patient and patient verbalizes understanding and agreement for treatment.    Progress Toward Goals: improving slowly    Treatment Planning:     All current active medications have been reviewed.  Continue to monitor response to treatment and assess for potential side effects of medications.  Encourage group therapy, milieu therapy and occupational therapy.  Collaboration with medical service for medical comorbidities as indicated.  Behavioral Health checks for safety monitoring.  Estimated Discharge Day:   "13 days (2025)  Legal Status: Status of Admission  Status of Admission: 302  Date 302 will :: 25  Release of Information Signed: No.  Long Stay Certification : Not Applicable    Subjective     Behavior over the last 24 hours: unchanged    Reports no current issues with depression or anxiety since admission. Started Lexapro this morning and denies experiencing any side effects. Sleep quality was described as \"very good\" last night, and has been eating well. Denies any suicidal thoughts, thoughts of harming others, or experiencing hallucinations (both visual and auditory).     Acknowledged a history of suicidal thoughts and impulsive decision-making during teenage years but firmly states this is not currently a problem, saying \"it's not a problem\" when asked if impulsive decisions might be returning. Despite this assertion, recognized the importance of resuming therapy and agreed to attend sessions.     Expressed several concerns about the duration of hospitalization, specifically asking if discharge might occur by Wednesday and wondering about upcoming court dates (\"is there a chance that I will go to court?\"). Was informed that court appearance is certain but timing of discharge remains undetermined. Inquired about assigned doctor during weekdays, stating \"I thought it was going to be you\". No physical health complaints or pain reported during the interview.    Sleep: normal  Appetite: normal  Medication side effects: No  ROS: review of systems as noted above in HPI/Subjective report, all other systems are negative    Objective :  Temp:  [97.4 °F (36.3 °C)] 97.4 °F (36.3 °C)  HR:  [124] 124  BP: (149)/(67) 149/67  SpO2:  [96 %] 96 %  O2 Device: None (Room air)    Mental Status Evaluation:    Appearance:  adequate grooming, lying in bed with eyes closed   Behavior:  cooperative, calm, guarded   Speech:  scant   Mood:  euthymic   Affect:  blunted, irritable   Thought Process:  goal directed, linear "   Associations: intact associations   Thought Content:  no overt delusions   Perceptual Disturbances: no auditory hallucinations, no visual hallucinations, does not appear responding to internal stimuli   Risk Potential: Suicidal ideation - None at present  Homicidal ideation - None at present  Potential for aggression - No   Sensorium:  Grossly oriented   Memory:  recent and remote memory grossly intact   Consciousness:  alert and awake   Attention/Concentration: attention span and concentration are age appropriate   Insight:  impaired   Judgment: impaired   Gait/Station: normal gait/station, normal balance   Motor Activity: no abnormal movements         Lab Results: I have reviewed the following results:  Results from the past 24 hours:   Recent Results (from the past 24 hours)   CBC and differential    Collection Time: 06/22/25  4:32 PM   Result Value Ref Range    WBC 13.00 (H) 4.31 - 10.16 Thousand/uL    RBC 4.63 3.81 - 5.12 Million/uL    Hemoglobin 12.3 11.5 - 15.4 g/dL    Hematocrit 39.3 34.8 - 46.1 %    MCV 85 82 - 98 fL    MCH 26.6 (L) 26.8 - 34.3 pg    MCHC 31.3 (L) 31.4 - 37.4 g/dL    RDW 14.1 11.6 - 15.1 %    MPV 9.9 8.9 - 12.7 fL    Platelets 379 149 - 390 Thousands/uL    nRBC 0 /100 WBCs    Segmented % 67 43 - 75 %    Immature Grans % 0 0 - 2 %    Lymphocytes % 25 14 - 44 %    Monocytes % 7 4 - 12 %    Eosinophils Relative 1 0 - 6 %    Basophils Relative 0 0 - 1 %    Absolute Neutrophils 8.68 (H) 1.85 - 7.62 Thousands/µL    Absolute Immature Grans 0.05 0.00 - 0.20 Thousand/uL    Absolute Lymphocytes 3.24 0.60 - 4.47 Thousands/µL    Absolute Monocytes 0.86 0.17 - 1.22 Thousand/µL    Eosinophils Absolute 0.13 0.00 - 0.61 Thousand/µL    Basophils Absolute 0.04 0.00 - 0.10 Thousands/µL   Comprehensive metabolic panel    Collection Time: 06/22/25  4:32 PM   Result Value Ref Range    Sodium 137 135 - 147 mmol/L    Potassium 3.9 3.5 - 5.3 mmol/L    Chloride 104 96 - 108 mmol/L    CO2 25 21 - 32 mmol/L    ANION  "GAP 8 4 - 13 mmol/L    BUN 10 5 - 25 mg/dL    Creatinine 0.57 (L) 0.60 - 1.30 mg/dL    Glucose 93 65 - 140 mg/dL    Calcium 9.4 8.4 - 10.2 mg/dL    AST 27 13 - 39 U/L    ALT 82 (H) 7 - 52 U/L    Alkaline Phosphatase 63 34 - 104 U/L    Total Protein 7.5 6.4 - 8.4 g/dL    Albumin 4.0 3.5 - 5.0 g/dL    Total Bilirubin 0.25 0.20 - 1.00 mg/dL    eGFR 130 ml/min/1.73sq m   Magnesium    Collection Time: 06/22/25  4:32 PM   Result Value Ref Range    Magnesium 2.2 1.9 - 2.7 mg/dL   Phosphorus    Collection Time: 06/22/25  4:32 PM   Result Value Ref Range    Phosphorus 3.8 2.7 - 4.5 mg/dL       Administrative Statements     Counseling / Coordination of Care:   Patient's progress discussed with staff in treatment team meeting.  Medication changes reviewed with staff in treatment team meeting.    Portions of the record may have been created with voice recognition software. Occasional wrong word or \"sound a like\" substitutions may have occurred due to the inherent limitations of voice recognition software. Read the chart carefully and recognize, using context, where substitutions have occurred.    Anai Mejia MD 06/22/25  "

## 2025-06-22 NOTE — NURSING NOTE
Complained of trouble sleeping. Requested and received PRN trazodone 50 mg PO for insomnia at 2114. When reassessed at 2214 pt observed laying in bed, respirations observed. Medication appears effective.

## 2025-06-22 NOTE — NURSING NOTE
Pt requested to see their phone to retrieve numbers. Pt made reminded they had the opportunity upon admission Friday to retrieve numbers and at that time it was explained this is not to be made a habit of. Pt also reminded an exception was made yesterday upon their request to retrieve numbers. Discussed with patient their phone would not be retrieved today. Pt did not seem happy, though did not say anything disrespectful and retreated to their room without any behaviors.

## 2025-06-22 NOTE — NURSING NOTE
"Withdrawn to room most of the evening. Out of their room to use the patient telephone, not observed engaging with peers or in milieu activities.     Cooperative.    Expresses feeling \"good\". Discharge focused. Denies depression, anxiety, SI, HI, and hallucinations of any kind.    Declined scheduled colace this evening.    Denies any unmet needs/concerns at this time.   "

## 2025-06-22 NOTE — ASSESSMENT & PLAN NOTE
ASPD vs BPD, with ASPD or antisocial traits being more likely.  Recommend clear limit setting while inpatient.

## 2025-06-22 NOTE — NURSING NOTE
Withdrawn to room all morning, not attending groups. Out of their room to use the phone or make needs known.     Denies depression, anxiety, SI, HI, and hallucinations of any kind.     Declined scheduled nicotine patch, compliant with the rest of their medications.     Encouraged hygiene and to attend groups, requested shower supplied.    Staff availability reinforced.

## 2025-06-22 NOTE — PLAN OF CARE
Problem: SELF HARM/SUICIDALITY  Goal: Will have no self-injury during hospital stay  Description: INTERVENTIONS:  - Q 15 MINUTES: Routine safety checks  - Q WAKING SHIFT & PRN: Assess risk to determine if routine checks are adequate to maintain patient safety  - Encourage patient to participate actively in care by formulating a plan to combat response to suicidal ideation, identify supports and resources  Outcome: Progressing     Problem: ANXIETY  Goal: By discharge: Patient will verbalize 2 strategies to deal with anxiety  Description: Interventions:  - Identify any obvious source/trigger to anxiety  - Staff will assist patient in applying identified coping technique/skills  - Encourage attendance of scheduled groups and activities  Outcome: Progressing     Problem: INVOLUNTARY ADMIT  Goal: Will cooperate with staff recommendations and doctor's orders and will demonstrate appropriate behavior  Description: INTERVENTIONS:  - Treat underlying conditions and offer medication as ordered  - Educate regarding involuntary admission procedures and rules  - Utilize positive consistent limit setting strategies to support patient and staff safety  Outcome: Progressing     Problem: Ineffective Coping  Goal: Participates in unit activities  Description: Interventions:  - Provide therapeutic environment   - Provide required programming   - Redirect inappropriate behaviors   Outcome: Progressing

## 2025-06-23 ENCOUNTER — TRANSITIONAL CARE MANAGEMENT (OUTPATIENT)
Dept: FAMILY MEDICINE CLINIC | Facility: CLINIC | Age: 25
End: 2025-06-23

## 2025-06-23 LAB
25(OH)D3 SERPL-MCNC: 9.1 NG/ML (ref 30–100)
CHOLEST SERPL-MCNC: 98 MG/DL (ref ?–200)
EST. AVERAGE GLUCOSE BLD GHB EST-MCNC: 103 MG/DL
HBA1C MFR BLD: 5.2 %
HCG SERPL QL: NEGATIVE
HDLC SERPL-MCNC: 33 MG/DL
LDLC SERPL CALC-MCNC: 42 MG/DL (ref 0–100)
NONHDLC SERPL-MCNC: 65 MG/DL
TREPONEMA PALLIDUM IGG+IGM AB [PRESENCE] IN SERUM OR PLASMA BY IMMUNOASSAY: NORMAL
TRIGL SERPL-MCNC: 116 MG/DL (ref ?–150)
VIT B12 SERPL-MCNC: 288 PG/ML (ref 180–914)

## 2025-06-23 PROCEDURE — 84703 CHORIONIC GONADOTROPIN ASSAY: CPT | Performed by: PSYCHIATRY & NEUROLOGY

## 2025-06-23 PROCEDURE — 82607 VITAMIN B-12: CPT | Performed by: PSYCHIATRY & NEUROLOGY

## 2025-06-23 PROCEDURE — 80061 LIPID PANEL: CPT | Performed by: PSYCHIATRY & NEUROLOGY

## 2025-06-23 PROCEDURE — 99232 SBSQ HOSP IP/OBS MODERATE 35: CPT | Performed by: PSYCHIATRY & NEUROLOGY

## 2025-06-23 PROCEDURE — 83036 HEMOGLOBIN GLYCOSYLATED A1C: CPT | Performed by: PSYCHIATRY & NEUROLOGY

## 2025-06-23 PROCEDURE — 82306 VITAMIN D 25 HYDROXY: CPT | Performed by: PSYCHIATRY & NEUROLOGY

## 2025-06-23 PROCEDURE — 86780 TREPONEMA PALLIDUM: CPT | Performed by: PSYCHIATRY & NEUROLOGY

## 2025-06-23 RX ORDER — ESCITALOPRAM OXALATE 5 MG/1
5 TABLET ORAL DAILY
Status: DISCONTINUED | OUTPATIENT
Start: 2025-06-24 | End: 2025-06-25

## 2025-06-23 RX ORDER — TRAZODONE HYDROCHLORIDE 100 MG/1
100 TABLET ORAL
Status: DISCONTINUED | OUTPATIENT
Start: 2025-06-23 | End: 2025-06-27

## 2025-06-23 RX ADMIN — DOCUSATE SODIUM 100 MG: 100 CAPSULE, LIQUID FILLED ORAL at 08:37

## 2025-06-23 RX ADMIN — CHOLECALCIFEROL TAB 25 MCG (1000 UNIT) 1000 UNITS: 25 TAB at 08:37

## 2025-06-23 RX ADMIN — ESCITALOPRAM OXALATE 2.5 MG: 5 TABLET, FILM COATED ORAL at 08:36

## 2025-06-23 RX ADMIN — TRAZODONE HYDROCHLORIDE 100 MG: 100 TABLET ORAL at 21:03

## 2025-06-23 RX ADMIN — PROPRANOLOL HYDROCHLORIDE 10 MG: 10 TABLET ORAL at 22:11

## 2025-06-23 NOTE — NURSING NOTE
Pt brought to RN's attention that her mother, (who has her two kids), is acting excessively frustrated with pt's kids and pt is nervous that kids may not be safe due to mother's lack of patience. There were no specific threats made from pt's mother, only vague frustration verbalized. Pt states she'd feel more comfortable with father of kids holding the kids till she is out, but he isn't answering his phone while at work.

## 2025-06-23 NOTE — SOCIAL WORK
CM met with patient briefly to provide patient's rights form for 303 hearing tomorrow morning at 815. Pt was tearful, and had questions regarding the possibility of signing 201 so that she can get back home to her children. She stated that her kids are at home with her mom, and mom is stating that she cannot manage the children while she is hospitalized. Pt stated that she has no other options for childcare at this time and is concerned for her kids wellbeing at home while she is in the hospital. Pt was asking about a 201 again. Due to nature of admission, it is encouraged that we move forward with 303 hearing tomorrow morning.     JT completed childline report based on disclosure of safety concern for the children at home. JT spoke with Maura,  #[phone]

## 2025-06-23 NOTE — NURSING NOTE
Complained of anxiety at bedtime related to having to stay in the hospital another night. Weiss Anxiety scale score 21. Received PRN atarax 50 mg PO for moderate anxiety at 2116.    Upon reassessment one hour later at 2216 pt observed resting in bed, appears comfortable. No further complaints made. Medication presumably effective.

## 2025-06-23 NOTE — PROGRESS NOTES
Progress Note - Behavioral Health   Name: Grismerlin Fernandez 25 y.o. female I MRN: [id number]  Unit/Bed#: U 381-01 I Date of Admission: 6/20/2025   Date of Service: 6/23/2025 I Hospital Day: 3    Assessment & Plan  Major depressive disorder, recurrent, severe without psychotic features (HCC)  Grismerlin Fernandez is a 25 y.o. female with a history of depression who was admitted to the inpatient psychiatric unit on an involuntary 302 commitment basis due to suicide attempt by overdose on Tylenol. Patient states that she was being bullied online prior to presentation and denies any psychiatric symptoms outside of anxiety and irritability. Strong history of labile, callous, manipulative behavior. Suspect cluster B personality disorder contributing to current presentation.     Increase Lexapro to 5 mg daily for mood  Start trazodone 100 mg nightly for insomnia  303 hearing tomorrow    Cluster B personality disorder (HCC)  ASPD vs BPD, with ASPD or antisocial traits being more likely.  Recommend clear limit setting while inpatient.  Medical clearance for psychiatric admission  Per medical  Vitamin D deficiency  Per medical    Current Medications:    Current Facility-Administered Medications:     Cholecalciferol (VITAMIN D3) tablet 1,000 Units, Oral, Daily    docusate sodium (COLACE) capsule 100 mg, Oral, BID    [START ON 6/24/2025] escitalopram (LEXAPRO) tablet 5 mg, Oral, Daily    nicotine (NICODERM CQ) 7 mg/24hr TD 24 hr patch 1 patch, Transdermal, Daily    traZODone (DESYREL) tablet 100 mg, Oral, HS    Current Facility-Administered Medications:     aluminum-magnesium hydroxide-simethicone (MAALOX) oral suspension 30 mL, Oral, Q4H PRN    haloperidol lactate (HALDOL) injection 2.5 mg, Intramuscular, Q4H PRN Max 4/day **AND** LORazepam (ATIVAN) injection 1 mg, Intramuscular, Q4H PRN Max 4/day **AND** benztropine (COGENTIN) injection 0.5 mg, Intramuscular, Q4H PRN Max 4/day    haloperidol lactate (HALDOL) injection 5  mg, Intramuscular, Q4H PRN Max 4/day **AND** LORazepam (ATIVAN) injection 2 mg, Intramuscular, Q4H PRN Max 4/day **AND** benztropine (COGENTIN) injection 1 mg, Intramuscular, Q4H PRN Max 4/day    benztropine (COGENTIN) injection 1 mg, Intramuscular, Q4H PRN Max 6/day    benztropine (COGENTIN) tablet 1 mg, Oral, Q4H PRN Max 6/day    bisacodyl (DULCOLAX) rectal suppository 10 mg, Rectal, Daily PRN    hydrOXYzine HCL (ATARAX) tablet 50 mg, Oral, Q6H PRN Max 4/day **OR** diphenhydrAMINE (BENADRYL) injection 50 mg, Intramuscular, Q6H PRN    hydrOXYzine HCL (ATARAX) tablet 100 mg, Oral, Q6H PRN Max 4/day **OR** LORazepam (ATIVAN) injection 2 mg, Intramuscular, Q6H PRN    hydrOXYzine HCL (ATARAX) tablet 25 mg, Oral, Q6H PRN Max 4/day    ibuprofen (MOTRIN) tablet 400 mg, Oral, Q4H PRN    ibuprofen (MOTRIN) tablet 600 mg, Oral, Q6H PRN    ibuprofen (MOTRIN) tablet 800 mg, Oral, Q8H PRN    melatonin tablet 3 mg, Oral, HS PRN    ondansetron (ZOFRAN-ODT) dispersible tablet 4 mg, Oral, Q6H PRN    polyethylene glycol (MIRALAX) packet 17 g, Oral, Daily PRN    propranolol (INDERAL) tablet 10 mg, Oral, Q8H PRN    risperiDONE (RisperDAL) tablet 0.25 mg, Oral, Q4H PRN Max 6/day    risperiDONE (RisperDAL) tablet 0.5 mg, Oral, Q4H PRN Max 3/day    risperiDONE (RisperDAL) tablet 1 mg, Oral, Q4H PRN Max 6/day    senna-docusate sodium (SENOKOT S) 8.6-50 mg per tablet 1 tablet, Oral, Daily PRN    Risks/Benefits of Treatment:     Risks, benefits, and possible side effects of medications explained to patient. Patient has limited understanding of risks and benefits of treatment at this time, but agrees to take medications as prescribed.    Progress Toward Goals: progressing    Treatment Planning:     All current active medications have been reviewed.  Continue to monitor response to treatment and assess for potential side effects of medications.  Encourage group therapy, milieu therapy and occupational therapy.  Collaboration with medical  "service for medical comorbidities as indicated.  Behavioral Health checks for safety monitoring.  Estimated Discharge Day: 7/1/2025   Legal Status : On 302 commitment for up to 5 days.  Long Stay Certification : Not Applicable    Subjective     Patient was visited on unit for continuing care; chart reviewed and discussed with multidisciplinary treatment team. Patient continues to be isolative, out for needs, not attending groups. No reports of aggression or self-injurious behavior on unit. PRN medications used in the past 24 hours include Atarax, trazodone.Patient accepted all offered medications and no adverse effects of medications noted or reported.    On evaluation, patient is superficially cooperative, evasive, minimizing of symptoms preceding admission, guarded.  She states her mood is \"good\".  She asks about potential discharge to which she is informed that 303 hearing will be held tomorrow.  Patient states \"I am doing really good.  I am not depressed.  The medication is good at this dose.\"  Patient informed on treatment plan and further plan of care to which she appears frustrated, but expresses understanding. Patient expresses concern regarding her children at home and regret regarding suicide attempt.  She states sleep is decreased from being in the hospital.  She states appetite and energy are good.  She denies other complaints today.  She states she is tolerating Lexapro without any side effects and is agreeable to increase at this time. Denies current suicidal ideation, intent, or plan. Denies homicidal ideation. Denies auditory or visual hallucinations. Denies other complaints at this time.     Sleep: decreased  Appetite: normal  Medication side effects: No  ROS: review of systems as noted above in HPI/Subjective report, all other systems are negative    Objective :  Temp:  [97.9 °F (36.6 °C)] 97.9 °F (36.6 °C)  HR:  [86] 86  BP: (114)/(75) 114/75  SpO2:  [100 %] 100 %  O2 Device: None (Room " "air)    Mental Status Evaluation:    Appearance:  casually dressed, looks older than stated age, overweight   Behavior:  guarded, superficially cooperative, evasive   Speech:  normal rate, normal volume   Mood:  \"Good\"   Affect:  constricted   Thought Process:  linear, concrete   Thought Content:  no overt delusions   Perceptual Disturbances: no auditory hallucinations, no visual hallucinations, does not appear responding to internal stimuli   Risk Potential: Suicidal Ideation -  None at present, status post suicide attempt  Homicidal Ideation -  None at present  Potential for Aggression - Not at present   Sensorium:  oriented to person, place, and time/date   Memory:  recent and remote memory grossly intact   Consciousness:  alert and awake   Attention/Concentration: attention span and concentration are age appropriate   Insight:  limited   Judgment: limited   Gait/Station: normal gait/station   Motor Activity: no abnormal movements       Lab Results: I have reviewed the following results:  Most Recent Labs:   Lab Results   Component Value Date    WBC 13.00 (H) 06/22/2025    RBC 4.63 06/22/2025    HGB 12.3 06/22/2025    HCT 39.3 06/22/2025     06/22/2025    RDW 14.1 06/22/2025    NEUTROABS 8.68 (H) 06/22/2025    SODIUM 137 06/22/2025    K 3.9 06/22/2025     06/22/2025    CO2 25 06/22/2025    BUN 10 06/22/2025    CREATININE 0.57 (L) 06/22/2025    GLUC 93 06/22/2025    CALCIUM 9.4 06/22/2025    AST 27 06/22/2025    ALT 82 (H) 06/22/2025    ALKPHOS 63 06/22/2025    TP 7.5 06/22/2025    ALB 4.0 06/22/2025    TBILI 0.25 06/22/2025    CHOLESTEROL 98 06/23/2025    HDL 33 (L) 06/23/2025    TRIG 116 06/23/2025    LDLCALC 42 06/23/2025    NONHDLC 65 06/23/2025    AMMONIA 61 01/30/2024    BDH7SWJZFMRQ 0.933 06/19/2025    PREGUR negative 01/06/2022    PREGSERUM Negative 06/23/2025    HCGQUANT 84,702 (H) 06/21/2023    SYPHILISAB Non-reactive 01/14/2024    HGBA1C 5.2 06/23/2025     06/23/2025 " "      Administrative Statements     Counseling / Coordination of Care:   Patient's progress discussed with staff in treatment team meeting.  Medication changes reviewed with staff in treatment team meeting.    Portions of the record may have been created with voice recognition software. Occasional wrong word or \"sound a like\" substitutions may have occurred due to the inherent limitations of voice recognition software. Read the chart carefully and recognize, using context, where substitutions have occurred.    Keke Valentine, DO 06/23/25  "

## 2025-06-23 NOTE — ASSESSMENT & PLAN NOTE
Grismerlin Fernandez is a 25 y.o. female with a history of depression who was admitted to the inpatient psychiatric unit on an involuntary 302 commitment basis due to suicide attempt by overdose on Tylenol. Patient states that she was being bullied online prior to presentation and denies any psychiatric symptoms outside of anxiety and irritability. Strong history of labile, callous, manipulative behavior. Suspect cluster B personality disorder contributing to current presentation.     Increase Lexapro to 5 mg daily for mood  Start trazodone 100 mg nightly for insomnia  303 hearing tomorrow

## 2025-06-23 NOTE — TREATMENT PLAN
TREATMENT PLAN REVIEW - Behavioral Health Grismerlin Fernandez 25 y.o. 2000 female MRN: [id number]    [facility] 3P BEHAVIORAL HLTH Room / Bed: [facility] 381/[facility] 381-01 Encounter: [id number]          Admit Date/Time:  6/20/2025  4:49 PM    Treatment Team:   MD Megan Carey RN Daniel Patrick Dever, MD Jayleen Rodriguez-Mercado Evangelia Raymond Wesley Davis    Diagnosis: Principal Problem:    Unspecified mood (affective) disorder (HCC)  Active Problems:    Medical clearance for psychiatric admission    Vitamin D deficiency    Cluster B personality disorder (HCC)      Patient Strengths/Assets: family ties, negotiates basic needs, stable housing, stable/recent employment, work skills     Patient Barriers/Limitations: limited insight, limited motivation, patient is on an involuntary commitment, poor past treatment response, resistance to treatment    Short Term Goals: decrease in depressive symptoms, decrease in suicidal thoughts, decrease in self abusive behaviors    Long Term Goals: improvement in depression, improved insight    Progress Towards Goals: starting psychiatric medications as prescribed    Recommended Treatment: medication management, patient medication education, group therapy, milieu therapy, continued Behavioral Health psychiatric evaluation/assessment process    Treatment Frequency: daily medication monitoring, group and milieu therapy daily, monitoring through interdisciplinary rounds, monitoring through weekly patient care conferences    Expected Discharge Date:  >2 midnights    Discharge Plan: return to previous living arrangement    Treatment Plan Created/Updated By: Anai Mejia MD

## 2025-06-23 NOTE — NURSING NOTE
Withdrawn to room most of the evening, not attending groups,  out for needs. Had a virtual visit, reports it went well. Says they feel well.     Denies depression, anxiety, SI, HI, and hallucinations of any kind.    Did not have medications scheduled at bedtime.     Denies any unmet needs/concerns at this time.

## 2025-06-23 NOTE — NURSING NOTE
Complained of trouble sleeping in the hospital. Requested PRN sleep medication. Received trazodone 50 mg PO for insomnia at 2116.    Upon reassessment one hour later at 2216 pt observed resting in bed, eyes closed and respirations noted. Has not been observed up or out of bed since receiving the medication.    Medication effective.

## 2025-06-23 NOTE — ASSESSMENT & PLAN NOTE
Grismerlin Fernandez is a 25 y.o. female with a history of depression who was admitted to the inpatient psychiatric unit on an involuntary 302 commitment basis due to suicide attempt by overdose on Tylenol. Patient states that she was being bullied online prior to presentation and denies any psychiatric symptoms outside of anxiety and irritability. Strong history of labile, callous, manipulative behavior. Suspect cluster B personality disorder contributing to current presentation.     Continue Lexapro 2.5 mg daily.

## 2025-06-23 NOTE — UTILIZATION REVIEW
NOTIFICATION OF ADMISSION DISCHARGE   This is a Notification of Discharge from [facility]. Please be advised that this patient has been discharge from our facility. Below you will find the admission and discharge date and time including the patient’s disposition.   UTILIZATION REVIEW CONTACT:  Utilization Review Assistants  Network Utilization Review Department  Phone: [phone] x carefully listen to the prompts. All voicemails are confidential.  Email: [email or web address]     ADMISSION INFORMATION  PRESENTATION DATE: 6/19/2025  9:10 AM  OBERVATION ADMISSION DATE: N/A  INPATIENT ADMISSION DATE: 6/19/25 11:13 AM   DISCHARGE DATE: 6/20/2025  4:40 PM   DISPOSITION:[facility] Utilization Review Department  ATTENTION: Please call with any questions or concerns to [phone] and carefully listen to the prompts so that you are directed to the right person. All voicemails are confidential.   For Discharge needs, contact Care Management DC Support Team at [phone] opt. 2  Send all requests for admission clinical reviews, approved or denied determinations and any other requests to dedicated fax number below belonging to the campus where the patient is receiving treatment. List of dedicated fax numbers for the Facilities:  FACILITY NAME UR FAX NUMBER   ADMISSION DENIALS (Administrative/Medical Necessity) [phone]   DISCHARGE SUPPORT TEAM ([facility]) [phone]   PARENT CHILD HEALTH (Maternity/NICU/Pediatrics) [phone]   [facility] [phone]   [facility] [phone]   [facility] [phone]   [facility] [phone]   [facility] [phone]   General acute hospital [phone]   [facility] [phone]   [facility] [phone]   [facility]  [facility] [phone]   [facility] [phone]   [facility] [phone]   [facility] [phone]

## 2025-06-23 NOTE — NURSING NOTE
Pt verbally denies SI, HI and A/V hallucinations. Compliant with meds. Expresses questions about length of stay in hospital, wishes for DC by Wednesday. States she does not agree with 302. Appears willing to cooperative with treatment however. No current symptoms expressed or observed. Received labs. Limited insight.

## 2025-06-23 NOTE — PROGRESS NOTES
06/23/25 0839   Team Meeting   Meeting Type Daily Rounds   Team Members Present   Team Members Present Physician;Nurse;   Physician Team Member Irina   Nursing Team Member Mirela   [facility] Management Team Member Minh   Patient/Family Present   Patient Present No   Patient's Family Present No     Pt new admit on unit. 302. OD on pills. Isolative to self. Pt refusing labs this morning.

## 2025-06-23 NOTE — QUICK NOTE
"Pt refused lab work this morning. Pt states \" Its too early for blood work can I have them done closer to breakfast time\" Pt encouraged on the importance of getting labs before breakfast.  "

## 2025-06-24 PROCEDURE — 99232 SBSQ HOSP IP/OBS MODERATE 35: CPT | Performed by: PSYCHIATRY & NEUROLOGY

## 2025-06-24 RX ADMIN — Medication 3 MG: at 21:03

## 2025-06-24 RX ADMIN — ESCITALOPRAM OXALATE 5 MG: 5 TABLET, FILM COATED ORAL at 08:14

## 2025-06-24 RX ADMIN — CHOLECALCIFEROL TAB 25 MCG (1000 UNIT) 1000 UNITS: 25 TAB at 08:14

## 2025-06-24 RX ADMIN — TRAZODONE HYDROCHLORIDE 100 MG: 100 TABLET ORAL at 21:01

## 2025-06-24 NOTE — NURSING NOTE
Pt verbally denies SI, HI and A/V hallucinations. Poor insight. Asking for cellphone despite being informed several times, pt is not to receive phone since she had been given phone for numbers already. Compliant with meds. Currently on pt phone, speaking with family. Content is reality based but discharge focused, pt attempts to bargain about accelerated discharge. No group attendance. Positive ADL care.

## 2025-06-24 NOTE — PROGRESS NOTES
Progress Note - Behavioral Health   Name: Grismerlin Fernandez 25 y.o. female I MRN: [id number]  Unit/Bed#: U 381-01 I Date of Admission: 6/20/2025   Date of Service: 6/24/2025 I Hospital Day: 4    Assessment & Plan  Major depressive disorder, recurrent, severe without psychotic features (HCC)  Grismerlin Fernandez is a 25 y.o. female with a history of depression who was admitted to the inpatient psychiatric unit on an involuntary 302 commitment basis due to suicide attempt by overdose on Tylenol. Patient states that she was being bullied online prior to presentation and denies any psychiatric symptoms outside of anxiety and irritability. Strong history of labile, callous, manipulative behavior. Suspect cluster B personality disorder contributing to current presentation.     Continue Lexapro 5 mg daily for mood; increase as tolerated  Continue Trazodone 100 mg nightly for insomnia  303 hearing completed this AM, awaiting results    Cluster B personality disorder (HCC)  ASPD vs BPD, with ASPD or antisocial traits being more likely.  Recommend clear limit setting while inpatient.  Medical clearance for psychiatric admission  Per medical  Vitamin D deficiency  Per medical    Current Medications:    Current Facility-Administered Medications:     Cholecalciferol (VITAMIN D3) tablet 1,000 Units, Oral, Daily    docusate sodium (COLACE) capsule 100 mg, Oral, BID    escitalopram (LEXAPRO) tablet 5 mg, Oral, Daily    nicotine (NICODERM CQ) 7 mg/24hr TD 24 hr patch 1 patch, Transdermal, Daily    traZODone (DESYREL) tablet 100 mg, Oral, HS    Current Facility-Administered Medications:     aluminum-magnesium hydroxide-simethicone (MAALOX) oral suspension 30 mL, Oral, Q4H PRN    haloperidol lactate (HALDOL) injection 2.5 mg, Intramuscular, Q4H PRN Max 4/day **AND** LORazepam (ATIVAN) injection 1 mg, Intramuscular, Q4H PRN Max 4/day **AND** benztropine (COGENTIN) injection 0.5 mg, Intramuscular, Q4H PRN Max 4/day    haloperidol  lactate (HALDOL) injection 5 mg, Intramuscular, Q4H PRN Max 4/day **AND** LORazepam (ATIVAN) injection 2 mg, Intramuscular, Q4H PRN Max 4/day **AND** benztropine (COGENTIN) injection 1 mg, Intramuscular, Q4H PRN Max 4/day    benztropine (COGENTIN) injection 1 mg, Intramuscular, Q4H PRN Max 6/day    benztropine (COGENTIN) tablet 1 mg, Oral, Q4H PRN Max 6/day    bisacodyl (DULCOLAX) rectal suppository 10 mg, Rectal, Daily PRN    hydrOXYzine HCL (ATARAX) tablet 50 mg, Oral, Q6H PRN Max 4/day **OR** diphenhydrAMINE (BENADRYL) injection 50 mg, Intramuscular, Q6H PRN    hydrOXYzine HCL (ATARAX) tablet 100 mg, Oral, Q6H PRN Max 4/day **OR** LORazepam (ATIVAN) injection 2 mg, Intramuscular, Q6H PRN    hydrOXYzine HCL (ATARAX) tablet 25 mg, Oral, Q6H PRN Max 4/day    ibuprofen (MOTRIN) tablet 400 mg, Oral, Q4H PRN    ibuprofen (MOTRIN) tablet 600 mg, Oral, Q6H PRN    ibuprofen (MOTRIN) tablet 800 mg, Oral, Q8H PRN    melatonin tablet 3 mg, Oral, HS PRN    ondansetron (ZOFRAN-ODT) dispersible tablet 4 mg, Oral, Q6H PRN    polyethylene glycol (MIRALAX) packet 17 g, Oral, Daily PRN    propranolol (INDERAL) tablet 10 mg, Oral, Q8H PRN    risperiDONE (RisperDAL) tablet 0.25 mg, Oral, Q4H PRN Max 6/day    risperiDONE (RisperDAL) tablet 0.5 mg, Oral, Q4H PRN Max 3/day    risperiDONE (RisperDAL) tablet 1 mg, Oral, Q4H PRN Max 6/day    senna-docusate sodium (SENOKOT S) 8.6-50 mg per tablet 1 tablet, Oral, Daily PRN    Risks/Benefits of Treatment:     Risks, benefits, and possible side effects of medications explained to patient. Patient has limited understanding of risks and benefits of treatment at this time, but agrees to take medications as prescribed.    Progress Toward Goals: progressing    Treatment Planning:     All current active medications have been reviewed.  Continue to monitor response to treatment and assess for potential side effects of medications.  Encourage group therapy, milieu therapy and occupational  "therapy.  Collaboration with medical service for medical comorbidities as indicated.  Behavioral Health checks for safety monitoring.  Estimated Discharge Day: 7/1/2025   Legal Status: 303 hearing completed 6/24.  Long Stay Certification : Not Applicable    Subjective     Behavior over the last 24 hours: unchanged    Patient was visited on unit for continuing care; chart reviewed and discussed with multidisciplinary treatment team. Patient continues to be visible in the milieu and interacts with select staff and peers.  Per nursing, \"Patient does mention that she does not feel as if she needs psych meds and states she feels \"fine\" upon psych assessment.\" No reports of aggression or self-injurious behavior on unit. PRN medications used in the past 24 hours include propranolol. Patient noncompliant with scheduled Colace.  She was compliant with psychiatric medications and no adverse effects of medications noted or reported.  303 hearing held this morning, patient testified.    On evaluation, patient continues to be superficial, evasive, minimizing of symptoms, poor insight.  Today, patient states her mood is \"good\".  She is frustrated regarding potential 303 commitment and asks if she can be discharged today.  Patient updated on treatment recommendations to which she appeared irritable.  Patient continues to state that \"I will take my medications but I do not really know if I need them.  I think I need to try therapy or other coping skills first\".  Patient educated on importance of medication compliance.  She states sleep continues to be decreased, however admits she has been napping throughout the day.  Patient educated on sleep hygiene.  States appetite and energy are good.  Denies any side effects from medications. She denies other complaints today. Denies suicidal ideation, intent, or plan.  She contracts for safety and states she will notify staff if feeling unsafe. Denies homicidal ideation. Denies auditory or " "visual hallucinations. Denies other complaints at this time.    Sleep: decreased  Appetite: normal  Medication side effects: No  ROS: review of systems as noted above in HPI/Subjective report, all other systems are negative    Objective :  Temp:  [97.5 °F (36.4 °C)-97.7 °F (36.5 °C)] 97.5 °F (36.4 °C)  HR:  [76-96] 96  BP: ()/(59-71) 97/59  Resp:  [16-18] 16  SpO2:  [98 %] 98 %  O2 Device: None (Room air)    Mental Status Evaluation:    Appearance:  marginal hygiene, dressed in hospital attire, looks older than stated age   Behavior:  guarded, evasive, superficial    Speech:  normal rate, normal volume   Mood:  \"Good\"   Affect:  constricted   Thought Process:  linear, concrete   Thought Content:  no overt delusions   Perceptual Disturbances: no auditory hallucinations, no visual hallucinations, does not appear responding to internal stimuli   Risk Potential: Suicidal Ideation -  None at present, status post suicide attempt, able to seek out staff before acting on thoughts of harming self on the unit, would talk to staff if not feeling safe on the unit  Homicidal Ideation -  None at present  Potential for Aggression - Not at present   Sensorium:  oriented to person, place, and time/date   Memory:  recent and remote memory grossly intact   Consciousness:  alert and awake   Attention/Concentration: attention span and concentration are age appropriate   Insight:  poor   Judgment: poor   Gait/Station: normal gait/station   Motor Activity: no abnormal movements       Lab Results: I have reviewed the following results:  Most Recent Labs:   Lab Results   Component Value Date    WBC 13.00 (H) 06/22/2025    RBC 4.63 06/22/2025    HGB 12.3 06/22/2025    HCT 39.3 06/22/2025     06/22/2025    RDW 14.1 06/22/2025    NEUTROABS 8.68 (H) 06/22/2025    SODIUM 137 06/22/2025    K 3.9 06/22/2025     06/22/2025    CO2 25 06/22/2025    BUN 10 06/22/2025    CREATININE 0.57 (L) 06/22/2025    GLUC 93 06/22/2025    CALCIUM " "9.4 06/22/2025    AST 27 06/22/2025    ALT 82 (H) 06/22/2025    ALKPHOS 63 06/22/2025    TP 7.5 06/22/2025    ALB 4.0 06/22/2025    TBILI 0.25 06/22/2025    CHOLESTEROL 98 06/23/2025    HDL 33 (L) 06/23/2025    TRIG 116 06/23/2025    LDLCALC 42 06/23/2025    NONHDLC 65 06/23/2025    AMMONIA 61 01/30/2024    DHS6LOERRJIX 0.933 06/19/2025    PREGUR negative 01/06/2022    PREGSERUM Negative 06/23/2025    HCGQUANT 84,702 (H) 06/21/2023    SYPHILISAB Non-reactive 01/14/2024    TREPONEMAPA Non-reactive 06/23/2025    HGBA1C 5.2 06/23/2025     06/23/2025       Administrative Statements     Counseling / Coordination of Care:   Patient's progress discussed with staff in treatment team meeting.  Medication changes reviewed with staff in treatment team meeting.    Portions of the record may have been created with voice recognition software. Occasional wrong word or \"sound a like\" substitutions may have occurred due to the inherent limitations of voice recognition software. Read the chart carefully and recognize, using context, where substitutions have occurred.    Keke Valentine, DO 06/24/25  "

## 2025-06-24 NOTE — SOCIAL WORK
Admission Status    Status of admission [address] [place] [address]     Patient Intake   Address to discharge to [facility] [address], [place] [address]   Living Arrangement Lives with  and children    Can patient return home Yes    Patient's Telephone Number [phone]   Patient's e-mail Address [email or web address]   Insurance [employer]   PCP PER Man   Education High School   Type of work [employer]     History Denies    Access to Firearms Denies    Marital Status/Children Single, 2 children    Spirituality/Sikh Holiness   Transportation Drives personal vehicle    Preferred Pharmacy [facility]      Patient History   Presenting Problem Intentional overdose on pills    Stressor/Trigger Uncertain regarding what the future holds, career, college, etc    Treatment History Hospitalized in April of last year    Current psychiatrist/therapist [facility]--psychiatry and therapy    ACT/ICM    Family History of Mental Health Denies    Suicide Attempts Denies    Legal Issues Denies    Trauma/Psychosocial loss Denies      Substance Abuse Assessment   UDS: Negative all substance   Audit Score: 0  Nicotine/Tobacco: Yes    Substance First use Last Use and amount Frequency Amount Used How long Longest period of sobriety and when Method of use   THC   Denies Use          Heroin   Denies Use          Cocaine   Denies Use          ETOH   Denies Use          Meth   Denies Use          Benzos   Denies Use           Other:   Denies Use          History of GIOVANNY Denies   Family History of GIOVANNY Denies    Prior Inpatient GIOVANNY Treatment Denies    Current Outpatient treatment Denies    Response to Referral Denies      Referrals/ROIs   Referrals Needed    ROIs Signed

## 2025-06-24 NOTE — ASSESSMENT & PLAN NOTE
Grismerlin Fernandez is a 25 y.o. female with a history of depression who was admitted to the inpatient psychiatric unit on an involuntary 302 commitment basis due to suicide attempt by overdose on Tylenol. Patient states that she was being bullied online prior to presentation and denies any psychiatric symptoms outside of anxiety and irritability. Strong history of labile, callous, manipulative behavior. Suspect cluster B personality disorder contributing to current presentation.     Continue Lexapro 5 mg daily for mood; increase as tolerated  Continue Trazodone 100 mg nightly for insomnia  303 hearing completed this AM, awaiting results

## 2025-06-24 NOTE — SOCIAL WORK
Pt attended and participated in 303 hearing this morning with [facility]. [facility] upheld 303 petition for up to 20 more days on the unit. Pt was made aware.

## 2025-06-24 NOTE — PROGRESS NOTES
06/24/25 0855   Team Meeting   Meeting Type Daily Rounds   Team Members Present   Team Members Present Physician;Nurse;   Physician Team Member Irina   Nursing Team Member [facility] Management Team Member Minh   Patient/Family Present   Patient Present No   Patient's Family Present No     Reluctant to be on unit. Poor insight into condition. Reporting anxiety. 303 hearing this morning with [facility].

## 2025-06-24 NOTE — NURSING NOTE
"Patient is visible on the milieu and social with select peers. Patient was able to get in contact with her sister via virtual and the meeting went well. Patient does mention that she does not feel as if she needs psych meds and states she feels \"fine\" upon psych assessment. Patient is compliant with nightly trazodone and does not report any unmet needs. Q 15 min rounds maintained for safety.     "

## 2025-06-24 NOTE — NURSING NOTE
Patient reports restlessness and anxiety due to her upcoming hearing tomorrow. Patient given Propanolol 10 mg po Prn at 2211. Will monitor patient for effectiveness.

## 2025-06-25 PROCEDURE — 99232 SBSQ HOSP IP/OBS MODERATE 35: CPT | Performed by: PSYCHIATRY & NEUROLOGY

## 2025-06-25 RX ORDER — ESCITALOPRAM OXALATE 10 MG/1
10 TABLET ORAL DAILY
Status: DISCONTINUED | OUTPATIENT
Start: 2025-06-26 | End: 2025-06-30 | Stop reason: HOSPADM

## 2025-06-25 RX ADMIN — Medication 3 MG: at 21:20

## 2025-06-25 RX ADMIN — TRAZODONE HYDROCHLORIDE 100 MG: 100 TABLET ORAL at 21:21

## 2025-06-25 RX ADMIN — ESCITALOPRAM OXALATE 5 MG: 5 TABLET, FILM COATED ORAL at 08:18

## 2025-06-25 RX ADMIN — CHOLECALCIFEROL TAB 25 MCG (1000 UNIT) 1000 UNITS: 25 TAB at 08:19

## 2025-06-25 NOTE — NURSING NOTE
Patient is visible on the milieu and remains withdrawn to herself. Patient is compliant with all nightly meds but comments that she does not feel like meds are necessary. Patient denies all psych symptoms at this time. Q 15 min rounds maintained for safety.      Melatonin 3mg Po Prn given at 2103 for reports of insomnia.Will monitor for med effectiveness.

## 2025-06-25 NOTE — NURSING NOTE
Pt came ot briefly then returned back to bed. Pt denies anxiety and depression. Denies SI/HI and A/V hallucinations. Pt did not eat breakfast. Pt encouraged to go to groups. Pt states that she is just tired right now.

## 2025-06-25 NOTE — PROGRESS NOTES
Progress Note - Behavioral Health   Name: Grismerlin Fernandez 25 y.o. female I MRN: [id number]  Unit/Bed#: U 381-01 I Date of Admission: 6/20/2025   Date of Service: 6/25/2025 I Hospital Day: 5    Assessment & Plan  Major depressive disorder, recurrent, severe without psychotic features (HCC)  As of 06/25/25 - Grismerlin is still guarded and preoccupied with discharge, but otherwise seems less depressed and is more receptive to psychiatric treatment.    Current Plan:  Increase Lexapro to 10 mg daily to help with mood  Continue Trazodone 100 mg at bedtime to help with insomnia  Cluster B personality disorder (HCC)  Management as per Principal Problem  Recommend clear limit setting while inpatient.  Medical clearance for psychiatric admission  Management per medical service  Vitamin D deficiency  Management per medical service    Current Medications:    Current Facility-Administered Medications:     Cholecalciferol (VITAMIN D3) tablet 1,000 Units, Oral, Daily    docusate sodium (COLACE) capsule 100 mg, Oral, BID    [START ON 6/26/2025] escitalopram (LEXAPRO) tablet 10 mg, Oral, Daily    nicotine (NICODERM CQ) 7 mg/24hr TD 24 hr patch 1 patch, Transdermal, Daily    traZODone (DESYREL) tablet 100 mg, Oral, HS      Risks/Benefits of Treatment:     Risks, benefits, and possible side effects of medications explained to patient and patient verbalizes understanding and agreement for treatment.  Risks of medications in pregnancy explained if female patient. Patient verbalizes understanding and agrees to notify her doctor if she becomes pregnant.    Progress Toward Goals: progressing slowly, still anxious, less depressed, working on accepting mental illness diagnosis, working on coping skills, denies current suicidal thoughts    Treatment Planning:     All current active medications have been reviewed.  Continue to monitor response to treatment and assess for potential side effects of medications.  Encourage group therapy,  "milieu therapy and occupational therapy.  Collaboration with medical service for medical comorbidities as indicated.  Behavioral Health checks for safety monitoring.  Will need family meeting prior to discharge to assure progress and support  Estimated Discharge Day: 7/1/2025   Legal Status: On 303 commitment up to 20 days.  Long Stay Certification : Not Applicable    Subjective     Behavior over the last 24 hours: slowly improving    Grismerlin is still anxious, superficial and preoccupied with discharge, but otherwise seems more receptive to continue psychiatric treatment and stay on medications \"I want to feel better\". She has not been attending groups and is still seclusive to the room - stated during the session that she was going to start attending some groups and was seen out of her room later today. She denies current suicidal thoughts and is remorseful about her suicide attempt. Compliant with medications.    Sleep: normal  Appetite: normal  Medication side effects: No  ROS: review of systems as noted above in HPI/Subjective report, denies any headache, shortness of breath, or chest pain, all other systems are negative    Objective :  Temp:  [97.5 °F (36.4 °C)-98.3 °F (36.8 °C)] 97.5 °F (36.4 °C)  HR:  [74-84] 74  BP: ()/(64-75) 96/64  Resp:  [16] 16  SpO2:  [98 %] 98 %  O2 Device: None (Room air)    Mental Status Evaluation:    Appearance:  wearing hospital clothes   Behavior:  cooperative, slightly better eye contact   Speech:  normal rate, soft   Mood:  anxious, less depressed   Affect:  constricted   Thought Process:  organized, goal directed, concrete   Thought Content:  no overt delusions   Perceptual Disturbances: no auditory hallucinations, no visual hallucinations   Risk Potential: Suicidal Ideation -  None at present, remorseful about suicide attempt  Homicidal Ideation -  None  Potential for Aggression - No   Sensorium:  oriented to person, place, and time/date   Memory:  recent and remote " memory grossly intact   Consciousness:  alert and awake   Attention/Concentration: decreased attention span and decreased concentration   Insight:  impaired   Judgment: impaired   Gait/Station: normal gait/station, normal balance   Motor Activity: no abnormal movements       Lab Results: I have reviewed the following results:  Results from the past 24 hours: No results found for this or any previous visit (from the past 24 hours).    Administrative Statements     Counseling / Coordination of Care:   I have spent a total time of 35 minutes in caring for this patient on the day of the visit/encounter including:    Patient's progress discussed with staff in treatment team meeting.  Medication changes reviewed with staff in treatment team meeting.  Medication changes discussed with patient.  Medication education provided to patient.  Educated on importance of medication and treatment compliance.  Discussed with patient acceptance of mental illness diagnosis and need for ongoing treatment after discharge.  Encouraged participation in milieu and group therapy on the unit.    Cheryle Arevalo MD 06/25/25

## 2025-06-25 NOTE — ASSESSMENT & PLAN NOTE
As of 06/25/25 - Adamismerlin is still guarded and preoccupied with discharge, but otherwise seems less depressed and is more receptive to psychiatric treatment.    Current Plan:  Increase Lexapro to 10 mg daily to help with mood  Continue Trazodone 100 mg at bedtime to help with insomnia

## 2025-06-25 NOTE — PROGRESS NOTES
06/25/25 1000   Team Meeting   Meeting Type Daily Rounds   Initial Conference Date 06/25/25   Team Members Present   Team Members Present Physician;Nurse;;Other (Discipline and Name)   Physician Team Member Irina   Nursing Team Member Brian   Care Management Team Member Shyam Joel   Other (Discipline and Name) PER Heath   Patient/Family Present   Patient Present No   Patient's Family Present No     303 Now  Superficial in interactions  Focused on Discharge  Worried about children and C&Y involvement  Not attending groups  Sleeping well  Vitals reviewed  Need Family Mtg scheduled  Had significant OD- offer Warm Hand-Off Referral    Est DC: 7/1

## 2025-06-26 PROCEDURE — 99232 SBSQ HOSP IP/OBS MODERATE 35: CPT | Performed by: PSYCHIATRY & NEUROLOGY

## 2025-06-26 RX ADMIN — ESCITALOPRAM OXALATE 10 MG: 10 TABLET ORAL at 08:26

## 2025-06-26 RX ADMIN — Medication 3 MG: at 21:40

## 2025-06-26 RX ADMIN — CHOLECALCIFEROL TAB 25 MCG (1000 UNIT) 1000 UNITS: 25 TAB at 08:26

## 2025-06-26 RX ADMIN — TRAZODONE HYDROCHLORIDE 100 MG: 100 TABLET ORAL at 21:40

## 2025-06-26 NOTE — ASSESSMENT & PLAN NOTE
As of 06/26/25 - Grismerlin is less depressed and less anxious. She denies current suicidal thoughts and regrets suicide attempt. She is more amenable to psychiatric treatment after discharge.    Current Plan:  Continue Lexapro 10 mg daily to help with mood  Continue Trazodone 100 mg at bedtime to help with insomnia. Will ask to administer between 10 PM and 11 PM

## 2025-06-26 NOTE — PROGRESS NOTES
06/26/25 0839   Team Meeting   Meeting Type Daily Rounds   Team Members Present   Team Members Present Physician;Nurse;   Physician Team Member Irina   Nursing Team Member Mirela   Care Management Team Member Minh   Patient/Family Present   Patient Present No   Patient's Family Present No     Pt calm and cooperative. Med and meal compliant. Requested PRN for sleep last night. Denying psych symptoms. Possible discharge for early next week if stable.

## 2025-06-26 NOTE — PROGRESS NOTES
Progress Note - Behavioral Health   Name: Grismerlin Fernandez 25 y.o. female I MRN: [id number]  Unit/Bed#: U 381-01 I Date of Admission: 6/20/2025   Date of Service: 6/26/2025 I Hospital Day: 6    Assessment & Plan  Major depressive disorder, recurrent, severe without psychotic features (HCC)  As of 06/26/25 - Grismerlin is less depressed and less anxious. She denies current suicidal thoughts and regrets suicide attempt. She is more amenable to psychiatric treatment after discharge.    Current Plan:  Continue Lexapro 10 mg daily to help with mood  Continue Trazodone 100 mg at bedtime to help with insomnia. Will ask to administer between 10 PM and 11 PM  Cluster B personality disorder (HCC)  Management as per Principal Problem  Recommend clear limit setting while inpatient.  Medical clearance for psychiatric admission  Management per medical service  Vitamin D deficiency  Management per medical service    Current Medications:    Current Facility-Administered Medications:     Cholecalciferol (VITAMIN D3) tablet 1,000 Units, Oral, Daily    docusate sodium (COLACE) capsule 100 mg, Oral, BID    escitalopram (LEXAPRO) tablet 10 mg, Oral, Daily    nicotine (NICODERM CQ) 7 mg/24hr TD 24 hr patch 1 patch, Transdermal, Daily    traZODone (DESYREL) tablet 100 mg, Oral, HS      Risks/Benefits of Treatment:     Risks, benefits, and possible side effects of medications explained to patient and patient verbalizes understanding and agreement for treatment.  Risks of medications in pregnancy explained if female patient. Patient verbalizes understanding and agrees to notify her doctor if she becomes pregnant.    Progress Toward Goals: progressing, less anxious, less depressed, working on coping skills, denies current suicidal thoughts    Treatment Planning:     All current active medications have been reviewed.  Continue to monitor response to treatment and assess for potential side effects of medications.  Encourage group  "therapy, milieu therapy and occupational therapy.  Collaboration with medical service for medical comorbidities as indicated.  Behavioral Health checks for safety monitoring.  Family meeting pending to assess progress and support after discharge.  Estimated Discharge Day: 7/1/2025   Legal Status: On 303 commitment up to 20 days.  Long Stay Certification : Not Applicable    Subjective     Behavior over the last 24 hours: improving Grismerlin is doing slightly better today. She reports feeling less depressed and less anxious. She is remorseful about suicide attempt. She is still seclusive To her room, but slightly less, and also went to one group yesterday. Some difficulty with sleep last night \"I think I took my sleeping pill too early, I woke up at 5 AM\". Has been taking medications.    Sleep: decreased, early awakenings  Appetite: normal  Medication side effects: No  ROS: review of systems as noted above in HPI/Subjective report, denies any shortness of breath, chest pain, or abdominal pain, all other systems are negative    Objective :  Temp:  [98 °F (36.7 °C)-98.2 °F (36.8 °C)] 98 °F (36.7 °C)  HR:  [] 109  BP: (121-132)/(60-65) 132/65  Resp:  [14-16] 16  SpO2:  [98 %] 98 %  O2 Device: None (Room air)    Mental Status Evaluation:    Appearance:  age appropriate   Behavior:  cooperative, improved eye contact   Speech:  normal rate and volume   Mood:  less anxious, less depressed   Affect:  constricted   Thought Process:  organized, goal directed, concrete   Thought Content:  no overt delusions   Perceptual Disturbances: no auditory hallucinations, no visual hallucinations   Risk Potential: Suicidal Ideation -  None at present, remorseful about suicide attempt  Homicidal Ideation -  None  Potential for Aggression - No   Sensorium:  oriented to person, place, and time/date   Memory:  recent and remote memory grossly intact   Consciousness:  alert and awake   Attention/Concentration: decreased attention span " and decreased concentration   Insight:  improving and moderate   Judgment: improving and moderate   Gait/Station: normal gait/station, normal balance   Motor Activity: no abnormal movements       Lab Results: I have reviewed the following results:  Results from the past 24 hours: No results found for this or any previous visit (from the past 24 hours).    Administrative Statements     Counseling / Coordination of Care:   Patient's progress discussed with staff in treatment team meeting.  Medication changes reviewed with staff in treatment team meeting.    Cheryle Arevalo MD 06/26/25

## 2025-06-26 NOTE — NURSING NOTE
Pt denies SI/HI/AH/VH. Present in dayroom and milieu. Medication compliant ( refused Nicotine patch). Meal compliant. Pt is calm and pleasant. Compliant with Lunch. No further concerns as of present. Plan of care ongoing.

## 2025-06-26 NOTE — NURSING NOTE
Pt is visible with minimal interaction with peers. Appears calm and cooperative. Medication and meal compliant, but refused 100mg colace. Denies SI, HI, AH, and VH. Requesting PRN for sleep, 3mg melatonin given @ 2120

## 2025-06-27 PROCEDURE — 99232 SBSQ HOSP IP/OBS MODERATE 35: CPT | Performed by: NURSE PRACTITIONER

## 2025-06-27 RX ORDER — TRAZODONE HYDROCHLORIDE 100 MG/1
100 TABLET ORAL
Status: DISCONTINUED | OUTPATIENT
Start: 2025-06-27 | End: 2025-06-29

## 2025-06-27 RX ORDER — TRAZODONE HYDROCHLORIDE 100 MG/1
100 TABLET ORAL
Status: DISCONTINUED | OUTPATIENT
Start: 2025-06-27 | End: 2025-06-27

## 2025-06-27 RX ADMIN — CHOLECALCIFEROL TAB 25 MCG (1000 UNIT) 1000 UNITS: 25 TAB at 09:18

## 2025-06-27 RX ADMIN — TRAZODONE HYDROCHLORIDE 100 MG: 100 TABLET ORAL at 21:25

## 2025-06-27 RX ADMIN — ESCITALOPRAM OXALATE 10 MG: 10 TABLET ORAL at 09:18

## 2025-06-27 RX ADMIN — Medication 3 MG: at 21:24

## 2025-06-27 NOTE — SOCIAL WORK
CM and patient met in pt's room to have phone conference with pt's mom to discuss discharge plan for Monday. CM explained that pt is set for discharge on Monday if stable, and will have medications and outpatient follow-up appointments scheduled. Pt is to return back home to own apartment where she resides with her significant other and 2 young children. Mom is in agreement with discharge plan, no questions or concerns at this time. Mom is pt's primary familial support. Pt will coordinate with mom if she will be available on Monday to pick pt up from hospital at time of discharge.

## 2025-06-27 NOTE — DISCHARGE INSTR - OTHER ORDERS
CRISIS INFORMATION  If you are experiencing a mental health emergency, you may call the [facility] Crisis Intervention Office 24 hours a day, 7 days per week at [phone].    In [facility], call [phone].    [facility] is a confidential 24/7 telephone support service manned by trained mental health consumers.  [facility] provides support, a listening ear and can provide information about available services.[facility] specializes in the concerns of mental health consumers, their families and friends.  However, we are also here for anyone who has a mental health concern, is confused about or just doesn't know anything about mental health or where to get information.  To reach [facility], call [phone].    HOW TO GET SUBSTANCE ABUSE HELP:  If you or someone you know has a drug or alcohol problem, there is help:  [facility]: [phone]  [facility]: [phone]  An assessment is the first step.   In addition to those listed there are other programs available in the area but assessment is best to determine an appropriate level of care.  If you DO NOT have Medical Assistance (MA) or Private Insurance, an assessment can be scheduled at one of these providers:  [facility]  [address] [place] [address]  [phone]   [facility]  [address] [place] [address]  [phone]   [address] [place] [address]  [phone]   [facility]  [address] [place] [address]  [phone]   [facility]  [address] [place] [address]  [phone]   [facility]  [address] [place] [address]  [phone]     If you HAVE Medical Assistance, an assessment can be scheduled at one of these providers:  [facility]  [address] [place] [address]  [phone]   [facility]  [address] [place] [address]  [phone]   [facility]  [address], [place] [address]  [phone]  [address] [place] [address], [address], [place] [address] [phone]   [address] [address]  [phone]   [address] [place] [address]  [phone]   [facility]  [address] [place] [address]  [phone]   [facility]  [address] [place] [address]  [phone]   [facility]  [address] [place] [address]  [phone]   [facility]  [address] [place] [address]  [phone]     If you HAVE Private Insurance, an assessment can be scheduled at one of these providers.  Please contact these Providers to determine if they are in your network plan:  [facility]  [address], [place] [address]  [phone]   [address] [address]  [phone]   [address] [place] [address]  [phone]   [facility]  [address] [place] [address]  [phone]   [facility]  [address] [place] [address]  [phone]     From the [facility] website [place]opioid-epidemic/#GetNaloxone    How do I get naloxone?  Family members and friends can access naloxone by:    Obtaining a prescription from their family doctor  Using the standing order issued by [facility] Estefania Xie. A standing order is a prescription written for the general public, rather than specifically for an individual.  To use the standing order, print it and take it with you to the pharmacy or have the digital version on your phone. Download the standing order from the Department of [facility] (PDF).    If you are unable to print  it or use the digital version, the standing order is kept on file at many pharmacies. If a pharmacy does not have it on file, they may have the ability to look it up.    Naloxone prescriptions can be filled at most pharmacies. Although the medication might not be available for same-day pickup, it often can be ordered and available within a day or two.

## 2025-06-27 NOTE — NURSING NOTE
Patient is calm and cooperative on the unit. Denies SI, HI, SIB, auditory and visual hallucinations. Patient is med and meal compliant. Patient is visible on the unit and social with peers. Denies any unmet needs, is discharge focused. Maintaining Q15 safety checks.

## 2025-06-27 NOTE — PROGRESS NOTES
Progress Note - Behavioral Health   Name: Grismerlin Fernandez 25 y.o. female I MRN: [id number]  Unit/Bed#: -01 I Date of Admission: 6/20/2025   Date of Service: 6/28/2025 I Hospital Day: 8    Assessment & Plan  Major depressive disorder, recurrent, severe without psychotic features (HCC)  As of 06/28/25 - Grismerlin states that she feels is less depressed and less anxious. She continues to deny current suicidal thoughts. Brighter affect    Current Plan:  Continue Lexapro 10 mg daily to help with mood  Continue Trazodone 100 mg at bedtime to help with insomnia.   Cluster B personality disorder (HCC)  Management as per Principal Problem  Recommend clear limit setting while inpatient.  Medical clearance for psychiatric admission  Management per medical service  Vitamin D deficiency  Management per medical service    Current Medications:    Current Facility-Administered Medications:     Cholecalciferol (VITAMIN D3) tablet 1,000 Units, Oral, Daily    docusate sodium (COLACE) capsule 100 mg, Oral, BID    escitalopram (LEXAPRO) tablet 10 mg, Oral, Daily    traZODone (DESYREL) tablet 100 mg, Oral, HS      Risks/Benefits of Treatment:     Risks, benefits, and possible side effects of medications explained to patient and patient verbalizes understanding and agreement for treatment.  Risks of medications in pregnancy explained if female patient. Patient verbalizes understanding and agrees to notify her doctor if she becomes pregnant.    Progress Toward Goals: progressing, less anxious, less depressed, working on coping skills, discharge planning, denies current suicidal thoughts    Treatment Planning:     All current active medications have been reviewed.  Continue to monitor response to treatment and assess for potential side effects of medications.  Encourage group therapy, milieu therapy and occupational therapy.  Collaboration with medical service for medical comorbidities as indicated.  Behavioral Health checks  for safety monitoring.  Possible discharge after the weekend if continues to improve.  Estimated Discharge Day: 7/1/2025   Legal Status: On 303 commitment up to 20 days.  Long Stay Certification : Not Applicable    Subjective     Behavior over the last 24 hours: improving    Grismerlin continues to improve gradually. She feels less depressed and less anxious, has brighter affect and is more optimistic about the future. She continues to deny current suicidal thoughts. She has brighter affect and is smiling appropriately during the session when discharge plan is discussed. She had a family meeting with her mother yesterday that went well. Mother is in agreement with discharge plan after the weekend. Compliant with medications. Milieu participation is still limited    Sleep: normal  Appetite: normal  Medication side effects: No  ROS: review of systems as noted above in HPI/Subjective report, denies any headache, shortness of breath, or chest pain, all other systems are negative    Objective :  Temp:  [97.5 °F (36.4 °C)] 97.5 °F (36.4 °C)  HR:  [65-77] 65  BP: (112-127)/(56-72) 112/56  Resp:  [16] 16  SpO2:  [97 %] 97 %  O2 Device: None (Room air)    Mental Status Evaluation:    Appearance:  age appropriate   Behavior:  cooperative, improved eye contact   Speech:  normal rate and volume   Mood:  less anxious, less depressed   Affect:  brighter   Thought Process:  organized, goal directed   Thought Content:  no overt delusions   Perceptual Disturbances: no auditory hallucinations, no visual hallucinations   Risk Potential: Suicidal Ideation -  None at present  Homicidal Ideation -  None  Potential for Aggression - No   Sensorium:  oriented to person, place, and time/date   Memory:  recent and remote memory grossly intact   Consciousness:  alert and awake   Attention/Concentration: attention span and concentration are improving   Insight:  improving and moderate   Judgment: improving and moderate   Gait/Station: normal  gait/station, normal balance   Motor Activity: no abnormal movements       Lab Results: I have reviewed the following results:  Results from the past 24 hours: No results found for this or any previous visit (from the past 24 hours).    Administrative Statements     Counseling / Coordination of Care:   Patient's progress reviewed with nursing staff.  Medication changes reviewed with nursing staff.  Discharge plan discussed with patient.    Cheryle Arevalo MD 06/28/25

## 2025-06-27 NOTE — ASSESSMENT & PLAN NOTE
As of 06/27/25 - Grismerlin is less depressed and less anxious. She denies current suicidal thoughts and regrets suicide attempt. She is more amenable to psychiatric treatment after discharge.    Current Plan:  Continue Lexapro 10 mg daily to help with mood  Continue Trazodone 100 mg at bedtime to help with insomnia. Will ask to administer between 10 PM and 11 PM

## 2025-06-27 NOTE — DISCHARGE INSTR - APPOINTMENTS
Behavioral Health Nurse Navigator, Camilla or Demetra will be calling you after your discharge, on the phone number that you provided.  They will be available as an additional support, if needed.   If you wish to speak with Camilla, you may contact her at [phone].

## 2025-06-27 NOTE — ASSESSMENT & PLAN NOTE
As of 06/28/25 - Grismerlin states that she feels is less depressed and less anxious. She continues to deny current suicidal thoughts. Brighter affect    Current Plan:  Continue Lexapro 10 mg daily to help with mood  Continue Trazodone 100 mg at bedtime to help with insomnia.

## 2025-06-27 NOTE — NURSING NOTE
Pt calm, cooperative, brightens upon approach. Denies all psych symptoms this HS. Pt is visible in the milieu utilizing unit phone frequently. Adherent with scheduled trazodone. Currently no unmet needs. Q15 minute checks maintained for safety.

## 2025-06-27 NOTE — PROGRESS NOTES
Progress Note - Behavioral Health   Name: Grismerlin Fernandez 25 y.o. female I MRN: [id number]  Unit/Bed#: -01 I Date of Admission: 6/20/2025   Date of Service: 6/27/2025 I Hospital Day: 7     Assessment & Plan  Major depressive disorder, recurrent, severe without psychotic features (HCC)  As of 06/27/25 - Grismerlin is less depressed and less anxious. She denies current suicidal thoughts and regrets suicide attempt. She is more amenable to psychiatric treatment after discharge.    Current Plan:  Continue Lexapro 10 mg daily to help with mood  Continue Trazodone 100 mg at bedtime to help with insomnia. Will ask to administer between 10 PM and 11 PM  Cluster B personality disorder (HCC)  Management as per Principal Problem  Recommend clear limit setting while inpatient.  Medical clearance for psychiatric admission  Management per medical service  Vitamin D deficiency  Management per medical service    Current Medications:    Current Facility-Administered Medications:     Cholecalciferol (VITAMIN D3) tablet 1,000 Units, Oral, Daily    docusate sodium (COLACE) capsule 100 mg, Oral, BID    escitalopram (LEXAPRO) tablet 10 mg, Oral, Daily    traZODone (DESYREL) tablet 100 mg, Oral, HS      Risks/Benefits of Treatment:     Risks, benefits, and possible side effects of medications explained to patient and patient verbalizes understanding and agreement for treatment.    Progress Toward Goals: progressing    Treatment Planning:     All current active medications have been reviewed.  Continue to monitor response to treatment and assess for potential side effects of medications.  Encourage group therapy, milieu therapy and occupational therapy.  Collaboration with medical service for medical comorbidities as indicated.  Behavioral Health checks for safety monitoring.  Estimated Discharge Day: 7/1/2025   Legal Status : On 303 commitment.  Long Stay Certification : Not Applicable    Subjective     Behavior over the last  24 hours: slowly improving    Patient was visited on unit for continuing care; chart reviewed and discussed with multidisciplinary treatment team.  On approach, the patient was calm, pleasant and cooperative. Endorsed better mood and less anxiety sxs. Able to cite improvements in depression on Lexapro, no longer having suicidal thoughts or passive death wish.  Denied any changes in appetite, and energy level. Denied intense anxiety sxs. Denied initial insomnia but reported interrupted sleep. Denied A/VH currently.  Denied SI/HI, intent or plan upon direct inquiry at this time. The patient consented for safety and agreed to verbalize any frustration or concerns to the nursing staff, immediately.    Patient continues to be visible in the milieu and interacts with staff and peers. No reports of aggression or self-injurious behavior on unit.  The patient has remained in good behavioral control over past 24 hours.    Patient accepted all offered medications and no adverse effects of medications noted or reported.    Sleep: slept off and on  Appetite: fair  Medication side effects: No  ROS: review of systems as noted above in HPI/Subjective report, no additional complaints, all other systems are negative    Objective :  Temp:  [96.5 °F (35.8 °C)-97.9 °F (36.6 °C)] 96.5 °F (35.8 °C)  HR:  [72-91] 91  BP: ()/(42-65) 123/65  Resp:  [16] 16  SpO2:  [98 %] 98 %  O2 Device: None (Room air)    Mental Status Evaluation:    Appearance:  age appropriate   Behavior:  pleasant, cooperative   Speech:  normal rate, normal volume   Mood:  improved   Affect:  brighter   Thought Process:  goal directed   Thought Content:  no overt delusions   Perceptual Disturbances: denies auditory hallucinations when asked, does not appear responding to internal stimuli   Risk Potential: Suicidal Ideation -  None  Homicidal Ideation -  None  Potential for Aggression - No   Sensorium:  oriented to person, place, and time/date   Memory:  recent and  "remote memory grossly intact   Consciousness:  alert and awake   Attention/Concentration: decreased attention span and decreased concentration   Insight:  improving   Judgment: improving   Gait/Station: normal gait/station, normal balance   Motor Activity: no abnormal movements       Lab Results: I have reviewed the following results:  Results from the past 24 hours: No results found for this or any previous visit (from the past 24 hours).    Administrative Statements     Counseling / Coordination of Care:   Patient's progress discussed with staff in treatment team meeting.  Medication changes reviewed with staff in treatment team meeting.  Reassurance and supportive therapy provided.  Encouraged participation in milieu and group therapy on the unit.    Portions of the record may have been created with voice recognition software. Occasional wrong word or \"sound a like\" substitutions may have occurred due to the inherent limitations of voice recognition software. Read the chart carefully and recognize, using context, where substitutions have occurred.    PER Garza 06/27/25  "

## 2025-06-27 NOTE — PROGRESS NOTES
06/27/25 0840   Team Meeting   Meeting Type Daily Rounds   Team Members Present   Team Members Present Physician;Nurse;   Physician Team Member Irina   Nursing Team Member Harvey   Care Management Team Member Minh   Patient/Family Present   Patient Present No   Patient's Family Present No     Pt med and meal compliant. Attending groups. Family meeting this afternoon to discuss discharge plan for Monday

## 2025-06-28 PROCEDURE — 99232 SBSQ HOSP IP/OBS MODERATE 35: CPT | Performed by: PSYCHIATRY & NEUROLOGY

## 2025-06-28 RX ADMIN — TRAZODONE HYDROCHLORIDE 100 MG: 100 TABLET ORAL at 21:49

## 2025-06-28 RX ADMIN — Medication 3 MG: at 21:49

## 2025-06-28 RX ADMIN — CHOLECALCIFEROL TAB 25 MCG (1000 UNIT) 1000 UNITS: 25 TAB at 08:59

## 2025-06-28 RX ADMIN — ESCITALOPRAM OXALATE 10 MG: 10 TABLET ORAL at 08:59

## 2025-06-28 NOTE — NURSING NOTE
"Patient visible intermittently throughout the day. She was observed eating dinner alone in her room. Pleasant and cooperative with routine. At one point she did request nicotine gum despite not being a smoker \"I just want to try it and see what it does.\"     Patient refused evening does of Colace 100 mg \"I don't need that.\"    At 2149, requested and received Melatonin 3 mg po prn. At 2249, patient resting in bed. Melatonin 3 mg po prn effective.   "

## 2025-06-28 NOTE — ASSESSMENT & PLAN NOTE
As of 06/29/25 - Harmanerlin continues to improve gradually. She states that depressive symptoms have resolved and also feels less anxious. She denies current suicidal thoughts. Reports some difficulty sleeping.    Current Plan:  Continue Lexapro 10 mg daily to help with mood  Increase Trazodone to 150 mg at bedtime to help with insomnia.

## 2025-06-28 NOTE — PROGRESS NOTES
Progress Note - Behavioral Health   Name: Grismerlin Fernandez 25 y.o. female I MRN: [id number]  Unit/Bed#: -01 I Date of Admission: 6/20/2025   Date of Service: 6/29/2025 I Hospital Day: 9    Assessment & Plan  Major depressive disorder, recurrent, severe without psychotic features (HCC)  As of 06/29/25 - Grismerlin continues to improve gradually. She states that depressive symptoms have resolved and also feels less anxious. She denies current suicidal thoughts. Reports some difficulty sleeping.    Current Plan:  Continue Lexapro 10 mg daily to help with mood  Increase Trazodone to 150 mg at bedtime to help with insomnia.   Cluster B personality disorder (HCC)  Management as per Principal Problem  Recommend clear limit setting while inpatient.  Medical clearance for psychiatric admission  Management per medical service  Vitamin D deficiency  Management per medical service    Current Medications:    Current Facility-Administered Medications:     Cholecalciferol (VITAMIN D3) tablet 1,000 Units, Oral, Daily    escitalopram (LEXAPRO) tablet 10 mg, Oral, Daily    traZODone (DESYREL) tablet 150 mg, Oral, HS      Risks/Benefits of Treatment:     Risks, benefits, and possible side effects of medications explained to patient and patient verbalizes understanding and agreement for treatment.  Risks of medications in pregnancy explained if female patient. Patient verbalizes understanding and agrees to notify her doctor if she becomes pregnant.    Progress Toward Goals: improved, less anxious, no longer feels depressed, working on coping skills, discharge planning, denies current suicidal thoughts    Treatment Planning:     All current active medications have been reviewed.  Continue to monitor response to treatment and assess for potential side effects of medications.  Encourage group therapy, milieu therapy and occupational therapy.  Collaboration with medical service for medical comorbidities as  indicated.  Behavioral Health checks for safety monitoring.  Discharge planned for tomorrow.  Estimated Discharge Day: 7/1/2025   Legal Status: On 303 commitment up to 20 days.  Long Stay Certification : Not Applicable    Subjective     Behavior over the last 24 hours: improved    Grismerlin is doing well today. She feels that depressive symptoms have resolved now and is more optimistic about the future. She also feels that anxiety symptoms are more controlled. She denies current suicidal thoughts. Brighter affect. Compliant with medications. Attends some groups.    Sleep: slept off and on, early awakenings  Appetite: normal  Medication side effects: No  ROS: review of systems as noted above in HPI/Subjective report, denies any headache, shortness of breath, or abdominal pain, all other systems are negative    Objective :  Temp:  [97.3 °F (36.3 °C)-97.5 °F (36.4 °C)] 97.5 °F (36.4 °C)  HR:  [70-82] 82  BP: (115-125)/(61-76) 115/76  Resp:  [16] 16  SpO2:  [97 %-98 %] 98 %  O2 Device: None (Room air)    Mental Status Evaluation:    Appearance:  casually dressed   Behavior:  cooperative, good eye contact   Speech:  normal rate and volume   Mood:  less anxious, no longer depressed   Affect:  brighter   Thought Process:  organized, goal directed   Thought Content:  no overt delusions   Perceptual Disturbances: no auditory hallucinations, no visual hallucinations   Risk Potential: Suicidal Ideation -  None at present  Homicidal Ideation -  None at present  Potential for Aggression - Not at present   Sensorium:  oriented to person, place, and time/date   Memory:  recent and remote memory grossly intact   Consciousness:  alert and awake   Attention/Concentration: attention span and concentration are improved   Insight:  moderate   Judgment: moderate   Gait/Station: normal gait/station, normal balance   Motor Activity: no abnormal movements       Lab Results: I have reviewed the following results:  Results from the past 24  hours: No results found for this or any previous visit (from the past 24 hours).    Administrative Statements     Counseling / Coordination of Care:   Patient's progress reviewed with nursing staff.  Medication changes reviewed with nursing staff.  Discharge plan discussed with patient.    Cheryle Arevalo MD 06/29/25

## 2025-06-28 NOTE — NURSING NOTE
Patient is alert and oriented able to verbalize her needs. She is visible in the milieu interacts with staff and peers. She is bright on approach. She denies all psych s/s. She is med compliant. She requested and was given PRN melatonin 3 mg for insomnia. No behaviors noted. Safety checks maintained.

## 2025-06-28 NOTE — PLAN OF CARE
Problem: Ineffective Coping  Goal: Participates in unit activities  Description: Interventions:  - Provide therapeutic environment   - Provide required programming   - Redirect inappropriate behaviors   Outcome: Progressing     Problem: ANXIETY  Goal: By discharge: Patient will verbalize 2 strategies to deal with anxiety  Description: Interventions:  - Identify any obvious source/trigger to anxiety  - Staff will assist patient in applying identified coping technique/skills  - Encourage attendance of scheduled groups and activities  Outcome: Progressing

## 2025-06-28 NOTE — NURSING NOTE
Withdrawn to room most of the morning, observed in the milieu to use the patient telephone. Brighter in affect this weekend compared to last weekend, observed smiling during conversation. Calm and cooperative.     Denies depression, anxiety, SI, HI, and hallucinations of any kind.    Declined scheduled colace, pt made aware of medication use and states that they do not need it. Complied with the rest of their scheduled medications.    Reminded to complete ADLs. Encouraged to attend groups.    Staff availability reinforced.

## 2025-06-29 PROCEDURE — 99232 SBSQ HOSP IP/OBS MODERATE 35: CPT | Performed by: PSYCHIATRY & NEUROLOGY

## 2025-06-29 RX ORDER — DOCUSATE SODIUM 100 MG/1
100 CAPSULE, LIQUID FILLED ORAL 2 TIMES DAILY PRN
Status: DISCONTINUED | OUTPATIENT
Start: 2025-06-29 | End: 2025-06-30

## 2025-06-29 RX ADMIN — TRAZODONE HYDROCHLORIDE 150 MG: 100 TABLET ORAL at 21:31

## 2025-06-29 RX ADMIN — ESCITALOPRAM OXALATE 10 MG: 10 TABLET ORAL at 09:12

## 2025-06-29 RX ADMIN — Medication 3 MG: at 21:31

## 2025-06-29 RX ADMIN — CHOLECALCIFEROL TAB 25 MCG (1000 UNIT) 1000 UNITS: 25 TAB at 09:12

## 2025-06-29 NOTE — NURSING NOTE
Withdrawn to her room where she ate dinner. Somewhat social with room mate. Denies any needs at this time.

## 2025-06-29 NOTE — NURSING NOTE
Patient denies SI, HI, AHs and VHs. Denies anxiety and depression. Patient is medication and meal compliant. Bright and pleasant. Went back to bed after breakfast. She denies any needs at this time.

## 2025-06-29 NOTE — PLAN OF CARE
Problem: SELF HARM/SUICIDALITY  Goal: Will have no self-injury during hospital stay  Description: INTERVENTIONS:  - Q 15 MINUTES: Routine safety checks  - Q WAKING SHIFT & PRN: Assess risk to determine if routine checks are adequate to maintain patient safety  - Encourage patient to participate actively in care by formulating a plan to combat response to suicidal ideation, identify supports and resources  Outcome: Progressing     Problem: ANXIETY  Goal: By discharge: Patient will verbalize 2 strategies to deal with anxiety  Description: Interventions:  - Identify any obvious source/trigger to anxiety  - Staff will assist patient in applying identified coping technique/skills  - Encourage attendance of scheduled groups and activities  Outcome: Progressing     Problem: Ineffective Coping  Goal: Participates in unit activities  Description: Interventions:  - Provide therapeutic environment   - Provide required programming   - Redirect inappropriate behaviors   Outcome: Progressing     Problem: DISCHARGE PLANNING - CARE MANAGEMENT  Goal: Discharge to post-acute care or home with appropriate resources  Description: INTERVENTIONS:  - Conduct assessment to determine patient/family and health care team treatment goals, and need for post-acute services based on payer coverage, community resources, and patient preferences, and barriers to discharge  - Address psychosocial, clinical, and financial barriers to discharge as identified in assessment in conjunction with the patient/family and health care team  - Arrange appropriate level of post-acute services according to patient’s   needs and preference and payer coverage in collaboration with the physician and health care team  - Communicate with and update the patient/family, physician, and health care team regarding progress on the discharge plan  - Arrange appropriate transportation to post-acute venues  Outcome: Progressing

## 2025-06-30 VITALS
HEIGHT: 62 IN | HEART RATE: 71 BPM | OXYGEN SATURATION: 98 % | TEMPERATURE: 97.3 F | RESPIRATION RATE: 16 BRPM | WEIGHT: 186.4 LBS | BODY MASS INDEX: 34.3 KG/M2 | DIASTOLIC BLOOD PRESSURE: 51 MMHG | SYSTOLIC BLOOD PRESSURE: 103 MMHG

## 2025-06-30 PROBLEM — Z00.8 MEDICAL CLEARANCE FOR PSYCHIATRIC ADMISSION: Status: RESOLVED | Noted: 2020-07-09 | Resolved: 2025-06-30

## 2025-06-30 LAB
BASOPHILS # BLD AUTO: 0.03 THOUSANDS/ÂΜL (ref 0–0.1)
BASOPHILS NFR BLD AUTO: 0 % (ref 0–1)
EOSINOPHIL # BLD AUTO: 0.07 THOUSAND/ÂΜL (ref 0–0.61)
EOSINOPHIL NFR BLD AUTO: 1 % (ref 0–6)
ERYTHROCYTE [DISTWIDTH] IN BLOOD BY AUTOMATED COUNT: 13.4 % (ref 11.6–15.1)
HCT VFR BLD AUTO: 36 % (ref 34.8–46.1)
HGB BLD-MCNC: 11.5 G/DL (ref 11.5–15.4)
IMM GRANULOCYTES # BLD AUTO: 0.03 THOUSAND/UL (ref 0–0.2)
IMM GRANULOCYTES NFR BLD AUTO: 0 % (ref 0–2)
LYMPHOCYTES # BLD AUTO: 3.42 THOUSANDS/ÂΜL (ref 0.6–4.47)
LYMPHOCYTES NFR BLD AUTO: 31 % (ref 14–44)
MCH RBC QN AUTO: 26.4 PG (ref 26.8–34.3)
MCHC RBC AUTO-ENTMCNC: 31.9 G/DL (ref 31.4–37.4)
MCV RBC AUTO: 83 FL (ref 82–98)
MONOCYTES # BLD AUTO: 0.7 THOUSAND/ÂΜL (ref 0.17–1.22)
MONOCYTES NFR BLD AUTO: 6 % (ref 4–12)
NEUTROPHILS # BLD AUTO: 6.76 THOUSANDS/ÂΜL (ref 1.85–7.62)
NEUTS SEG NFR BLD AUTO: 62 % (ref 43–75)
NRBC BLD AUTO-RTO: 0 /100 WBCS
PLATELET # BLD AUTO: 378 THOUSANDS/UL (ref 149–390)
PMV BLD AUTO: 9.5 FL (ref 8.9–12.7)
RBC # BLD AUTO: 4.35 MILLION/UL (ref 3.81–5.12)
WBC # BLD AUTO: 11.01 THOUSAND/UL (ref 4.31–10.16)

## 2025-06-30 PROCEDURE — 85025 COMPLETE CBC W/AUTO DIFF WBC: CPT

## 2025-06-30 PROCEDURE — 99238 HOSP IP/OBS DSCHRG MGMT 30/<: CPT | Performed by: NURSE PRACTITIONER

## 2025-06-30 RX ORDER — ESCITALOPRAM OXALATE 10 MG/1
10 TABLET ORAL DAILY
Qty: 30 TABLET | Refills: 1 | Status: SHIPPED | OUTPATIENT
Start: 2025-06-30

## 2025-06-30 RX ORDER — DOCUSATE SODIUM 100 MG/1
100 CAPSULE, LIQUID FILLED ORAL 2 TIMES DAILY PRN
Status: DISCONTINUED | OUTPATIENT
Start: 2025-06-30 | End: 2025-06-30 | Stop reason: HOSPADM

## 2025-06-30 RX ORDER — TRAZODONE HYDROCHLORIDE 150 MG/1
150 TABLET ORAL
Qty: 30 TABLET | Refills: 1 | Status: SHIPPED | OUTPATIENT
Start: 2025-06-30

## 2025-06-30 RX ADMIN — ESCITALOPRAM OXALATE 10 MG: 10 TABLET ORAL at 09:37

## 2025-06-30 RX ADMIN — CHOLECALCIFEROL TAB 25 MCG (1000 UNIT) 1000 UNITS: 25 TAB at 09:37

## 2025-06-30 NOTE — NURSING NOTE
Patient is calm and cooperative on the unit. Denies SI, HI, SIB, auditory and visual hallucinations. Patient is med and meal compliant. Discharge planned for 11am today. Denies any unmet needs. Maintaining Q15 safety checks.

## 2025-06-30 NOTE — PROGRESS NOTES
06/24/25 1037   Team Meeting   Meeting Type Tx Team Meeting   Team Members Present   Team Members Present Physician;Nurse;   Physician Team Member Irina   Nursing Team Member [facility] Management Team Member Minh   Patient/Family Present   Patient Present Yes   Patient's Family Present No     Tx plan was reviewed and discussed with Pt. Pt was encouraged to attend groups. Medication was discussed with Pt. Pt signed tx plan.

## 2025-06-30 NOTE — BH TRANSITION RECORD
Contact Information: If you have any questions, concerns, pended studies, tests and/or procedures, or emergencies regarding your inpatient behavioral health visit. Please contact [facility] [phone] and ask to speak to a , nurse or physician. A contact is available 24 hours/ 7 days a week at this number.     Summary of Procedures Performed During your Stay:  Below is a list of major procedures performed during your hospital stay and a summary of results:  - Cardiac Procedures/Studies: ECG done on 6/9/2025 showed normal sinus rhythm.    Pending Studies (From admission, onward)      None          Please follow up on the above pending studies with your PCP and/or referring provider.

## 2025-06-30 NOTE — SOCIAL WORK
Pt to D/C today. Pt denies SI/HI/AVH. Pt oriented x3. Pt to d/c to home and [employer] will  upon discharge. Pt to follow up with  [facility] on Wednesday July 2, 2025 at 8:15am. Scripts sent to preferred pharmacy.     Discharge Address: [address] [place] [address]  Phone:  [phone]

## 2025-06-30 NOTE — DISCHARGE SUMMARY
Discharge Summary - Behavioral Health   Name: Grismerlin Fernandez 25 y.o. female I MRN: [id number]  Unit/Bed#: -01 I Date of Admission: 6/20/2025   Date of Service: 6/30/2025 I Hospital Day: 10    Assessment & Plan  Major depressive disorder, recurrent, severe without psychotic features (HCC)  As of 06/30/25 -discharged today on the following medications:    Lexapro 10 mg daily to help with mood  Trazodone to 150 mg at bedtime to help with insomnia.   Cluster B personality disorder (HCC)  Management as per Principal Problem    Vitamin D deficiency  Continue Cholecalciferol (VITAMIN D3) 1,000 units tablet, Take 1 tablet (1,000 Units total) by mouth daily  Follow up with PCP    Admission Date: 6/20/2025       Discharge Date: 6/30/2025  Attending Psychiatrist: Cheryle Arevalo MD    Admission Diagnosis:  Principal Problem:    Major depressive disorder, recurrent, severe without psychotic features (HCC)  Active Problems:    Vitamin D deficiency    Cluster B personality disorder (HCC)    Discharge Diagnosis:   Principal Problem:    Major depressive disorder, recurrent, severe without psychotic features (HCC)  Active Problems:    Vitamin D deficiency    Cluster B personality disorder (HCC)  Resolved Problems:    Medical clearance for psychiatric admission    Medical Problems       Resolved Problems  Date Reviewed: 6/30/2025          Resolved    Medical clearance for psychiatric admission 6/30/2025     Resolved by  PER Garza          Reason for Admission/HPI:     Patient is a 25 y.o. female with a history of depression who was admitted to the inpatient psychiatric unit on an involuntary 302 commitment basis due to suicide attempt by overdose on Tylenol.   According to admission report from Dr. Mahamed Cooper:    Grismerlin Fernandez is a 25 y.o. female with a history of depression who was admitted to the inpatient psychiatric unit on an involuntary 302 commitment basis due to suicide attempt by overdose  "on Tylenol.     Symptoms prior to admission included suicidal ideation, suicide attempt, mood swings, anxiety symptoms, and noncompliance with treatment. Onset of symptoms was abrupt starting same day of admission with improving course since that time. Stressors preceding admission included social difficulties.      On initial evaluation after admission to the inpatient psychiatric unit:  The current presentation follows a recent suicide attempt precipitated by online bullying, describing the event as completely impulsive rather than driven by sadness or depression. States the bullying occurred on the same day as the attempt, with multiple people commenting mean things on social media about an old picture of her. Prior to this incident, reports functioning well with normal sleep, appetite, and concentration. Denies feeling depressed but acknowledges \"thinking too much\" at times. Despite not identifying as having depression, expresses significant guilt over the impact of suicide attempt on her children, stating \"I made a stupid decision, and I feel bad for my kids because I didn't think about my kids.\" Has experienced two suicide attempts in her life. The first occurred around age 23, which was also an overdose, though has difficulty recalling specific details about that episode. Was previously hospitalized at a psychiatric facility approximately three years ago following this first attempt.     Reports a history of anxiety since childhood, which has been a persistent issue. Attempted to manage anxiety without medication and has had minimal engagement with therapy, attending only one session with therapist.      Describes episodes of irritability and aggression, particularly with boyfriend. Admits to verbal aggression and some instances of physical aggression such as damaging boyfriend's tires when upset, but denies similar behavior with others. Reports having a pattern of breaking up and reconciling with " boyfriend.     Describes immigrating to the [place] from the [place] in 2009 at age 9. Has three children ages 1, 2, and 4. Lives with her children, has a boyfriend, who is the father of her children. Currently employed at a [employer]. Reports rare alcohol consumption (5-8 drinks maximum when at parties), daily hookah use, and denies marijuana or other drug use. Uses an implant for birth control. Denies any history of trauma, abuse, or significant family mental health or substance use issues. Currently aware of the involuntary hospitalization process and associated legal procedures.     Per chart review, patient has history of conduct disorder as a teen.    Past Medical History[1]  Past Surgical History[2]  No Known Allergies    Objective :  Temp:  [97.2 °F (36.2 °C)-97.3 °F (36.3 °C)] 97.3 °F (36.3 °C)  HR:  [71-98] 71  BP: (103-113)/(51-52) 103/51  Resp:  [16] 16  SpO2:  [98 %] 98 %  O2 Device: None (Room air)    Hospital Course:     Grismerlin was admitted to the inpatient psychiatric unit and started on Behavioral Health checks for safety monitoring. During the hospitalization she was encouraged to attend individual therapy, group therapy, milieu therapy and occupational therapy.     Psychiatric medications were initiated during the hospital stay. To address depressive symptoms, Grismerlin was treated with antidepressant Lexapro. Medication doses were gradually adjusted during the hospital course. Dose was slowly titrated up to 10 mg.  In addition trazodone was started for sleep and titrated to 150 mg. Prior to beginning of treatment medications risks and benefits and possible side effects including risk of suicidality and serotonin syndrome related to treatment with antidepressants were reviewed with Grismerlin. She verbalized understanding and agreement for treatment. Upon admission Grismerlin was seen by medical service for medical clearance for inpatient treatment and medical follow up.      Grismerlin's symptoms gradually improved over the hospital course. Initially after admission she was still feeling depressed. With adjustment of medications and therapeutic milieu her symptoms slowly improved. At the end of treatment Grismerlin was doing much better. Her mood was significantly improved at the time of discharge. Grismerlin denied suicidal ideation, intent or plan at the time of discharge and denied homicidal ideation, intent or plan at the time of discharge. Grismerlin was now remorseful about suicide attempt and had more hope for the future. There was no overt psychosis at the time of discharge. Paranoid ideation was resolved. Grismerlin was participating appropriately in milieu at the time of discharge. Behavior was appropriate on the unit at the time of discharge. Sleep and appetite were improved.     Since Grismerlin was doing well at the end of the hospitalization, treatment team felt that Grismerlin could be safely discharged to outpatient care.     The outpatient follow up with [facility] for psychiatric intake was arranged by the unit  upon discharge.       Mental Status at Time of Discharge:     Appearance:  age appropriate   Behavior:  pleasant, cooperative   Speech:  normal rate, normal volume   Mood:  improved   Affect:  brighter   Thought Process:  goal directed   Thought Content:  no overt delusions   Perceptual Disturbances: none   Risk Potential: Suicidal Ideation -  None  Homicidal Ideation -  None  Potential for Aggression - No   Sensorium:  oriented to person, place, and time/date   Memory:  recent and remote memory grossly intact   Consciousness:  alert and awake   Attention/Concentration: decreased attention span and decreased concentration   Insight:  improving   Judgment: improving   Gait/Station: normal gait/station, normal balance   Motor Activity: no abnormal movements     Suicide/Homicide Risk Assessment:    Risk of Harm to Self:    The following ratings are based on assessment at the time of the interview  Nursing Suicide Risk Assessment Last 24 hours: C-SSRS Risk (Since Last Contact)  Calculated C-SSRS Risk Score (Since Last Contact): No Risk Indicated  Demographic Risk Factors include: age: young adult (15-24)  Historical Risk Factors include: chronic depressive symptoms  Current Specific Risk Factors include: recent inpatient psychiatric admission - being discharged today  Protective Factors: improved depressive symptoms, improved anxiety symptoms  Weapons/Firearms: none. The following steps have been taken to ensure weapons are properly secured: not applicable  Based on today's assessment, Grismerlin presents the following risk of harm to self: minimal    Risk of Harm to Others:  The following ratings are based on  assessment at the time of the interview  Nursing Homicide Risk Assessment: Violence Risk to Others: Denies within past 6 months  Demographic Risk Factors include: 16-25 years of age  Historical Risk Factors include: history of aggressive behavior  Current Specific Risk Factors include: none  Protective Factors: no current homicidal ideation, good impulse control, stable mood, no current psychotic symptoms  Weapons/Firearms: none. The following steps have been taken to ensure weapons are properly secured: not applicable  Based on today's assessment, Grismerlin presents the following risk of harm to others: none    The following interventions are recommended: outpatient follow up with a psychiatrist, outpatient follow up with a therapist      Lab Results: I have reviewed the following results:  Results from the past 24 hours:   Recent Results (from the past 24 hours)   CBC and differential    Collection Time: 06/30/25 10:05 AM   Result Value Ref Range    WBC 11.01 (H) 4.31 - 10.16 Thousand/uL    RBC 4.35 3.81 - 5.12 Million/uL    Hemoglobin 11.5 11.5 - 15.4 g/dL    Hematocrit 36.0 34.8 - 46.1 %    MCV 83 82 - 98 fL    MCH 26.4  (L) 26.8 - 34.3 pg    MCHC 31.9 31.4 - 37.4 g/dL    RDW 13.4 11.6 - 15.1 %    MPV 9.5 8.9 - 12.7 fL    Platelets 378 149 - 390 Thousands/uL    nRBC 0 /100 WBCs    Segmented % 62 43 - 75 %    Immature Grans % 0 0 - 2 %    Lymphocytes % 31 14 - 44 %    Monocytes % 6 4 - 12 %    Eosinophils Relative 1 0 - 6 %    Basophils Relative 0 0 - 1 %    Absolute Neutrophils 6.76 1.85 - 7.62 Thousands/µL    Absolute Immature Grans 0.03 0.00 - 0.20 Thousand/uL    Absolute Lymphocytes 3.42 0.60 - 4.47 Thousands/µL    Absolute Monocytes 0.70 0.17 - 1.22 Thousand/µL    Eosinophils Absolute 0.07 0.00 - 0.61 Thousand/µL    Basophils Absolute 0.03 0.00 - 0.10 Thousands/µL       Discharge Medications:    Home Medications After Discharge    Scheduled   Cholecalciferol (VITAMIN D3) 1,000 units tablet, Take 1 tablet (1,000 Units total) by mouth daily   escitalopram (LEXAPRO) 10 mg tablet, Take 1 tablet (10 mg total) by mouth daily   traZODone (DESYREL) 150 mg tablet, Take 1 tablet (150 mg total) by mouth daily at bedtime      Discharge instructions/Information to patient and family:   See After Visit Summary for information provided to patient and family.      Provisions for Follow-Up Care:  See after visit summary for information related to follow-up care and any pertinent home health orders.      Discharge Statement:    I have spent a total time of 30 minutes in caring for this patient on the day of the visit/encounter.   >30 minutes of time was spent on: Counseling / Coordination of care.  I reviewed with Grismerlin importance of compliance with medications and outpatient treatment after discharge.  I discussed the medication regimen and possible side effects of the medications with Grismerlin prior to discharge. At the time of discharge she was tolerating psychiatric medications.  I discussed outpatient follow up with Grismerlin.  I reviewed with Grismerlin crisis plan and safety plan upon discharge.  Grismerlin agreed to abstain from  "drug and alcohol use after discharge.  Outpatient Smoking Cessation referral was offered to Grismerlin. She declined the referral.  Smoking Cessation medication was offered to Grismerlin. She declined Smoking Cessation medication.    Discharge on Two Antipsychotic Medications: No    Portions of the record may have been created with voice recognition software. Occasional wrong word or \"sound a like\" substitutions may have occurred due to the inherent limitations of voice recognition software. Read the chart carefully and recognize, using context, where substitutions have occurred.    PER Garza 25          [1]   Past Medical History:  Diagnosis Date    Abnormal CT scan, kidney     Cluster B personality disorder (HCC)     COVID-19     Depression     Leukocytosis     SIRS (systemic inflammatory response syndrome) ([facility]) 2024    Transaminitis     Vitamin D deficiency    [2]   Past Surgical History:  Procedure Laterality Date    ABDOMINOPLASTY      SD  DELIVERY ONLY N/A 2020    Procedure:  SECTION ();  Surgeon: Adair Clifton MD;  Location: [facility];  Service: Obstetrics    SD  DELIVERY ONLY N/A 1/15/2024    Procedure:  SECTION ();  Surgeon: Yardlie Toussaint-Foster, DO;  [facility]: [facility];  Service: Obstetrics     "

## 2025-06-30 NOTE — PROGRESS NOTES
06/30/25 0835   Team Meeting   Meeting Type Daily Rounds   Team Members Present   Team Members Present Physician;Nurse;   Physician Team Member Irina   Nursing Team Member Harvey   Care Management Team Member Minh   Patient/Family Present   Patient Present No   Patient's Family Present No     Pt for discharge today. Denying all psych symptoms. Meds to be sent to preferred pharmacy.

## 2025-06-30 NOTE — PROGRESS NOTES
Pt completed and signed Safety & Relapse Prevention Plan. Chart copy was labeled with Pt sticker and placed in RN scan bin. Patient copy placed in Pt folder and is to be received upon D/C.    06/30/25 1015   Activity/Group Checklist   Group Admission/Discharge   Attendance Attended   Attendance Duration (min) 0-15   Interactions Interacted appropriately   Affect/Mood Appropriate   Goals Achieved Identified feelings;Identified triggers;Identified relapse prevention strategies;Identified medication adherence strategies;Discussed safety plan;Discussed coping strategies;Discussed discharge plans

## 2025-06-30 NOTE — NURSING NOTE
Patient awoke and completed morning routine. Denies SI, HI, SIB, auditory and visual hallucinations. Patient states she is ready to go and take care of her children. Patient has no interest in quitting smoking but is aware of the resources available to her if she chooses to do so. Discharged to home at 1110 with all belongings.

## 2025-06-30 NOTE — ASSESSMENT & PLAN NOTE
As of 06/30/25 -discharged today on the following medications:    Lexapro 10 mg daily to help with mood  Trazodone to 150 mg at bedtime to help with insomnia.

## 2025-06-30 NOTE — ASSESSMENT & PLAN NOTE
Continue Cholecalciferol (VITAMIN D3) 1,000 units tablet, Take 1 tablet (1,000 Units total) by mouth daily  Follow up with PCP

## 2025-06-30 NOTE — NURSING NOTE
Patient remained visible and social throughout the evening. Pleasant and cooperative. HS medication compliant. Requested and received Melatonin 3 mg po prn at 2131. At 2231, patient observed resting in bed with her eyes closed, Melatonin 3 mg po prn.

## 2025-07-03 DIAGNOSIS — E66.09 CLASS 2 OBESITY DUE TO EXCESS CALORIES WITHOUT SERIOUS COMORBIDITY WITH BODY MASS INDEX (BMI) OF 39.0 TO 39.9 IN ADULT: ICD-10-CM

## 2025-07-03 DIAGNOSIS — E66.812 CLASS 2 OBESITY DUE TO EXCESS CALORIES WITHOUT SERIOUS COMORBIDITY WITH BODY MASS INDEX (BMI) OF 39.0 TO 39.9 IN ADULT: ICD-10-CM

## 2025-07-07 RX ORDER — TIRZEPATIDE 12.5 MG/.5ML
12.5 INJECTION, SOLUTION SUBCUTANEOUS WEEKLY
Qty: 2 ML | Refills: 0 | Status: SHIPPED | OUTPATIENT
Start: 2025-07-07 | End: 2025-09-01

## 2025-08-09 ENCOUNTER — HOSPITAL ENCOUNTER (EMERGENCY)
Facility: HOSPITAL | Age: 25
Discharge: HOME | End: 2025-08-09
Attending: EMERGENCY MEDICINE | Admitting: EMERGENCY MEDICINE
Payer: MEDICARE

## (undated) DEVICE — PACK C-SECTION PBDS

## (undated) DEVICE — CHLORAPREP HI-LITE 10.5ML ORANGE

## (undated) DEVICE — SUT VICRYL 2-0 SH 27 IN UNDYED J417H

## (undated) DEVICE — 3M™ STERI-STRIP™ COMPOUND BENZOIN TINCTURE 40 BAGS/CARTON 4 CARTONS/CASE C1544: Brand: 3M™ STERI-STRIP™

## (undated) DEVICE — GLOVE PI ULTRA TOUCH SZ 6

## (undated) DEVICE — MEDI-VAC YANKAUER SUCTION HANDLE W/STRAIGHT TIP & CONTROL VENT: Brand: CARDINAL HEALTH

## (undated) DEVICE — ADHESIVE SKIN HIGH VISCOSITY EXOFIN 1ML

## (undated) DEVICE — 3M™ STERI-STRIP™ REINFORCED ADHESIVE SKIN CLOSURES, R1547, 1/2 IN X 4 IN (12 MM X 100 MM), 6 STRIPS/ENVELOPE: Brand: 3M™ STERI-STRIP™

## (undated) DEVICE — 3M™ TEGADERM™ TRANSPARENT FILM DRESSING FRAME STYLE, 1627, 4 IN X 10 IN (10 CM X 25 CM), 20/CT 4CT/CASE: Brand: 3M™ TEGADERM™

## (undated) DEVICE — SUT PDS II 1 CT-1 27 IN Z347H

## (undated) DEVICE — NEEDLE SPINAL 22G X 5IN QUINCKE

## (undated) DEVICE — SPONGE LAP 18 X 18 IN STRL RFD

## (undated) DEVICE — SCD SEQUENTIAL COMPRESSION COMFORT SLEEVE MEDIUM KNEE LENGTH: Brand: KENDALL SCD

## (undated) DEVICE — SUT PLAIN 3-0 CT-1 27 IN 842H

## (undated) DEVICE — GLOVE INDICATOR PI UNDERGLOVE SZ 6.5 BLUE

## (undated) DEVICE — NEEDLE BLUNT 18 G X 1 1/2IN

## (undated) DEVICE — INTERCEED

## (undated) DEVICE — SKIN MARKER DUAL TIP WITH RULER CAP, FLEXIBLE RULER AND LABELS: Brand: DEVON

## (undated) DEVICE — WOUND RETRACTOR AND PROTECTOR: Brand: ALEXIS O WOUND PROTECTOR-RETRACTOR

## (undated) DEVICE — SUT MONOCRYL 4-0 PS-2 27 IN Y426H

## (undated) DEVICE — SUT VICRYL 0 CT 36 IN J958H